# Patient Record
Sex: FEMALE | Race: WHITE | NOT HISPANIC OR LATINO | Employment: UNEMPLOYED | URBAN - METROPOLITAN AREA
[De-identification: names, ages, dates, MRNs, and addresses within clinical notes are randomized per-mention and may not be internally consistent; named-entity substitution may affect disease eponyms.]

---

## 2018-05-03 ENCOUNTER — OFFICE VISIT (OUTPATIENT)
Dept: FAMILY MEDICINE | Facility: CLINIC | Age: 53
End: 2018-05-03
Payer: OTHER GOVERNMENT

## 2018-05-03 VITALS — SYSTOLIC BLOOD PRESSURE: 132 MMHG | RESPIRATION RATE: 18 BRPM | DIASTOLIC BLOOD PRESSURE: 84 MMHG | HEART RATE: 74 BPM

## 2018-05-03 DIAGNOSIS — M79.7 FIBROMYALGIA: ICD-10-CM

## 2018-05-03 DIAGNOSIS — Z86.69 HISTORY OF MIGRAINE: ICD-10-CM

## 2018-05-03 DIAGNOSIS — G47.09 OTHER INSOMNIA: Primary | ICD-10-CM

## 2018-05-03 DIAGNOSIS — F41.8 SITUATIONAL ANXIETY: ICD-10-CM

## 2018-05-03 PROCEDURE — 99214 OFFICE O/P EST MOD 30 MIN: CPT | Performed by: FAMILY MEDICINE

## 2018-05-03 RX ORDER — ZOLPIDEM TARTRATE 10 MG/1
10 TABLET ORAL NIGHTLY PRN
Qty: 30 TABLET | Refills: 5 | Status: SHIPPED | OUTPATIENT
Start: 2018-05-03 | End: 2020-06-24 | Stop reason: SDUPTHER

## 2018-05-03 RX ORDER — BUTALBITAL, ASPIRIN, CAFFEINE AND CODEINE PHOSPHATE 50; 325; 40; 30 MG/1; MG/1; MG/1; MG/1
1 CAPSULE ORAL EVERY 6 HOURS PRN
Qty: 40 CAPSULE | Refills: 0 | Status: SHIPPED | OUTPATIENT
Start: 2018-05-03 | End: 2018-07-17 | Stop reason: SDUPTHER

## 2018-05-03 RX ORDER — TIZANIDINE 4 MG/1
4 TABLET ORAL
COMMUNITY
End: 2018-06-15 | Stop reason: SDUPTHER

## 2018-05-03 RX ORDER — PLECANATIDE 3 MG/1
3 TABLET ORAL DAILY
Refills: 5 | COMMUNITY
Start: 2018-04-02 | End: 2020-06-24 | Stop reason: ALTCHOICE

## 2018-05-03 RX ORDER — ALPRAZOLAM 0.25 MG/1
0.25 TABLET ORAL
COMMUNITY
End: 2018-05-03 | Stop reason: SDUPTHER

## 2018-05-03 RX ORDER — GABAPENTIN 100 MG/1
1 CAPSULE ORAL 3 TIMES DAILY
Refills: 2 | COMMUNITY
Start: 2018-03-03 | End: 2018-05-21 | Stop reason: SDUPTHER

## 2018-05-03 RX ORDER — ALPRAZOLAM 0.25 MG/1
0.25 TABLET ORAL 2 TIMES DAILY PRN
Qty: 30 TABLET | Refills: 0 | Status: SHIPPED | OUTPATIENT
Start: 2018-05-03 | End: 2018-07-17 | Stop reason: SDUPTHER

## 2018-05-03 RX ORDER — BUTALBITAL, ASPIRIN, CAFFEINE AND CODEINE PHOSPHATE 50; 325; 40; 30 MG/1; MG/1; MG/1; MG/1
1 CAPSULE ORAL EVERY 4 HOURS PRN
COMMUNITY
End: 2018-05-03 | Stop reason: SDUPTHER
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
## 2018-05-15 ENCOUNTER — OFFICE VISIT (OUTPATIENT)
Dept: FAMILY MEDICINE | Facility: CLINIC | Age: 53
End: 2018-05-15
Payer: OTHER GOVERNMENT

## 2018-05-15 ENCOUNTER — TELEPHONE (OUTPATIENT)
Dept: FAMILY MEDICINE | Facility: CLINIC | Age: 53
End: 2018-05-15

## 2018-05-15 VITALS — SYSTOLIC BLOOD PRESSURE: 124 MMHG | DIASTOLIC BLOOD PRESSURE: 68 MMHG | HEART RATE: 76 BPM | RESPIRATION RATE: 18 BRPM

## 2018-05-15 DIAGNOSIS — G43.809 OTHER MIGRAINE WITHOUT STATUS MIGRAINOSUS, NOT INTRACTABLE: Primary | ICD-10-CM

## 2018-05-15 PROCEDURE — 96372 THER/PROPH/DIAG INJ SC/IM: CPT | Performed by: FAMILY MEDICINE

## 2018-05-15 PROCEDURE — 99213 OFFICE O/P EST LOW 20 MIN: CPT | Mod: 25 | Performed by: FAMILY MEDICINE

## 2018-05-15 RX ORDER — PROMETHAZINE HYDROCHLORIDE 25 MG/ML
25 INJECTION, SOLUTION INTRAMUSCULAR; INTRAVENOUS ONCE
Status: COMPLETED | OUTPATIENT
Start: 2018-05-15 | End: 2018-05-15

## 2018-05-15 RX ORDER — KETOROLAC TROMETHAMINE 30 MG/ML
60 INJECTION, SOLUTION INTRAMUSCULAR; INTRAVENOUS ONCE
Status: COMPLETED | OUTPATIENT
Start: 2018-05-15 | End: 2018-05-15

## 2018-05-15 RX ORDER — BUTORPHANOL TARTRATE 10 MG/ML
1 SPRAY NASAL EVERY 4 HOURS PRN
Qty: 2.5 ML | Refills: 0 | Status: SHIPPED | OUTPATIENT
Start: 2018-05-15 | End: 2018-05-29

## 2018-05-15 RX ORDER — PREDNISONE 20 MG/1
20 TABLET ORAL 2 TIMES DAILY
Qty: 10 TABLET | Refills: 0 | Status: SHIPPED | OUTPATIENT
Start: 2018-05-15 | End: 2018-05-20

## 2018-05-15 RX ADMIN — KETOROLAC TROMETHAMINE 60 MG: 30 INJECTION, SOLUTION INTRAMUSCULAR; INTRAVENOUS at 16:00

## 2018-05-15 RX ADMIN — PROMETHAZINE HYDROCHLORIDE 25 MG: 25 INJECTION, SOLUTION INTRAMUSCULAR; INTRAVENOUS at 16:03

## 2018-05-15 NOTE — PROGRESS NOTES
Office Visit Progress Note  Subjective      Luiza Tay is a 53 y.o. female who presents for headache.    HPI she has a history of migraine and has generally been able to manage headaches recently with over-the-counter agents fluids ondansetron for nausea and occasional Fioricet.  She is currently working in [employer] and has been exposed to a lot of wood smoke and it seems to have caused some head congestion which is triggered a real intense headache.  She is not getting relief with her usual treatments.  She is trying to drink fluids and she has not had actual vomiting although she has been nauseated.  She has intense pain above her right eye.  Some photophobia and phonophobia.  No numbness or vision change or other focal neurologic symptom.  This is generally more intense and her headaches typically are but not otherwise out of the pattern of normal for her.  In the past she has used it on nose spray with benefit she is also taking Toradol shots with benefit.  She be open to just about any treatment.    When I asked her about her reaction to the would smoke it does not sound like there is been a lot of actual sniffling sneezing itching.          Review of Systems  Items reviewed this visit: No text in SmartText        Objective   /68   Pulse 76   Resp 18     PHYS EXAM she is alert and cooperative.  Speech is clear.  A little tender in the right forehead but not focally at the right temporal artery.  Neck is slightly tender but generally supple.  Cranial nerves are grossly intact.  She has got some nasal membrane congestion swelling with clear discharge oropharynx is clear.  Conjunctiva pink and moist.  TMs clear.  Eyes clear.  Heart regular.          Assessment/Plan     Diagnoses and all orders for this visit:    Other migraine without status migrainosus, not intractable  -     butorphanol (STADOL) 10 mg/mL nasal spray; Administer 1 spray into each nostril every 4 (four) hours as needed for  pain scale 8-10/10 for up to 14 days.  -     ketorolac (TORADOL) injection 60 mg; Inject 2 mL (60 mg total) into the shoulder, thigh, or buttocks once.   -     promethazine (PHENERGAN) injection 25 mg; Inject 1 mL (25 mg total) into the shoulder, thigh, or buttocks once.   -     predniSONE (DELTASONE) 20 mg tablet; Take 1 tablet (20 mg total) by mouth 2 (two) times a day for 5 days. For persistent allergic reaction      For now we will give her Phenergan and Toradol injections in the clinic and see if we can get things to improve with that treatment.  She could certainly keep Stadol nose spray on him as she is used in the past to see if that gives a more potent effect and is something she can self-administer.  Of course would like her to minimize the narcotics and I did warn her of potential comminution affects if she takes that along with Fioricet.  If she is getting more obvious allergic reaction or head congestion it might be beneficial to take a short course of prednisone to reduce inflammation and swelling which might also help her headache.  If she gets new neurologic symptoms or other warning signs like fever or stiff neck and would want to reevaluate  ALEXANDRA CANADA MD

## 2018-05-21 DIAGNOSIS — M79.7 FIBROMYALGIA: Primary | ICD-10-CM

## 2018-05-21 RX ORDER — GABAPENTIN 100 MG/1
100 CAPSULE ORAL 4 TIMES DAILY
Qty: 120 CAPSULE | Refills: 1 | Status: SHIPPED | OUTPATIENT
Start: 2018-05-21 | End: 2018-07-17 | Stop reason: SDUPTHER

## 2018-06-15 DIAGNOSIS — M79.10 MUSCLE ACHE: Primary | ICD-10-CM

## 2018-06-15 RX ORDER — TIZANIDINE 4 MG/1
TABLET ORAL
Qty: 30 TABLET | Refills: 2 | Status: SHIPPED | OUTPATIENT
Start: 2018-06-15 | End: 2018-07-24

## 2018-06-21 ENCOUNTER — OFFICE VISIT (OUTPATIENT)
Dept: URGENT CARE | Facility: CLINIC | Age: 53
End: 2018-06-21
Payer: OTHER GOVERNMENT

## 2018-06-21 VITALS
OXYGEN SATURATION: 100 % | SYSTOLIC BLOOD PRESSURE: 112 MMHG | HEART RATE: 73 BPM | RESPIRATION RATE: 18 BRPM | DIASTOLIC BLOOD PRESSURE: 82 MMHG

## 2018-06-21 DIAGNOSIS — G43.009 MIGRAINE WITHOUT AURA AND WITHOUT STATUS MIGRAINOSUS, NOT INTRACTABLE: Primary | ICD-10-CM

## 2018-06-21 PROCEDURE — 96367 TX/PROPH/DG ADDL SEQ IV INF: CPT | Performed by: NURSE PRACTITIONER

## 2018-06-21 PROCEDURE — 96368 THER/DIAG CONCURRENT INF: CPT | Performed by: NURSE PRACTITIONER

## 2018-06-21 PROCEDURE — 99214 OFFICE O/P EST MOD 30 MIN: CPT | Mod: 25 | Performed by: NURSE PRACTITIONER

## 2018-06-21 PROCEDURE — 96365 THER/PROPH/DIAG IV INF INIT: CPT | Performed by: NURSE PRACTITIONER

## 2018-06-21 RX ORDER — SODIUM CHLORIDE 9 MG/ML
1000 INJECTION, SOLUTION INTRAVENOUS ONCE
Status: COMPLETED | OUTPATIENT
Start: 2018-06-21 | End: 2018-06-21

## 2018-06-21 RX ORDER — DIPHENHYDRAMINE HYDROCHLORIDE 50 MG/ML
25 INJECTION INTRAMUSCULAR; INTRAVENOUS ONCE
Status: COMPLETED | OUTPATIENT
Start: 2018-06-21 | End: 2018-06-21

## 2018-06-21 RX ORDER — KETOROLAC TROMETHAMINE 30 MG/ML
30 INJECTION, SOLUTION INTRAMUSCULAR; INTRAVENOUS ONCE
Status: COMPLETED | OUTPATIENT
Start: 2018-06-21 | End: 2018-06-21

## 2018-06-21 RX ORDER — METOCLOPRAMIDE HYDROCHLORIDE 5 MG/ML
10 INJECTION INTRAMUSCULAR; INTRAVENOUS ONCE
Status: COMPLETED | OUTPATIENT
Start: 2018-06-21 | End: 2018-06-21

## 2018-06-21 RX ADMIN — METOCLOPRAMIDE HYDROCHLORIDE 10 MG: 5 INJECTION INTRAMUSCULAR; INTRAVENOUS at 13:43

## 2018-06-21 RX ADMIN — SODIUM CHLORIDE 1000 ML/HR: 9 INJECTION, SOLUTION INTRAVENOUS at 13:41

## 2018-06-21 RX ADMIN — DIPHENHYDRAMINE HYDROCHLORIDE 25 MG: 50 INJECTION INTRAMUSCULAR; INTRAVENOUS at 13:42

## 2018-06-21 RX ADMIN — KETOROLAC TROMETHAMINE 30 MG: 30 INJECTION, SOLUTION INTRAMUSCULAR; INTRAVENOUS at 13:43

## 2018-06-21 ASSESSMENT — ENCOUNTER SYMPTOMS
UNEXPECTED WEIGHT CHANGE: 0
MYALGIAS: 0
NUMBNESS: 0
SHORTNESS OF BREATH: 0
EYE DISCHARGE: 0
NECK STIFFNESS: 0
VOMITING: 1
AGITATION: 0
NECK PAIN: 0
SINUS PRESSURE: 0
FEVER: 0
DIAPHORESIS: 0
DYSPHORIC MOOD: 0
SORE THROAT: 0
ARTHRALGIAS: 0
APPETITE CHANGE: 0
FACIAL ASYMMETRY: 0
WEAKNESS: 0
APNEA: 0
PHOTOPHOBIA: 1
RHINORRHEA: 0
SEIZURES: 0
SPEECH DIFFICULTY: 0
FATIGUE: 0
ACTIVITY CHANGE: 1
EYE PAIN: 0
EYE REDNESS: 0
NAUSEA: 1
VOICE CHANGE: 0
DIZZINESS: 0
TREMORS: 0
COUGH: 0
TROUBLE SWALLOWING: 0
SINUS PAIN: 0
PALPITATIONS: 0
CHEST TIGHTNESS: 0
CHILLS: 0
DIARRHEA: 0
LIGHT-HEADEDNESS: 0
HEADACHES: 1

## 2018-06-21 ASSESSMENT — PAIN SCALES - GENERAL: PAINLEVEL: 10-WORST PAIN EVER

## 2018-06-21 NOTE — PROGRESS NOTES
Luiza Tay  1965    Subjective     CC:  Chief Complaint   Patient presents with   • Migraine       HPI:  Luiza Tay is a 53 y.o. female who presents for report of migraine that started yesterday around 3 PM.  She started to feel kind of funny when she stood up she had an episode of vomiting and the headache started.  She has a long history of migraines, and this feels like they have in the past.  She points to pain on the right temporal area radiating down the back of her neck.  She is the most sensitive to smells and light, and less sensitive to noise.  She did take a Zofran that did not help the nausea much and she had another episode of vomiting after that.  She also took Fioricet this morning around 730 without any relief.  She was seen in May by Dr. Dougherty and was given butorphanol nasal spray and use that at that time since she no longer has that.  She has not taken anything else for the pain like ibuprofen or Tylenol.  She generally drinks lots of fluids but feels very dry this morning.  She and her  are in town for 6 months living at [facility], they do travel rides and yesterday afternoon she tried to go riding but was not able to because of her headache.  She has no other neurologic symptoms, she has no visual changes, she has no auras, no tinnitus, dizziness, or imbalance.  She tells me she is generally well and has no other complaints at this time.    HPI    Problem List:  Patient Active Problem List   Diagnosis   • History of migraine   • Situational anxiety   • Other insomnia   • Fibromyalgia       Past Medical History:  Past Medical History:   Diagnosis Date   • Fibromyalgia 5/3/2018   • History of migraine 9/14/2017   • Other insomnia 5/3/2018   • Situational anxiety 5/3/2018       Past Surgical History:   Procedure Laterality Date   • COLONOSCOPY  03/2018    no polyps   • HYSTERECTOMY     • SIGMOIDECTOMY  2016    diverticular ds       Social History:  Social  History     Social History   • Marital status:      Spouse name: N/A   • Number of children: N/A   • Years of education: N/A     Occupational History   • Not on file.     Social History Main Topics   • Smoking status: Not on file   • Smokeless tobacco: Not on file   • Alcohol use Not on file   • Drug use: Unknown   • Sexual activity: Not on file     Other Topics Concern   • Not on file     Social History Narrative   • No narrative on file       Family History:  No family history on file.    Allergies:  Allergies   Allergen Reactions   • Desvenlafaxine Succinate      Other reaction(s): Headache   • Hydromorphone Hives   • Morphine      Other reaction(s): Headache   • Trazodone      nightmares   • Clindamycin Rash   • Sumatriptan Rash       Medications:   Current Outpatient Prescriptions on File Prior to Visit   Medication Sig Dispense Refill   • ALPRAZolam (XANAX) 0.25 mg tablet Take 1 tablet (0.25 mg total) by mouth 2 (two) times a day as needed for anxiety. 30 tablet 0   • codeine-butalbital-ASA-caff (FIORINAL-CODEINE #3) capsule Take 1 capsule by mouth every 6 (six) hours as needed for headaches. 40 capsule 0   • gabapentin (NEURONTIN) 100 mg capsule Take 1 capsule (100 mg total) by mouth 4 (four) times a day. 120 capsule 1   • tiZANidine (ZANAFLEX) 4 mg tablet TAKE ONE TABLET BY MOUTH AT BEDTIME 30 tablet 2   • TRULANCE 3 mg tablet 1 tablet daily.  5   • zolpidem (AMBIEN) 10 mg tablet Take 1 tablet (10 mg total) by mouth nightly as needed for sleep. 30 tablet 5   • BUTORPHANOL TARTRATE NASL Administer 1 spray into affected nostril(s) every 4 (four) hours as needed.       No current facility-administered medications on file prior to visit.        Review of Systems:  Review of Systems   Constitutional: Positive for activity change. Negative for appetite change, chills, diaphoresis, fatigue, fever and unexpected weight change.   HENT: Negative for congestion, ear discharge, ear pain, hearing loss, nosebleeds,  postnasal drip, rhinorrhea, sinus pain, sinus pressure, sneezing, sore throat, tinnitus, trouble swallowing and voice change.    Eyes: Positive for photophobia. Negative for pain, discharge, redness and visual disturbance.   Respiratory: Negative for apnea, cough, chest tightness and shortness of breath.    Cardiovascular: Negative for chest pain and palpitations.   Gastrointestinal: Positive for nausea and vomiting. Negative for diarrhea.   Musculoskeletal: Negative for arthralgias, myalgias, neck pain and neck stiffness.   Neurological: Positive for headaches. Negative for dizziness, tremors, seizures, syncope, facial asymmetry, speech difficulty, weakness, light-headedness and numbness.   Psychiatric/Behavioral: Negative for agitation and dysphoric mood.       Objective   EXAM:  /82 (BP Location: Right arm, Patient Position: Sitting, Cuff Size: Reg)   Pulse 73   Resp 18   SpO2 100%     Physical Exam   Constitutional: She is oriented to person, place, and time. She appears well-developed and well-nourished. No distress.   HENT:   Head: Normocephalic and atraumatic.   Right Ear: External ear normal.   Left Ear: External ear normal.   Nose: Nose normal.   Mouth/Throat: Oropharynx is clear and moist.   Eyes: Conjunctivae and EOM are normal. Pupils are equal, round, and reactive to light. Right eye exhibits no discharge. Left eye exhibits no discharge.   Neck: Normal range of motion. Neck supple. No thyromegaly present.   Cardiovascular: Normal rate, regular rhythm and normal heart sounds.    Pulmonary/Chest: Effort normal and breath sounds normal. No respiratory distress.   Musculoskeletal: Normal range of motion.   Lymphadenopathy:     She has no cervical adenopathy.   Neurological: She is alert and oriented to person, place, and time. No cranial nerve deficit or sensory deficit. She exhibits normal muscle tone. Coordination normal.   Skin: Skin is warm. She is not diaphoretic.   Psychiatric: She has a  normal mood and affect. Her behavior is normal. Judgment and thought content normal.   Vitals reviewed.      Procedures      A/P:  Assessment/Plan   Luiza was seen today for migraine.    Diagnoses and all orders for this visit:    Migraine without aura and without status migrainosus, not intractable  -     ketorolac (TORADOL) injection 30 mg; Infuse 1 mL (30 mg total) into a venous catheter once.   -     diphenhydrAMINE (BENADRYL) injection 25 mg; Infuse 0.5 mL (25 mg total) into a venous catheter once.   -     metoclopramide (REGLAN) injection 10 mg; Infuse 2 mL (10 mg total) into a venous catheter once.   -     NS infusion; Infuse 1,000 mL/hr into a venous catheter once.       Luiza tolerated the IV infusion well.  And after the completion of IV fluids she did feel better.  snf through she told me she was still having some pounding of her head, but after she finished she had resolution of pain and just felt a little tactily tender.  Rubbing of her head when it hurts.  Her cheeks appeared to me to be flushed, but her  said that this was a normal coloration for her and she generally is a little bit on the reddish side, so we determined that she was a little bit pale when she first came in.  She has no more complaint of nausea at this time, and is able to keep down fluids while she was in the clinic.  She will plan to go home and rest for the rest of the day and Ensure to drink lots of fluids at home as well.  She instructed that if there are any neurologic symptoms such as changes in speech, coordination, muscle strength, worsening head pain she needs to present to the emergency room.  I also discussed with her that if these migraines seem to be persisting or coming more frequently she needs to follow-up with Dr. Dougherty for further evaluation.  She expressed agreement the plan of care of above and has no concerns or questions at the end of this appointment and will return as needed.  Patient  Instructions:  There are no Patient Instructions on file for this visit.    Follow Up from this visit:  Return if symptoms worsen or fail to improve.        Electronically signed by: Mindy Layton CNP  6/21/2018  2:36 PM    A voice to text program was used to aid in medical record documentation. Sometimes words are printed not exactly as they were spoken. While efforts were made to carefully edit and correct any inaccuracies, some errors may be present. Errors should be taken within the context of the discussion.  Please contact our office if you need assistance interpreting this medical record or notice any mistakes.

## 2018-07-09 RX ORDER — BUTORPHANOL TARTRATE 10 MG/ML
SPRAY NASAL
Qty: 2.5 ML | OUTPATIENT
Start: 2018-07-09

## 2018-07-10 ENCOUNTER — OFFICE VISIT (OUTPATIENT)
Dept: URGENT CARE | Facility: CLINIC | Age: 53
End: 2018-07-10
Payer: OTHER GOVERNMENT

## 2018-07-10 VITALS
WEIGHT: 168 LBS | HEART RATE: 88 BPM | SYSTOLIC BLOOD PRESSURE: 110 MMHG | DIASTOLIC BLOOD PRESSURE: 70 MMHG | RESPIRATION RATE: 18 BRPM | BODY MASS INDEX: 27.12 KG/M2

## 2018-07-10 DIAGNOSIS — G43.009 MIGRAINE WITHOUT AURA AND WITHOUT STATUS MIGRAINOSUS, NOT INTRACTABLE: Primary | ICD-10-CM

## 2018-07-10 PROCEDURE — 96372 THER/PROPH/DIAG INJ SC/IM: CPT | Performed by: NURSE PRACTITIONER

## 2018-07-10 PROCEDURE — 99213 OFFICE O/P EST LOW 20 MIN: CPT | Mod: 25 | Performed by: NURSE PRACTITIONER

## 2018-07-10 RX ORDER — ORPHENADRINE CITRATE 30 MG/ML
60 INJECTION INTRAMUSCULAR; INTRAVENOUS EVERY 12 HOURS
Status: DISCONTINUED | OUTPATIENT
Start: 2018-07-10 | End: 2018-07-10

## 2018-07-10 RX ORDER — BUTORPHANOL TARTRATE 10 MG/ML
1 SPRAY NASAL EVERY 6 HOURS PRN
Qty: 2.5 ML | Refills: 0 | Status: SHIPPED | OUTPATIENT
Start: 2018-07-10 | End: 2018-07-17 | Stop reason: SDUPTHER

## 2018-07-10 RX ORDER — ONDANSETRON 4 MG/1
4 TABLET, ORALLY DISINTEGRATING ORAL ONCE
Status: COMPLETED | OUTPATIENT
Start: 2018-07-10 | End: 2018-07-10

## 2018-07-10 RX ORDER — ORPHENADRINE CITRATE 30 MG/ML
60 INJECTION INTRAMUSCULAR; INTRAVENOUS ONCE
Status: COMPLETED | OUTPATIENT
Start: 2018-07-10 | End: 2018-07-10

## 2018-07-10 RX ORDER — KETOROLAC TROMETHAMINE 30 MG/ML
30 INJECTION, SOLUTION INTRAMUSCULAR; INTRAVENOUS ONCE
Status: COMPLETED | OUTPATIENT
Start: 2018-07-10 | End: 2018-07-10

## 2018-07-10 RX ORDER — KETOROLAC TROMETHAMINE 30 MG/ML
30 INJECTION, SOLUTION INTRAMUSCULAR; INTRAVENOUS ONCE
Status: DISCONTINUED | OUTPATIENT
Start: 2018-07-10 | End: 2018-07-10

## 2018-07-10 RX ADMIN — KETOROLAC TROMETHAMINE 30 MG: 30 INJECTION, SOLUTION INTRAMUSCULAR; INTRAVENOUS at 17:26

## 2018-07-10 RX ADMIN — ORPHENADRINE CITRATE 60 MG: 30 INJECTION INTRAMUSCULAR; INTRAVENOUS at 17:27

## 2018-07-10 RX ADMIN — ONDANSETRON 4 MG: 4 TABLET, ORALLY DISINTEGRATING ORAL at 17:20

## 2018-07-10 ASSESSMENT — ENCOUNTER SYMPTOMS
ARTHRALGIAS: 0
ABDOMINAL PAIN: 0
NERVOUS/ANXIOUS: 0
HEADACHES: 1
SHORTNESS OF BREATH: 0
NUMBNESS: 0
DYSURIA: 0
BACK PAIN: 0
UNEXPECTED WEIGHT CHANGE: 0
BLOOD IN STOOL: 0
PALPITATIONS: 0
PHOTOPHOBIA: 1
RHINORRHEA: 0
WEAKNESS: 0
ADENOPATHY: 0
CONSTIPATION: 0
CHILLS: 0
ACTIVITY CHANGE: 0
FEVER: 0
DIZZINESS: 0
DIARRHEA: 0
SLEEP DISTURBANCE: 0
LIGHT-HEADEDNESS: 0
MYALGIAS: 0
APPETITE CHANGE: 0
POLYDIPSIA: 0
DYSPHORIC MOOD: 0

## 2018-07-10 ASSESSMENT — PAIN SCALES - GENERAL: PAINLEVEL: 9

## 2018-07-10 NOTE — PROGRESS NOTES
Luiza Tay  1965    Subjective     CC:  Chief Complaint   Patient presents with   • Migraine       HPI:  Luiza Tay is a 53 y.o. female who presents for report of migraine for the last 2 days.  She was horseback riding 2 days ago and reports she feels that she was very overheated and has been nauseated with headaches since then.  She is out of her abortive medications at home.  She has long-standing history of migraines and notes her triggers.  sHe has been seen in this clinic by myself in the past and treated for migraine.  She does not feel as ill today she has in the past, but is very nauseated and threw up on the way here.  She has no other complaints at this time.  HPI    Problem List:  Patient Active Problem List   Diagnosis   • History of migraine   • Situational anxiety   • Other insomnia   • Fibromyalgia       Past Medical History:  Past Medical History:   Diagnosis Date   • Fibromyalgia 5/3/2018   • History of migraine 9/14/2017   • Other insomnia 5/3/2018   • Situational anxiety 5/3/2018       Past Surgical History:   Procedure Laterality Date   • COLONOSCOPY  03/2018    no polyps   • HYSTERECTOMY     • SIGMOIDECTOMY  2016    diverticular ds       Social History:  Social History     Social History   • Marital status:      Spouse name: N/A   • Number of children: N/A   • Years of education: N/A     Occupational History   • Not on file.     Social History Main Topics   • Smoking status: Not on file   • Smokeless tobacco: Not on file   • Alcohol use Not on file   • Drug use: Unknown   • Sexual activity: Not on file     Other Topics Concern   • Not on file     Social History Narrative   • No narrative on file       Family History:  No family history on file.    Allergies:  Allergies   Allergen Reactions   • Desvenlafaxine Succinate      Other reaction(s): Headache   • Hydromorphone Hives   • Morphine      Other reaction(s): Headache   • Trazodone      nightmares   • Clindamycin  Rash   • Sumatriptan Rash       Medications:   Current Outpatient Prescriptions on File Prior to Visit   Medication Sig Dispense Refill   • ALPRAZolam (XANAX) 0.25 mg tablet Take 1 tablet (0.25 mg total) by mouth 2 (two) times a day as needed for anxiety. 30 tablet 0   • codeine-butalbital-ASA-caff (FIORINAL-CODEINE #3) capsule Take 1 capsule by mouth every 6 (six) hours as needed for headaches. 40 capsule 0   • gabapentin (NEURONTIN) 100 mg capsule Take 1 capsule (100 mg total) by mouth 4 (four) times a day. 120 capsule 1   • tiZANidine (ZANAFLEX) 4 mg tablet TAKE ONE TABLET BY MOUTH AT BEDTIME 30 tablet 2   • TRULANCE 3 mg tablet 1 tablet daily.  5   • zolpidem (AMBIEN) 10 mg tablet Take 1 tablet (10 mg total) by mouth nightly as needed for sleep. 30 tablet 5   • [DISCONTINUED] BUTORPHANOL TARTRATE NASL Administer 1 spray into affected nostril(s) every 4 (four) hours as needed.       No current facility-administered medications on file prior to visit.        Review of Systems:  Review of Systems   Constitutional: Negative for activity change, appetite change, chills, fever and unexpected weight change.   HENT: Negative for congestion, dental problem and rhinorrhea.         Sensitive to smells   Eyes: Positive for photophobia. Negative for visual disturbance.   Respiratory: Negative for shortness of breath.    Cardiovascular: Negative for chest pain and palpitations.   Gastrointestinal: Negative for abdominal pain, blood in stool, constipation and diarrhea.   Endocrine: Negative for cold intolerance, heat intolerance, polydipsia and polyuria.   Genitourinary: Negative for dysuria, enuresis, menstrual problem, pelvic pain, vaginal bleeding, vaginal discharge and vaginal pain.   Musculoskeletal: Negative for arthralgias, back pain and myalgias.   Skin: Negative for rash.   Allergic/Immunologic: Negative for environmental allergies and food allergies.   Neurological: Positive for headaches ( Worse on right frontal  area). Negative for dizziness, weakness, light-headedness and numbness.        No Tingling   Hematological: Negative for adenopathy.   Psychiatric/Behavioral: Negative for dysphoric mood and sleep disturbance. The patient is not nervous/anxious.        Objective   EXAM:  /70 (BP Location: Left arm)   Pulse 88   Resp 18   Wt 76.2 kg (168 lb)   BMI 27.12 kg/m²     Physical Exam   Constitutional: She is oriented to person, place, and time. She appears well-developed and well-nourished.   HENT:   Head: Normocephalic and atraumatic.   Right Ear: Tympanic membrane and ear canal normal.   Left Ear: Tympanic membrane and ear canal normal.   Eyes: Conjunctivae and EOM are normal. Pupils are equal, round, and reactive to light. No scleral icterus.   Neck: Normal range of motion. Neck supple.   Cardiovascular: Normal rate, regular rhythm and normal heart sounds.    Pulmonary/Chest: Effort normal and breath sounds normal. No respiratory distress.   Neurological: She is alert and oriented to person, place, and time. She displays normal reflexes. No cranial nerve deficit or sensory deficit. She exhibits normal muscle tone. Coordination normal.   Cranial nerves II through XII grossly intact   Skin: Skin is warm and dry. Capillary refill takes less than 2 seconds.   Psychiatric: She has a normal mood and affect. Her behavior is normal. Judgment and thought content normal.   Nursing note and vitals reviewed.      Procedures      A/P:  Assessment/Plan   Luiza was seen today for migraine.    Diagnoses and all orders for this visit:    Migraine without aura and without status migrainosus, not intractable  -     ondansetron ODT (ZOFRAN-ODT) disintegrating tablet 4 mg; Take 1 tablet (4 mg total) by mouth once.   -     Discontinue: ketorolac (TORADOL) injection 30 mg; Inject 1 mL (30 mg total) into the shoulder, thigh, or buttocks once.   -     Discontinue: orphenadrine (NORFLEX) injection 60 mg; Inject 2 mL (60 mg total) into  the shoulder, thigh, or buttocks every 12 (twelve) hours.   -     butorphanol (STADOL) 10 mg/mL nasal spray; Administer 1 spray into one nostril every 6 (six) hours as needed for pain scale 4-7/10 for up to 2 doses.  -     ketorolac (TORADOL) injection 30 mg; Inject 1 mL (30 mg total) into the shoulder, thigh, or buttocks once.   -     orphenadrine (NORFLEX) injection 60 mg; Inject 2 mL (60 mg total) into the shoulder, thigh, or buttocks once.     Discussed with patient that she should establish primary care here in follow-up after this appointment.  She does need to have abortives on hand.  She is given a prescription for Stadol as this seems to work best for her.  I did discuss with her that she would need a primary care to provide further abortive medications though.  She is neurologically intact and reports this migraine is typical of her others.  She thinks that the heat was her trigger at this time.  She is able to tolerate p.o. hydration so I encouraged her to do this at home.  She has a  today and has had the orphenadrine in the past along with Toradol.  She was given ODT Zofran which helped somewhat before she left.  She will return to clinic if she needs in this headache is not improving.  She is in agreement with plan of care as above had no other questions at the end of this exam.    Patient Instructions:  There are no Patient Instructions on file for this visit.    Follow Up from this visit:  Return if symptoms worsen or fail to improve, for Please schedule to establish care with either Dr. Cowart or Dr. Verdin.        Electronically signed by: Mindy Layton CNP  7/10/2018  6:41 PM    A voice to text program was used to aid in medical record documentation. Sometimes words are printed not exactly as they were spoken. While efforts were made to carefully edit and correct any inaccuracies, some errors may be present. Errors should be taken within the context of the discussion.  Please contact  our office if you need assistance interpreting this medical record or notice any mistakes.

## 2018-07-17 ENCOUNTER — OFFICE VISIT (OUTPATIENT)
Dept: FAMILY MEDICINE | Facility: CLINIC | Age: 53
End: 2018-07-17
Payer: OTHER GOVERNMENT

## 2018-07-17 VITALS
BODY MASS INDEX: 29.38 KG/M2 | RESPIRATION RATE: 16 BRPM | OXYGEN SATURATION: 95 % | DIASTOLIC BLOOD PRESSURE: 56 MMHG | HEART RATE: 71 BPM | WEIGHT: 182 LBS | SYSTOLIC BLOOD PRESSURE: 130 MMHG

## 2018-07-17 DIAGNOSIS — G43.009 MIGRAINE WITHOUT AURA AND WITHOUT STATUS MIGRAINOSUS, NOT INTRACTABLE: ICD-10-CM

## 2018-07-17 DIAGNOSIS — M79.7 FIBROMYALGIA: ICD-10-CM

## 2018-07-17 DIAGNOSIS — F41.8 SITUATIONAL ANXIETY: ICD-10-CM

## 2018-07-17 DIAGNOSIS — Z86.69 HISTORY OF MIGRAINE: Primary | ICD-10-CM

## 2018-07-17 PROCEDURE — 90715 TDAP VACCINE 7 YRS/> IM: CPT

## 2018-07-17 PROCEDURE — 99214 OFFICE O/P EST MOD 30 MIN: CPT | Mod: 25 | Performed by: FAMILY MEDICINE

## 2018-07-17 PROCEDURE — 90471 IMMUNIZATION ADMIN: CPT

## 2018-07-17 RX ORDER — BUTALBITAL, ASPIRIN, CAFFEINE AND CODEINE PHOSPHATE 50; 325; 40; 30 MG/1; MG/1; MG/1; MG/1
1 CAPSULE ORAL EVERY 6 HOURS PRN
Qty: 60 CAPSULE | Refills: 3 | Status: SHIPPED | OUTPATIENT
Start: 2018-07-17 | End: 2018-07-30 | Stop reason: HOSPADM

## 2018-07-17 RX ORDER — ALPRAZOLAM 0.25 MG/1
0.25 TABLET ORAL 2 TIMES DAILY PRN
Qty: 30 TABLET | Refills: 1 | Status: SHIPPED | OUTPATIENT
Start: 2018-07-17 | End: 2020-06-24 | Stop reason: ALTCHOICE

## 2018-07-17 RX ORDER — GABAPENTIN 100 MG/1
100 CAPSULE ORAL 4 TIMES DAILY
Qty: 120 CAPSULE | Refills: 5 | Status: SHIPPED | OUTPATIENT
Start: 2018-07-17 | End: 2018-07-24

## 2018-07-17 RX ORDER — BUTORPHANOL TARTRATE 10 MG/ML
1 SPRAY NASAL EVERY 6 HOURS PRN
Qty: 2.5 ML | Refills: 0 | Status: SHIPPED | OUTPATIENT
Start: 2018-07-17 | End: 2019-08-28 | Stop reason: SDUPTHER

## 2018-07-17 NOTE — PROGRESS NOTES
Office Visit Progress Note  Subjective      Luiza Tay is a 53 y.o. female who presents for follow-up of migraine.  She had a couple headaches this past month that required walking clinic visits and injections.  Once she got some IV fluids.  She thinks there might be a connection between sun exposure and dehydration.  She is having other headaches that she can resolve with Fioricet at home or with some Benadryl at home and rest..    HPI overall the headache pattern does seem to be worse when she is in the [place].  She does not know if it is the environment or hydration issues she cannot think of anything specific that she has been exposed to.  She like to keep some Stadol and butalbital on hand to be able to use them intermittently and she has kept the usage of this fairly modest.  The last prescription I wrote was in May and she did get some Stadol from the nurse practitioner [place].   query otherwise does not show any other prescribers.    She has some concerns about persistent weight gain and wonder if he due to gabapentin she is taking for fibromyalgia.  Gabapentin does help her and in fact we talked about possibly taking more gabapentin as a mechanism for try to provide some headache prevention I told her that I thought that the weight gain associated with the medication was likely to be fairly minimal and there were typically can be other reasons including diet and activity that could be connected with the weight gain.  She also brought up some scaly skin lesions I told her we could do a skin biopsy at some point to help you with those.    Finally she has her chronic anxiety symptoms that seem to be provoked by horse riding activities.  She uses alprazolam on a sparing basis.  I gave her a prescription for 30 on May 7 and she still has some left should like to get a refill so that she has not used for the rest of the summer          Review of Systems  Items reviewed this visit: No text in  SmartText        Objective   /56   Pulse 71   Resp 16   Wt 82.6 kg (182 lb)   SpO2 95%   BMI 29.38 kg/m²     PHYS EXAM she is alert and cooperative.  Speech is clear.  Membranes pink and moist.  He shows me areas scattered nondescript slightly scaly slightly reddened 8-10 mm patches of skin on her legs and arms.  I told her we could do a biopsy of these at some point in the future          Assessment/Plan     Diagnoses and all orders for this visit:    History of migraine  -     codeine-butalbital-ASA-caff (FIORINAL-CODEINE #3) capsule; Take 1 capsule by mouth every 6 (six) hours as needed for headaches.    Migraine without aura and without status migrainosus, not intractable  -     butorphanol (STADOL) 10 mg/mL nasal spray; Administer 1 spray into one nostril every 6 (six) hours as needed for pain scale 4-7/10 for up to 2 doses.    Situational anxiety  -     ALPRAZolam (XANAX) 0.25 mg tablet; Take 1 tablet (0.25 mg total) by mouth 2 (two) times a day as needed for anxiety.    Other orders  -     Tdap vaccine greater than or equal to 8yo IM      For now I will suggest that she continue sparing use of the symptom relieving treatments and try to gradually increase her gabapentin use to 5 or 6 or 700 mg daily to see if it helps prevent headaches any better  ALEXANDRA CANADA MD

## 2018-07-17 NOTE — PROGRESS NOTES
Random selection for chart review. Documentation reviewed.  Agree with the evaluation and management plan.

## 2018-07-24 ENCOUNTER — APPOINTMENT (OUTPATIENT)
Dept: RADIOLOGY | Facility: HOSPITAL | Age: 53
DRG: 958 | End: 2018-07-24
Payer: OTHER GOVERNMENT

## 2018-07-24 ENCOUNTER — APPOINTMENT (OUTPATIENT)
Dept: CT IMAGING | Facility: HOSPITAL | Age: 53
DRG: 958 | End: 2018-07-24
Payer: OTHER GOVERNMENT

## 2018-07-24 ENCOUNTER — HOSPITAL ENCOUNTER (INPATIENT)
Facility: HOSPITAL | Age: 53
LOS: 6 days | Discharge: HOME | DRG: 958 | End: 2018-07-30
Attending: EMERGENCY MEDICINE | Admitting: SURGERY
Payer: OTHER GOVERNMENT
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
## 2018-07-24 DIAGNOSIS — S27.0XXA TRAUMATIC PNEUMOTHORAX, INITIAL ENCOUNTER: ICD-10-CM

## 2018-07-24 DIAGNOSIS — S52.571B OTHER TYPE I OR II OPEN INTRA-ARTICULAR FRACTURE OF DISTAL END OF RIGHT RADIUS, INITIAL ENCOUNTER: ICD-10-CM

## 2018-07-24 DIAGNOSIS — S52.501A CLOSED FRACTURE OF RIGHT DISTAL RADIUS: ICD-10-CM

## 2018-07-24 LAB
ANION GAP SERPL CALC-SCNC: 12 MMOL/L (ref 3–11)
BASOPHILS # BLD AUTO: 0 10*3/UL
BASOPHILS NFR BLD AUTO: 0 % (ref 0–2)
BUN SERPL-MCNC: 8 MG/DL (ref 7–25)
CALCIUM SERPL-MCNC: 9.5 MG/DL (ref 8.6–10.3)
CHLORIDE SERPL-SCNC: 102 MMOL/L (ref 98–107)
CO2 SERPL-SCNC: 24 MMOL/L (ref 21–32)
CREAT SERPL-MCNC: 0.9 MG/DL (ref 0.6–1.2)
EOSINOPHIL # BLD AUTO: 0 10*3/UL
EOSINOPHIL NFR BLD AUTO: 0 % (ref 0–3)
ERYTHROCYTE [DISTWIDTH] IN BLOOD BY AUTOMATED COUNT: 12.6 % (ref 11.5–14)
GFR SERPL CREATININE-BSD FRML MDRD: 66 ML/MIN/1.73M*2
GLUCOSE SERPL-MCNC: 100 MG/DL (ref 70–105)
HCT VFR BLD AUTO: 40.8 % (ref 34–45)
HGB BLD-MCNC: 14.2 G/DL (ref 11.5–15.5)
LYMPHOCYTES # BLD AUTO: 1 10*3/UL
LYMPHOCYTES NFR BLD AUTO: 6 % (ref 11–47)
MCH RBC QN AUTO: 31.9 PG (ref 28–33)
MCHC RBC AUTO-ENTMCNC: 34.8 G/DL (ref 32–36)
MCV RBC AUTO: 91.8 FL (ref 81–97)
MONOCYTES # BLD AUTO: 0.7 10*3/UL
MONOCYTES NFR BLD AUTO: 5 % (ref 3–11)
NEUTROPHILS # BLD AUTO: 14 10*3/UL
NEUTROPHILS NFR BLD AUTO: 89 % (ref 41–81)
PLATELET # BLD AUTO: 255 10*3/UL (ref 140–350)
PMV BLD AUTO: 7.6 FL (ref 6.9–10.8)
POTASSIUM SERPL-SCNC: 3.3 MMOL/L (ref 3.5–5.1)
RBC # BLD AUTO: 4.45 10*6/ΜL (ref 3.7–5.3)
SODIUM SERPL-SCNC: 138 MMOL/L (ref 135–145)
WBC # BLD AUTO: 15.8 10*3/UL (ref 4.5–10.5)

## 2018-07-24 PROCEDURE — 12032 INTMD RPR S/A/T/EXT 2.6-7.5: CPT | Performed by: EMERGENCY MEDICINE

## 2018-07-24 PROCEDURE — 71045 X-RAY EXAM CHEST 1 VIEW: CPT

## 2018-07-24 PROCEDURE — 25605 CLTX DST RDL FX/EPHYS SEP W/: CPT | Performed by: EMERGENCY MEDICINE

## 2018-07-24 PROCEDURE — 73090 X-RAY EXAM OF FOREARM: CPT | Mod: RT

## 2018-07-24 PROCEDURE — 6370000100 HC RX 637 (ALT 250 FOR IP): Performed by: SURGERY

## 2018-07-24 PROCEDURE — 6360000200 HC RX 636 W HCPCS (ALT 250 FOR IP): Performed by: EMERGENCY MEDICINE

## 2018-07-24 PROCEDURE — 71260 CT THORAX DX C+: CPT

## 2018-07-24 PROCEDURE — 2500000200 HC RX 250 WO HCPCS: Performed by: EMERGENCY MEDICINE

## 2018-07-24 PROCEDURE — 6360000200 HC RX 636 W HCPCS (ALT 250 FOR IP): Performed by: SURGERY

## 2018-07-24 PROCEDURE — 6360000200 HC RX 636 W HCPCS (ALT 250 FOR IP): Mod: JW | Performed by: EMERGENCY MEDICINE

## 2018-07-24 PROCEDURE — (BLANK) HC ROOM SEMI PRIVATE

## 2018-07-24 PROCEDURE — 36415 COLL VENOUS BLD VENIPUNCTURE: CPT | Performed by: EMERGENCY MEDICINE

## 2018-07-24 PROCEDURE — 80048 BASIC METABOLIC PNL TOTAL CA: CPT | Performed by: EMERGENCY MEDICINE

## 2018-07-24 PROCEDURE — 2550000100 HC RX 255: Mod: JW | Performed by: EMERGENCY MEDICINE

## 2018-07-24 PROCEDURE — 99222 1ST HOSP IP/OBS MODERATE 55: CPT | Performed by: SURGERY

## 2018-07-24 PROCEDURE — 0PSHXZZ REPOSITION RIGHT RADIUS, EXTERNAL APPROACH: ICD-10-PCS | Performed by: EMERGENCY MEDICINE

## 2018-07-24 PROCEDURE — 32551 INSERTION OF CHEST TUBE: CPT | Performed by: EMERGENCY MEDICINE

## 2018-07-24 PROCEDURE — 99291 CRITICAL CARE FIRST HOUR: CPT | Performed by: EMERGENCY MEDICINE

## 2018-07-24 PROCEDURE — 73100 X-RAY EXAM OF WRIST: CPT | Mod: RT

## 2018-07-24 PROCEDURE — 0W9930Z DRAINAGE OF RIGHT PLEURAL CAVITY WITH DRAINAGE DEVICE, PERCUTANEOUS APPROACH: ICD-10-PCS | Performed by: EMERGENCY MEDICINE

## 2018-07-24 PROCEDURE — 0JQG3ZZ REPAIR RIGHT LOWER ARM SUBCUTANEOUS TISSUE AND FASCIA, PERCUTANEOUS APPROACH: ICD-10-PCS | Performed by: EMERGENCY MEDICINE

## 2018-07-24 PROCEDURE — 85025 COMPLETE CBC W/AUTO DIFF WBC: CPT | Performed by: EMERGENCY MEDICINE

## 2018-07-24 PROCEDURE — 2580000300 HC RX 258: Performed by: EMERGENCY MEDICINE

## 2018-07-24 PROCEDURE — 73060 X-RAY EXAM OF HUMERUS: CPT | Mod: RT

## 2018-07-24 RX ORDER — FENTANYL CITRATE/PF 50 MCG/ML
PLASTIC BAG, INJECTION (ML) INTRAVENOUS
Status: DISCONTINUED
Start: 2018-07-24 | End: 2018-07-30 | Stop reason: HOSPADM

## 2018-07-24 RX ORDER — FENTANYL CITRATE/PF 50 MCG/ML
50 PLASTIC BAG, INJECTION (ML) INTRAVENOUS
Status: COMPLETED | OUTPATIENT
Start: 2018-07-24 | End: 2018-07-24

## 2018-07-24 RX ORDER — PROPOFOL 10 MG/ML
INJECTION, EMULSION INTRAVENOUS
Status: DISCONTINUED
Start: 2018-07-24 | End: 2018-07-30 | Stop reason: HOSPADM

## 2018-07-24 RX ORDER — CEFAZOLIN SODIUM 10 G/1
2000 INJECTION, POWDER, FOR SOLUTION INTRAVENOUS ONCE
Status: COMPLETED | OUTPATIENT
Start: 2018-07-24 | End: 2018-07-24

## 2018-07-24 RX ORDER — CYCLOBENZAPRINE HCL 10 MG
10 TABLET ORAL 3 TIMES DAILY PRN
Status: DISCONTINUED | OUTPATIENT
Start: 2018-07-24 | End: 2018-07-30 | Stop reason: HOSPADM

## 2018-07-24 RX ORDER — DIPHENHYDRAMINE HYDROCHLORIDE 50 MG/ML
25 INJECTION INTRAMUSCULAR; INTRAVENOUS ONCE
Status: COMPLETED | OUTPATIENT
Start: 2018-07-24 | End: 2018-07-25

## 2018-07-24 RX ORDER — PROPOFOL 10 MG/ML
INJECTION, EMULSION INTRAVENOUS
Status: DISCONTINUED
Start: 2018-07-24 | End: 2018-07-24 | Stop reason: WASHOUT

## 2018-07-24 RX ORDER — SODIUM CHLORIDE 9 MG/ML
INJECTION, SOLUTION INTRAVENOUS
Status: COMPLETED | OUTPATIENT
Start: 2018-07-24 | End: 2018-07-24

## 2018-07-24 RX ORDER — ONDANSETRON HYDROCHLORIDE 2 MG/ML
4 INJECTION, SOLUTION INTRAVENOUS ONCE
Status: COMPLETED | OUTPATIENT
Start: 2018-07-24 | End: 2018-07-24

## 2018-07-24 RX ORDER — FENTANYL CITRATE/PF 50 MCG/ML
25 PLASTIC BAG, INJECTION (ML) INTRAVENOUS
Status: DISCONTINUED | OUTPATIENT
Start: 2018-07-24 | End: 2018-07-30 | Stop reason: HOSPADM

## 2018-07-24 RX ORDER — SODIUM CHLORIDE 0.9 % (FLUSH) 0.9 %
3 SYRINGE (ML) INJECTION AS NEEDED
Status: DISCONTINUED | OUTPATIENT
Start: 2018-07-24 | End: 2018-07-30 | Stop reason: HOSPADM

## 2018-07-24 RX ORDER — ONDANSETRON HYDROCHLORIDE 2 MG/ML
INJECTION, SOLUTION INTRAVENOUS
Status: DISCONTINUED
Start: 2018-07-24 | End: 2018-07-30 | Stop reason: HOSPADM

## 2018-07-24 RX ORDER — PROPOFOL 10 MG/ML
INJECTION, EMULSION INTRAVENOUS CODE/TRAUMA/SEDATION MEDICATION
Status: COMPLETED | OUTPATIENT
Start: 2018-07-24 | End: 2018-07-24

## 2018-07-24 RX ORDER — KETOROLAC TROMETHAMINE 30 MG/ML
30 INJECTION, SOLUTION INTRAMUSCULAR; INTRAVENOUS EVERY 6 HOURS
Status: DISCONTINUED | OUTPATIENT
Start: 2018-07-24 | End: 2018-07-27

## 2018-07-24 RX ORDER — IOPAMIDOL 755 MG/ML
115 INJECTION, SOLUTION INTRAVASCULAR ONCE
Status: COMPLETED | OUTPATIENT
Start: 2018-07-24 | End: 2018-07-24

## 2018-07-24 RX ORDER — HYDROMORPHONE HYDROCHLORIDE 1 MG/ML
1 INJECTION, SOLUTION INTRAMUSCULAR; INTRAVENOUS; SUBCUTANEOUS
Status: COMPLETED | OUTPATIENT
Start: 2018-07-24 | End: 2018-07-25

## 2018-07-24 RX ORDER — GABAPENTIN 100 MG/1
100 CAPSULE ORAL 4 TIMES DAILY
COMMUNITY
End: 2020-06-24 | Stop reason: SDUPTHER

## 2018-07-24 RX ORDER — TIZANIDINE 4 MG/1
4 TABLET ORAL NIGHTLY
COMMUNITY
End: 2020-06-24 | Stop reason: SDUPTHER

## 2018-07-24 RX ORDER — LIDOCAINE 560 MG/1
1 PATCH PERCUTANEOUS; TOPICAL; TRANSDERMAL DAILY
Status: DISCONTINUED | OUTPATIENT
Start: 2018-07-25 | End: 2018-07-30 | Stop reason: HOSPADM

## 2018-07-24 RX ADMIN — SODIUM CHLORIDE 1000 ML: 9 INJECTION, SOLUTION INTRAVENOUS at 21:27

## 2018-07-24 RX ADMIN — FENTANYL CITRATE 50 MCG: 50 INJECTION, SOLUTION INTRAMUSCULAR; INTRAVENOUS at 21:08

## 2018-07-24 RX ADMIN — IOPAMIDOL 115 ML: 755 INJECTION, SOLUTION INTRAVENOUS at 21:00

## 2018-07-24 RX ADMIN — FENTANYL CITRATE 50 MCG: 50 INJECTION, SOLUTION INTRAMUSCULAR; INTRAVENOUS at 20:06

## 2018-07-24 RX ADMIN — CYCLOBENZAPRINE HYDROCHLORIDE 10 MG: 10 TABLET, FILM COATED ORAL at 23:25

## 2018-07-24 RX ADMIN — PROPOFOL 50 MG: 10 INJECTION, EMULSION INTRAVENOUS at 21:30

## 2018-07-24 RX ADMIN — KETOROLAC TROMETHAMINE 30 MG: 30 INJECTION, SOLUTION INTRAMUSCULAR at 23:25

## 2018-07-24 RX ADMIN — CEFAZOLIN SODIUM 2000 MG: 10 INJECTION, POWDER, FOR SOLUTION INTRAVENOUS at 22:49

## 2018-07-24 RX ADMIN — PROPOFOL 50 MG: 10 INJECTION, EMULSION INTRAVENOUS at 21:45

## 2018-07-24 RX ADMIN — FENTANYL CITRATE 50 MCG: 50 INJECTION, SOLUTION INTRAMUSCULAR; INTRAVENOUS at 20:33

## 2018-07-24 RX ADMIN — ONDANSETRON 4 MG: 2 INJECTION INTRAMUSCULAR; INTRAVENOUS at 20:06

## 2018-07-24 ASSESSMENT — ENCOUNTER SYMPTOMS
WOUND: 1
COUGH: 0
DYSURIA: 0
HEADACHES: 0
EYE PAIN: 0
FEVER: 0
BACK PAIN: 0
CHILLS: 0
DIARRHEA: 0
RHINORRHEA: 0
SHORTNESS OF BREATH: 1
NAUSEA: 0
NECK PAIN: 0
ABDOMINAL PAIN: 0
VOMITING: 0
SORE THROAT: 0

## 2018-07-24 ASSESSMENT — ACTIVITIES OF DAILY LIVING (ADL)
ADEQUATE_TO_COMPLETE_ADL: YES
PATIENT'S MEMORY ADEQUATE TO SAFELY COMPLETE DAILY ACTIVITIES?: YES

## 2018-07-25 ENCOUNTER — ANESTHESIA (OUTPATIENT)
Dept: OPERATING ROOM | Facility: HOSPITAL | Age: 53
DRG: 958 | End: 2018-07-25
Payer: OTHER GOVERNMENT

## 2018-07-25 ENCOUNTER — APPOINTMENT (OUTPATIENT)
Dept: RADIOLOGY | Facility: HOSPITAL | Age: 53
DRG: 958 | End: 2018-07-25
Attending: SURGERY
Payer: OTHER GOVERNMENT

## 2018-07-25 ENCOUNTER — APPOINTMENT (OUTPATIENT)
Dept: FLUOROSCOPY | Facility: HOSPITAL | Age: 53
DRG: 958 | End: 2018-07-25
Attending: ORTHOPAEDIC SURGERY
Payer: OTHER GOVERNMENT

## 2018-07-25 ENCOUNTER — APPOINTMENT (OUTPATIENT)
Dept: RADIOLOGY | Facility: HOSPITAL | Age: 53
DRG: 958 | End: 2018-07-25
Payer: OTHER GOVERNMENT

## 2018-07-25 PROBLEM — S52.501B OPEN FRACTURE OF RIGHT DISTAL RADIUS: Status: ACTIVE | Noted: 2018-07-24

## 2018-07-25 LAB
ALBUMIN SERPL-MCNC: 3.6 G/DL (ref 3.5–5.3)
ALP SERPL-CCNC: 49 U/L (ref 41–108)
ALT SERPL-CCNC: 56 U/L (ref 0–52)
ANION GAP SERPL CALC-SCNC: 10 MMOL/L (ref 3–11)
AST SERPL-CCNC: 65 U/L (ref 0–39)
BASOPHILS # BLD AUTO: 0 10*3/UL
BASOPHILS NFR BLD AUTO: 0 % (ref 0–2)
BILIRUB SERPL-MCNC: 0.44 MG/DL (ref 0–1.4)
BUN SERPL-MCNC: 7 MG/DL (ref 7–25)
CALCIUM ALBUM COR SERPL-MCNC: 9.1 MG/DL (ref 8.5–10.1)
CALCIUM SERPL-MCNC: 8.8 MG/DL (ref 8.6–10.3)
CHLORIDE SERPL-SCNC: 104 MMOL/L (ref 98–107)
CO2 SERPL-SCNC: 22 MMOL/L (ref 21–32)
CREAT SERPL-MCNC: 0.7 MG/DL (ref 0.6–1.2)
EOSINOPHIL # BLD AUTO: 0 10*3/UL
EOSINOPHIL NFR BLD AUTO: 0 % (ref 0–3)
ERYTHROCYTE [DISTWIDTH] IN BLOOD BY AUTOMATED COUNT: 12.7 % (ref 11.5–14)
GFR SERPL CREATININE-BSD FRML MDRD: 88 ML/MIN/1.73M*2
GLUCOSE SERPL-MCNC: 106 MG/DL (ref 70–105)
HCT VFR BLD AUTO: 36.5 % (ref 34–45)
HGB BLD-MCNC: 12.9 G/DL (ref 11.5–15.5)
LYMPHOCYTES # BLD AUTO: 1.4 10*3/UL
LYMPHOCYTES NFR BLD AUTO: 14 % (ref 11–47)
MCH RBC QN AUTO: 32.9 PG (ref 28–33)
MCHC RBC AUTO-ENTMCNC: 35.3 G/DL (ref 32–36)
MCV RBC AUTO: 93.1 FL (ref 81–97)
MONOCYTES # BLD AUTO: 0.6 10*3/UL
MONOCYTES NFR BLD AUTO: 6 % (ref 3–11)
NEUTROPHILS # BLD AUTO: 7.6 10*3/UL
NEUTROPHILS NFR BLD AUTO: 79 % (ref 41–81)
PLATELET # BLD AUTO: 228 10*3/UL (ref 140–350)
PMV BLD AUTO: 7.8 FL (ref 6.9–10.8)
POTASSIUM SERPL-SCNC: 3.8 MMOL/L (ref 3.5–5.1)
PROT SERPL-MCNC: 6.2 G/DL (ref 6–8.3)
RBC # BLD AUTO: 3.93 10*6/ΜL (ref 3.7–5.3)
SODIUM SERPL-SCNC: 136 MMOL/L (ref 135–145)
WBC # BLD AUTO: 9.7 10*3/UL (ref 4.5–10.5)

## 2018-07-25 PROCEDURE — 6360000200 HC RX 636 W HCPCS (ALT 250 FOR IP)

## 2018-07-25 PROCEDURE — 6360000200 HC RX 636 W HCPCS (ALT 250 FOR IP): Mod: JW

## 2018-07-25 PROCEDURE — 0JQG0ZZ REPAIR RIGHT LOWER ARM SUBCUTANEOUS TISSUE AND FASCIA, OPEN APPROACH: ICD-10-PCS | Performed by: ORTHOPAEDIC SURGERY

## 2018-07-25 PROCEDURE — 13121 CMPLX RPR S/A/L 2.6-7.5 CM: CPT | Mod: 59 | Performed by: ORTHOPAEDIC SURGERY

## 2018-07-25 PROCEDURE — 99232 SBSQ HOSP IP/OBS MODERATE 35: CPT | Performed by: NURSE PRACTITIONER

## 2018-07-25 PROCEDURE — 6360000200 HC RX 636 W HCPCS (ALT 250 FOR IP): Performed by: ANESTHESIOLOGY

## 2018-07-25 PROCEDURE — 80053 COMPREHEN METABOLIC PANEL: CPT | Performed by: SURGERY

## 2018-07-25 PROCEDURE — 71045 X-RAY EXAM CHEST 1 VIEW: CPT

## 2018-07-25 PROCEDURE — (BLANK) HC ROOM SEMI PRIVATE

## 2018-07-25 PROCEDURE — 99285 EMERGENCY DEPT VISIT HI MDM: CPT

## 2018-07-25 PROCEDURE — (BLANK) HC RECOVERY PHASE-1 1ST  HOUR ACUITY LEVEL 3: Performed by: ORTHOPAEDIC SURGERY

## 2018-07-25 PROCEDURE — 25608 OPTX DST RD XART FX/EPI SEP2: CPT | Performed by: ORTHOPAEDIC SURGERY

## 2018-07-25 PROCEDURE — 2500000200 HC RX 250 WO HCPCS: Performed by: ORTHOPAEDIC SURGERY

## 2018-07-25 PROCEDURE — 01830 ANES ARTHR/NDSC WRST/HND NOS: CPT | Performed by: NURSE ANESTHETIST, CERTIFIED REGISTERED

## 2018-07-25 PROCEDURE — 2590000100 HC RX 259: Performed by: SURGERY

## 2018-07-25 PROCEDURE — 6360000200 HC RX 636 W HCPCS (ALT 250 FOR IP): Performed by: SURGERY

## 2018-07-25 PROCEDURE — (BLANK) HC OR LEVEL 3 PROC 1ST 15MIN: Performed by: ORTHOPAEDIC SURGERY

## 2018-07-25 PROCEDURE — 6360000200 HC RX 636 W HCPCS (ALT 250 FOR IP): Performed by: ORTHOPAEDIC SURGERY

## 2018-07-25 PROCEDURE — 2500000200 HC RX 250 WO HCPCS: Performed by: NURSE ANESTHETIST, CERTIFIED REGISTERED

## 2018-07-25 PROCEDURE — 6370000100 HC RX 637 (ALT 250 FOR IP): Performed by: SURGERY

## 2018-07-25 PROCEDURE — 6360000200 HC RX 636 W HCPCS (ALT 250 FOR IP): Performed by: NURSE ANESTHETIST, CERTIFIED REGISTERED

## 2018-07-25 PROCEDURE — C1713 ANCHOR/SCREW BN/BN,TIS/BN: HCPCS | Performed by: ORTHOPAEDIC SURGERY

## 2018-07-25 PROCEDURE — 2580000300 HC RX 258: Performed by: ORTHOPAEDIC SURGERY

## 2018-07-25 PROCEDURE — 2580000300 HC RX 258: Performed by: ANESTHESIOLOGY

## 2018-07-25 PROCEDURE — (BLANK) HC RECOVERY PHASE-1 EACH ADDITIONAL  1/2 HOUR ACUITY LEVEL 3: Performed by: ORTHOPAEDIC SURGERY

## 2018-07-25 PROCEDURE — 96376 TX/PRO/DX INJ SAME DRUG ADON: CPT

## 2018-07-25 PROCEDURE — S0028 INJECTION, FAMOTIDINE, 20 MG: HCPCS

## 2018-07-25 PROCEDURE — 2500000200 HC RX 250 WO HCPCS

## 2018-07-25 PROCEDURE — 6360000200 HC RX 636 W HCPCS (ALT 250 FOR IP): Performed by: EMERGENCY MEDICINE

## 2018-07-25 PROCEDURE — 36415 COLL VENOUS BLD VENIPUNCTURE: CPT | Performed by: SURGERY

## 2018-07-25 PROCEDURE — 99252 IP/OBS CONSLTJ NEW/EST SF 35: CPT | Mod: 57 | Performed by: ORTHOPAEDIC SURGERY

## 2018-07-25 PROCEDURE — 76000 FLUOROSCOPY <1 HR PHYS/QHP: CPT | Mod: NO READ

## 2018-07-25 PROCEDURE — 0PSH34Z REPOSITION RIGHT RADIUS WITH INTERNAL FIXATION DEVICE, PERCUTANEOUS APPROACH: ICD-10-PCS | Performed by: ORTHOPAEDIC SURGERY

## 2018-07-25 PROCEDURE — 96374 THER/PROPH/DIAG INJ IV PUSH: CPT

## 2018-07-25 PROCEDURE — 2580000300 HC RX 258: Performed by: SURGERY

## 2018-07-25 PROCEDURE — (BLANK) HC OR LEVEL 3 PROC EACH ADDITIONAL MIN: Performed by: ORTHOPAEDIC SURGERY

## 2018-07-25 PROCEDURE — 85025 COMPLETE CBC W/AUTO DIFF WBC: CPT | Performed by: SURGERY

## 2018-07-25 DEVICE — SCREW VAR ANG 2.4X20MM LCK STARDRIVE RECESS: Type: IMPLANTABLE DEVICE | Site: WRIST | Status: FUNCTIONAL

## 2018-07-25 DEVICE — IMPLANTABLE DEVICE: Type: IMPLANTABLE DEVICE | Site: WRIST | Status: FUNCTIONAL

## 2018-07-25 DEVICE — SCREW CORTEX SS 2.7MM X 14MM T8 STARDRIVE RECESS: Type: IMPLANTABLE DEVICE | Site: WRIST | Status: FUNCTIONAL

## 2018-07-25 DEVICE — SCREW VAR ANG 2.4X22MM LCK STARDRIVE: Type: IMPLANTABLE DEVICE | Site: WRIST | Status: FUNCTIONAL

## 2018-07-25 RX ORDER — ENOXAPARIN SODIUM 100 MG/ML
40 INJECTION SUBCUTANEOUS
Status: DISCONTINUED | OUTPATIENT
Start: 2018-07-25 | End: 2018-07-30 | Stop reason: HOSPADM

## 2018-07-25 RX ORDER — FENTANYL CITRATE/PF 50 MCG/ML
50 PLASTIC BAG, INJECTION (ML) INTRAVENOUS EVERY 5 MIN PRN
Status: COMPLETED | OUTPATIENT
Start: 2018-07-25 | End: 2018-07-25

## 2018-07-25 RX ORDER — CEFAZOLIN SODIUM 10 G/1
1000 INJECTION, POWDER, FOR SOLUTION INTRAVENOUS EVERY 8 HOURS
Status: DISCONTINUED | OUTPATIENT
Start: 2018-07-25 | End: 2018-07-28

## 2018-07-25 RX ORDER — ONDANSETRON HYDROCHLORIDE 2 MG/ML
4 INJECTION, SOLUTION INTRAVENOUS EVERY 6 HOURS PRN
Status: DISCONTINUED | OUTPATIENT
Start: 2018-07-25 | End: 2018-07-30 | Stop reason: HOSPADM

## 2018-07-25 RX ORDER — DOCUSATE SODIUM 100 MG/1
100 CAPSULE, LIQUID FILLED ORAL 2 TIMES DAILY
Status: DISCONTINUED | OUTPATIENT
Start: 2018-07-25 | End: 2018-07-27

## 2018-07-25 RX ORDER — PROPOFOL 10 MG/ML
INJECTION, EMULSION INTRAVENOUS CODE/TRAUMA/SEDATION MEDICATION
Status: DISCONTINUED | OUTPATIENT
Start: 2018-07-24 | End: 2018-07-30 | Stop reason: HOSPADM

## 2018-07-25 RX ORDER — DIPHENHYDRAMINE HYDROCHLORIDE 50 MG/ML
INJECTION INTRAMUSCULAR; INTRAVENOUS
Status: COMPLETED
Start: 2018-07-25 | End: 2018-07-25

## 2018-07-25 RX ORDER — PROMETHAZINE HYDROCHLORIDE 25 MG/ML
12.5 INJECTION, SOLUTION INTRAMUSCULAR; INTRAVENOUS ONCE AS NEEDED
Status: DISCONTINUED | OUTPATIENT
Start: 2018-07-25 | End: 2018-07-25 | Stop reason: HOSPADM

## 2018-07-25 RX ORDER — DEXAMETHASONE SODIUM PHOSPHATE 4 MG/ML
4 INJECTION, SOLUTION INTRA-ARTICULAR; INTRALESIONAL; INTRAMUSCULAR; INTRAVENOUS; SOFT TISSUE ONCE AS NEEDED
Status: DISCONTINUED | OUTPATIENT
Start: 2018-07-25 | End: 2018-07-25 | Stop reason: HOSPADM

## 2018-07-25 RX ORDER — MIDAZOLAM HYDROCHLORIDE 1 MG/ML
1 INJECTION, SOLUTION INTRAMUSCULAR; INTRAVENOUS EVERY 5 MIN PRN
Status: COMPLETED | OUTPATIENT
Start: 2018-07-25 | End: 2018-07-25

## 2018-07-25 RX ORDER — LIDOCAINE HYDROCHLORIDE AND EPINEPHRINE 10; 10 UG/ML; MG/ML
INJECTION, SOLUTION INFILTRATION; PERINEURAL AS NEEDED
Status: DISCONTINUED | OUTPATIENT
Start: 2018-07-25 | End: 2018-07-25 | Stop reason: HOSPADM

## 2018-07-25 RX ORDER — FENTANYL CITRATE/PF 50 MCG/ML
50 PLASTIC BAG, INJECTION (ML) INTRAVENOUS ONCE
Status: DISCONTINUED | OUTPATIENT
Start: 2018-07-25 | End: 2018-07-30 | Stop reason: HOSPADM

## 2018-07-25 RX ORDER — MIDAZOLAM HYDROCHLORIDE 1 MG/ML
INJECTION, SOLUTION INTRAMUSCULAR; INTRAVENOUS
Status: COMPLETED
Start: 2018-07-25 | End: 2018-07-25

## 2018-07-25 RX ORDER — GLYCOPYRROLATE 0.6MG/3ML
SYRINGE (ML) INTRAVENOUS AS NEEDED
Status: DISCONTINUED | OUTPATIENT
Start: 2018-07-25 | End: 2018-07-25 | Stop reason: SURG

## 2018-07-25 RX ORDER — NEOSTIGMINE METHYLSULFATE 5 MG/5 ML
SYRINGE (ML) INTRAVENOUS AS NEEDED
Status: DISCONTINUED | OUTPATIENT
Start: 2018-07-25 | End: 2018-07-25 | Stop reason: SURG

## 2018-07-25 RX ORDER — BUTALBITAL, ASPIRIN, CAFFEINE AND CODEINE PHOSPHATE 50; 325; 40; 30 MG/1; MG/1; MG/1; MG/1
1 CAPSULE ORAL EVERY 6 HOURS PRN
Status: DISCONTINUED | OUTPATIENT
Start: 2018-07-25 | End: 2018-07-27

## 2018-07-25 RX ORDER — ALPRAZOLAM 0.25 MG/1
0.25 TABLET ORAL 2 TIMES DAILY PRN
Status: DISCONTINUED | OUTPATIENT
Start: 2018-07-25 | End: 2018-07-30 | Stop reason: HOSPADM

## 2018-07-25 RX ORDER — ONDANSETRON HYDROCHLORIDE 2 MG/ML
INJECTION, SOLUTION INTRAVENOUS AS NEEDED
Status: DISCONTINUED | OUTPATIENT
Start: 2018-07-25 | End: 2018-07-25 | Stop reason: SURG

## 2018-07-25 RX ORDER — FAMOTIDINE 10 MG/ML
INJECTION INTRAVENOUS
Status: COMPLETED
Start: 2018-07-25 | End: 2018-07-25

## 2018-07-25 RX ORDER — FENTANYL CITRATE/PF 50 MCG/ML
PLASTIC BAG, INJECTION (ML) INTRAVENOUS
Status: COMPLETED
Start: 2018-07-25 | End: 2018-07-25

## 2018-07-25 RX ORDER — DEXTROSE MONOHYDRATE, SODIUM CHLORIDE, AND POTASSIUM CHLORIDE 50; 1.49; 4.5 G/1000ML; G/1000ML; G/1000ML
125 INJECTION, SOLUTION INTRAVENOUS CONTINUOUS
Status: DISCONTINUED | OUTPATIENT
Start: 2018-07-25 | End: 2018-07-27

## 2018-07-25 RX ORDER — ACETAMINOPHEN 325 MG/1
650 TABLET ORAL EVERY 6 HOURS SCHEDULED
Status: DISCONTINUED | OUTPATIENT
Start: 2018-07-25 | End: 2018-07-30 | Stop reason: HOSPADM

## 2018-07-25 RX ORDER — SODIUM CHLORIDE, SODIUM LACTATE, POTASSIUM CHLORIDE, CALCIUM CHLORIDE 600; 310; 30; 20 MG/100ML; MG/100ML; MG/100ML; MG/100ML
100 INJECTION, SOLUTION INTRAVENOUS CONTINUOUS
Status: DISCONTINUED | OUTPATIENT
Start: 2018-07-25 | End: 2018-07-25

## 2018-07-25 RX ORDER — IBUPROFEN 800 MG/1
800 TABLET ORAL EVERY 8 HOURS PRN
Status: DISCONTINUED | OUTPATIENT
Start: 2018-07-25 | End: 2018-07-27

## 2018-07-25 RX ORDER — ONDANSETRON HYDROCHLORIDE 2 MG/ML
4 INJECTION, SOLUTION INTRAVENOUS ONCE AS NEEDED
Status: COMPLETED | OUTPATIENT
Start: 2018-07-25 | End: 2018-07-25

## 2018-07-25 RX ORDER — OXYCODONE HYDROCHLORIDE 5 MG/1
5 TABLET ORAL EVERY 4 HOURS PRN
Status: DISCONTINUED | OUTPATIENT
Start: 2018-07-25 | End: 2018-07-27

## 2018-07-25 RX ORDER — ONDANSETRON HYDROCHLORIDE 2 MG/ML
INJECTION, SOLUTION INTRAVENOUS
Status: COMPLETED
Start: 2018-07-25 | End: 2018-07-25

## 2018-07-25 RX ORDER — DIPHENHYDRAMINE HYDROCHLORIDE 50 MG/ML
25 INJECTION INTRAMUSCULAR; INTRAVENOUS ONCE AS NEEDED
Status: COMPLETED | OUTPATIENT
Start: 2018-07-25 | End: 2018-07-25

## 2018-07-25 RX ORDER — TIZANIDINE 4 MG/1
4 TABLET ORAL NIGHTLY
Status: DISCONTINUED | OUTPATIENT
Start: 2018-07-25 | End: 2018-07-30 | Stop reason: HOSPADM

## 2018-07-25 RX ORDER — FENTANYL CITRATE/PF 50 MCG/ML
50 PLASTIC BAG, INJECTION (ML) INTRAVENOUS EVERY 5 MIN PRN
Status: DISCONTINUED | OUTPATIENT
Start: 2018-07-25 | End: 2018-07-25 | Stop reason: HOSPADM

## 2018-07-25 RX ORDER — OXYCODONE AND ACETAMINOPHEN 5; 325 MG/1; MG/1
1-2 TABLET ORAL EVERY 4 HOURS PRN
Status: DISCONTINUED | OUTPATIENT
Start: 2018-07-25 | End: 2018-07-27

## 2018-07-25 RX ORDER — AMOXICILLIN 250 MG
1 CAPSULE ORAL 2 TIMES DAILY
Status: DISCONTINUED | OUTPATIENT
Start: 2018-07-25 | End: 2018-07-30 | Stop reason: HOSPADM

## 2018-07-25 RX ORDER — ADHESIVE BANDAGE
30 BANDAGE TOPICAL DAILY PRN
Status: DISCONTINUED | OUTPATIENT
Start: 2018-07-25 | End: 2018-07-30 | Stop reason: HOSPADM

## 2018-07-25 RX ORDER — METOPROLOL TARTRATE 1 MG/ML
1 INJECTION, SOLUTION INTRAVENOUS EVERY 5 MIN PRN
Status: DISCONTINUED | OUTPATIENT
Start: 2018-07-25 | End: 2018-07-25 | Stop reason: HOSPADM

## 2018-07-25 RX ORDER — ONDANSETRON HYDROCHLORIDE 2 MG/ML
4 INJECTION, SOLUTION INTRAVENOUS ONCE
Status: DISCONTINUED | OUTPATIENT
Start: 2018-07-25 | End: 2018-07-25 | Stop reason: HOSPADM

## 2018-07-25 RX ORDER — SODIUM CHLORIDE 9 MG/ML
100 INJECTION, SOLUTION INTRAVENOUS CONTINUOUS
Status: DISCONTINUED | OUTPATIENT
Start: 2018-07-25 | End: 2018-07-28

## 2018-07-25 RX ORDER — ROPIVACAINE HYDROCHLORIDE 5 MG/ML
INJECTION, SOLUTION EPIDURAL; INFILTRATION; PERINEURAL AS NEEDED
Status: DISCONTINUED | OUTPATIENT
Start: 2018-07-25 | End: 2018-07-25 | Stop reason: SURG

## 2018-07-25 RX ORDER — LIDOCAINE HYDROCHLORIDE 20 MG/ML
INJECTION, SOLUTION EPIDURAL; INFILTRATION; INTRACAUDAL; PERINEURAL AS NEEDED
Status: DISCONTINUED | OUTPATIENT
Start: 2018-07-25 | End: 2018-07-25 | Stop reason: SURG

## 2018-07-25 RX ORDER — FENTANYL CITRATE/PF 50 MCG/ML
PLASTIC BAG, INJECTION (ML) INTRAVENOUS AS NEEDED
Status: DISCONTINUED | OUTPATIENT
Start: 2018-07-25 | End: 2018-07-25 | Stop reason: SURG

## 2018-07-25 RX ORDER — PROPOFOL 10 MG/ML
INJECTION, EMULSION INTRAVENOUS AS NEEDED
Status: DISCONTINUED | OUTPATIENT
Start: 2018-07-25 | End: 2018-07-25 | Stop reason: SURG

## 2018-07-25 RX ORDER — ACETAMINOPHEN 500 MG
TABLET ORAL
Status: COMPLETED
Start: 2018-07-25 | End: 2018-07-25

## 2018-07-25 RX ORDER — BUPIVACAINE HYDROCHLORIDE AND EPINEPHRINE 5; 5 MG/ML; UG/ML
INJECTION, SOLUTION EPIDURAL; INTRACAUDAL; PERINEURAL AS NEEDED
Status: DISCONTINUED | OUTPATIENT
Start: 2018-07-25 | End: 2018-07-25 | Stop reason: HOSPADM

## 2018-07-25 RX ORDER — DIPHENHYDRAMINE HYDROCHLORIDE 50 MG/ML
25 INJECTION INTRAMUSCULAR; INTRAVENOUS ONCE
Status: COMPLETED | OUTPATIENT
Start: 2018-07-25 | End: 2018-07-25

## 2018-07-25 RX ORDER — ROCURONIUM BROMIDE 50 MG/5 ML
SYRINGE (ML) INTRAVENOUS AS NEEDED
Status: DISCONTINUED | OUTPATIENT
Start: 2018-07-25 | End: 2018-07-25 | Stop reason: SURG

## 2018-07-25 RX ORDER — SODIUM CHLORIDE 9 MG/ML
10 INJECTION, SOLUTION INTRAVENOUS CONTINUOUS
Status: DISCONTINUED | OUTPATIENT
Start: 2018-07-25 | End: 2018-07-30 | Stop reason: HOSPADM

## 2018-07-25 RX ORDER — ACETAMINOPHEN 500 MG
1000 TABLET ORAL ONCE
Status: COMPLETED | OUTPATIENT
Start: 2018-07-25 | End: 2018-07-25

## 2018-07-25 RX ORDER — MIDAZOLAM HYDROCHLORIDE 1 MG/ML
1 INJECTION, SOLUTION INTRAMUSCULAR; INTRAVENOUS EVERY 5 MIN PRN
Status: DISCONTINUED | OUTPATIENT
Start: 2018-07-25 | End: 2018-07-25 | Stop reason: HOSPADM

## 2018-07-25 RX ORDER — GABAPENTIN 100 MG/1
100 CAPSULE ORAL 4 TIMES DAILY
Status: DISCONTINUED | OUTPATIENT
Start: 2018-07-25 | End: 2018-07-30 | Stop reason: HOSPADM

## 2018-07-25 RX ADMIN — DOCUSATE SODIUM 100 MG: 100 CAPSULE, LIQUID FILLED ORAL at 02:25

## 2018-07-25 RX ADMIN — FENTANYL CITRATE 50 MCG: 50 INJECTION, SOLUTION INTRAMUSCULAR; INTRAVENOUS at 09:40

## 2018-07-25 RX ADMIN — DIPHENHYDRAMINE HYDROCHLORIDE 25 MG: 50 INJECTION INTRAMUSCULAR; INTRAVENOUS at 00:00

## 2018-07-25 RX ADMIN — HYDROMORPHONE HYDROCHLORIDE 1 MG: 1 INJECTION, SOLUTION INTRAMUSCULAR; INTRAVENOUS; SUBCUTANEOUS at 12:10

## 2018-07-25 RX ADMIN — Medication 100 MCG: at 10:15

## 2018-07-25 RX ADMIN — FENTANYL CITRATE 25 MCG: 50 INJECTION INTRAMUSCULAR; INTRAVENOUS at 08:07

## 2018-07-25 RX ADMIN — POTASSIUM CHLORIDE, DEXTROSE MONOHYDRATE AND SODIUM CHLORIDE 125 ML/HR: 150; 5; 450 INJECTION, SOLUTION INTRAVENOUS at 02:18

## 2018-07-25 RX ADMIN — DIPHENHYDRAMINE HYDROCHLORIDE 25 MG: 50 INJECTION INTRAMUSCULAR; INTRAVENOUS at 12:48

## 2018-07-25 RX ADMIN — Medication 0.4 MG: at 11:25

## 2018-07-25 RX ADMIN — FENTANYL CITRATE 50 MCG: 50 INJECTION INTRAMUSCULAR; INTRAVENOUS at 12:25

## 2018-07-25 RX ADMIN — CEFAZOLIN 1000 MG: 10 INJECTION, POWDER, FOR SOLUTION INTRAVENOUS at 21:56

## 2018-07-25 RX ADMIN — MIDAZOLAM HYDROCHLORIDE 1 MG: 1 INJECTION, SOLUTION INTRAMUSCULAR; INTRAVENOUS at 13:17

## 2018-07-25 RX ADMIN — FENTANYL CITRATE 25 MCG: 50 INJECTION INTRAMUSCULAR; INTRAVENOUS at 20:51

## 2018-07-25 RX ADMIN — FENTANYL CITRATE 50 MCG: 50 INJECTION, SOLUTION INTRAMUSCULAR; INTRAVENOUS at 11:32

## 2018-07-25 RX ADMIN — Medication 1000 MG: at 09:41

## 2018-07-25 RX ADMIN — ONDANSETRON 4 MG: 2 INJECTION INTRAMUSCULAR; INTRAVENOUS at 13:14

## 2018-07-25 RX ADMIN — TIZANIDINE 4 MG: 4 TABLET ORAL at 20:12

## 2018-07-25 RX ADMIN — STANDARDIZED SENNA CONCENTRATE AND DOCUSATE SODIUM 1 TABLET: 8.6; 5 TABLET, FILM COATED ORAL at 20:12

## 2018-07-25 RX ADMIN — DIPHENHYDRAMINE HYDROCHLORIDE 25 MG: 50 INJECTION INTRAMUSCULAR; INTRAVENOUS at 09:32

## 2018-07-25 RX ADMIN — GABAPENTIN 100 MG: 100 CAPSULE ORAL at 20:13

## 2018-07-25 RX ADMIN — FENTANYL CITRATE 50 MCG: 50 INJECTION INTRAMUSCULAR; INTRAVENOUS at 12:53

## 2018-07-25 RX ADMIN — FENTANYL CITRATE 50 MCG: 50 INJECTION, SOLUTION INTRAMUSCULAR; INTRAVENOUS at 11:36

## 2018-07-25 RX ADMIN — FENTANYL CITRATE 25 MCG: 50 INJECTION INTRAMUSCULAR; INTRAVENOUS at 04:51

## 2018-07-25 RX ADMIN — Medication 3 MG: at 11:25

## 2018-07-25 RX ADMIN — TIZANIDINE 4 MG: 4 TABLET ORAL at 02:25

## 2018-07-25 RX ADMIN — FAMOTIDINE 20 MG: 10 INJECTION, SOLUTION INTRAVENOUS at 13:13

## 2018-07-25 RX ADMIN — ONDANSETRON HYDROCHLORIDE 4 MG: 2 INJECTION, SOLUTION INTRAVENOUS at 13:14

## 2018-07-25 RX ADMIN — POTASSIUM CHLORIDE, DEXTROSE MONOHYDRATE AND SODIUM CHLORIDE 125 ML/HR: 150; 5; 450 INJECTION, SOLUTION INTRAVENOUS at 20:03

## 2018-07-25 RX ADMIN — ALPRAZOLAM 0.25 MG: 0.25 TABLET ORAL at 15:40

## 2018-07-25 RX ADMIN — PROPOFOL 140 MG: 10 INJECTION, EMULSION INTRAVENOUS at 10:00

## 2018-07-25 RX ADMIN — Medication 100 MCG: at 10:05

## 2018-07-25 RX ADMIN — FENTANYL CITRATE 50 MCG: 50 INJECTION, SOLUTION INTRAMUSCULAR; INTRAVENOUS at 09:30

## 2018-07-25 RX ADMIN — FENTANYL CITRATE 25 MCG: 50 INJECTION INTRAMUSCULAR; INTRAVENOUS at 15:12

## 2018-07-25 RX ADMIN — SODIUM CHLORIDE 2.5 MILLION UNITS: 9 INJECTION, SOLUTION INTRAVENOUS at 04:33

## 2018-07-25 RX ADMIN — MIDAZOLAM 1 MG: 1 INJECTION INTRAMUSCULAR; INTRAVENOUS at 12:25

## 2018-07-25 RX ADMIN — LIDOCAINE 1 PATCH: 560 PATCH PERCUTANEOUS; TOPICAL; TRANSDERMAL at 00:06

## 2018-07-25 RX ADMIN — FENTANYL CITRATE 100 MCG: 50 INJECTION, SOLUTION INTRAMUSCULAR; INTRAVENOUS at 10:00

## 2018-07-25 RX ADMIN — KETOROLAC TROMETHAMINE 30 MG: 30 INJECTION, SOLUTION INTRAMUSCULAR at 05:29

## 2018-07-25 RX ADMIN — DOCUSATE SODIUM 100 MG: 100 CAPSULE, LIQUID FILLED ORAL at 20:12

## 2018-07-25 RX ADMIN — OXYCODONE HYDROCHLORIDE AND ACETAMINOPHEN 2 TABLET: 5; 325 TABLET ORAL at 15:41

## 2018-07-25 RX ADMIN — MIDAZOLAM HYDROCHLORIDE 1 MG: 1 INJECTION, SOLUTION INTRAMUSCULAR; INTRAVENOUS at 12:25

## 2018-07-25 RX ADMIN — LIDOCAINE 1 PATCH: 560 PATCH PERCUTANEOUS; TOPICAL; TRANSDERMAL at 16:09

## 2018-07-25 RX ADMIN — FENTANYL CITRATE 50 MCG: 50 INJECTION, SOLUTION INTRAMUSCULAR; INTRAVENOUS at 12:25

## 2018-07-25 RX ADMIN — ACETAMINOPHEN 650 MG: 325 TABLET ORAL at 18:28

## 2018-07-25 RX ADMIN — ALPRAZOLAM 0.25 MG: 0.25 TABLET ORAL at 02:17

## 2018-07-25 RX ADMIN — KETOROLAC TROMETHAMINE 30 MG: 30 INJECTION, SOLUTION INTRAMUSCULAR at 22:40

## 2018-07-25 RX ADMIN — FENTANYL CITRATE 25 MCG: 50 INJECTION, SOLUTION INTRAMUSCULAR; INTRAVENOUS at 11:14

## 2018-07-25 RX ADMIN — FENTANYL CITRATE 25 MCG: 50 INJECTION INTRAMUSCULAR; INTRAVENOUS at 22:59

## 2018-07-25 RX ADMIN — MIDAZOLAM 1 MG: 1 INJECTION INTRAMUSCULAR; INTRAVENOUS at 09:50

## 2018-07-25 RX ADMIN — STANDARDIZED SENNA CONCENTRATE AND DOCUSATE SODIUM 1 TABLET: 8.6; 5 TABLET, FILM COATED ORAL at 02:24

## 2018-07-25 RX ADMIN — SODIUM CHLORIDE, POTASSIUM CHLORIDE, SODIUM LACTATE AND CALCIUM CHLORIDE 100 ML/HR: 600; 310; 30; 20 INJECTION, SOLUTION INTRAVENOUS at 09:30

## 2018-07-25 RX ADMIN — ONDANSETRON 4 MG: 2 INJECTION INTRAMUSCULAR; INTRAVENOUS at 11:00

## 2018-07-25 RX ADMIN — FENTANYL CITRATE 50 MCG: 50 INJECTION, SOLUTION INTRAMUSCULAR; INTRAVENOUS at 10:32

## 2018-07-25 RX ADMIN — Medication 5 MG: at 10:00

## 2018-07-25 RX ADMIN — Medication 45 MG: at 10:02

## 2018-07-25 RX ADMIN — HYDROMORPHONE HYDROCHLORIDE 1 MG: 1 INJECTION, SOLUTION INTRAMUSCULAR; INTRAVENOUS; SUBCUTANEOUS at 03:33

## 2018-07-25 RX ADMIN — OXYCODONE HYDROCHLORIDE AND ACETAMINOPHEN 2 TABLET: 5; 325 TABLET ORAL at 20:03

## 2018-07-25 RX ADMIN — GABAPENTIN 100 MG: 100 CAPSULE ORAL at 16:05

## 2018-07-25 RX ADMIN — KETOROLAC TROMETHAMINE 30 MG: 30 INJECTION, SOLUTION INTRAMUSCULAR at 17:15

## 2018-07-25 RX ADMIN — ACETAMINOPHEN 650 MG: 325 TABLET ORAL at 02:24

## 2018-07-25 RX ADMIN — MIDAZOLAM HYDROCHLORIDE 1 MG: 1 INJECTION, SOLUTION INTRAMUSCULAR; INTRAVENOUS at 09:50

## 2018-07-25 RX ADMIN — CEFAZOLIN 1000 MG: 10 INJECTION, POWDER, FOR SOLUTION INTRAVENOUS at 06:10

## 2018-07-25 RX ADMIN — LIDOCAINE HYDROCHLORIDE 60 MG: 20 INJECTION, SOLUTION EPIDURAL; INFILTRATION; INTRACAUDAL; PERINEURAL at 10:00

## 2018-07-25 RX ADMIN — ROPIVACAINE HYDROCHLORIDE 30 ML: 5 INJECTION, SOLUTION EPIDURAL; INFILTRATION; PERINEURAL at 12:36

## 2018-07-25 RX ADMIN — HYDROMORPHONE HYDROCHLORIDE 1 MG: 1 INJECTION, SOLUTION INTRAMUSCULAR; INTRAVENOUS; SUBCUTANEOUS at 12:00

## 2018-07-25 RX ADMIN — HYDROMORPHONE HYDROCHLORIDE 1 MG: 1 INJECTION, SOLUTION INTRAMUSCULAR; INTRAVENOUS; SUBCUTANEOUS at 00:13

## 2018-07-25 RX ADMIN — CEFAZOLIN 1000 MG: 10 INJECTION, POWDER, FOR SOLUTION INTRAVENOUS at 10:16

## 2018-07-25 RX ADMIN — FENTANYL CITRATE 50 MCG: 50 INJECTION, SOLUTION INTRAMUSCULAR; INTRAVENOUS at 11:50

## 2018-07-25 RX ADMIN — FENTANYL CITRATE 25 MCG: 50 INJECTION INTRAMUSCULAR; INTRAVENOUS at 01:14

## 2018-07-25 RX ADMIN — FENTANYL CITRATE 25 MCG: 50 INJECTION, SOLUTION INTRAMUSCULAR; INTRAVENOUS at 11:25

## 2018-07-25 ASSESSMENT — PAIN DESCRIPTION - DESCRIPTORS
DESCRIPTORS: SHARP;STABBING

## 2018-07-25 ASSESSMENT — ENCOUNTER SYMPTOMS: EXERCISE TOLERANCE: GOOD (4-7 METS)

## 2018-07-25 NOTE — PROGRESS NOTES
07/25/18  07:20 AM    ID: 53 y.o. female admitted 7/24/18 after falling off of a horse.  No loss of consciousness, injuries identified include an open displaced right radial fracture, right-sided rib fractures, and right pneumothorax.     SUBJECTIVE:  Patient seen and examined in her room this morning, sitting in bed resting with eyes closed, awakens easily to verbal stimuli and engages easily in examination and conversation.  Patient acknowledges generalized pain this morning, states it is more so in her right chest and right wrist.  Acknowledges that it took quite a bit of time overnight to get her pain under control, however she feels that she is relatively okay this morning.  Patient also acknowledges that it is difficult to take a deep breath due to the sensation of a catching on that right side, however denies feeling short of breath.  Patient denies feeling dizzy, lightheaded, other chest pain than on her right side.  Admits to some nausea due to the pain medications and not being able to eat due to surgery this morning, no episodes of emesis, denies feeling as if her abdomen is distended.  Denies any numbness or tingling to extremities upper and lower bilaterally, does acknowledge pain in her right arm.    No reported acute overnight events per nursing staff.    OBJECTIVE:  Temp:  [36.7 °C (98.1 °F)-37 °C (98.6 °F)] 37 °C (98.6 °F)  Heart Rate:  [] 80  Resp:  [17-28] 20  BP: ()/(38-88) 93/41  Vital signs reviewed    Physical Exam:  General: Alert, oriented, in no acute distress  HEENT: Normocephalic, ecchymosis noted to forehead, pupils equal round reactive, trachea midline  Respiratory: Respirations shallow, unlabored, clear to auscultation  Chest: Tender to palpation over right lateral chest wall, no crepitus, pigtail catheter in place to right mid axillary line, small air leak noted, chest tube hooked to suction, scant amount of sanguinous drainage noted in chamber  Cardiovascular: Regular  rate and rhythm, no murmurs, rubs, clicks, gallops auscultated  Abdomen: Soft, nontender to palpation, nondistended, bowel sounds present  Extremities: Right forearm in splint that extends from above elbow to mid digit area, able to wiggle fingers, make okay sign, fingers warm to touch, capillary refill less than 3 seconds.  Pulses: Dorsalis pedis 2+ bilaterally, left radial 2+, unable to palpate left radial due to cast  Skin: Warm, dry  Psych: Mood and affect appropriate for situation    Intake/Output Summary (Last 24 hours) at 07/25/18 0840  Last data filed at 07/25/18 0635   Gross per 24 hour   Intake             1119 ml   Output             1260 ml   Net             -141 ml     Laboratory:  CBC with Platelet:  Lab Results   Component Value Date    WBC 9.7 07/25/2018    HGB 12.9 07/25/2018    HCT 36.5 07/25/2018     07/25/2018    RBC 3.93 07/25/2018    MCV 93.1 07/25/2018    MCH 32.9 07/25/2018    MCHC 35.3 07/25/2018    RDW 12.7 07/25/2018    MPV 7.8 07/25/2018     Comp: Lab Results   Component Value Date     07/25/2018    K 3.8 07/25/2018     07/25/2018    CO2 22 07/25/2018    BUN 7 07/25/2018    CREATININE 0.7 07/25/2018    GLUCOSE 106 (H) 07/25/2018    CALCIUM 8.8 07/25/2018    PROT 6.2 07/25/2018    ALBUMIN 3.6 07/25/2018    AST 65 (H) 07/25/2018    ALT 56 (H) 07/25/2018    ALKPHOS 49 07/25/2018    BILITOT 0.44 07/25/2018     ASSESSMENT:  53 y.o.female admitted 7/24/18 after falling off of a horse.  Landed on her right side, was not wearing helmet, however did not experience a loss of consciousness, injuries identified include an open displaced right radial fracture, right-sided rib fractures, and large right pneumothorax.  Pigtail catheter placed in emergency department with partial resolution of pneumothorax. Patient afebrile with stable vital signs overnight, orthopedic surgery planning for OR today for right radial fracture.     07/25/18:  PLAN:  -Keep chest tube to suction to -20 cm  H2O  -Daily chest x-rays  -Supplemental oxygen of 2-3 L and frequent use of incentive spirometry  -NPO in anticipation of operating room with orthopedic surgery for I&D and ORIF of distal radial fracture.  -Patient may have a regular diet from a general surgery standpoint postoperatively  -Pain and nausea control as necessary   -Consider epidural placement if pain unable to be controlled  -PT/OT consulted   -Weight-bear status to right upper extremity per orthopedic recommendations  -DVT prophylaxis: SCDs, pharmacological prophylaxis per orthopedic surgery recommendations    Patient case and plan of care discussed with Dr. Sauer.    Michelle Wright, CNP

## 2018-07-25 NOTE — INTERDISCIPLINARY/THERAPY
Care Manager [phone]    PORSHA reviewed chart and rounded on patient once she returned from surgery this afternoon.  She is from [place] and is staying in [facility] with her  until the end of August.  She uses no home health, oxygen or assistive device.  PT/OT are ordered and depending on how she does will determine if she can discharge to home or if she will need to somewhere for inpatient rehab.    Anticipated disposition is unclear, will depend on her progress with PT.  PORSHA will continue to follow.

## 2018-07-25 NOTE — ED PROCEDURE NOTE
Procedure  Procedural Sedation  Date/Time: 7/24/2018 9:11 PM  Performed by: FRANCISCA DOMINGO  Authorized by: FRANCISCA DOMINGO     Consent:     Consent obtained:  Verbal and emergent situation    Consent given by:  Patient    Anesthetic risks, benefits, and alternatives explained to the patient: Yes    Indications:     Procedure performed:  Chest tube placement and fracture reduction    Procedure Performed by:  Different physician    Intended level of sedation:  General anesthesia  Pre-sedation assessment:     Time since last food or drink:  4 hours prior    ASA classification: class 3 - patient with severe systemic disease      Mouth opening:  3 or more finger widths    Mallampati score:  I - soft palate, uvula, fauces, pillars visible    Pre-sedation assessments completed and reviewed: airway patency, cardiovascular function, hydration status, mental status, nausea/vomiting, pain level, respiratory function and temperature      History of difficult intubation: no      Pre-sedation assessment completed:  7/24/2018 9:10 PM  Immediate pre-procedure details:     Reassessment: Patient reassessed immediately prior to procedure    Procedure details (see MAR for exact dosages):     Sedation start time:  7/24/2018 9:15 PM    Preoxygenation:  Nonrebreather mask and nasal cannula    Medications:  Propofol    Intra-procedure monitoring:  Blood pressure monitoring, continuous pulse oximetry and EKG    Intra-procedure events: none      Sedation end time:  7/24/2018 10:03 PM  Post-procedure details:     Attendance: Constant attendance by certified staff until patient recovered      Recovery: Patient returned to pre-procedure baseline      Patient Participation:  Complete - patient participated    Level of Consciousness:  Awake    Pain Management:  Adequate    Anesthetic Complications: No      Cardiovascular Status:  Acceptable    Respiratory Status:  Acceptable    Postoperative Hydration:  Acceptable    Patient is stable for  discharge or admission: yes      Patient tolerance:  Tolerated well, no immediate complications                             Fan Boo MD  08/22/18 2326

## 2018-07-25 NOTE — ANESTHESIA PROCEDURE NOTES
Peripheral Block    Patient location during procedure: post-op  Start time: 7/25/2018 12:35 PM  End time: 7/25/2018 12:42 PM  Reason for block: post-op pain management    Staffing  Anesthesiologist: RENA SHINE  Performed: anesthesiologist   Preanesthetic Checklist  Completed: patient identified, surgical consent, pre-op evaluation, timeout performed, IV checked, risks and benefits discussed and monitors and equipment checked  Peripheral Block  Patient position: supine  Prep: ChloraPrep  Patient monitoring: EKG, blood pressure monitoring and continuous pulse oximetry  Supplemental oxygen: nasal cannula  Block type: adductor canal block  Laterality: right  Injection technique: single-shot  Procedures: ultrasound guided  Ultrasound image added to chart: yes    Needle  Needle type: EchoStim   Needle gauge: 21 G  Needle length: 4 in      Assessment  Injection assessment: no apparent complications, negative aspiration for heme, no paresthesia on injection, incremental injection and local visualized surrounding nerve on ultrasound  Paresthesia pain: none  Heart rate change: no  Slow fractionated injection: yes

## 2018-07-25 NOTE — PLAN OF CARE
Problem: Knowledge Deficit  Goal: Patient/family/caregiver demonstrates understanding of disease process, treatment plan, medications, and discharge instructions  INTERVENTIONS:   1. Complete learning assessment and assess knowledge base  2. Provide teaching at level of understanding   3. Provide teaching via preferred learning methods   Outcome: Progressing   07/24/18 2319   Interventions Appropriate for this Patient   Patient/family/caregiver demonstrates understanding of disease process, treatment plan, medications, and discharge instructions Complete learning assessment and assess knowledge base;Provide teaching at level of understanding;Provide teaching via preferred learning methods       Problem: Potential for Compromised Skin Integrity  Goal: Skin Integrity is Maintained or Improved  INTERVENTIONS:  1. Assess and monitor skin integrity  2. Collaborate with interdisciplinary team and initiate plans and interventions as needed  3. Alternate a full bath with partial baths for elderly   4. Monitor patient's hygiene practices   5. Collaborate with wound, ostomy, and continence nurse   Outcome: Progressing   07/24/18 2319   Interventions Appropriate for this Patient   Skin integrity is maintained or improved Assess and monitor skin integrity;Collaborate with interdisciplinary team and initiate plans and interventions as needed;Alternate a full bath with partial baths for elderly;Monitor patient's hygiene practices;Collaborate with wound, ostomy, and continence nurse     Goal: Nutritional status is improving  INTERVENTIONS:  1. Monitor and assess patient for malnutrition (ex- brittle hair, bruises, dry skin, pale skin and conjunctiva, muscle wasting, smooth red tongue, and disorientation)  2. Monitor patient's weight and dietary intake as ordered or per policy  3. Determine patient's food preferences and provide high-protein, high-caloric foods as appropriate  4. Assist patient with eating   5. Allow adequate time  for meals   6. Encourage patient to take dietary supplement as ordered   7. Collaborate with dietitian  8. Include patient/family/caregiver in decisions related to nutrition   Outcome: Progressing   07/24/18 2319   Interventions Appropriate for this Patient   Nutritional status is improving Monitor and assess patient for malnutrition (ex- brittle hair, bruises, dry skin, pale skin and conjunctiva, muscle wasting, smooth red tongue, and disorientation);Monitor patient's weight and dietary intake as ordered or per policy;Determine patient's food preferences and provide high-protein, high-caloric foods as appropriate;Encourage patient to take dietary supplement as ordered;Assist patient with eating;Allow adequate time for meals;Collaborate with dietitian;Include patient/family/caregiver in decisions related to nutrition     Goal: MOBILITY IS MAINTAINED OR IMPROVED  INTERVENTIONS  1. Collaborate with interdisciplinary team and initiate plan and interventions as ordered (PT/OT)  2. Encourage ambulation  3. Up to chair for meals  4. Monitor for signs of deconditioning   Outcome: Progressing   07/24/18 2319   Interventions Appropriate for this Patient   Mobility is Maintained or Improved Collaborate with interdisciplinary team and initiate plan and interventions as ordered (PT/OT);Encourage ambulation;Up to chair for meals;Monitor for signs of deconditioning       Problem: Urinary Incontinence  Goal: Perineal skin integrity is maintained or improved  INTERVENTIONS:  1. Assess genitourinary system, perineal skin, labs (urinalysis), and history of incontinence to include past management, aggravating, and alleviating factors  2. Collaborate with interdisciplinary team including wound, ostomy, and continence nurse and initiate plans and interventions as needed  4. Consider urine containment device  5. Apply skin protectant   6. Develop skin care regimen  7. Provide privacy when changing patient's incontinence device to  maintain their dignity   Outcome: Progressing   07/24/18 8012   Interventions Appropriate for this Patient   Perineal skin integrity is maintained or improved Assess genitourinary system, perineal skin, labs (urinalysis), and history of incontinence to include past management, aggravating, and alleviating factors;Collaborate with interdisciplinary team including wound, ostomy, and continence nurse and initiate plans and interventions as needed;Apply skin protectant;Consider urine containment device;Develop skin care regimen;Provide privacy when changing patient's incontinence device to maintain their dignity

## 2018-07-25 NOTE — MEDICATION HISTORY SPECIALIST NOTES
CSN: [id number]  : [id number]    Information sources:  EPIC  Complete Dispense Report    Allergies verified.    Patient interviewed in ED. Updated Epic per patient and Complete Dispense Report. Patient verified med list, last doses, and allergies.     Tizanidine 4 mg was prescribed on 6/15 in EPIC as 4 mg HS. Medication entered in incorrect format, corrected to allow proper inpatient provider functionality. (One line med)

## 2018-07-25 NOTE — BRIEF OP NOTE
Brief Op Note:    Luiza Tay  7/24/2018 - 7/25/2018    Pre-op Diagnosis     * Other type I or II open intra-articular fracture of distal end of right radius, initial encounter [S52.981B]       Post-op Diagnosis     * Other type I or II open intra-articular fracture of distal end of right radius, initial encounter [S52.991B]    Procedures:    * RIGHT ORIF WRIST    * irrigation and debridement of right upper extremity and all indicated procedures    Surgeon(s):  Saad Zuniga MD    Anesthesia:  Anesthesiologist: Mainor Mancia MD  CRNA: Guille Coelho CRNA; Crissy Taylor CRNA  Student Nurse Anesthetist: MUSA Duarte  General      Staff:   Circulator: Kelle Hya RN; Abida Osborn RN  Relief Circulator: Mark Bailon RN  Relief Scrub: David Peña  Scrub Person: Liliana Higgins  First Assist: Trevon Harrison; Sebas Zhou RN    Estimated Blood Loss:  30 mL    Specimens:  * No specimens in log *      Drains:  Chest Tube Right (Active)   Chest Tube Air Leak Continuous 7/25/2018  6:35 AM   Patency Intervention Tip/tilt 7/25/2018  6:35 AM   Drainage Description Serosanguineous 7/25/2018  6:35 AM   Dressing Status Clean;Dry;Intact 7/25/2018  8:53 AM   Site Assessment Clean;Dry;Intact 7/25/2018  8:53 AM   Surrounding Skin Intact 7/25/2018  8:53 AM   Output (mL) 10 mL 7/25/2018  6:35 AM       [REMOVED] Urethral Catheter Latex 16 Fr. (Removed)       Complications:  None    Saad Zuniga MD  Phone Number: [phone]    Date: 7/25/2018  Time: 12:56 PM

## 2018-07-25 NOTE — PLAN OF CARE
Problem: Knowledge Deficit  Goal: Patient/family/caregiver demonstrates understanding of disease process, treatment plan, medications, and discharge instructions  INTERVENTIONS:   1. Complete learning assessment and assess knowledge base  2. Provide teaching at level of understanding   3. Provide teaching via preferred learning methods   Outcome: Progressing      Problem: Potential for Compromised Skin Integrity  Goal: Skin Integrity is Maintained or Improved  INTERVENTIONS:  1. Assess and monitor skin integrity  2. Collaborate with interdisciplinary team and initiate plans and interventions as needed  3. Alternate a full bath with partial baths for elderly   4. Monitor patient's hygiene practices   5. Collaborate with wound, ostomy, and continence nurse   Outcome: Progressing    Goal: Nutritional status is improving  INTERVENTIONS:  1. Monitor and assess patient for malnutrition (ex- brittle hair, bruises, dry skin, pale skin and conjunctiva, muscle wasting, smooth red tongue, and disorientation)  2. Monitor patient's weight and dietary intake as ordered or per policy  3. Determine patient's food preferences and provide high-protein, high-caloric foods as appropriate  4. Assist patient with eating   5. Allow adequate time for meals   6. Encourage patient to take dietary supplement as ordered   7. Collaborate with dietitian  8. Include patient/family/caregiver in decisions related to nutrition   Outcome: Progressing    Goal: MOBILITY IS MAINTAINED OR IMPROVED  INTERVENTIONS  1. Collaborate with interdisciplinary team and initiate plan and interventions as ordered (PT/OT)  2. Encourage ambulation  3. Up to chair for meals  4. Monitor for signs of deconditioning   Outcome: Not Progressing      Problem: Urinary Incontinence  Goal: Perineal skin integrity is maintained or improved  INTERVENTIONS:  1. Assess genitourinary system, perineal skin, labs (urinalysis), and history of incontinence to include past management,  aggravating, and alleviating factors  2. Collaborate with interdisciplinary team including wound, ostomy, and continence nurse and initiate plans and interventions as needed  4. Consider urine containment device  5. Apply skin protectant   6. Develop skin care regimen  7. Provide privacy when changing patient's incontinence device to maintain their dignity   Outcome: Progressing      Problem: Pain - Adult  Goal: Verbalizes/displays adequate comfort level or baseline comfort level  INTERVENTIONS:  1. Encourage patient to monitor pain and request interventions  2. Assess pain using the appropriate pain scale  3. Administer analgesics based on type and severity of pain and evaluate response  4. Educate/Implement non-pharmacological measures as appropriate and evaluate response  5. Consider cultural, developmental and social influences on pain and pain management  6. Notify Provider if interventions unsuccessful or patient reports new pain  Outcome: Not Progressing      Problem: Infection - Adult  Goal: Absence of infection during hospitalization  INTERVENTIONS:  1. Assess and monitor for signs and symptoms of infection  2. Monitor lab/diagnostic results  3. Monitor all insertion sites/wounds/incisions  4. Monitor secretions for changes in amount and color  5. Administer medications as ordered  6. Educate and encourage patient and family to use good hand hygiene technique  7. Identify and educate in appropriate isolation precautions for identified infection/condition  Outcome: Progressing      Problem: Safety Adult  Goal: Patient will remain safe during hospitalization  INTERVENTIONS    1. Assess patient for fall risk and implement interventions if needed  2. Use safe transport techniques  3. Assess patient using the Sergei skin assessment scale  4. Assess patient for risk of aspiration  5. Assess patient for risk of elopement  Outcome: Progressing      Problem: Daily Care  Goal: Daily care needs are met  INTERVENTIONS:    1. Assess and monitor ability to perform self care and identify potential discharge needs  2. Assess skin integrity/risk for skin breakdown  3. Assist patient with activities of daily living as needed  4. Encourage independent activity per ability   5. Provide mouth care   6. Include patient/family/caregiver in decisions related to daily care   Outcome: Progressing      Problem: Discharge Barriers  Goal: Patient's discharge needs are met  INTERVENTIONS:  1. Assess patient for self-management skills  2. Encourage participation in management  3. Identify potential discharge barriers on admission and throughout hospital stay  4. Involve patient/family/caregiver in discharge planning process  5. Collaborate with case management/ for discharge needs  Outcome: Progressing

## 2018-07-25 NOTE — ANESTHESIA POSTPROCEDURE EVALUATION
Patient: Luiza Tay    Procedure Summary     Date:  07/25/18 Room / Location:  [facility] OR  / [facility] OR    Anesthesia Start:  0957 Anesthesia Stop:  1214    Procedures:       RIGHT ORIF WRIST (Right Wrist)      irrigation and debridement of right upper extremity and all indicated procedures (Right ) Diagnosis:       Other type I or II open intra-articular fracture of distal end of right radius, initial encounter      (Other type I or II open intra-articular fracture of distal end of right radius, initial encounter [S52.571B])    Surgeon:  Saad Zuniga MD Responsible Provider:  Mainor Mancia MD    Anesthesia Type:  general ASA Status:  2          Anesthesia Type: general  Last vitals  BP:  Pulse:    Resp:  SpO2: 90   Temp: 37.2 °C (99 °F)   Height:  Weight: 83 kg (182 lb 15.7 oz)  (07/24/18)   BMI:  IBW:    Last edited 07/25/18 1210 by PATRIZIA     Anesthesia Post Evaluation    Patient location during evaluation: PACU  Patient participation: complete - patient participated  Level of consciousness: awake and alert  Pain management: adequate  Airway patency: patent  Anesthetic complications: no  Cardiovascular status: acceptable  Respiratory status: acceptable  Hydration status: acceptable  May dismiss recovered patient based on consultation with the appropriate physicians and/or meeting appropriate discharge criteria

## 2018-07-25 NOTE — OP NOTE
"Preoperative diagnosis: Right distal radius fracture dislocation, right complex elbow laceration    Postoperative diagnosis: same    Procedure: Open reduction internal fixation right distal radius, irrigation debridement right elbow laceration, complex closure right elbow, placement of vacuum-assisted closure device right elbow    Attending surgeon: Saad Zuniga MD    Anesthesia: General endotracheal anesthesia    Estimated blood loss: minimum    Findings: Comminuted intra-articular right distal radius fracture, 4 x 3 x 2\" complex laceration of the right elbow with avulsion of the supra superficial layer exposed fascia but intact capsule no evidence of traumatic arthrotomy    Dispoition: Stable to the PACU    Implants used: [employer] distal radius plate        Indications for operation: [place] is a 53-year-old female sustained a fall from horse yesterday.  She was brought to the emergency department where she was evaluated x-rays did reveal a right intra-articular distal radius fracture.  She also had a complex laceration to the right elbow was initially sutured in the emergency department but after being seen by Dr. Steinert it was felt that she would benefit from formal irrigation debridement and operative closure.  I saw the patient and examined discuss operative care with her the risks and benefits were explained of the procedure all her questions were answered and she agreed upon operative intervention she was therefore taken to the operating room for the above-named procedure on 7/25/2018.        Surgeon's narrative: Patient was identified in the preoperative holding area.  She was taken to the operating room where she was induced under general anesthesia.  Once patient was appropriate anesthetized with position prepped and draped in the usual sterile fashion.  Full surgical timeout was then undertaken prior to surgical incision.  The patient's laterality procedure and identity were all confirmed all " involved in the case were in agreement.    We first turned our attention to the distal radius.  A nonsterile tourniquet was inflated at the onset of the case.  We then made an incision for volar approach to the distal radius.  We the standard modified approach of Saad to the distal radius.  We cut through skin subcutaneous tissue identified the FCR tendon mobilized and identified the FPL and were able to then identify the pronator quadratus.  The pronator was then resected from the fracture and this was the fracture was then visualized.  There was a very distal intra-articular fracture.  We pulled longitudinal traction and were able to get a reasonable reduction on the styloid as well as the ulnar portions of it.  Were satisfied with our reduction we temporarily pinned and using a 2.0 mm K wire.  We able to get a good reduction and was able to reduce the styloid back to the remainder of the distal radius.  Once we were satisfied the reduction which we confirmed this height as well as a volar tilt under fluoroscopy with internal rotation to fixation.  She is a 2 hole [employer] distal locking plate.  We first placed a screw into the shaft of the radius in the oblong hole of the plate in position the plate in its appropriate position.  Once were satisfied with the position of the plate we then placed 4 screws the distal segment 2 screws within the styloid and 2 screws in the ulnar portion.  These were all locking screws.  Great care was taken to make sure the screws were not bicortical in the dorsal cortex was not violated.  We checked our reduction and fixation under fluoroscopy.  We then we noted that the radial styloid was in good position as well as the height and tilt was all restored.  Being satisfied with the reduction we placed one more screw in the proximal segment and intra-articular closure.  The wound was copiously irrigated using normal saline we then closed subcutaneous tissue and closed the skin using  "3-0 Monocryl in a running fashion.  Steri-Strips were then applied.  Dressings were then placed.    Once satisfied with the wrist fixation and closure with internal rotation to the right elbow.  This is a complex laceration of the right elbow that was about 4 x 3 x 2\".  We removed the previous sutures.  There was some necrosis fat and some debris.  The overall fat layer was intact and had just been peeled off from the fascia.  There is a significant dead space underneath with lots of fluid.  There is no evidence of purulence at this point in time.  We used a curette to remove all of the nonviable tissue and also to remove any debris that we noted.  We then carefully inspected the wound.  We extended the incision by 2 cm on either side.  We again carefully inspected the wound and explore the entire zone of injury.  There was no rent to the fascia and the capsule was intact.  We then copiously irrigated the wound using 1 L of normal saline.  We again inspected the laceration noted that the superficial fat layer was intact and at this point time clean and bleeding with healthy tissue.  Given this we chose to repair the subcutaneous layer as well as the skin.  We placed we repaired the cutaneous tissue using 2-0 PDS sutures in interrupted fashion.  We tacked out of the corners back to the remaining soup subcutaneous layer and got good closure.  We then repaired the skin.  We used 2-0 nylon sutures in interrupted fashion.  Once satisfied with the closure was still concerned that there was still separation between the subcutaneous layer and the fascia.  This would possibly be an avenue for retained fluid and possibly but an increased risk for infection.  I felt that she would benefit from negative pressure dressing placed in negative pressure dressing over the over the skin and subcutaneous layer to remove the fluid and provide adhesion for the subcutaneous layer.  Once this was placed sterile dressings were then applied " the patient was then awakened from anesthesia.  We confirmed all of our reduction and fixation under fluoroscopy were all satisfied.  Patient was awakened without difficulty for anesthesia and taken to recovery room.    Plan for the patient for the right wrist she will be immobilized for 5-10 days and a short wrist splint but will encourage finger motion as well as a hand motion and elbow motion.  She can be in a sling for comfort by light to encourage her range of motion at the elbow as soon as possible.  The negative pressure dressing will be on for 7 days and then removed at home.  Once the skin is healed there is no need for any further precautions on the right elbow.  In terms of the wrist she is going to be nonweightbearing lifting no more than no lifting no more than a coffee cup for the next 4-6 weeks until her wrist heals but she could have unfettered motion once the splint is removed.

## 2018-07-25 NOTE — ED PROVIDER NOTES
Room 11    HPI:  Chief Complaint   Patient presents with   • Arm Injury     fall from horse r arm (wrist elbow)    • Rib Injury   • Wrist Pain       HPI     Patient is a 53 year old female presenting for evaluation after she was involved in a horse accident. Patient states she was riding her horse 2.5 hours ago. The horse was walking and suddenly became spooked and started running. They were going up a hill on some rocks when the patient had fallen off. She now complains of diffuse right arm pain including right shoulder and elbow pain. She has a history of an open fracture to her right humerus and has plates in place. She also complains of right sided chest pain and states she has some shortness of breath with this. Her pain is severe and sharp and exacerbated when she takes a deep breath. She denies any head or neck pain. She has not had any nausea or vomiting. She denies abdominal pain.     HISTORY:  Past Medical History:   Diagnosis Date   • Fibromyalgia 5/3/2018   • History of migraine 9/14/2017   • Other insomnia 5/3/2018   • Situational anxiety 5/3/2018       Past Surgical History:   Procedure Laterality Date   • COLONOSCOPY  03/2018    no polyps   • FOOT SURGERY Right 2009    Screws   • HUMERUS SURGERY Right 2013    plate/screws   • HYSTERECTOMY     • ORIF WRIST FRACTURE Right 7/25/2018    Procedure: RIGHT ORIF WRIST;  Surgeon: Saad Zuniga MD;  Location: [facility] OR;  Service: Orthopedics;  Laterality: Right;   • SIGMOIDECTOMY  2016    diverticular ds   • UPPER EXTREMITY DEBRIDEMENT Right 7/25/2018    Procedure: irrigation and debridement of right upper extremity and all indicated procedures;  Surgeon: Saad Zuniga MD;  Location: [facility] OR;  Service: Orthopedics;  Laterality: Right;       History reviewed. No pertinent family history.    Social History   Substance Use Topics   • Smoking status: Never Smoker   • Smokeless tobacco: Never Used   • Alcohol use Yes       ROS:  Review of Systems   Constitutional:  Negative for chills and fever.   HENT: Negative for congestion, rhinorrhea and sore throat.    Eyes: Negative for pain.   Respiratory: Positive for shortness of breath. Negative for cough.    Cardiovascular: Positive for chest pain.   Gastrointestinal: Negative for abdominal pain, diarrhea, nausea and vomiting.   Genitourinary: Negative for dysuria.   Musculoskeletal: Negative for back pain and neck pain.        Positive for right arm pain   Skin: Positive for wound. Negative for rash.   Neurological: Negative for headaches.       PE:  ED Triage Vitals   Temp Heart Rate Resp BP SpO2   07/24/18 1947 07/24/18 1947 07/24/18 1947 07/24/18 1947 07/24/18 1947   36.7 °C (98.1 °F) 104 (!) 28 131/77 (!) 2 %      Temp Source Heart Rate Source Patient Position BP Location FiO2 (%)   07/24/18 1947 07/25/18 0114 07/24/18 2005 07/25/18 0635 --   Oral Monitor Head of bed 30 degrees or higher Left arm        Physical Exam   Constitutional: She appears well-developed.   HENT:   Head: Atraumatic.   Right Ear: External ear normal.   Left Ear: External ear normal.   Small bruise mid forehead   Eyes: Conjunctivae are normal.   Neck: Neck supple.   Cardiovascular: Normal rate and regular rhythm.    No murmur heard.  Pulmonary/Chest: Effort normal and breath sounds normal. No respiratory distress. She exhibits tenderness (right sided).   Abdominal: Soft. There is no tenderness.   Musculoskeletal: Normal range of motion. She exhibits tenderness (diffuse right arm with overlying abrasions). She exhibits no edema or deformity.   Neurological: She is alert.   Skin: Skin is warm and dry. No rash noted.   Abrasion right scalp. Diffuse road rash right arm. Small rock embedded to the superficial skin of the right upper arm.    Psychiatric: She has a normal mood and affect.   Nursing note and vitals reviewed.      ED LABS:  Labs Reviewed   CBC WITH AUTO DIFFERENTIAL - Abnormal        Result Value    WBC 15.8 (*)     RBC 4.45      Hemoglobin 14.2       Hematocrit 40.8      MCV 91.8      MCH 31.9      MCHC 34.8      RDW 12.6      Platelets 255      MPV 7.6      Neutrophils% 89 (*)     Lymphocytes% 6 (*)     Monocytes% 5      Eosinophils% 0      Basophils% 0      Neutrophils Absolute 14.00      Lymphocytes Absolute 1.00      Monocytes Absolute 0.70      Eosinophils Absolute 0.00      Basophils Absolute 0.00     BASIC METABOLIC PANEL - Abnormal     Sodium 138      Potassium 3.3 (*)     Chloride 102      CO2 24      BUN 8      Creatinine 0.9      Glucose 100      Calcium 9.5      Anion Gap 12 (*)     eGFR 66      Narrative:     ESTIMATED GFR CALCULATED USING THE IDMS-TRACEABLE MDRD STUDY EQUATION WITH THE RESULT NORMALIZED TO 1.73M^2 BODY SURFACE AREA.    AVERAGE GFR BY AGE RANGE     20-30 years 116 mL/min/1.73m^2  30-40 years 107 mL/min/1.73m^2  40-50 years 99 mL/min/1.73m^2  50-60 years 93 mL/min/1.73m^2  60-70 years 85 mL/min/1.73m^2  70-up years 75 mL/min/1.73m^2         ED IMAGES:  X-ray chest portable 1 view   Final Result   IMPRESSION:   1.  Small right apical pneumothorax is stable. Tip of the right-sided chest tube is not clearly seen but it has probably been withdrawn slightly and is outside the pleural space.       X-ray chest portable 1 view   Final Result   IMPRESSION:   Tiny right apical pneumothorax. Location of the chest tube tip is not clearly seen on this exam.         X-ray forearm right 2 views   Final Result   Impression:   1.  Interval reduction and internal fixation of distal radius fracture.      X-ray wrist right 3+ views   Final Result   Impression:   1.  Interval reduction and internal fixation of distal radius fracture.      X-ray chest portable 1 view   Final Result   IMPRESSION:   1.  No acute cardiothoracic complication.   2.  Bibasilar pneumonia/atelectasis.      REPORT NOTE:   An upright, in the department, PA and lateral chest may need to be considered.      FL fluoro charge   Final Result      X-ray chest portable 1 view   Final  Result   IMPRESSION:   Small right apical pneumothorax. Elevated right diaphragm.               XR chest portable 1 view   Final Result   IMPRESSION:   Interval placement of right chest tube with apparent decrease of pneumothorax. Some anteromedial lucency remains suggesting there is likely small residual pneumothorax.         X-ray wrist 2 views right   Final Result   IMPRESSION:   Decreased displacement and angulation of the distal radius fracture fragments with splint in place.      X-ray forearm 2 views right   Final Result   IMPRESSION:   Comminuted fracture of the distal radius. Possible proximal soft tissue foreign bodies.      X-ray humerus right   Final Result   IMPRESSION:   No acute displaced fracture. Recommend clinical correlation to exclude soft tissue foreign body.      CT CHEST ABDOMEN PELVIS W IV CONTRAST No Oral Contrast   Final Result   IMPRESSION:   1.  Right rib fractures with right pneumothorax.   2.  Fracture of the distal right radius.          ED PROCEDURES:  Chest Tube  Date/Time: 7/24/2018 9:25 PM  Performed by: DANIEL NIELSON  Authorized by: DANIEL NIELSON     Consent:     Consent obtained:  Verbal and written    Consent given by:  Patient    Risks discussed:  Bleeding, infection and pain    Alternatives discussed:  No treatment  Pre-procedure details:     Skin preparation:  Alcohol    Preparation: Patient was prepped and draped in the usual sterile fashion    Sedation:     Sedation type:  Deep (Performed by Dr. Boo)  Procedure details:     Placement location: Right mid axillary.    Tube size (Fr):  16    Ultrasound guidance: no      Drainage characteristics:  Air only    Dressing:  4x4 sterile gauze  Post-procedure details:     Post-insertion x-ray findings: tube in good position      Patient tolerance of procedure:  Tolerated well, no immediate complications  Fracture - Orthopedic Injury Treatment  Date/Time: 7/24/2018 9:28 PM  Performed by: DANIEL NIELSON  Authorized by: DANIEL NIELSON      Consent:     Consent obtained:  Verbal    Consent given by:  Patient    Risks discussed:  Pain and nerve damage    Alternatives discussed:  No treatment  Injury:     Injury location:  Wrist    Wrist injury location:  R wrist  Laceration Repair  Date/Time: 7/24/2018 9:52 PM  Performed by: DANIEL NIELSON  Authorized by: DANIEL NIELSON     Consent:     Consent obtained:  Emergent situation  Laceration details:     Location:  Shoulder/arm    Shoulder/arm location:  R elbow    Length (cm):  3    Laceration depth: DEEP.  Repair type:     Repair type:  Complex  Pre-procedure details:     Preparation:  Patient was prepped and draped in usual sterile fashion and imaging obtained to evaluate for foreign bodies  Exploration:     Wound exploration: wound explored through full range of motion and entire depth of wound probed and visualized      Wound extent: areolar tissue violated, fascia violated and foreign bodies/material      Wound extent: no underlying fracture noted      Contaminated: yes    Treatment:     Area cleansed with:  Saline    Amount of cleaning:  Extensive    Irrigation solution:  Sterile saline    Irrigation method:  Syringe    Visualized foreign bodies/material removed: yes (2 small rock foreign bodies removed. )      Debridement:  Minimal    Scar revision: no    Skin repair:     Repair method:  Sutures    Suture size:  4-0    Suture material:  Nylon    Number of sutures:  4  Approximation:     Approximation:  Loose  Post-procedure details:     Dressing: Tegaderm.    Patient tolerance of procedure:  Tolerated well, no immediate complications  Laceration Repair  Date/Time: 7/24/2018 9:54 PM  Performed by: DANIEL NIELSON  Authorized by: DANIEL NIELSON     Consent:     Consent obtained:  Emergent situation  Anesthesia (see MAR for exact dosages):     Anesthesia method:  None  Laceration details:     Location:  Shoulder/arm    Shoulder/arm location:  R upper arm    Length (cm):  1  Repair type:     Repair type:   Simple  Pre-procedure details:     Preparation:  Patient was prepped and draped in usual sterile fashion and imaging obtained to evaluate for foreign bodies  Exploration:     Wound extent: foreign bodies/material      Wound extent: no areolar tissue violation noted and no fascia violation noted      Contaminated: yes    Treatment:     Area cleansed with:  Saline    Amount of cleaning:  Standard    Irrigation solution:  Sterile saline    Visualized foreign bodies/material removed: yes (1 small rock removed)    Skin repair:     Repair method:  Sutures    Suture size:  4-0    Suture material:  Nylon    Number of sutures:  1  Approximation:     Approximation:  Loose  Post-procedure details:     Dressing: Tegaderm.    Patient tolerance of procedure:  Tolerated well, no immediate complications  Fracture - Orthopedic Injury Treatment  Date/Time: 7/24/2018 9:56 PM  Performed by: DANIEL NIELSON  Authorized by: DANIEL NIELSON     Consent:     Consent obtained:  Verbal    Consent given by:  Patient  Injury:     Injury location:  Forearm    Forearm injury location:  R forearm    Forearm fracture type: distal radial    Pre-procedure assessment:     Neurological function: normal      Distal perfusion: normal    Sedation:     Sedation type:  Deep (Performed by Dr. Boo)  Procedure details:     Manipulation performed: yes      Skin traction used: yes      Skeletal traction used: yes      Reduction successful: yes      X-ray confirmed reduction: yes      Immobilization:  Splint    Splint type:  Volar short arm  Post-procedure assessment:     Neurological function: normal      Distal perfusion: normal      Patient tolerance of procedure:  Tolerated well, no immediate complications        ED COURSE:  ED Course as of Jul 27 0151 Tue Jul 24, 2018 2204 Case was discussed with trauma surgeon on call Dr. Lancaster per trauma evaluation criteria.  [AB]      ED Course User Index  [AB] Esthela Hurtado        Critical care: I provided critical care  services for this patient given the high probability of sudden, clinically significant, and/or life-threatening deterioration requiring full and direct attention, intervention, and personal management while the patient was critical.  I provided 40 minutes of critical care services in addition to separately billable procedures.       MDM:  MDM    Patient presents for evaluation after she was involved in a horse accident. Patient states she fell off of a horse while it was running. She landed on her right side and she now complains of diffuse right arm pain as well as shortness of breath and pain with breathing. On exam, she has extensive road rash to her right arm with diffuse tenderness but I do not appreciate any obvious deformity. Labs were obtained but these do not show signs of acute kidney injury, anemia or electrolyte abnormality. She is not hypoxic here, however, CT of the chest/abdomen/pelvis shows a 50% pneumothorax on the right. A chest tube was placed successfully by myself under sedation performed by Dr. Boo. CT of the abdomen did not show any intraabdominal process. Due to her diffuse arm pain, imaging was obtained and confirms an acute distal radius fracture. This was reduced successfully by myself and she was placed in volar short arm splint. Orthopedics was consulted and they agree to consult as an inpatient.  She is a small skin disruption over the ulnar surface.  This is not near the fracture is not bleeding but I did discuss with the orthopedic doctor through the nurse that this may be open.  She does have extensive road rash to her right arm that was cleaned and dressed here. After cleaning the wounds, patient did appear to have two lacerations that were repaired successfully. Small foreign bodies were removed from both lacerations. She was given Ancef here and she was treated with IV fluids, Toradol, Fentanyl and Dilaudid here. She feel somewhat better on serial evaluations. Patient does  have a small bruise to her mid forehead, however, she has not complained of a headache and she was observed here closely for several hours and has remained alert and appropriate. Head CT was not performed due to this. A trauma evaluation was called following the patient's imaging results and she was evaluated by Dr. Lancaster of trauma surgery in the emergency department. He agrees to admit for further evaluation and care.       Final diagnoses:   [V80.010A] Fall from horse, initial encounter   [S27.0XXA] Traumatic pneumothorax, initial encounter   [S52.501A] Closed fracture of right distal radius   Lacerations  Multiple abrasions  Closed head injury  Shortness of breath  Chest pain  Fibromyalgia    By signing my name, I, Esthela Bryant, attest that this documentation has been prepared under the direction and in the presence of Dr. Lopez, 7/24/2018, 11:56 PM.     I, Dayana Lopez MD,  have personally performed the services described as documentation by the scibe in my presence.  I attest this is both accurate and complete.       Dayana Lopez MD  07/27/18 0152

## 2018-07-25 NOTE — INTERDISCIPLINARY/THERAPY
07/25/18 1058   Subjective Comments   Subjective Comments Pt in surg. Will cont.to follow.   Plan   Plan Comment New eval. Check post-op orders. Fell off horse, ORIF/I&D R distal radial fx.

## 2018-07-25 NOTE — ANESTHESIA PROCEDURE NOTES
Airway  Date/Time: 7/25/2018 10:04 AM  Urgency: elective    Airway not difficult    General Information and Staff    Patient location during procedure: OR  CRNA: SONYA DILL  Other anesthesia staff: KRANTHI AMADOR  Performed: other anesthesia staff     Indications and Patient Condition  Indications for airway management: anesthesia and airway protection  Spontaneous Ventilation: absent  Sedation level: deep  Preoxygenated: yes  Patient position: sniffing  Mask difficulty assessment: 1 - vent by mask    Final Airway Details  Final airway type: endotracheal airway      Successful airway: ETT  Cuffed: yes   Successful intubation technique: direct laryngoscopy  Facilitating devices/methods: stylet  Blade: Ryan  Blade size: #3  ETT size: 7.0 mm  Cormack-Lehane Classification: grade I - full view of glottis  Placement verified by: chest auscultation, capnometry and palpation of cuff   Cuff volume (mL): 6  Measured from: lips  ETT to lips (cm): 22  Number of attempts at approach: 1    Additional Comments  Atraumatic. First attempt- 7.5 ETT too large. Switched to size 7.

## 2018-07-25 NOTE — H&P
General Surgical History and Physical  Dr. Mynor Lancaster  [facility]    Patients name: Luiza Tay  Patients : 1965  Medical record number: [id number]      Patient Active Problem List   Diagnosis   • History of migraine   • Situational anxiety   • Other insomnia   • Fibromyalgia         Fall from horse    Subjective     Patient is a 53 y.o. female presents with fall from horse.  Patient says she was riding, when she took off her helmet.  Horse spooked and she fell off.  She landed on her right side for multiple times.  She did not lose consciousness.  She did not ambulate, but was carried off the helpless stretcher.  Patient was seen in regional ER where she was found to have a displaced right radial fracture, possibly open with multiple foreign bodies and soft tissue.  Right-sided pneumothorax approximately 50% with fractures numbers 5, 7, A.  Pigtail was placed in the ER.  Trauma evaluation was called.    Patient has history of anxiety about riding horses, prior right humerus fracture from fall off different horse, migraines which seem worse in the [facility], fibromyalgia, appendectomy, cholecystectomy, sigmoidectomy, hysterectomy.  She is visiting from [place] and accompanied by friends and family.    Patient says she has no headache, neck pain, back pain.  She has some anxiety, pain in the right chest and right arm..     The following portions of the patient's history were reviewed and updated as appropriate: allergies, current medications, past family history, past medical history, past social history, past surgical history and problem list.    Review of Systems  Constitutional: Anxiety  Eyes: negative  Ears, nose, mouth, throat, and face: negative  Respiratory: negative  Cardiovascular: negative  Gastrointestinal: negative  Genitourinary:negative  Integument/breast: negative  Hematologic/lymphatic: negative  Musculoskeletal: Right chest pain, rib pain, arm pain  Neurological:  negative  Behavioral/Psych: negative  Endocrine: negative  Allergic/Immunologic: negative    Patient History:  Medical History:   Past Medical History:   Diagnosis Date   • Fibromyalgia 5/3/2018   • History of migraine 9/14/2017   • Other insomnia 5/3/2018   • Situational anxiety 5/3/2018     Surgical History:   Past Surgical History:   Procedure Laterality Date   • COLONOSCOPY  03/2018    no polyps   • HYSTERECTOMY     • SIGMOIDECTOMY  2016    diverticular ds     Family History: History reviewed. No pertinent family history.  Social History:   Social History     Social History   • Marital status:      Spouse name: N/A   • Number of children: N/A   • Years of education: N/A     Occupational History   • Not on file.     Social History Main Topics   • Smoking status: Never Smoker   • Smokeless tobacco: Never Used   • Alcohol use Yes   • Drug use: No   • Sexual activity: Yes     Other Topics Concern   • Not on file     Social History Narrative   • No narrative on file     Allergies:   Allergies   Allergen Reactions   • Desvenlafaxine Succinate      Other reaction(s): Headache   • Hydromorphone Hives   • Morphine      Other reaction(s): Headache   • Trazodone      nightmares   • Clindamycin Rash   • Sumatriptan Rash       Medications:   Current Facility-Administered Medications:   •  fentaNYL citrate (PF) 50 mcg/mL injection, , , ,   •  fentaNYL citrate (PF) 50 mcg/mL injection, , , ,   •  ondansetron (ZOFRAN) injection, , , ,   •  propofol (DIPRIVAN) injection, , , ,   •  propofol (DIPRIVAN) injection, , , ,     Current Outpatient Prescriptions:   •  codeine-butalbital-ASA-caff (FIORINAL-CODEINE #3) capsule, Take 1 capsule by mouth every 6 (six) hours as needed for headaches., Disp: 60 capsule, Rfl: 3  •  gabapentin (NEURONTIN) 100 mg capsule, Take 100 mg by mouth 4 (four) times a day., Disp: , Rfl:   •  tiZANidine (ZANAFLEX) 4 mg tablet, Take 4 mg by mouth nightly.  , Disp: , Rfl:   •  zolpidem (AMBIEN) 10 mg  tablet, Take 1 tablet (10 mg total) by mouth nightly as needed for sleep., Disp: 30 tablet, Rfl: 5  •  ALPRAZolam (XANAX) 0.25 mg tablet, Take 1 tablet (0.25 mg total) by mouth 2 (two) times a day as needed for anxiety., Disp: 30 tablet, Rfl: 1  •  butorphanol (STADOL) 10 mg/mL nasal spray, Administer 1 spray into one nostril every 6 (six) hours as needed for pain scale 4-7/10 for up to 2 doses., Disp: 2.5 mL, Rfl: 0  •  TRULANCE 3 mg tablet, Take 3 mg by mouth daily.  , Disp: , Rfl: 5    Objective:  Objective     Temp:  [36.7 °C (98.1 °F)] 36.7 °C (98.1 °F)  Heart Rate:  [] 95  Resp:  [17-28] 20  BP: ()/(38-88) 97/69  No intake/output data recorded.  I/O this shift:  In: 800 [I.V.:800]  Out: -     Exam: Primary survey: Airway breathing circulation intact.    Secondary survey:  General Appearance:    Alert, cooperative, no distress, appears stated age    Head:    Normocephalic, without obvious abnormality, atraumatic   Eyes:    PERRL, conjunctiva/corneas clear, EOM's intact, both eyes   Ears:    Normal TM's and external ear canals, both ears   Nose:   Nares normal, septum midline, mucosa normal, no drainage   Throat:   Lips, mucosa, and tongue normal; teeth and gums normal   Neck:   Supple, symmetrical, trachea midline, no adenopathy;     thyroid:  no enlargement/tenderness/nodules   Back:     Symmetric, no curvature, ROM normal, no CVA tenderness   Lungs:     Clear to auscultation bilaterally, respirations unlabored   Chest Wall:   Tender to palpation right chest, pigtail in place, small air leak noted.  Chest tube to suction.   Heart:    Regular rate and rhythm, S1 and S2 normal, no murmur, rub or gallop   Abdomen:     Soft, non-tender, bowel sounds active,     no masses, no organomegaly   Genitalia:    Normal  without lesion, discharge   Extremities:  5 out of 5 strength bilateral lower extremities, normal distal pulses.  5 out of 5 strength left arm, normal radial pulse.  Right arm limited by pain.   Able to wiggle fingers, distal pulses and capillary refill intact.   Pulses:   2+ and symmetric all extremities   Skin:   Skin color, texture, turgor normal, no rashes or lesions   Lymph nodes:   Cervical, supraclavicular, and axillary nodes normal   Neurologic:   CNII-XII intact, normal strength, sensation and reflexes     throughout    Lab and image review:  Data Review CBC:   Lab Results   Component Value Date    WBC 15.8 (H) 07/24/2018    RBC 4.45 07/24/2018    HGB 14.2 07/24/2018    HCT 40.8 07/24/2018     07/24/2018     BMP:   Lab Results   Component Value Date    GLUCOSE 100 07/24/2018     07/24/2018    K 3.3 (L) 07/24/2018     07/24/2018    CO2 24 07/24/2018    BUN 8 07/24/2018    CREATININE 0.9 07/24/2018    CALCIUM 9.5 07/24/2018     Coagulation: No results found for: PT, INR, APTT  Cardiac markers: No results found for: TROPONINT, MYOGLOBIN  ABGs: No results found for: PHART, PHCAP, PHVEN, PO2, PO2ART, PO2CAP, FYM4RXC, YSV6EDY, PCO2, BASEEXCESS  Radiology review: Multiple x-rays, CT chest abdomen pelvis reviewed    Assessment: 53-year-old female status post fall from horse with right-sided rib fractures numbers 578, 50% pneumothorax treated with pigtail chest tube with improvement on x-ray, right distal radial fracture status post reduction and splinting.  Multiple foreign bodies and soft tissue lacerations right arm.    Clinical exam does not reveal any additional injuries apart from CT and x-ray findings.  Patient has small abrasion on the head which does not require treatment.  She has significant anxiety, which may be impacting her pain management, and she has allergies to morphine which exacerbate her migraines.    We will admit to the trauma service, multimodal pain control, and will consider epidural pain catheter if unable to manage her pain without.  Orthopedic surgery has been consulted and will evaluate the right arm for possible washout, see if any further reduction of her  radial fracture as needed.  Patient may eat if orthopedics is not planning any surgical intervention.  Repeat chest x-ray in the morning, PT for mobilization thereafter.    My partner Dr. Sauer will be assuming care of this patient in the morning.      Problems: [unfilled]    Plan: Admit to trauma, pain control, orthopedic consultation, chest x-ray in the morning.      Electronically signed by:  Mynor Lancaster MD

## 2018-07-25 NOTE — CONSULTATION
ORTHOPAEDIC CONSULTATION    Patient: Luiza Tay   MRN: [id number]   CSN: [id number]     Date of Injury: July 24, 2018    Orthopedic Staff: Justin N Steinert, MD      Chief Complaint:    Chief Complaint   Patient presents with   • Arm Injury     fall from horse r arm (wrist elbow)    • Rib Injury   • Wrist Pain         HPI:  Luiza Tay is a 53 y.o. female who present to the emergency room today after a fall from her horse.  She states that the horse got spooked and she fell off.  She denies striking her head or losing consciousness.  She states she did land on her right arm and began having significant pain in the right arm.  She was seen in the emergency room where x-rays revealed a displaced intra-articular distal radius fracture on the right.  Additionally she had multiple skin tears and lacerations of her right arm which were contaminated with dirt and gravel.  She did undergo some debridement and closure of the large wounds with suture in the ER.  Additionally a closed reduction of the distal radius was performed followed by splinting.  She is also noted to have a right-sided pneumothorax and rib fractures.  A chest tube was also placed in the ER.  She has been admitted to the hospital by the trauma service.  Patient resides in [place] but is spending the summer camping at [facility].  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  She does have a history of a right humeral shaft fracture treated with ORIF many years ago after another fall from horse.  She is right-hand dominant.  She denies any pain in the lower extremities or the left upper extremity.     Review of Systems:    Negative except for those mentioned in the HPI    Past Medical History:  Past Medical History:   Diagnosis Date   • Fibromyalgia 5/3/2018   • History of migraine 9/14/2017   • Other insomnia 5/3/2018   • Situational anxiety 5/3/2018       Past Surgical History:  Past Surgical History:   Procedure Laterality  Date   • COLONOSCOPY  03/2018    no polyps   • FOOT SURGERY Right 2009    Screws   • HUMERUS SURGERY Right 2013    plate/screws   • HYSTERECTOMY     • SIGMOIDECTOMY  2016    diverticular ds       Allergies:  Allergies   Allergen Reactions   • Desvenlafaxine Succinate      Other reaction(s): Headache   • Hydromorphone Hives   • Morphine      Other reaction(s): Headache   • Trazodone      nightmares   • Clindamycin Rash   • Sumatriptan Rash       Social History:  Social History   Substance Use Topics   • Smoking status: Never Smoker   • Smokeless tobacco: Never Used   • Alcohol use Yes     History   Drug Use No       Family History:     History reviewed. No pertinent family history.    Home Medications:   Prior to Admission medications    Medication Sig Start Date End Date Taking? Authorizing Provider   codeine-butalbital-ASA-caff (FIORINAL-CODEINE #3) capsule Take 1 capsule by mouth every 6 (six) hours as needed for headaches. 7/17/18  Yes Carlos A Dougherty MD   gabapentin (NEURONTIN) 100 mg capsule Take 100 mg by mouth 4 (four) times a day.   Yes Historical Provider, MD   tiZANidine (ZANAFLEX) 4 mg tablet Take 4 mg by mouth nightly.     Yes Historical Provider, MD   zolpidem (AMBIEN) 10 mg tablet Take 1 tablet (10 mg total) by mouth nightly as needed for sleep. 5/3/18  Yes Carlos A Dougherty MD   ALPRAZolam (XANAX) 0.25 mg tablet Take 1 tablet (0.25 mg total) by mouth 2 (two) times a day as needed for anxiety. 7/17/18   Carlos A Dougherty MD   butorphanol (STADOL) 10 mg/mL nasal spray Administer 1 spray into one nostril every 6 (six) hours as needed for pain scale 4-7/10 for up to 2 doses. 7/17/18   Carlos A Dougherty MD   TRULANCE 3 mg tablet Take 3 mg by mouth daily.   4/2/18   Historical Provider, MD        Physical Examination:  /63   Pulse 90   Temp 36.9 °C (98.4 °F) (Oral)   Resp 20   Wt 83 kg (182 lb 15.7 oz)   SpO2 96%   BMI 29.53 kg/m²   General: Healthy, well-developed, well-nourished, mild distress  Psych: Alert  and oriented x3.  Appropriate mood/affect  HEENT: normocephalic, atraumatic  Neck: supple  Resp: non-labored.  Chest tube in place  Lymph: no lymphadenopathy  Musculoskeletal: Right upper extremity: A sugar tong splint is in place.  This was removed.  She was noted to have Tegaderm over all of her wounds.  This was also removed.  There is a very small poke hole on the volar aspect of the wrist.  There is some residual deformity to the wrist as well.  She has several large skin tears on the forearm and elbow which still had considerable amount of debris.  A large laceration over the radial aspect of the elbow and forearm is present and has been sutured closed.  There is also very small cut on the upper arm with a suture which is directly over her previous incision.  All compartments are soft and compressible.  No tenderness to palpation or crepitance noted around the shoulder or clavicle.  She is able to very weakly wiggle her fingers.  Sensation appears to be intact light touch over the radial, median, ulnar nerves.    Imaging:  Plain films of the right forearm and humerus demonstrate an intra-articular distal radius fracture with complete dorsal displacement and dorsal comminution.  There is significant debris and foreign bodies noticed within the soft tissue  near the elbow.  No elbow fracture readily identified.  There is previous plate and screw fixation of the humeral shaft which is well-healed.  Postreduction wrist x-rays show improvement in fracture position but still remains unacceptably dorsally angulated.    CT of the chest abdomen pelvis does not reveal any additional fractures other than some rib fractures.    Pertinent Laboratory Studies:    Admission on 07/24/2018   Component Date Value Ref Range Status   • WBC 07/24/2018 15.8* 4.5 - 10.5 10*3/uL Final   • RBC 07/24/2018 4.45  3.70 - 5.30 10*6/µL Final   • Hemoglobin 07/24/2018 14.2  11.5 - 15.5 g/dL Final   • Hematocrit 07/24/2018 40.8  34.0 - 45.0 %  Final   • MCV 07/24/2018 91.8  81.0 - 97.0 fL Final   • MCH 07/24/2018 31.9  28.0 - 33.0 pg Final   • MCHC 07/24/2018 34.8  32.0 - 36.0 g/dL Final   • RDW 07/24/2018 12.6  11.5 - 14.0 % Final   • Platelets 07/24/2018 255  140 - 350 10*3/uL Final   • MPV 07/24/2018 7.6  6.9 - 10.8 fL Final   • Neutrophils% 07/24/2018 89* 41 - 81 % Final   • Lymphocytes% 07/24/2018 6* 11 - 47 % Final   • Monocytes% 07/24/2018 5  3 - 11 % Final   • Eosinophils% 07/24/2018 0  0 - 3 % Final   • Basophils% 07/24/2018 0  0 - 2 % Final   • Neutrophils Absolute 07/24/2018 14.00  10*3/uL Final   • Lymphocytes Absolute 07/24/2018 1.00  10*3/uL Final   • Monocytes Absolute 07/24/2018 0.70  10*3/uL Final   • Eosinophils Absolute 07/24/2018 0.00  10*3/uL Final   • Basophils Absolute 07/24/2018 0.00  10*3/uL Final   • Sodium 07/24/2018 138  135 - 145 mmol/L Final   • Potassium 07/24/2018 3.3* 3.5 - 5.1 mmol/L Final   • Chloride 07/24/2018 102  98 - 107 mmol/L Final   • CO2 07/24/2018 24  21 - 32 mmol/L Final   • BUN 07/24/2018 8  7 - 25 mg/dL Final   • Creatinine 07/24/2018 0.9  0.6 - 1.2 mg/dL Final   • Glucose 07/24/2018 100  70 - 105 mg/dL Final   • Calcium 07/24/2018 9.5  8.6 - 10.3 mg/dL Final   • Anion Gap 07/24/2018 12* 3 - 11 mmol/L Final   • eGFR 07/24/2018 66  >60 mL/min/1.73m*2 Final         Assessment:  53 y.o. female with right vocal open intra-articular distal radius fracture, multiple contaminated lacerations and skin tears to the right upper extremity.  I have reviewed the x-rays and my findings with Callie and her .    Plan: I have reviewed the x-rays and my findings with the patient.  As there was still significant contamination of her wounds I performed further irrigation and debridement of the wounds at the bedside.  The wounds were then covered with Xeroform and gauze and her sugar tong splint replaced.  She does have a small poke hole wound on the volar aspect of her wrist I am going to treat this as an open distal  radius fracture.  I am starting her on Ancef for open fracture as well as her wounds.  As she was tossed off of a horse and the possibility for [place] contamination exists, I am also giving her a dose of penicillin.  Plan will be for OR in the morning for irrigation debridement of her right upper extremity wounds including exploration of the large wound near the elbow for possible traumatic arthrotomy.  She will need I&D and ORIF of her distal radius fracture as well.  Surgery will most likely be performed by Dr. Samayoa. The risks and benefits have been discussed with the patient.  Risk include, were not limited to, risk of anesthesia up to including death, damage to blood vessels and nerves, nonunion, malunion, failure of hardware, painful hardware, stiffness, infection, hematoma, wound complications, blood clots, potential need for additional procedures.  She will need to remain n.p.o.  She has been instructed on elevation of the right upper extremity.  Luiza verbalizes understanding of the plan and is in agreement.  All questions answered.    [unfilled]  2:18 AM

## 2018-07-25 NOTE — ANESTHESIA PREPROCEDURE EVALUATION
"Pre-Procedure Assessment    Patient: Luiza Tay, female, 53 y.o.    Ht Readings from Last 1 Encounters:   09/25/17 1.676 m (5' 6\")     Wt Readings from Last 1 Encounters:   07/24/18 83 kg (182 lb 15.7 oz)       Last Vitals  /44 (07/25/18 0851)    Temp      Pulse 76 (07/25/18 0851)   Resp 20 (07/25/18 0851)    SpO2 97 % (07/25/18 0851)    Pain Score         Problem list reviewed and Medical history reviewed           Airway   Mallampati: II  TM distance: >3 FB  Neck ROM: full      Dental      Pulmonary - negative ROS    breath sounds clear to auscultation  Cardiovascular - negative ROS  Exercise tolerance: good (4-7 METS)    Rhythm: regular  Rate: normal    Mental Status/Neuro/Psych - negative ROS   Pt is alert.        GI/Hepatic/Renal - negative ROS     Endo/Other - negative ROS   Abdominal           Social History   Substance Use Topics   • Smoking status: Never Smoker   • Smokeless tobacco: Never Used   • Alcohol use Yes      Hematology   Lab Results   Component Value Date/Time    WBC 9.7 07/25/2018 05:48 AM    RBC 3.93 07/25/2018 05:48 AM    MCV 93.1 07/25/2018 05:48 AM    HGB 12.9 07/25/2018 05:48 AM    HCT 36.5 07/25/2018 05:48 AM     07/25/2018 05:48 AM      Coagulation No results found for: PT, APTT, INR   General Chemistry   Lab Results   Component Value Date/Time    CALCIUM 8.8 07/25/2018 05:48 AM    BUN 7 07/25/2018 05:48 AM    CREATININE 0.7 07/25/2018 05:48 AM    GLUCOSE 106 (H) 07/25/2018 05:48 AM     07/25/2018 05:48 AM    K 3.8 07/25/2018 05:48 AM    CO2 22 07/25/2018 05:48 AM     07/25/2018 05:48 AM     Anesthesia Plan    ASA 2   NPO status reviewed: > 6 hours    General         Induction: intravenous   Airway Planning: oral ET tube          Plan for postoperative opioid use.    Anesthetic plan and risks discussed with patient.                "

## 2018-07-26 ENCOUNTER — APPOINTMENT (OUTPATIENT)
Dept: WOUND CARE | Facility: HOSPITAL | Age: 53
DRG: 958 | End: 2018-07-26
Attending: SURGERY
Payer: OTHER GOVERNMENT

## 2018-07-26 ENCOUNTER — APPOINTMENT (OUTPATIENT)
Dept: RADIOLOGY | Facility: HOSPITAL | Age: 53
DRG: 958 | End: 2018-07-26
Attending: ORTHOPAEDIC SURGERY
Payer: OTHER GOVERNMENT

## 2018-07-26 ENCOUNTER — APPOINTMENT (OUTPATIENT)
Dept: RADIOLOGY | Facility: HOSPITAL | Age: 53
DRG: 958 | End: 2018-07-26
Payer: OTHER GOVERNMENT

## 2018-07-26 LAB
ALBUMIN SERPL-MCNC: 2.8 G/DL (ref 3.5–5.3)
ALP SERPL-CCNC: 40 U/L (ref 41–108)
ALT SERPL-CCNC: 32 U/L (ref 0–52)
ANION GAP SERPL CALC-SCNC: 6 MMOL/L (ref 3–11)
AST SERPL-CCNC: 30 U/L (ref 0–39)
BASOPHILS # BLD AUTO: 0 10*3/UL
BASOPHILS NFR BLD AUTO: 0 % (ref 0–2)
BILIRUB SERPL-MCNC: 0.31 MG/DL (ref 0–1.4)
BUN SERPL-MCNC: 6 MG/DL (ref 7–25)
CALCIUM ALBUM COR SERPL-MCNC: 9 MG/DL (ref 8.5–10.1)
CALCIUM SERPL-MCNC: 8 MG/DL (ref 8.6–10.3)
CHLORIDE SERPL-SCNC: 107 MMOL/L (ref 98–107)
CO2 SERPL-SCNC: 26 MMOL/L (ref 21–32)
CREAT SERPL-MCNC: 0.7 MG/DL (ref 0.6–1.2)
EOSINOPHIL # BLD AUTO: 0.3 10*3/UL
EOSINOPHIL NFR BLD AUTO: 4 % (ref 0–3)
ERYTHROCYTE [DISTWIDTH] IN BLOOD BY AUTOMATED COUNT: 12.8 % (ref 11.5–14)
GFR SERPL CREATININE-BSD FRML MDRD: 88 ML/MIN/1.73M*2
GLUCOSE SERPL-MCNC: 101 MG/DL (ref 70–105)
HCT VFR BLD AUTO: 28.7 % (ref 34–45)
HGB BLD-MCNC: 10.3 G/DL (ref 11.5–15.5)
LYMPHOCYTES # BLD AUTO: 1.6 10*3/UL
LYMPHOCYTES NFR BLD AUTO: 20 % (ref 11–47)
MCH RBC QN AUTO: 33.2 PG (ref 28–33)
MCHC RBC AUTO-ENTMCNC: 35.7 G/DL (ref 32–36)
MCV RBC AUTO: 92.9 FL (ref 81–97)
MONOCYTES # BLD AUTO: 0.7 10*3/UL
MONOCYTES NFR BLD AUTO: 9 % (ref 3–11)
NEUTROPHILS # BLD AUTO: 5.4 10*3/UL
NEUTROPHILS NFR BLD AUTO: 67 % (ref 41–81)
PLATELET # BLD AUTO: 178 10*3/UL (ref 140–350)
PMV BLD AUTO: 8 FL (ref 6.9–10.8)
POTASSIUM SERPL-SCNC: 3.7 MMOL/L (ref 3.5–5.1)
PROT SERPL-MCNC: 5.1 G/DL (ref 6–8.3)
RBC # BLD AUTO: 3.09 10*6/ΜL (ref 3.7–5.3)
SODIUM SERPL-SCNC: 139 MMOL/L (ref 135–145)
WBC # BLD AUTO: 8.1 10*3/UL (ref 4.5–10.5)

## 2018-07-26 PROCEDURE — 99232 SBSQ HOSP IP/OBS MODERATE 35: CPT | Performed by: NURSE PRACTITIONER

## 2018-07-26 PROCEDURE — 97110 THERAPEUTIC EXERCISES: CPT | Mod: GO | Performed by: OCCUPATIONAL THERAPIST

## 2018-07-26 PROCEDURE — 6370000100 HC RX 637 (ALT 250 FOR IP): Performed by: SURGERY

## 2018-07-26 PROCEDURE — 97165 OT EVAL LOW COMPLEX 30 MIN: CPT | Mod: GO | Performed by: OCCUPATIONAL THERAPIST

## 2018-07-26 PROCEDURE — 80053 COMPREHEN METABOLIC PANEL: CPT | Performed by: SURGERY

## 2018-07-26 PROCEDURE — 97535 SELF CARE MNGMENT TRAINING: CPT | Mod: GO

## 2018-07-26 PROCEDURE — 73110 X-RAY EXAM OF WRIST: CPT | Mod: RT

## 2018-07-26 PROCEDURE — 6360000200 HC RX 636 W HCPCS (ALT 250 FOR IP): Performed by: ORTHOPAEDIC SURGERY

## 2018-07-26 PROCEDURE — 36415 COLL VENOUS BLD VENIPUNCTURE: CPT | Performed by: SURGERY

## 2018-07-26 PROCEDURE — 2500000200 HC RX 250 WO HCPCS: Performed by: ORTHOPAEDIC SURGERY

## 2018-07-26 PROCEDURE — 97530 THERAPEUTIC ACTIVITIES: CPT | Mod: GP | Performed by: PHYSICAL THERAPIST

## 2018-07-26 PROCEDURE — 2580000300 HC RX 258: Performed by: ORTHOPAEDIC SURGERY

## 2018-07-26 PROCEDURE — 71045 X-RAY EXAM CHEST 1 VIEW: CPT

## 2018-07-26 PROCEDURE — 97530 THERAPEUTIC ACTIVITIES: CPT | Mod: GO | Performed by: OCCUPATIONAL THERAPIST

## 2018-07-26 PROCEDURE — (BLANK) HC ROOM SEMI PRIVATE

## 2018-07-26 PROCEDURE — 85025 COMPLETE CBC W/AUTO DIFF WBC: CPT | Performed by: SURGERY

## 2018-07-26 PROCEDURE — 2580000300 HC RX 258: Performed by: NURSE PRACTITIONER

## 2018-07-26 PROCEDURE — 73090 X-RAY EXAM OF FOREARM: CPT | Mod: RT

## 2018-07-26 PROCEDURE — 99212 OFFICE O/P EST SF 10 MIN: CPT

## 2018-07-26 PROCEDURE — 97162 PT EVAL MOD COMPLEX 30 MIN: CPT | Mod: GP | Performed by: PHYSICAL THERAPIST

## 2018-07-26 PROCEDURE — 6360000200 HC RX 636 W HCPCS (ALT 250 FOR IP)

## 2018-07-26 PROCEDURE — 2590000100 HC RX 259: Performed by: SURGERY

## 2018-07-26 PROCEDURE — 6360000200 HC RX 636 W HCPCS (ALT 250 FOR IP): Performed by: SURGERY

## 2018-07-26 RX ORDER — NALOXONE HYDROCHLORIDE 0.4 MG/ML
INJECTION, SOLUTION INTRAMUSCULAR; INTRAVENOUS; SUBCUTANEOUS
Status: COMPLETED
Start: 2018-07-26 | End: 2018-07-26

## 2018-07-26 RX ORDER — SODIUM CHLORIDE 9 MG/ML
1000 INJECTION, SOLUTION INTRAVENOUS ONCE
Status: COMPLETED | OUTPATIENT
Start: 2018-07-26 | End: 2018-07-26

## 2018-07-26 RX ORDER — LEVALBUTEROL 1.25 MG/.5ML
1.25 SOLUTION, CONCENTRATE RESPIRATORY (INHALATION) EVERY 4 HOURS PRN
Status: DISCONTINUED | OUTPATIENT
Start: 2018-07-26 | End: 2018-07-30 | Stop reason: HOSPADM

## 2018-07-26 RX ADMIN — KETOROLAC TROMETHAMINE 30 MG: 30 INJECTION, SOLUTION INTRAMUSCULAR at 22:27

## 2018-07-26 RX ADMIN — CEFAZOLIN 1000 MG: 10 INJECTION, POWDER, FOR SOLUTION INTRAVENOUS at 22:28

## 2018-07-26 RX ADMIN — GABAPENTIN 100 MG: 100 CAPSULE ORAL at 21:24

## 2018-07-26 RX ADMIN — CYCLOBENZAPRINE HYDROCHLORIDE 10 MG: 10 TABLET, FILM COATED ORAL at 04:46

## 2018-07-26 RX ADMIN — OXYCODONE HYDROCHLORIDE AND ACETAMINOPHEN 2 TABLET: 5; 325 TABLET ORAL at 02:23

## 2018-07-26 RX ADMIN — ALPRAZOLAM 0.25 MG: 0.25 TABLET ORAL at 04:46

## 2018-07-26 RX ADMIN — LIDOCAINE 1 PATCH: 560 PATCH PERCUTANEOUS; TOPICAL; TRANSDERMAL at 09:45

## 2018-07-26 RX ADMIN — DOCUSATE SODIUM 100 MG: 100 CAPSULE, LIQUID FILLED ORAL at 09:45

## 2018-07-26 RX ADMIN — OXYCODONE HYDROCHLORIDE 5 MG: 5 TABLET ORAL at 21:24

## 2018-07-26 RX ADMIN — SODIUM CHLORIDE 100 ML/HR: 9 INJECTION, SOLUTION INTRAVENOUS at 04:07

## 2018-07-26 RX ADMIN — STANDARDIZED SENNA CONCENTRATE AND DOCUSATE SODIUM 1 TABLET: 8.6; 5 TABLET, FILM COATED ORAL at 21:24

## 2018-07-26 RX ADMIN — ENOXAPARIN SODIUM 40 MG: 40 INJECTION, SOLUTION INTRAVENOUS; SUBCUTANEOUS at 09:45

## 2018-07-26 RX ADMIN — GABAPENTIN 100 MG: 100 CAPSULE ORAL at 17:15

## 2018-07-26 RX ADMIN — STANDARDIZED SENNA CONCENTRATE AND DOCUSATE SODIUM 1 TABLET: 8.6; 5 TABLET, FILM COATED ORAL at 09:45

## 2018-07-26 RX ADMIN — SODIUM CHLORIDE 100 ML/HR: 9 INJECTION, SOLUTION INTRAVENOUS at 18:22

## 2018-07-26 RX ADMIN — ACETAMINOPHEN 650 MG: 325 TABLET ORAL at 05:11

## 2018-07-26 RX ADMIN — BUTALBITAL, ASPIRIN, CAFFEINE AND CODEINE PHOSPHATE 1 CAPSULE: 30; 50; 40; 325 CAPSULE ORAL at 18:51

## 2018-07-26 RX ADMIN — OXYCODONE HYDROCHLORIDE 5 MG: 5 TABLET ORAL at 13:45

## 2018-07-26 RX ADMIN — TIZANIDINE 4 MG: 4 TABLET ORAL at 21:23

## 2018-07-26 RX ADMIN — SODIUM CHLORIDE 1000 ML: 9 INJECTION, SOLUTION INTRAVENOUS at 10:45

## 2018-07-26 RX ADMIN — FENTANYL CITRATE 25 MCG: 50 INJECTION INTRAMUSCULAR; INTRAVENOUS at 03:33

## 2018-07-26 RX ADMIN — ACETAMINOPHEN 650 MG: 325 TABLET ORAL at 00:02

## 2018-07-26 RX ADMIN — ACETAMINOPHEN 650 MG: 325 TABLET ORAL at 23:53

## 2018-07-26 RX ADMIN — GABAPENTIN 100 MG: 100 CAPSULE ORAL at 13:41

## 2018-07-26 RX ADMIN — KETOROLAC TROMETHAMINE 30 MG: 30 INJECTION, SOLUTION INTRAMUSCULAR at 11:15

## 2018-07-26 RX ADMIN — KETOROLAC TROMETHAMINE 30 MG: 30 INJECTION, SOLUTION INTRAMUSCULAR at 05:11

## 2018-07-26 RX ADMIN — CEFAZOLIN 1000 MG: 10 INJECTION, POWDER, FOR SOLUTION INTRAVENOUS at 14:28

## 2018-07-26 RX ADMIN — NALOXONE HYDROCHLORIDE 0.4 MG: 0.4 INJECTION, SOLUTION INTRAMUSCULAR; INTRAVENOUS; SUBCUTANEOUS at 08:38

## 2018-07-26 RX ADMIN — GABAPENTIN 100 MG: 100 CAPSULE ORAL at 09:45

## 2018-07-26 RX ADMIN — CEFAZOLIN 1000 MG: 10 INJECTION, POWDER, FOR SOLUTION INTRAVENOUS at 05:12

## 2018-07-26 RX ADMIN — KETOROLAC TROMETHAMINE 30 MG: 30 INJECTION, SOLUTION INTRAMUSCULAR at 17:15

## 2018-07-26 NOTE — INTERDISCIPLINARY/THERAPY
07/26/18 1252   Time Calculation   Start Time 1252   Stop Time 1330   Time Calculation (min) 38 min   General   Chart Reviewed Yes   Therapy Treatment Diagnosis R distal Radius fx- ORIF   Is this a Co-Treatment? No   Family/Caregiver Present Yes   Precautions   Reinforced Precautions Yes   Other Precautions fall   Weight Bearing Precautions Yes   RUE Weight Bearing Non Weight Bearing (NWB)   Home Living   Type of Home Motor home/RV   Home Layout Two level   Home Access Stairs to enter with rails   Entrance Stairs-Number of Steps 3   Bathroom Shower/Tub Walk-in shower   Bathroom Toilet Standard   Home Living Comments Lives in [place]/horse trailer- from [place]   Prior Function   Level of [place] Independent with ADLs and functional transfers;Independent with homemaking with ambulation   Lived With Spouse   Receives Help From Family   ADL Assistance Independent   IADL Assistance Independent   Driving Yes   Subjective Comments   Subjective Comments RN Ok'd for OT with BP now stable..  Pt agrreeable for OT and spoue present.  At end pt positioned back into bed with call light and all needs within reach.   Pain Assessment   Pain Assessment 0-10   Pain Score 8   Pain Type Surgical pain   Pain Location Arm   Pain Orientation Right   Pain Interventions Medication (See MAR);Repositioned   Cognition   Arousal/Alertness WFL   Orientation Level Oriented to time;Oriented to situation;Oriented to person;Oriented to place   Bed Mobility   Bed Mobility Assessed   Bed Mobility 1   Bed Mobility From 1 Supine   Bed Mobility Type 1 To and from   Bed Mobility to 1 Short sit   Level of Assistance 1 Min assist x 2   Bed Mobility Comments 1 partly due to multiple lines/tubes pt needed min x 2 supine to sit and then back to supine at end.   Transfers   Transfer Assessed   Transfer 1   Transfer From 1 Bed   Transfer Type 1 To and from   Transfer to 1 Commode-standard   Technique 1 Stand pivot   Transfer Device 1 Grab bar;Transfer belt    Level of Assistance 1 Min assist x 2   Trials/Comments 1 Pt tearful due to pain but just min x 2 to stand and then a few steps to commode and to sit and min x 1 back to bed.   Toileting   Toileting Level of Assistance Minimum assistance   Where Assessed Bedside commode   Toileting Comments Min  a for transfers and min a for hygiene   LUE Assessment   LUE Assessment WFL   RLE Assessment   RLE Assessment X   Activity Tolerance   Activity Tolerance Comments Pt able to complete AAROM R shoulder to 90 flex/abd.  30-90 of elbow fex and only partial fist with fingers with significant edema and Prom of fingers to full ext..  Pt was positioned in arm sling for transfers   Assessment   Rehab Potential Good   Progress Slow progress, decreased activity tolerance   Problem List Decreased ADL status;Decreased upper extremity range of motion;Decreased functional mobility;Decreased IADLs   Barriers to Discharge Inaccessible home environment   Recommendations for Therapy Continue skilled therapy   Assessment Comment Pt very tearful due to pain through the entire sessions but had fair active movement of R shoulder/elbow although hand stiff and edematous and just min a for OOB and transfer to commode.   Plan   Plan Comment R UE ROM/ADL   Treatment Interventions ADL retraining;Therapeutic activity;Therapeutic exercise;UE strengthening/ROM;Patient/family training   OT Frequency 3-5x/wk   Date POC Due 08/02/18

## 2018-07-26 NOTE — INTERDISCIPLINARY/THERAPY
07/26/18 1444   PT Last Visit   PT Received On 07/26/18   Visit Number 1   General   Chart Reviewed Yes   Therapy Treatment Diagnosis ORIF R distal radius, irrigation, debridement R elbow laceration, complex closure R elbow 7/25.   PT Treatment Duration (Min) 26 Minutes   Is this a Co-Treatment? No   Family/Caregiver Present Yes   Precautions   Other Precautions NWB R UE, chest tube   Weight Bearing Precautions Yes   RUE Weight Bearing Non Weight Bearing (NWB)   Home Living   Type of Home Motor home/RV  (Horse trailer)   Home Access Stairs to enter with rails   Entrance Stairs-Number of Steps 3   Home Adaptive Equipment None   Prior Function   Level of [place] Independent with ADLs and functional transfers   Lived With Spouse   Subjective Comments   Subjective Comments Pt. was lying in bed, agreed to PT. Martin per RN for therapy. Pt. ended session sitting in recliner with call light in reach. Chair alarm on.   Pain Assessment   Pain Assessment 0-10   Pain Score 8   Bed Mobility 1   Bed Mobility From 1 Supine   Bed Mobility Type 1 To   Bed Mobility to 1 Short sit   Level of Assistance 1 Mod assist x 2   Transfer 1   Transfer From 1 Sit;Bed   Transfer Type 1 To and from   Transfer to 1 Stand;Sit;Chair with arms   Technique 1 Sit to stand;Stand to sit   Transfer Device 1 Transfer belt   Level of Assistance 1 Min assist x 2;Increased time;Verbal cueing   Trials/Comments 1 Extra asst. to watch lines.   Ambulation   Ambulation Assessed   RUE Weight Bearing Non Weight Bearing (NWB)   Ambulation 1   Surface 1 Level surface;Smooth   Device 1 Gait belt   Assistance 1 Min assist x 2;Verbal cueing;Increased time   Quality of Gait 1 Wide base of support. Guarded due to pain.   Comments/Distance 1 2 feet.   Balance   Balance Intact   RLE Assessment   RLE Assessment WFL   LLE Assessment   LLE Assessment WFL   Light Touch   RLE Light Touch Grossly intact   LLE Light Touch Grossly intact   Activity Tolerance   Position  Sitting;Standing   Standing Endurance Tolerates less than 10 min exercise, no significant change in vital signs   Sitting Endurance Tolerates less than 10 min exercise, no significant change in vital signs   Activity Tolerance Comments SaO2 94%. O2 on 4L/min. NC.   Assessment   Rehab Potential Good   Problem List Decreased mobility;Decreased endurance;Pain   Barriers to Discharge Inaccessible home environment   Recommendation   Recommendations for Therapy Continue skilled therapy;Unable to tolerate 3 hours therapy   Plan   Treatment Interventions Bed mobility;Functional transfer training;Stair training;Gait training;Balance training;Endurance training;Therapeutic activities;Therapeutic exercises   PT Frequency 5-7x/wk   Plan Comment 2 asst. Transfers, bed mobility. Progress gait as pt. is able. Limited by pain and multiple lines.   Date POC Due 08/02/18

## 2018-07-26 NOTE — PROGRESS NOTES
Patient seen and examined.  Wrist pain improved still some significant trepidation.  Overnight noted to be hypotensive currently being given fluids.  Denies any fevers chills or night sweats.    Hand is warm and well-perfused neurovascular intact in the median radial ulnar nerve distribution.    Impression: Postoperative day #1 status post open reduction internal fixation right wrist with repair of laceration to right elbow.  Plan: Discussed [place] condition with the patient I think that she is doing well I am concerned over pressure currently she is being administered n fluid resuscitation.  The trauma surgery team is well aware of her progress and is monitoring her appropriately.  From my standpoint I like for to start occupational therapy range of motion at the fingers and hand and wrist as soon as possible she has a vacuum dressing on the right elbow the right elbow which will remain there for 7 days.  She will follow-up with Dr. Steinert upon discharge.

## 2018-07-26 NOTE — INTERDISCIPLINARY/THERAPY
Patient is a 54 y/o female, admitted s/p fall from a horse. Hx. Migraines, anxiety, fibromyalgia. WCT consulted per Dr. Zuniga with surgery for troubleshooting to provena plus incisional wound VAC pump. Patient underwent surgery on 7/25/18 per Dr. Saad Zuniga with open reduction internal fixation of right distal radius, irrigation and debridement of right elbow laceration with provena plus dressing applied to right elbow incision. This dressing to be left on for 7 days post-op. With assessment, dressing functioning well and clean, dry and intact. Instructions given to patient and her spouse at bedside. WCT to follow up PRN for troubleshooting of pump only.        07/26/18 1515   Incision 07/25/18   Date First Assessed/Time First Assessed: 07/25/18 1115   Location Descriptor: Right  Location: Elbow  Incision Type: Post-op incision intact approximated   Dressing Status Clean;Dry;Intact;Do not remove per provider order   Dressing Changed No   (R) Drainage Amount Small (25% or less of dressing)   (R) Drainage Description Serosanguineous   Wound Odor None   (R) Site Assessment Prior to Treatment Unable to view   (R) Surrounding Skin Assessment Unable to view   Dressing NPWT - Disposable system   Compression Therapy/Wraps/Garments Long stretch wrap   NPWT Cycle at Time of Assessment Continuous   NPWT Target Pressure (mmHg) 125   NPWT Dressing/Machine Therapeutic and Intact Yes   NPWT VAC Canister Changed No   NPWT Instillation Therapy, Solution Changed as Ordered N/A   WCT NPWT Unit Type Provena Plus   WCT Debridement Performed No

## 2018-07-26 NOTE — INTERDISCIPLINARY/THERAPY
Care Manager [phone]    CM reviewed chart and rounded on patient to discuss plan of care.  She will be seen by PT/OT today, depending on how she does while here she may need inpatient placement for rehab.  She is having pain and BP issues this morning.    She has a wound vac on her right elbow.  I have contacted Dr. Zuniga and obtained a wound care order to eval and treat to help coordinate discharge needs.    Anticipated disposition is unclear depending on her wound care and antibiotic needs.  CM will continue to follow.

## 2018-07-26 NOTE — NURSING END OF SHIFT
Nursing End of Shift Summary    Goals:  Clinical Goals for the Shift: control pain    Narrative Summary of Progress Towards Clinical Goals:  Patient's pain managed by rest and medication.   Patient started to eat regular food and tolerated.    Barriers for Transfer or Discharge: yes  Medical intervention.

## 2018-07-26 NOTE — PROGRESS NOTES
"07/26/18  0800 AM    ID: 53 y.o. female admitted 7/24/18 after falling off of a horse.  No loss of consciousness, injuries identified include an open displaced right radial fracture, right-sided rib fractures, and right pneumothorax.      SUBJECTIVE:  Patient seen and examined in her room this morning, patient very drowsy, states \"I can't even stay awake to talk to you\". Patient acknowledges pain in her right chest and right wrist/elbow area, however subjective examination is markedly limited due to patient's extreme drowsiness and lethargy.  Patient was able to deny increased shortness of breath, difficulty breathing, or worsening chest pain on the right side.    OBJECTIVE:  Temp:  [36.5 °C (97.7 °F)-37.2 °C (99 °F)] 36.7 °C (98.1 °F)  Heart Rate:  [62-98] 64  Resp:  [15-20] 16  BP: ()/() 121/102  Vital signs reviewed  7/26/18 CBC, CMP reviewed  7/26/18 Portable chest x-ray reviewed with Dr. Mary, radiologist    Physical Exam:  General: Lethargic, in no acute distress  HEENT: Normocephalic, ecchymosis noted to forehead, pupils equal round reactive, trachea midline, no JVD  Respiratory: Respirations shallow, unlabored, clear to auscultation, decreased to right upper lobe  Chest: Tender to palpation over right lateral chest wall, no crepitus, pigtail catheter in place to right mid axillary line, no air leak noted, chest tube not hooked to suction on examination, scant amount of sanguinous drainage noted in chamber  Cardiovascular: Regular rate and rhythm, no murmurs, rubs, clicks, gallops auscultated  Abdomen: Soft, nontender to palpation, nondistended, bowel sounds present  Extremities: Right forearm in surgical cast that extends from elbow to mid digit area, able to wiggle fingers, fingers warm to touch, capillary refill less than 3 seconds, acknowledges pain with movement  Pulses: Dorsalis pedis 2+ bilaterally, left radial 2+, unable to palpate left radial due to cast  Skin: Warm, dry, right upper " extremity with ecchymosis, lacerations to humerus with sutures intact    Upon chart review this morning, it was noted the patient had a blood pressure of 87/45 at 0200 and 72/38 at 0630, and neither of these were relayed to the provider. Upon entering the patient's room this morning, it was noted that the patient was very drowsy was having a very difficult time staying awake to participate in the examination and maintain conversation.  The patient was able to acknowledge that she felt tired and could not keep her eyes open.  Patient was connected to the continuous pulse oximetry, her saturations were noted to be approximately 97-98% on 4 L per nasal cannula, pulse was noted to be approximately 56-60.  Upon further examination of the patient, it was noted that the patient's chest tube was not hooked to suction.  Nursing staff was notified of this immediately, and chest tube was then hooked to suction, and it was related that the chest tube had not been to suction since she had come back to the floor from her surgery yesterday afternoon.  Reviewed patient's  morning labs, and total narcotic medication that she had received since yesterday afternoon, and decision was made to give patient 0.4 mg of Narcan.  Patient responded well to the Narcan, and was able to hold a conversation and stay awake.  Blood pressures slightly improved over the next 20 minutes.  Nursing staff was instructed to keep blood pressure MAP greater than 65 and to call for further instruction if this was not achieved.    Intake/Output Summary (Last 24 hours) at 07/26/18 1337  Last data filed at 07/26/18 0600   Gross per 24 hour   Intake             2085 ml   Output             2005 ml   Net               80 ml     Laboratory:  CBC with Platelet:  Lab Results   Component Value Date    WBC 8.1 07/26/2018    HGB 10.3 (L) 07/26/2018    HCT 28.7 (L) 07/26/2018     07/26/2018    RBC 3.09 (L) 07/26/2018    MCV 92.9 07/26/2018    MCH 33.2 (H)  07/26/2018    MCHC 35.7 07/26/2018    RDW 12.8 07/26/2018    MPV 8.0 07/26/2018     Comp: Lab Results   Component Value Date     07/26/2018    K 3.7 07/26/2018     07/26/2018    CO2 26 07/26/2018    BUN 6 (L) 07/26/2018    CREATININE 0.7 07/26/2018    GLUCOSE 101 07/26/2018    CALCIUM 8.0 (L) 07/26/2018    PROT 5.1 (L) 07/26/2018    ALBUMIN 2.8 (L) 07/26/2018    AST 30 07/26/2018    ALT 32 07/26/2018    ALKPHOS 40 (L) 07/26/2018    BILITOT 0.31 07/26/2018     ASSESSMENT:  53 y.o.female admitted 7/24/18 after falling off of a horse.  Landed on her right side, not wearing helmet, denies loss of consciousness, injuries include an open displaced right radial fracture, right-sided rib fractures, and large right pneumothorax. Pigtail catheter placed in emergency department with partial resolution of pneumothorax. Is POD #1 from an ORIF of the right distal radius, irrigation & debridement of a right elbow laceration, complex closure of right elbow and placement of Prevena wound dressing to right elbow. Patient lethargic, hypotensive on examination this morning, chest tube not to suction. Chest tube immediately placed to suction, given Narcan with improvement of both blood pressure and lethargy.     07/26/18:  PLAN:  -Continue with vital sign checks every hour   -Maintain MAP greater than 65  -Keep chest tube to -20 cm H2O  -Supplemental oxygen of 2-3 L and frequent use of incentive spirometry  -Consult respiratory therapy for nebulizer treatments  -Repeat chest x-ray 7/27/18 morning  -Repeat CBC, BMP 7/27/18 morning  -Pain and nausea control as necessary   -Judicious use of narcotics and sedating medications  -Elevate, ice right upper extremity  -Patient may have regular diet as tolerated  -PT/OT consulted   -Weight-bear status to right upper extremity per orthopedic surgery recommendations  -DVT prophylaxis: SCDs, Lovenox 40 mg SQ daily    Patient case and plan of care discussed with Dr. Sauer.    Michelle BENNETT  Kyle, CNP

## 2018-07-26 NOTE — PLAN OF CARE
Problem: Wound Care  Goal: Wound will progress through the stages of healing  INTERVENTIONS:  1. Assess decrease in wound size.  2. Assess for presence of granulation tissue.  3. Assess for signs and symptoms of infection.  4. Assess drainage.  Outcome: Progressing   07/26/18 1515   Interventions Appropriate for this Patient   WC WOUND WILL PROGRESS THROUGH THE STAGES OF HEALING Assess drainage.

## 2018-07-26 NOTE — DISCHARGE INSTR - OTHER INFO
Wound Care Instructions for Discharge     Wound Site / Location:  • Right elbow post-op intact incision    NPWT:  • Prevena incisional wound VAC pump at target pressure of 125mmHg on continuous negative pressure.  • If NPWT pump malfunctions or unable to troubleshoot, contact physician or surgeon for further instruction.  • Pump to remain intact over site. Pump will automatically shut off after 7 days post-op. Dressing to then be removed.   • Follow any post-op instructions as directed per your surgeon.   • Follow up with your primary care provider as directed after hospital discharge.     Right wrist splint: Do not remove splint until follow-up visit.  Keep splint clean and dry at all times.

## 2018-07-26 NOTE — NURSING END OF SHIFT
Nursing End of Shift Summary    Goals:  Clinical Goals for the Shift: pain control; maintain safety and comfort; monitor vital signs and respiratory status    Narrative Summary of Progress Towards Clinical Goals:  Patient was weaned from the oxygen therapy; no fall noted; pain was maintained on minimum;     Barriers for Transfer or Discharge: yes  Patient needs to be evaluated by PT; patient still has drains and IV lines and still has pain

## 2018-07-26 NOTE — NURSING NOTE
Call out to Michelle CARPENTER regarding low to no output from CT. Instructed to keep the CT to suction and she will speak to Dr. Champion about this.

## 2018-07-26 NOTE — PLAN OF CARE
Problem: Pain - Adult  Goal: Verbalizes/displays adequate comfort level or baseline comfort level  INTERVENTIONS:  1. Encourage patient to monitor pain and request interventions  2. Assess pain using the appropriate pain scale  3. Administer analgesics based on type and severity of pain and evaluate response  4. Educate/Implement non-pharmacological measures as appropriate and evaluate response  5. Consider cultural, developmental and social influences on pain and pain management  6. Notify Provider if interventions unsuccessful or patient reports new pain   Outcome: Progressing   07/25/18 2100   Interventions Appropriate for this Patient   Verbalizes/displays adequate comfort level or baseline comfort level Encourage patient to monitor pain and request interventions;Assess pain using the appropriate pain scale;Administer analgesics based on type and severity of pain and evaluate response;Educate/Implement non-pharmacological measures as appropriate and evaluate response;Consider cultural, developmental and social influences on pain and pain management       Problem: Safety Adult  Goal: Patient will remain safe during hospitalization  INTERVENTIONS    1. Assess patient for fall risk and implement interventions if needed  2. Use safe transport techniques  3. Assess patient using the Sergei skin assessment scale  4. Assess patient for risk of aspiration  5. Assess patient for risk of elopement   Outcome: Progressing   07/25/18 2100   Interventions Appropriate for this Patient   Patient will remain safe [facility] Use safe transport;Assess Patient using the Sergei scale;Assess patient for Fall Risk

## 2018-07-26 NOTE — BRIEF OP NOTE
Brief Op Note:    Luiza Tay  7/24/2018 - 7/25/2018    Pre-op Diagnosis     * Other type I or II open intra-articular fracture of distal end of right radius, initial encounter [S52.581B]       Post-op Diagnosis     * Other type I or II open intra-articular fracture of distal end of right radius, initial encounter [S52.631B]    Procedures:    * RIGHT ORIF WRIST    * irrigation and debridement of right upper extremity and all indicated procedures    Surgeon(s):  Saad Zuniga MD    Anesthesia:  Anesthesiologist: Mainor Mancia MD  CRNA: Guille Coelho CRNA; Crissy Taylor CRNA  Student Nurse Anesthetist: MUSA Duarte  General      Staff:   Circulator: Kelle Hay RN; Abida Osborn RN  Relief Circulator: Mark Bailon RN  Relief Scrub: David Peña  Scrub Person: Liliana Higgins  First Assist: Trevon Harrison; Sebas Zhou RN    Estimated Blood Loss:  30 mL    Specimens:  * No specimens in log *      Drains:  Chest Tube Right (Active)   Chest Tube Air Leak Continuous 7/25/2018  6:35 AM   Patency Intervention Tip/tilt 7/25/2018  6:35 AM   Drainage Description Serosanguineous 7/25/2018  6:35 AM   Dressing Status Clean;Dry;Intact 7/25/2018  8:53 AM   Site Assessment Clean;Dry;Intact 7/25/2018  8:53 AM   Surrounding Skin Intact 7/25/2018  8:53 AM   Output (mL) 10 mL 7/25/2018  6:35 AM       [REMOVED] Urethral Catheter Latex 16 Fr. (Removed)       Complications:  None    Saad Zuniga MD  Phone Number: [phone]    Date: 7/25/2018  Time: 6:55 PM

## 2018-07-26 NOTE — PLAN OF CARE
Problem: Pain - Adult  Goal: Verbalizes/displays adequate comfort level or baseline comfort level  INTERVENTIONS:  1. Encourage patient to monitor pain and request interventions  2. Assess pain using the appropriate pain scale  3. Administer analgesics based on type and severity of pain and evaluate response  4. Educate/Implement non-pharmacological measures as appropriate and evaluate response  5. Consider cultural, developmental and social influences on pain and pain management  6. Notify Provider if interventions unsuccessful or patient reports new pain   Outcome: Progressing   07/25/18 2100   Interventions Appropriate for this Patient   Verbalizes/displays adequate comfort level or baseline comfort level Encourage patient to monitor pain and request interventions;Assess pain using the appropriate pain scale;Administer analgesics based on type and severity of pain and evaluate response;Educate/Implement non-pharmacological measures as appropriate and evaluate response;Consider cultural, developmental and social influences on pain and pain management       Problem: Infection - Adult  Goal: Absence of infection during hospitalization  INTERVENTIONS:  1. Assess and monitor for signs and symptoms of infection  2. Monitor lab/diagnostic results  3. Monitor all insertion sites/wounds/incisions  4. Monitor secretions for changes in amount and color  5. Administer medications as ordered  6. Educate and encourage patient and family to use good hand hygiene technique  7. Identify and educate in appropriate isolation precautions for identified infection/condition   Outcome: Progressing      Problem: Safety Adult  Goal: Patient will remain safe during hospitalization  INTERVENTIONS    1. Assess patient for fall risk and implement interventions if needed  2. Use safe transport techniques  3. Assess patient using the Sergei skin assessment scale  4. Assess patient for risk of aspiration  5. Assess patient for risk of elopement    Outcome: Progressing   07/25/18 2100   Interventions Appropriate for this Patient   Patient will remain safe durning hospitalization Use safe transport;Assess Patient using the Sergei scale;Assess patient for Fall Risk       Problem: Daily Care  Goal: Daily care needs are met  INTERVENTIONS:   1. Assess and monitor ability to perform self care and identify potential discharge needs  2. Assess skin integrity/risk for skin breakdown  3. Assist patient with activities of daily living as needed  4. Encourage independent activity per ability   5. Provide mouth care   6. Include patient/family/caregiver in decisions related to daily care    Outcome: Progressing   07/25/18 2749   Interventions Appropriate for this Patient   Daily care needs are met Assess and monitor skin integrity;Assess and monitor ability to perform self care and identify potential discharge needs;Assess skin integrity/risk for skin breakdown;Assist patient with activities of daily living as needed;Encourage independent activity per ability;Include patient/family/caregiver in decisions related to daily care       Problem: Respiratory - Adult  Goal: Achieves optimal ventilation and oxygenation  INTERVENTIONS:  1. Assess for changes in respiratory status  2. Assess for changes in mentation and behavior  3. Position to facilitate oxygenation and minimize respiratory effort  4. Oxygen supplementation based on oxygen saturation or ABGs  5. Assess patient's ability to cough effectively  6. Encourage broncho-pulmonary hygiene including cough, deep breathe  7. Assess the need for suctioning   8. Assess and instruct to report SOB or any respiratory difficulty  9. Respiratory Therapy support as indicated, including medications and treatment.  Outcome: Progressing   07/25/18 8691   Interventions Appropriate for this Patient   Achieves optimal ventilation and oxygenation Assess for changes in respiratory status;Assess for changes in mentation and  behavior;Position to facilitate oxygenation and minimize respiratory effort;Assess patient's ability to cough effectively;Encourage broncho-pulmonary hygiene including cough, deep breathe;Assess and instruct to report shortness of breath or any respiratory difficulty       Problem: Skin/Tissue Integrity - Adult  Goal: Incisions, wounds, or drain sites healing without S/S of infection  INTERVENTIONS  1. Assess and document risk factors for skin breakdown  2. Assess and document skin integrity  3. Assess and document dressing/incision, wound bed, drain sites and surrounding tissue  4. Implement wound care per orders  Outcome: Progressing   07/25/18 2355   Interventions Appropriate for this Patient   Incision(s), wound(s) or drain site(s) healing without S/S of infection Assess and document risk factors for skin breakdown;Assess and document skin integrity;Assess and document dressing/incision, wound bed, drain sites and surrounding tissue;Implement wound care per orders       Problem: Musculoskeletal - Adult  Goal: Maintain proper alignment of affected body part  INTERVENTIONS:  1. Support and protect limb and body alignment per provider's orders  2. Instruct and reinforce with patient and family use of appropriate assistive device and precautions (e.g. spinal or hip dislocation precautions)  Outcome: Progressing   07/25/18 2355   Interventions Appropriate for this Patient   Maintain proper alignment of affected body part Support and protect limb and body alignment per provider's orders

## 2018-07-26 NOTE — WOUND CARE PROCEDURE
Wound Care Instructions for Nursing    Wound Site / Location:  • Right elbow post-op intact incision    NPWT:  • Provena incisional wound VAC pump at target pressure of 125mmHg on continuous negative pressure.  • If NPWT pump malfunctions or unable to troubleshoot, contact physician or surgeon for further instruction.  • Pump to remain intact over site. Pump will automatically shut off after 7 days post-op. Dressing to then be removed.     Frequency  • Wound Care Team to troubleshoot pump PRN.

## 2018-07-27 ENCOUNTER — APPOINTMENT (OUTPATIENT)
Dept: RADIOLOGY | Facility: HOSPITAL | Age: 53
DRG: 958 | End: 2018-07-27
Payer: OTHER GOVERNMENT

## 2018-07-27 LAB
ALBUMIN SERPL-MCNC: 2.8 G/DL (ref 3.5–5.3)
ALP SERPL-CCNC: 44 U/L (ref 41–108)
ALT SERPL-CCNC: 19 U/L (ref 0–52)
ANION GAP SERPL CALC-SCNC: 6 MMOL/L (ref 3–11)
AST SERPL-CCNC: 19 U/L (ref 0–39)
BASOPHILS # BLD AUTO: 0 10*3/UL
BASOPHILS NFR BLD AUTO: 0 % (ref 0–2)
BILIRUB SERPL-MCNC: 0.33 MG/DL (ref 0–1.4)
BUN SERPL-MCNC: 4 MG/DL (ref 7–25)
CALCIUM ALBUM COR SERPL-MCNC: 8.9 MG/DL (ref 8.5–10.1)
CALCIUM SERPL-MCNC: 7.9 MG/DL (ref 8.6–10.3)
CHLORIDE SERPL-SCNC: 109 MMOL/L (ref 98–107)
CO2 SERPL-SCNC: 24 MMOL/L (ref 21–32)
CREAT SERPL-MCNC: 0.5 MG/DL (ref 0.6–1.2)
EOSINOPHIL # BLD AUTO: 0.2 10*3/UL
EOSINOPHIL NFR BLD AUTO: 2 % (ref 0–3)
ERYTHROCYTE [DISTWIDTH] IN BLOOD BY AUTOMATED COUNT: 12.5 % (ref 11.5–14)
GFR SERPL CREATININE-BSD FRML MDRD: 130 ML/MIN/1.73M*2
GLUCOSE SERPL-MCNC: 75 MG/DL (ref 70–105)
HCT VFR BLD AUTO: 29.6 % (ref 34–45)
HGB BLD-MCNC: 10.5 G/DL (ref 11.5–15.5)
LYMPHOCYTES # BLD AUTO: 1.8 10*3/UL
LYMPHOCYTES NFR BLD AUTO: 26 % (ref 11–47)
MCH RBC QN AUTO: 33 PG (ref 28–33)
MCHC RBC AUTO-ENTMCNC: 35.5 G/DL (ref 32–36)
MCV RBC AUTO: 92.7 FL (ref 81–97)
MONOCYTES # BLD AUTO: 0.5 10*3/UL
MONOCYTES NFR BLD AUTO: 7 % (ref 3–11)
NEUTROPHILS # BLD AUTO: 4.4 10*3/UL
NEUTROPHILS NFR BLD AUTO: 64 % (ref 41–81)
PLATELET # BLD AUTO: 169 10*3/UL (ref 140–350)
PMV BLD AUTO: 7.7 FL (ref 6.9–10.8)
POTASSIUM SERPL-SCNC: 3.2 MMOL/L (ref 3.5–5.1)
PROT SERPL-MCNC: 5.2 G/DL (ref 6–8.3)
RBC # BLD AUTO: 3.19 10*6/ΜL (ref 3.7–5.3)
SODIUM SERPL-SCNC: 139 MMOL/L (ref 135–145)
WBC # BLD AUTO: 6.9 10*3/UL (ref 4.5–10.5)

## 2018-07-27 PROCEDURE — 99233 SBSQ HOSP IP/OBS HIGH 50: CPT | Performed by: NURSE PRACTITIONER

## 2018-07-27 PROCEDURE — 85025 COMPLETE CBC W/AUTO DIFF WBC: CPT | Performed by: SURGERY

## 2018-07-27 PROCEDURE — 6360000200 HC RX 636 W HCPCS (ALT 250 FOR IP): Performed by: ORTHOPAEDIC SURGERY

## 2018-07-27 PROCEDURE — 2500000200 HC RX 250 WO HCPCS: Performed by: ORTHOPAEDIC SURGERY

## 2018-07-27 PROCEDURE — (BLANK) HC ROOM SEMI PRIVATE

## 2018-07-27 PROCEDURE — 6370000100 HC RX 637 (ALT 250 FOR IP): Performed by: SURGERY

## 2018-07-27 PROCEDURE — 36415 COLL VENOUS BLD VENIPUNCTURE: CPT | Performed by: SURGERY

## 2018-07-27 PROCEDURE — 2590000100 HC RX 259: Performed by: NURSE PRACTITIONER

## 2018-07-27 PROCEDURE — 2590000100 HC RX 259: Performed by: SURGERY

## 2018-07-27 PROCEDURE — 71045 X-RAY EXAM CHEST 1 VIEW: CPT

## 2018-07-27 PROCEDURE — 6370000100 HC RX 637 (ALT 250 FOR IP): Performed by: NURSE PRACTITIONER

## 2018-07-27 PROCEDURE — 2580000300 HC RX 258: Performed by: NURSE PRACTITIONER

## 2018-07-27 PROCEDURE — 6360000200 HC RX 636 W HCPCS (ALT 250 FOR IP): Performed by: SURGERY

## 2018-07-27 PROCEDURE — 2580000300 HC RX 258: Performed by: ORTHOPAEDIC SURGERY

## 2018-07-27 PROCEDURE — 80053 COMPREHEN METABOLIC PANEL: CPT | Performed by: SURGERY

## 2018-07-27 RX ORDER — SODIUM CHLORIDE 9 MG/ML
500 INJECTION, SOLUTION INTRAVENOUS ONCE
Status: COMPLETED | OUTPATIENT
Start: 2018-07-27 | End: 2018-07-27

## 2018-07-27 RX ORDER — DOCUSATE SODIUM 100 MG/1
200 CAPSULE, LIQUID FILLED ORAL 2 TIMES DAILY
Status: DISCONTINUED | OUTPATIENT
Start: 2018-07-27 | End: 2018-07-30 | Stop reason: HOSPADM

## 2018-07-27 RX ORDER — OXYCODONE HYDROCHLORIDE 5 MG/1
5-10 TABLET ORAL EVERY 4 HOURS PRN
Status: DISCONTINUED | OUTPATIENT
Start: 2018-07-27 | End: 2018-07-30 | Stop reason: HOSPADM

## 2018-07-27 RX ORDER — IBUPROFEN 800 MG/1
800 TABLET ORAL EVERY 8 HOURS SCHEDULED
Status: DISCONTINUED | OUTPATIENT
Start: 2018-07-27 | End: 2018-07-30 | Stop reason: HOSPADM

## 2018-07-27 RX ORDER — BUTALBITAL, ACETAMINOPHEN AND CAFFEINE 50; 325; 40 MG/1; MG/1; MG/1
2 TABLET ORAL ONCE
Status: COMPLETED | OUTPATIENT
Start: 2018-07-27 | End: 2018-07-27

## 2018-07-27 RX ADMIN — ACETAMINOPHEN 650 MG: 325 TABLET ORAL at 17:53

## 2018-07-27 RX ADMIN — LIDOCAINE 1 PATCH: 560 PATCH PERCUTANEOUS; TOPICAL; TRANSDERMAL at 09:25

## 2018-07-27 RX ADMIN — CEFAZOLIN 1000 MG: 10 INJECTION, POWDER, FOR SOLUTION INTRAVENOUS at 05:48

## 2018-07-27 RX ADMIN — OXYCODONE HYDROCHLORIDE 5 MG: 5 TABLET ORAL at 03:54

## 2018-07-27 RX ADMIN — SODIUM CHLORIDE 100 ML/HR: 9 INJECTION, SOLUTION INTRAVENOUS at 03:57

## 2018-07-27 RX ADMIN — TIZANIDINE 4 MG: 4 TABLET ORAL at 22:30

## 2018-07-27 RX ADMIN — KETOROLAC TROMETHAMINE 30 MG: 30 INJECTION, SOLUTION INTRAMUSCULAR at 05:46

## 2018-07-27 RX ADMIN — FENTANYL CITRATE 25 MCG: 50 INJECTION INTRAMUSCULAR; INTRAVENOUS at 22:30

## 2018-07-27 RX ADMIN — ONDANSETRON 4 MG: 2 INJECTION INTRAMUSCULAR; INTRAVENOUS at 13:36

## 2018-07-27 RX ADMIN — ACETAMINOPHEN 650 MG: 325 TABLET ORAL at 11:43

## 2018-07-27 RX ADMIN — GABAPENTIN 100 MG: 100 CAPSULE ORAL at 22:30

## 2018-07-27 RX ADMIN — CEFAZOLIN 1000 MG: 10 INJECTION, POWDER, FOR SOLUTION INTRAVENOUS at 23:07

## 2018-07-27 RX ADMIN — ACETAMINOPHEN 650 MG: 325 TABLET ORAL at 23:12

## 2018-07-27 RX ADMIN — BUTALBITAL, ACETAMINOPHEN, AND CAFFEINE 2 TABLET: 50; 325; 40 TABLET ORAL at 09:21

## 2018-07-27 RX ADMIN — SODIUM CHLORIDE 100 ML/HR: 9 INJECTION, SOLUTION INTRAVENOUS at 21:37

## 2018-07-27 RX ADMIN — IBUPROFEN 800 MG: 800 TABLET ORAL at 13:31

## 2018-07-27 RX ADMIN — GABAPENTIN 100 MG: 100 CAPSULE ORAL at 13:32

## 2018-07-27 RX ADMIN — CEFAZOLIN 1000 MG: 10 INJECTION, POWDER, FOR SOLUTION INTRAVENOUS at 13:32

## 2018-07-27 RX ADMIN — ENOXAPARIN SODIUM 40 MG: 40 INJECTION, SOLUTION INTRAVENOUS; SUBCUTANEOUS at 09:24

## 2018-07-27 RX ADMIN — DOCUSATE SODIUM 200 MG: 100 CAPSULE, LIQUID FILLED ORAL at 09:24

## 2018-07-27 RX ADMIN — GABAPENTIN 100 MG: 100 CAPSULE ORAL at 09:24

## 2018-07-27 RX ADMIN — STANDARDIZED SENNA CONCENTRATE AND DOCUSATE SODIUM 1 TABLET: 8.6; 5 TABLET, FILM COATED ORAL at 09:24

## 2018-07-27 RX ADMIN — IBUPROFEN 800 MG: 800 TABLET ORAL at 22:30

## 2018-07-27 RX ADMIN — GABAPENTIN 100 MG: 100 CAPSULE ORAL at 17:54

## 2018-07-27 RX ADMIN — OXYCODONE HYDROCHLORIDE 10 MG: 5 TABLET ORAL at 17:59

## 2018-07-27 RX ADMIN — ACETAMINOPHEN 650 MG: 325 TABLET ORAL at 05:48

## 2018-07-27 RX ADMIN — SODIUM CHLORIDE 500 ML: 9 INJECTION, SOLUTION INTRAVENOUS at 07:45

## 2018-07-27 RX ADMIN — FENTANYL CITRATE 25 MCG: 50 INJECTION INTRAMUSCULAR; INTRAVENOUS at 11:47

## 2018-07-27 NOTE — INTERDISCIPLINARY/THERAPY
07/27/18 0819   Subjective Comments   Subjective Comments Attempted to see pt at this time however pt verbalizing increased fatigue. pt stated she wish to remain in bed for a bit to allow for some rest. Will continue to attempt as able.

## 2018-07-27 NOTE — PROGRESS NOTES
Patient seen and examined today pressures improving overnight.  Received Narcan yesterday improved mental status.  Still with pain today however improved.    Splint intact no evidence of drainage neurovascular intact in the median radial ulnar nerve distribution.  Finger slightly stiff can get full extension of the fingers as well as thumb but with some discomfort.  Vacuum dressing intact on left elbow minimal serosanguineous drainage    Imaging: Wrist x-rays reviewed stable fixation        Impression: Postoperative day #2 status post open reduction internal fixation right distal radius irrigation debridement right elbow laceration.           Plan: Range of motion as tolerated with occupational therapy and independently of right hand and wrist and elbow.  Can maintain splint for 2 weeks until seen in follow-up and then will be placed into a cockup wrist splint follow-up with Dr. Steinert upon discharge

## 2018-07-27 NOTE — PROGRESS NOTES
07/27/18  0800 AM    ID: 53 y.o. female admitted 7/24/18 after falling off of a horse.  No loss of consciousness, injuries identified include an open displaced right radial fracture, right-sided rib fractures, and right pneumothorax.      SUBJECTIVE:  Hypotensive. A-febrile. No acute events noted overnight.   Pt resting comfortably in bed, states she has a massive headache. Denies feeling lethargic, weak, dizzy. Denies CP, palpitations. Admits intermittent SOB, worsening with ambulation. Admits RUE pain, R chest wall pain and discomfort.     OBJECTIVE:  Temp:  [36.7 °C (98.1 °F)-38.2 °C (100.8 °F)] 36.7 °C (98.1 °F)  Heart Rate:  [64-99] 81  Resp:  [16-18] 18  BP: ()/() 126/58  Vital signs reviewed    Physical Exam:  General: alert, orient, no acute distress  HEENT: Normocephalic, ecchymosis noted to forehead, pupils equal round reactive, trachea midline, no JVD  Respiratory: Respirations shallow, unlabored, clear to auscultation. No air leak noted to atrium. Chest tube dressing CDI.  Cardiovascular: Regular rate and rhythm, no murmurs, rubs, clicks, gallops auscultated  Abdomen: Soft, nontender to palpation, nondistended, bowel sounds present  Extremities: Right forearm in surgical cast that extends from elbow to mid digit area, able to wiggle fingers, fingers warm to touch, capillary refill less than 3 seconds, acknowledges pain with movement      Intake/Output Summary (Last 24 hours) at 07/27/18 0958  Last data filed at 07/27/18 0600   Gross per 24 hour   Intake             3147 ml   Output             1550 ml   Net             1597 ml     Laboratory:  CBC with Platelet:    Lab Results   Component Value Date    WBC 6.9 07/27/2018    HGB 10.5 (L) 07/27/2018    HCT 29.6 (L) 07/27/2018     07/27/2018    RBC 3.19 (L) 07/27/2018    MCV 92.7 07/27/2018    MCH 33.0 07/27/2018    MCHC 35.5 07/27/2018    RDW 12.5 07/27/2018    MPV 7.7 07/27/2018     Comp:   Lab Results   Component Value Date      07/27/2018    K 3.2 (L) 07/27/2018     (H) 07/27/2018    CO2 24 07/27/2018    BUN 4 (L) 07/27/2018    CREATININE 0.5 (L) 07/27/2018    GLUCOSE 75 07/27/2018    CALCIUM 7.9 (L) 07/27/2018    PROT 5.2 (L) 07/27/2018    ALBUMIN 2.8 (L) 07/27/2018    AST 19 07/27/2018    ALT 19 07/27/2018    ALKPHOS 44 07/27/2018    BILITOT 0.33 07/27/2018     ASSESSMENT:  53 y.o.female admitted 7/24/18 after falling off of a horse.  Landed on her right side, not wearing helmet, denies loss of consciousness, injuries include an open displaced right radial fracture, right-sided rib fractures, and large right pneumothorax. Pigtail catheter placed in emergency department with partial resolution of pneumothorax. Is POD 2 s/p ORIF of the right distal radius, irrigation & debridement of a right elbow laceration, complex closure of right elbow and placement of Prevena wound dressing to right elbow. CXR this AM showing slight improvement of R apical pneumo. Will keep to suction and likely place to water seal tomorrow. Repeat CXR in AM. Will adjust narcotics as pt has multiple narcotics ordered. Hypotensive this AM with documented 60/30 BP. 500cc NS Bolus x 1 ordered. Will keep pt on MIVF and reduce narcotics. Nursing staff instructed to notify provider of hypotension.     07/27/18:  PLAN:  -Chest tube to suction  -CXR in AM  -Regular diet  -Pulmonary toilet  -Elevate, ice right upper extremity  -PT/OT consulted    -Weight-bear status to right upper extremity per orthopedic  -DVT prophylaxis: SCDs, Lovenox 40 mg SQ daily    BAR YANG, CNP

## 2018-07-27 NOTE — PLAN OF CARE
Problem: Knowledge Deficit  Goal: Patient/family/caregiver demonstrates understanding of disease process, treatment plan, medications, and discharge instructions  INTERVENTIONS:   1. Complete learning assessment and assess knowledge base  2. Provide teaching at level of understanding   3. Provide teaching via preferred learning methods   Outcome: Progressing   07/27/18 0255   Interventions Appropriate for this Patient   Patient/family/caregiver demonstrates understanding of disease process, treatment plan, medications, and discharge instructions Complete learning assessment and assess knowledge base;Provide teaching at level of understanding;Provide teaching via preferred learning methods       Problem: Potential for Compromised Skin Integrity  Goal: Skin Integrity is Maintained or Improved  INTERVENTIONS:  1. Assess and monitor skin integrity  2. Collaborate with interdisciplinary team and initiate plans and interventions as needed  3. Alternate a full bath with partial baths for elderly   4. Monitor patient's hygiene practices   5. Collaborate with wound, ostomy, and continence nurse   Outcome: Progressing   07/27/18 0255   Interventions Appropriate for this Patient   Skin integrity is maintained or improved Collaborate with interdisciplinary team and initiate plans and interventions as needed;Alternate a full bath with partial baths for elderly;Monitor patient's hygiene practices;Collaborate with wound, ostomy, and continence nurse     Goal: Nutritional status is improving  INTERVENTIONS:  1. Monitor and assess patient for malnutrition (ex- brittle hair, bruises, dry skin, pale skin and conjunctiva, muscle wasting, smooth red tongue, and disorientation)  2. Monitor patient's weight and dietary intake as ordered or per policy  3. Determine patient's food preferences and provide high-protein, high-caloric foods as appropriate  4. Assist patient with eating   5. Allow adequate time for meals   6. Encourage patient  to take dietary supplement as ordered   7. Collaborate with dietitian  8. Include patient/family/caregiver in decisions related to nutrition   Outcome: Progressing   07/27/18 0255   Interventions Appropriate for this Patient   Nutritional status is improving Monitor and assess patient for malnutrition (ex- brittle hair, bruises, dry skin, pale skin and conjunctiva, muscle wasting, smooth red tongue, and disorientation);Monitor patient's weight and dietary intake as ordered or per policy;Determine patient's food preferences and provide high-protein, high-caloric foods as appropriate;Assist patient with eating;Include patient/family/caregiver in decisions related to nutrition;Collaborate with dietitian;Encourage patient to take dietary supplement as ordered     Goal: MOBILITY IS MAINTAINED OR IMPROVED  INTERVENTIONS  1. Collaborate with interdisciplinary team and initiate plan and interventions as ordered (PT/OT)  2. Encourage ambulation  3. Up to chair for meals  4. Monitor for signs of deconditioning   Outcome: Progressing   07/27/18 0255   Interventions Appropriate for this Patient   Mobility is Maintained or Improved Collaborate with interdisciplinary team and initiate plan and interventions as ordered (PT/OT);Encourage ambulation;Up to chair for meals;Monitor for signs of deconditioning       Problem: Urinary Incontinence  Goal: Perineal skin integrity is maintained or improved  INTERVENTIONS:  1. Assess genitourinary system, perineal skin, labs (urinalysis), and history of incontinence to include past management, aggravating, and alleviating factors  2. Collaborate with interdisciplinary team including wound, ostomy, and continence nurse and initiate plans and interventions as needed  4. Consider urine containment device  5. Apply skin protectant   6. Develop skin care regimen  7. Provide privacy when changing patient's incontinence device to maintain their dignity   Outcome: Progressing   07/27/18 0255    Interventions Appropriate for this Patient   Perineal skin integrity is maintained or improved Assess genitourinary system, perineal skin, labs (urinalysis), and history of incontinence to include past management, aggravating, and alleviating factors;Apply skin protectant;Provide privacy when changing patient's incontinence device to maintain their dignity;Consider urine containment device;Develop skin care regimen;Collaborate with interdisciplinary team including wound, ostomy, and continence nurse and initiate plans and interventions as needed       Problem: Pain - Adult  Goal: Verbalizes/displays adequate comfort level or baseline comfort level  INTERVENTIONS:  1. Encourage patient to monitor pain and request interventions  2. Assess pain using the appropriate pain scale  3. Administer analgesics based on type and severity of pain and evaluate response  4. Educate/Implement non-pharmacological measures as appropriate and evaluate response  5. Consider cultural, developmental and social influences on pain and pain management  6. Notify Provider if interventions unsuccessful or patient reports new pain   Outcome: Progressing   07/27/18 0255   Interventions Appropriate for this Patient   Verbalizes/displays adequate comfort level or baseline comfort level Encourage patient to monitor pain and request interventions;Assess pain using the appropriate pain scale;Administer analgesics based on type and severity of pain and evaluate response;Educate/Implement non-pharmacological measures as appropriate and evaluate response;Notify Provider if interventions unsuccessful or patient reports new pain       Problem: Infection - Adult  Goal: Absence of infection during hospitalization  INTERVENTIONS:  1. Assess and monitor for signs and symptoms of infection  2. Monitor lab/diagnostic results  3. Monitor all insertion sites/wounds/incisions  4. Monitor secretions for changes in amount and color  5. Administer medications as  ordered  6. Educate and encourage patient and family to use good hand hygiene technique  7. Identify and educate in appropriate isolation precautions for identified infection/condition   Outcome: Progressing   07/27/18 0255   Interventions Appropriate for this Patient   Absence of infection during hospitalization Assess and monitor for signs and symptoms of infection;Monitor all insertion sites/wounds/incisions;Monitor secretions for changes in amount and colo;Educate and encourage patient and family to use good hand hygiene technique;Identify and educate in appropriate isolation precautions for identified infection/condition;Administer medications as ordered;Monitor lab/diagnostic results       Problem: Safety Adult  Goal: Patient will remain safe during hospitalization  INTERVENTIONS    1. Assess patient for fall risk and implement interventions if needed  2. Use safe transport techniques  3. Assess patient using the Sergei skin assessment scale  4. Assess patient for risk of aspiration  5. Assess patient for risk of elopement   Outcome: Progressing   07/27/18 0255   Interventions Appropriate for this Patient   Patient will remain safe durning hospitalization Assess patient for Fall Risk;Use safe transport;Assess Patient using the Sergei scale       Problem: Daily Care  Goal: Daily care needs are met  INTERVENTIONS:   1. Assess and monitor ability to perform self care and identify potential discharge needs  2. Assess skin integrity/risk for skin breakdown  3. Assist patient with activities of daily living as needed  4. Encourage independent activity per ability   5. Provide mouth care   6. Include patient/family/caregiver in decisions related to daily care    Outcome: Progressing   07/27/18 0255   Interventions Appropriate for this Patient   Daily care needs are met Assess and monitor skin integrity;Identify patients at risk for skin breakdown on admission and per policy;Assess and monitor ability to perform self  care and identify potential discharge needs;Assess skin integrity/risk for skin breakdown;Assist patient with activities of daily living as needed;Encourage independent activity per ability;Include patient/family/caregiver in decisions related to daily care       Problem: Discharge Barriers  Goal: Patient's discharge needs are met  INTERVENTIONS:  1. Assess patient for self-management skills  2. Encourage participation in management  3. Identify potential discharge barriers on admission and throughout hospital stay  4. Involve patient/family/caregiver in discharge planning process  5. Collaborate with case management/ for discharge needs   Outcome: Progressing   07/27/18 0255   Interventions Appropriate for this Patient   Patient discharge needs are met Assess patient for self-management skills;Encourage participation in management;Identify potential discharge barriers on admission and throughout hospital stay;Involve patient/family/caregiver in discharge planning process;Collaborate with case management/ for discharge needs       Problem: Respiratory - Adult  Goal: Achieves optimal ventilation and oxygenation  INTERVENTIONS:  1. Assess for changes in respiratory status  2. Assess for changes in mentation and behavior  3. Position to facilitate oxygenation and minimize respiratory effort  4. Oxygen supplementation based on oxygen saturation or ABGs  5. Assess patient's ability to cough effectively  6. Encourage broncho-pulmonary hygiene including cough, deep breathe  7. Assess the need for suctioning   8. Assess and instruct to report SOB or any respiratory difficulty  9. Respiratory Therapy support as indicated, including medications and treatment.   Outcome: Progressing   07/27/18 0255   Interventions Appropriate for this Patient   Achieves optimal ventilation and oxygenation Assess for changes in respiratory status;Assess for changes in mentation and behavior;Position to facilitate  oxygenation and minimize respiratory effort;Oxygen supplementation based on oxygen saturation or arterial blood gases;Assess patient's ability to cough effectively;Encourage broncho-pulmonary hygiene including cough, deep breathe;Assess the need for suctioning;Assess and instruct to report shortness of breath or any respiratory difficulty;Respiratory Therapy support as indicated, including medications and treatment       Problem: Skin/Tissue Integrity - Adult  Goal: Incisions, wounds, or drain sites healing without S/S of infection  INTERVENTIONS  1. Assess and document risk factors for skin breakdown  2. Assess and document skin integrity  3. Assess and document dressing/incision, wound bed, drain sites and surrounding tissue  4. Implement wound care per orders   Outcome: Progressing   07/27/18 0255   Interventions Appropriate for this Patient   Incision(s), wound(s) or drain site(s) healing without S/S of infection Assess and document risk factors for skin breakdown;Assess and document skin integrity;Assess and document dressing/incision, wound bed, drain sites and surrounding tissue;Implement wound care per orders       Problem: Musculoskeletal - Adult  Goal: Return mobility to safest level of function  INTERVENTIONS:  1. Assess patient stability and activity tolerance for standing, transferring and ambulating w/ or w/o assistive devices  2. Assist with transfers and ambulation using safe practices  3. Ensure adequate protection for wounds/incisions during mobilization  4. Obtain PT/OT and other consults as needed  5. Apply Continuous Passive Motion per order to increase flexion toward goal  6. Instruct patient/family in ordered activity level   Outcome: Progressing   07/27/18 0255   Interventions Appropriate for this Patient   Return mobility to safest level of function Assess patient stability and activity tolerance for standing, transferring and ambulating with or without assistive devices;Assist with  transfers and ambulation using safe practices;Ensure adequate protection for wounds/incisions during mobilization;Apply Continuous Passive Motion per order to increase flexion toward goal;Instruct patient/family in ordered activity level;Obtain PT/OT and other consults as needed     Goal: Maintain proper alignment of affected body part  INTERVENTIONS:  1. Support and protect limb and body alignment per provider's orders  2. Instruct and reinforce with patient and family use of appropriate assistive device and precautions (e.g. spinal or hip dislocation precautions)   Outcome: Progressing   07/27/18 0255   Interventions Appropriate for this Patient   Maintain proper alignment of affected body part Support and protect limb and body alignment per provider's orders;Instruct and reinforce with patient and family use of appropriate assistive device and precautions (e.g. spinal or hip dislocation precautions)     Goal: Return ADL status to a safe level of function  INTERVENTIONS:  1. Assess patient's ADL deficits and provide assistive devices as needed  2. Obtain PT/OT consults as needed  3. Assist and instruct patient to increase activity and self care   Outcome: Progressing   07/27/18 0255   Interventions Appropriate for this Patient   Return activities of daily living status to a safe level of function Assess patient's activities of daily living deficits and provide assistive devices as needed;Obtain PT/OT consults as needed;Assist and instruct patient to increase activity and self care       Problem: Wound Care  Goal: Wound will progress through the stages of healing  INTERVENTIONS:  1. Assess decrease in wound size.  2. Assess for presence of granulation tissue.  3. Assess for signs and symptoms of infection.  4. Assess drainage.   Outcome: Progressing   07/27/18 0255   Interventions Appropriate for this Patient   WC WOUND WILL PROGRESS THROUGH THE STAGES OF HEALING Assess decrease in wound size.;Assess for signs and  symptoms of infection.;Assess for presence of granulation tissue.;Assess drainage.

## 2018-07-27 NOTE — INTERDISCIPLINARY/THERAPY
07/27/18 1016   Subjective Comments   Subjective Comments Attempted to see pt at this time again however pt continues to verbalize pain and fatigue. Will continue to attempt as appropriate.

## 2018-07-27 NOTE — INTERDISCIPLINARY/THERAPY
07/27/18 1500   Subjective Comments   Subjective Comments Attempted to see pt in the pm.  Nsx requested hold, pt refused secondary to pain and not feeling well.

## 2018-07-27 NOTE — NURSING END OF SHIFT
Nursing End of Shift Summary    Goals:  Clinical Goals for the Shift: pain control; maintain safety and comfort; monitor vital signs and respiratory status    Narrative Summary of Progress Towards Clinical Goals:      Pain control    Barriers for Transfer or Discharge: yes    Pain control

## 2018-07-27 NOTE — INTERDISCIPLINARY/THERAPY
07/27/18 1400   Subjective Comments   Subjective Comments Attempted to see pt at this time however pt continues to decline getting up. Will continue to attempt as able.

## 2018-07-27 NOTE — INTERDISCIPLINARY/THERAPY
Care Manager [phone]    CM reviewed chart and rounded on patient to discuss plan of care.  She was very tired and was trying to rest, a sign has been placed on her door this morning to help her achieve this.  She states she has been up to the chair, however she has not worked with PT yet.  Encouraged ambulation with her.  Will continue to follow her progress to determine if she will be needing any short term PT/OT placement once medically stable.    Currently she has a CT in place, remains on oxygen and had hypotension issues this morning.    Anticipated disposition is home pending PT recommendations.  CM will continue to follow.

## 2018-07-27 NOTE — PLAN OF CARE
Problem: Pain - Adult  Goal: Verbalizes/displays adequate comfort level or baseline comfort level  INTERVENTIONS:  1. Encourage patient to monitor pain and request interventions  2. Assess pain using the appropriate pain scale  3. Administer analgesics based on type and severity of pain and evaluate response  4. Educate/Implement non-pharmacological measures as appropriate and evaluate response  5. Consider cultural, developmental and social influences on pain and pain management  6. Notify Provider if interventions unsuccessful or patient reports new pain   Outcome: Progressing   07/26/18 4208   Interventions Appropriate for this Patient   Verbalizes/displays adequate comfort level or baseline comfort level Encourage patient to monitor pain and request interventions;Assess pain using the appropriate pain scale;Administer analgesics based on type and severity of pain and evaluate response;Educate/Implement non-pharmacological measures as appropriate and evaluate response;Consider cultural, developmental and social influences on pain and pain management;Notify Provider if interventions unsuccessful or patient reports new pain

## 2018-07-27 NOTE — INTERDISCIPLINARY/THERAPY
07/27/18 1050   Subjective Comments   Subjective Comments Pt refused this am stating that she did not get any sleep and she is in too much pain.

## 2018-07-28 ENCOUNTER — APPOINTMENT (OUTPATIENT)
Dept: RADIOLOGY | Facility: HOSPITAL | Age: 53
DRG: 958 | End: 2018-07-28
Payer: OTHER GOVERNMENT

## 2018-07-28 LAB
ALBUMIN SERPL-MCNC: 2.5 G/DL (ref 3.5–5.3)
ALP SERPL-CCNC: 47 U/L (ref 41–108)
ALT SERPL-CCNC: 12 U/L (ref 0–52)
ANION GAP SERPL CALC-SCNC: 8 MMOL/L (ref 3–11)
AST SERPL-CCNC: 13 U/L (ref 0–39)
BASOPHILS # BLD AUTO: 0 10*3/UL
BASOPHILS NFR BLD AUTO: 0 % (ref 0–2)
BILIRUB SERPL-MCNC: 0.19 MG/DL (ref 0–1.4)
BUN SERPL-MCNC: 4 MG/DL (ref 7–25)
CALCIUM ALBUM COR SERPL-MCNC: 9 MG/DL (ref 8.5–10.1)
CALCIUM SERPL-MCNC: 7.8 MG/DL (ref 8.6–10.3)
CHLORIDE SERPL-SCNC: 109 MMOL/L (ref 98–107)
CO2 SERPL-SCNC: 23 MMOL/L (ref 21–32)
CREAT SERPL-MCNC: 0.5 MG/DL (ref 0.6–1.2)
EOSINOPHIL # BLD AUTO: 0.4 10*3/UL
EOSINOPHIL NFR BLD AUTO: 9 % (ref 0–3)
ERYTHROCYTE [DISTWIDTH] IN BLOOD BY AUTOMATED COUNT: 12.5 % (ref 11.5–14)
GFR SERPL CREATININE-BSD FRML MDRD: 130 ML/MIN/1.73M*2
GLUCOSE SERPL-MCNC: 118 MG/DL (ref 70–105)
HCT VFR BLD AUTO: 26.9 % (ref 34–45)
HGB BLD-MCNC: 9.7 G/DL (ref 11.5–15.5)
LYMPHOCYTES # BLD AUTO: 1.7 10*3/UL
LYMPHOCYTES NFR BLD AUTO: 33 % (ref 11–47)
MCH RBC QN AUTO: 32.9 PG (ref 28–33)
MCHC RBC AUTO-ENTMCNC: 36 G/DL (ref 32–36)
MCV RBC AUTO: 91.1 FL (ref 81–97)
MONOCYTES # BLD AUTO: 0.4 10*3/UL
MONOCYTES NFR BLD AUTO: 8 % (ref 3–11)
NEUTROPHILS # BLD AUTO: 2.7 10*3/UL
NEUTROPHILS NFR BLD AUTO: 51 % (ref 41–81)
PLATELET # BLD AUTO: 176 10*3/UL (ref 140–350)
PMV BLD AUTO: 7.6 FL (ref 6.9–10.8)
POTASSIUM SERPL-SCNC: 2.8 MMOL/L (ref 3.5–5.1)
PROT SERPL-MCNC: 4.9 G/DL (ref 6–8.3)
RBC # BLD AUTO: 2.95 10*6/ΜL (ref 3.7–5.3)
SODIUM SERPL-SCNC: 140 MMOL/L (ref 135–145)
WBC # BLD AUTO: 5.2 10*3/UL (ref 4.5–10.5)

## 2018-07-28 PROCEDURE — 85025 COMPLETE CBC W/AUTO DIFF WBC: CPT | Performed by: SURGERY

## 2018-07-28 PROCEDURE — 99232 SBSQ HOSP IP/OBS MODERATE 35: CPT | Performed by: NURSE PRACTITIONER

## 2018-07-28 PROCEDURE — 80053 COMPREHEN METABOLIC PANEL: CPT | Performed by: SURGERY

## 2018-07-28 PROCEDURE — 6360000200 HC RX 636 W HCPCS (ALT 250 FOR IP): Performed by: ORTHOPAEDIC SURGERY

## 2018-07-28 PROCEDURE — 2500000200 HC RX 250 WO HCPCS: Performed by: ORTHOPAEDIC SURGERY

## 2018-07-28 PROCEDURE — (BLANK) HC ROOM SEMI PRIVATE

## 2018-07-28 PROCEDURE — 6360000200 HC RX 636 W HCPCS (ALT 250 FOR IP): Performed by: SURGERY

## 2018-07-28 PROCEDURE — 6370000100 HC RX 637 (ALT 250 FOR IP): Performed by: NURSE PRACTITIONER

## 2018-07-28 PROCEDURE — 71045 X-RAY EXAM CHEST 1 VIEW: CPT

## 2018-07-28 PROCEDURE — 99024 POSTOP FOLLOW-UP VISIT: CPT | Performed by: ORTHOPAEDIC SURGERY

## 2018-07-28 PROCEDURE — 6370000100 HC RX 637 (ALT 250 FOR IP): Performed by: SURGERY

## 2018-07-28 PROCEDURE — 36415 COLL VENOUS BLD VENIPUNCTURE: CPT | Performed by: SURGERY

## 2018-07-28 RX ORDER — POTASSIUM CHLORIDE 750 MG/1
60 TABLET, EXTENDED RELEASE ORAL ONCE
Status: COMPLETED | OUTPATIENT
Start: 2018-07-28 | End: 2018-07-28

## 2018-07-28 RX ADMIN — GABAPENTIN 100 MG: 100 CAPSULE ORAL at 12:57

## 2018-07-28 RX ADMIN — TIZANIDINE 4 MG: 4 TABLET ORAL at 20:54

## 2018-07-28 RX ADMIN — CEFAZOLIN 1000 MG: 10 INJECTION, POWDER, FOR SOLUTION INTRAVENOUS at 06:14

## 2018-07-28 RX ADMIN — ACETAMINOPHEN 650 MG: 325 TABLET ORAL at 18:09

## 2018-07-28 RX ADMIN — GABAPENTIN 100 MG: 100 CAPSULE ORAL at 18:09

## 2018-07-28 RX ADMIN — IBUPROFEN 800 MG: 800 TABLET ORAL at 21:04

## 2018-07-28 RX ADMIN — ENOXAPARIN SODIUM 40 MG: 40 INJECTION, SOLUTION INTRAVENOUS; SUBCUTANEOUS at 09:26

## 2018-07-28 RX ADMIN — POTASSIUM CHLORIDE 60 MEQ: 750 TABLET, FILM COATED, EXTENDED RELEASE ORAL at 09:26

## 2018-07-28 RX ADMIN — GABAPENTIN 100 MG: 100 CAPSULE ORAL at 20:54

## 2018-07-28 RX ADMIN — IBUPROFEN 800 MG: 800 TABLET ORAL at 18:11

## 2018-07-28 RX ADMIN — FENTANYL CITRATE 25 MCG: 50 INJECTION INTRAMUSCULAR; INTRAVENOUS at 06:13

## 2018-07-28 RX ADMIN — ACETAMINOPHEN 650 MG: 325 TABLET ORAL at 06:13

## 2018-07-28 RX ADMIN — LIDOCAINE 1 PATCH: 560 PATCH PERCUTANEOUS; TOPICAL; TRANSDERMAL at 09:28

## 2018-07-28 RX ADMIN — ACETAMINOPHEN 650 MG: 325 TABLET ORAL at 12:57

## 2018-07-28 RX ADMIN — OXYCODONE HYDROCHLORIDE 10 MG: 5 TABLET ORAL at 20:55

## 2018-07-28 RX ADMIN — GABAPENTIN 100 MG: 100 CAPSULE ORAL at 09:26

## 2018-07-28 RX ADMIN — OXYCODONE HYDROCHLORIDE 10 MG: 5 TABLET ORAL at 16:55

## 2018-07-28 RX ADMIN — IBUPROFEN 800 MG: 800 TABLET ORAL at 06:13

## 2018-07-28 NOTE — PROGRESS NOTES
07/28/18  0800 AM    ID: 53 y.o. female admitted 7/24/18 after falling off of a horse.  No loss of consciousness, injuries identified include an open displaced right radial fracture, right-sided rib fractures, and right pneumothorax.      SUBJECTIVE:  Hypotensive. A-febrile. No acute events noted overnight.   Pt resting comfortably in bed, states she got some good sleep last night. HA has improved but still there. Denies feeling lethargic, weak, dizzy. Denies CP, palpitations. Admits improved SOB, worsening with ambulation. Admits RUE pain, R chest wall pain and discomfort. Admits diarrhea.     OBJECTIVE:  Temp:  [36.2 °C (97.2 °F)-36.8 °C (98.2 °F)] 36.5 °C (97.7 °F)  Heart Rate:  [] 67  Resp:  [20] 20  BP: (111-138)/(42-59) 111/42  Vital signs reviewed    Physical Exam:  General: alert, orient, no acute distress  HEENT: Normocephalic, ecchymosis noted to forehead, pupils equal round reactive, trachea midline, no JVD  Respiratory: Respirations shallow, unlabored, clear to auscultation. Previous chest tube site dressing CDI.  Cardiovascular: Regular rate and rhythm, no murmurs, rubs, clicks, gallops auscultated  Abdomen: Soft, nontender to palpation, nondistended, bowel sounds present  Extremities: Right forearm in surgical cast that extends from elbow to mid digit area, able to wiggle fingers, fingers warm to touch, capillary refill less than 3 seconds, acknowledges pain with movement      Intake/Output Summary (Last 24 hours) at 07/28/18 0947  Last data filed at 07/28/18 0613   Gross per 24 hour   Intake             3667 ml   Output             1850 ml   Net             1817 ml     Laboratory:  CBC with Platelet:    Lab Results   Component Value Date    WBC 5.2 07/28/2018    HGB 9.7 (L) 07/28/2018    HCT 26.9 (L) 07/28/2018     07/28/2018    RBC 2.95 (L) 07/28/2018    MCV 91.1 07/28/2018    MCH 32.9 07/28/2018    MCHC 36.0 07/28/2018    RDW 12.5 07/28/2018    MPV 7.6 07/28/2018     Comp:   Lab Results    Component Value Date     07/28/2018    K 2.8 (LL) 07/28/2018     (H) 07/28/2018    CO2 23 07/28/2018    BUN 4 (L) 07/28/2018    CREATININE 0.5 (L) 07/28/2018    GLUCOSE 118 (H) 07/28/2018    CALCIUM 7.8 (L) 07/28/2018    PROT 4.9 (L) 07/28/2018    ALBUMIN 2.5 (L) 07/28/2018    AST 13 07/28/2018    ALT 12 07/28/2018    ALKPHOS 47 07/28/2018    BILITOT 0.19 07/28/2018     ASSESSMENT:  53 y.o.female admitted 7/24/18 after falling off of a horse.  Landed on her right side, not wearing helmet, denies loss of consciousness, injuries include an open displaced right radial fracture, right-sided rib fractures, and large right pneumothorax. Pigtail catheter placed in emergency department with partial resolution of pneumothorax. Is POD 3 s/p ORIF of the right distal radius, irrigation & debridement of a right elbow laceration, complex closure of right elbow and placement of Prevena wound dressing to right elbow. CXR this AM showing stable small R apical pneumo. Will place to water seal today. Repeat CXR in AM, hopeful to d/c chest tube tomorrow. Will SL MIVF as hypotension has appeared to have resolved, likely r/t dehydration. Hopeful d/c Monday AM.     07/28/18:  PLAN:  -Chest tube to water seal  -CXR in AM  -K+ replacement  -CBC and BMP in AM  -Regular diet  -Pulmonary toilet  -Elevate, ice right upper extremity  -PT/OT consulted    -Weight-bear status to right upper extremity per orthopedic  -DVT prophylaxis: SCDs, Lovenox 40 mg SQ daily    BAR YANG, CNP

## 2018-07-28 NOTE — PROGRESS NOTES
Subjective:  Symptoms:  (No new orthopedic issues.  Blood pressure has remained stable for the last 24 hours.  Patient states her pain has improved today.).      Objective:  General Appearance:  Comfortable and in no acute distress.    Vital signs: (most recent): Blood pressure 111/42, pulse 67, temperature 36.5 °C (97.7 °F), temperature source Oral, resp. rate 20, weight 83 kg (182 lb 15.7 oz), SpO2 94 %.    Extremities: (Right upper extremity: Splint in place.  Splint is clean dry and intact.  Minnie incisional VAC over elbow laceration with good seal.  No drainage in canister.  She is neurovascularly intact.  Mild to moderate swelling to the hand and fingers.)    Temp:  [36.2 °C (97.2 °F)-36.8 °C (98.2 °F)] 36.5 °C (97.7 °F)  Heart Rate:  [] 67  Resp:  [20] 20  BP: (111-138)/(42-59) 111/42  I/O last 3 completed shifts:  In: 5017 [P.O.:1620; I.V.:3397]  Out: 3400 [Urine:3400]  No intake/output data recorded.  Assessment:    Condition: In stable condition.   (Postoperative day #2 status post ORIF right distal radius and irrigation debridement of right elbow laceration.    -Pain control.  Continue current regimen  -No weightbearing to right upper extremity.  Okay for finger and elbow range of motion  -Elevate right upper extremity  -PT/OT  -Continue incisional VAC and remove at home on postoperative day #7  -We will discontinue antibiotics at this time  -Okay for DC from orthopedic standpoint.  Patient will need to follow-up in orthopedic office in 2 weeks with Tye Arevalo CNP.).     Principal Problem:    Open fracture of right distal radius

## 2018-07-28 NOTE — PLAN OF CARE
Problem: Knowledge Deficit  Goal: Patient/family/caregiver demonstrates understanding of disease process, treatment plan, medications, and discharge instructions  INTERVENTIONS:   1. Complete learning assessment and assess knowledge base  2. Provide teaching at level of understanding   3. Provide teaching via preferred learning methods   Outcome: Progressing   07/28/18 0413   Interventions Appropriate for this Patient   Patient/family/caregiver demonstrates understanding of disease process, treatment plan, medications, and discharge instructions Provide teaching at level of understanding       Problem: Potential for Compromised Skin Integrity  Goal: Skin Integrity is Maintained or Improved  INTERVENTIONS:  1. Assess and monitor skin integrity  2. Collaborate with interdisciplinary team and initiate plans and interventions as needed  3. Alternate a full bath with partial baths for elderly   4. Monitor patient's hygiene practices   5. Collaborate with wound, ostomy, and continence nurse   Outcome: Progressing   07/28/18 0413   Interventions Appropriate for this Patient   Skin integrity is maintained or improved Assess and monitor skin integrity     Goal: MOBILITY IS MAINTAINED OR IMPROVED  INTERVENTIONS  1. Collaborate with interdisciplinary team and initiate plan and interventions as ordered (PT/OT)  2. Encourage ambulation  3. Up to chair for meals  4. Monitor for signs of deconditioning   Outcome: Progressing   07/28/18 0413   Interventions Appropriate for this Patient   Mobility is Maintained or Improved Encourage ambulation       Problem: Urinary Incontinence  Goal: Perineal skin integrity is maintained or improved  INTERVENTIONS:  1. Assess genitourinary system, perineal skin, labs (urinalysis), and history of incontinence to include past management, aggravating, and alleviating factors  2. Collaborate with interdisciplinary team including wound, ostomy, and continence nurse and initiate plans and interventions  as needed  4. Consider urine containment device  5. Apply skin protectant   6. Develop skin care regimen  7. Provide privacy when changing patient's incontinence device to maintain their dignity   Outcome: Progressing   07/28/18 0413   Interventions Appropriate for this Patient   Perineal skin integrity is maintained or improved Provide privacy when changing patient's incontinence device to maintain their dignity       Problem: Pain - Adult  Goal: Verbalizes/displays adequate comfort level or baseline comfort level  INTERVENTIONS:  1. Encourage patient to monitor pain and request interventions  2. Assess pain using the appropriate pain scale  3. Administer analgesics based on type and severity of pain and evaluate response  4. Educate/Implement non-pharmacological measures as appropriate and evaluate response  5. Consider cultural, developmental and social influences on pain and pain management  6. Notify Provider if interventions unsuccessful or patient reports new pain   Outcome: Progressing   07/28/18 0413   Interventions Appropriate for this Patient   Verbalizes/displays adequate comfort level or baseline comfort level Encourage patient to monitor pain and request interventions;Assess pain using the appropriate pain scale       Problem: Infection - Adult  Goal: Absence of infection during hospitalization  INTERVENTIONS:  1. Assess and monitor for signs and symptoms of infection  2. Monitor lab/diagnostic results  3. Monitor all insertion sites/wounds/incisions  4. Monitor secretions for changes in amount and color  5. Administer medications as ordered  6. Educate and encourage patient and family to use good hand hygiene technique  7. Identify and educate in appropriate isolation precautions for identified infection/condition   Outcome: Progressing   07/28/18 0413   Interventions Appropriate for this Patient   Absence of infection during hospitalization Administer medications as ordered       Problem: Safety  Adult  Goal: Patient will remain safe during hospitalization  INTERVENTIONS    1. Assess patient for fall risk and implement interventions if needed  2. Use safe transport techniques  3. Assess patient using the Sergei skin assessment scale  4. Assess patient for risk of aspiration  5. Assess patient for risk of elopement   Outcome: Progressing   07/28/18 0413   Interventions Appropriate for this Patient   Patient will remain safe durning hospitalization Assess patient for Fall Risk;Assess Patient using the Sergei scale       Problem: Daily Care  Goal: Daily care needs are met  INTERVENTIONS:   1. Assess and monitor ability to perform self care and identify potential discharge needs  2. Assess skin integrity/risk for skin breakdown  3. Assist patient with activities of daily living as needed  4. Encourage independent activity per ability   5. Provide mouth care   6. Include patient/family/caregiver in decisions related to daily care    Outcome: Progressing   07/28/18 0413   Interventions Appropriate for this Patient   Daily care needs are met Encourage independent activity per ability;Assess skin integrity/risk for skin breakdown       Problem: Skin/Tissue Integrity - Adult  Goal: Incisions, wounds, or drain sites healing without S/S of infection  INTERVENTIONS  1. Assess and document risk factors for skin breakdown  2. Assess and document skin integrity  3. Assess and document dressing/incision, wound bed, drain sites and surrounding tissue  4. Implement wound care per orders   Outcome: Progressing   07/28/18 0413   Interventions Appropriate for this Patient   Incision(s), wound(s) or drain site(s) healing without S/S of infection Assess and document skin integrity       Problem: Musculoskeletal - Adult  Goal: Return mobility to safest level of function  INTERVENTIONS:  1. Assess patient stability and activity tolerance for standing, transferring and ambulating w/ or w/o assistive devices  2. Assist with transfers  and ambulation using safe practices  3. Ensure adequate protection for wounds/incisions during mobilization  4. Obtain PT/OT and other consults as needed  5. Apply Continuous Passive Motion per order to increase flexion toward goal  6. Instruct patient/family in ordered activity level   Outcome: Progressing   07/28/18 0413   Interventions Appropriate for this Patient   Return mobility to safest level of function Assist with transfers and ambulation using safe practices

## 2018-07-28 NOTE — DISCHARGE INSTRUCTIONS
No weightbearing to right upper extremity.  Continue to work on finger and elbow range of motion.    Continue to elevate right upper extremity to assist with swelling.

## 2018-07-28 NOTE — NURSING END OF SHIFT
Nursing End of Shift Summary    Goals:  Clinical Goals for the Shift: pain control     Narrative Summary of Progress Towards Clinical Goals:  Pain control    Barriers for Transfer or Discharge: yes    Pain control

## 2018-07-28 NOTE — PLAN OF CARE
Problem: Pain - Adult  Goal: Verbalizes/displays adequate comfort level or baseline comfort level  INTERVENTIONS:  1. Encourage patient to monitor pain and request interventions  2. Assess pain using the appropriate pain scale  3. Administer analgesics based on type and severity of pain and evaluate response  4. Educate/Implement non-pharmacological measures as appropriate and evaluate response  5. Consider cultural, developmental and social influences on pain and pain management  6. Notify Provider if interventions unsuccessful or patient reports new pain   Outcome: Progressing   07/27/18 0255   Interventions Appropriate for this Patient   Verbalizes/displays adequate comfort level or baseline comfort level Encourage patient to monitor pain and request interventions;Assess pain using the appropriate pain scale;Administer analgesics based on type and severity of pain and evaluate response;Educate/Implement non-pharmacological measures as appropriate and evaluate response;Notify Provider if interventions unsuccessful or patient reports new pain

## 2018-07-28 NOTE — NURSING END OF SHIFT
Nursing End of Shift Summary    Goals:  Clinical Goals for the Shift: Pain control, patient safety    Narrative Summary of Progress Towards Clinical Goals  Patient remained free from falls and injury during shift. Patient pain was maintained at a tolerable level. Patient was able to sleep well throughout the night.     Barriers for Transfer or Discharge:   Yes, patient needs ongoing medical care.

## 2018-07-29 ENCOUNTER — APPOINTMENT (OUTPATIENT)
Dept: RADIOLOGY | Facility: HOSPITAL | Age: 53
DRG: 958 | End: 2018-07-29
Payer: OTHER GOVERNMENT

## 2018-07-29 LAB
ALBUMIN SERPL-MCNC: 2.8 G/DL (ref 3.5–5.3)
ALP SERPL-CCNC: 44 U/L (ref 41–108)
ALT SERPL-CCNC: 11 U/L (ref 0–52)
ANION GAP SERPL CALC-SCNC: 9 MMOL/L (ref 3–11)
AST SERPL-CCNC: 12 U/L (ref 0–39)
BASOPHILS # BLD AUTO: 0 10*3/UL
BASOPHILS NFR BLD AUTO: 0 % (ref 0–2)
BILIRUB SERPL-MCNC: 0.3 MG/DL (ref 0–1.4)
BUN SERPL-MCNC: 3 MG/DL (ref 7–25)
CALCIUM ALBUM COR SERPL-MCNC: 9.4 MG/DL (ref 8.5–10.1)
CALCIUM SERPL-MCNC: 8.4 MG/DL (ref 8.6–10.3)
CHLORIDE SERPL-SCNC: 109 MMOL/L (ref 98–107)
CO2 SERPL-SCNC: 24 MMOL/L (ref 21–32)
CREAT SERPL-MCNC: 0.5 MG/DL (ref 0.6–1.2)
EOSINOPHIL # BLD AUTO: 0.5 10*3/UL
EOSINOPHIL NFR BLD AUTO: 9 % (ref 0–3)
ERYTHROCYTE [DISTWIDTH] IN BLOOD BY AUTOMATED COUNT: 12.4 % (ref 11.5–14)
GFR SERPL CREATININE-BSD FRML MDRD: 130 ML/MIN/1.73M*2
GLUCOSE SERPL-MCNC: 88 MG/DL (ref 70–105)
HCT VFR BLD AUTO: 28.5 % (ref 34–45)
HGB BLD-MCNC: 10.1 G/DL (ref 11.5–15.5)
LYMPHOCYTES # BLD AUTO: 1.8 10*3/UL
LYMPHOCYTES NFR BLD AUTO: 37 % (ref 11–47)
MCH RBC QN AUTO: 32.3 PG (ref 28–33)
MCHC RBC AUTO-ENTMCNC: 35.5 G/DL (ref 32–36)
MCV RBC AUTO: 90.9 FL (ref 81–97)
MONOCYTES # BLD AUTO: 0.4 10*3/UL
MONOCYTES NFR BLD AUTO: 8 % (ref 3–11)
NEUTROPHILS # BLD AUTO: 2.2 10*3/UL
NEUTROPHILS NFR BLD AUTO: 45 % (ref 41–81)
PLATELET # BLD AUTO: 227 10*3/UL (ref 140–350)
PMV BLD AUTO: 7.5 FL (ref 6.9–10.8)
POTASSIUM SERPL-SCNC: 3.2 MMOL/L (ref 3.5–5.1)
PROT SERPL-MCNC: 5.2 G/DL (ref 6–8.3)
RBC # BLD AUTO: 3.14 10*6/ΜL (ref 3.7–5.3)
SODIUM SERPL-SCNC: 142 MMOL/L (ref 135–145)
WBC # BLD AUTO: 4.9 10*3/UL (ref 4.5–10.5)

## 2018-07-29 PROCEDURE — 6370000100 HC RX 637 (ALT 250 FOR IP): Performed by: NURSE PRACTITIONER

## 2018-07-29 PROCEDURE — 97530 THERAPEUTIC ACTIVITIES: CPT | Mod: GP

## 2018-07-29 PROCEDURE — 97116 GAIT TRAINING THERAPY: CPT | Mod: GP

## 2018-07-29 PROCEDURE — (BLANK) HC ROOM SEMI PRIVATE

## 2018-07-29 PROCEDURE — 85025 COMPLETE CBC W/AUTO DIFF WBC: CPT | Performed by: SURGERY

## 2018-07-29 PROCEDURE — 6360000200 HC RX 636 W HCPCS (ALT 250 FOR IP): Performed by: SURGERY

## 2018-07-29 PROCEDURE — 71045 X-RAY EXAM CHEST 1 VIEW: CPT

## 2018-07-29 PROCEDURE — 80053 COMPREHEN METABOLIC PANEL: CPT | Performed by: SURGERY

## 2018-07-29 PROCEDURE — 6370000100 HC RX 637 (ALT 250 FOR IP): Performed by: SURGERY

## 2018-07-29 PROCEDURE — 99232 SBSQ HOSP IP/OBS MODERATE 35: CPT | Performed by: SURGERY

## 2018-07-29 PROCEDURE — 36415 COLL VENOUS BLD VENIPUNCTURE: CPT | Performed by: SURGERY

## 2018-07-29 RX ADMIN — OXYCODONE HYDROCHLORIDE 10 MG: 5 TABLET ORAL at 07:32

## 2018-07-29 RX ADMIN — ACETAMINOPHEN 650 MG: 325 TABLET ORAL at 00:01

## 2018-07-29 RX ADMIN — GABAPENTIN 100 MG: 100 CAPSULE ORAL at 20:47

## 2018-07-29 RX ADMIN — ACETAMINOPHEN 650 MG: 325 TABLET ORAL at 05:22

## 2018-07-29 RX ADMIN — IBUPROFEN 800 MG: 800 TABLET ORAL at 22:04

## 2018-07-29 RX ADMIN — GABAPENTIN 100 MG: 100 CAPSULE ORAL at 15:31

## 2018-07-29 RX ADMIN — ENOXAPARIN SODIUM 40 MG: 40 INJECTION, SOLUTION INTRAVENOUS; SUBCUTANEOUS at 09:10

## 2018-07-29 RX ADMIN — OXYCODONE HYDROCHLORIDE 5 MG: 5 TABLET ORAL at 17:05

## 2018-07-29 RX ADMIN — TIZANIDINE 4 MG: 4 TABLET ORAL at 20:45

## 2018-07-29 RX ADMIN — ACETAMINOPHEN 650 MG: 325 TABLET ORAL at 17:05

## 2018-07-29 RX ADMIN — LIDOCAINE 1 PATCH: 560 PATCH PERCUTANEOUS; TOPICAL; TRANSDERMAL at 09:10

## 2018-07-29 RX ADMIN — GABAPENTIN 100 MG: 100 CAPSULE ORAL at 09:11

## 2018-07-29 RX ADMIN — ACETAMINOPHEN 650 MG: 325 TABLET ORAL at 12:12

## 2018-07-29 RX ADMIN — IBUPROFEN 800 MG: 800 TABLET ORAL at 05:21

## 2018-07-29 RX ADMIN — ALPRAZOLAM 0.25 MG: 0.25 TABLET ORAL at 00:39

## 2018-07-29 RX ADMIN — OXYCODONE HYDROCHLORIDE 5 MG: 5 TABLET ORAL at 12:12

## 2018-07-29 RX ADMIN — IBUPROFEN 800 MG: 800 TABLET ORAL at 15:31

## 2018-07-29 RX ADMIN — OXYCODONE HYDROCHLORIDE 5 MG: 5 TABLET ORAL at 22:03

## 2018-07-29 NOTE — PROGRESS NOTES
Patient refused insertion of a new peripheral IV. Explained to patient that she has no continuous fluids nor any scheduled IV medications. Patient stated she would allow insertion of an IV only if medically necessary.

## 2018-07-29 NOTE — NURSING END OF SHIFT
Nursing End of Shift Summary    Goals:  Clinical Goals for the Shift: Pain control, patient safety    Narrative Summary of Progress Towards Clinical Goals:  Patient pain controlled adequately with PRN medications. Patient remained free from fall and injury.     Barriers for Transfer or Discharge:   Yes, Patient requires ongoing medical care for chest tube and small wound vac interventions.

## 2018-07-29 NOTE — INTERDISCIPLINARY/THERAPY
07/29/18 0941   PT Last Visit   PT Received On 07/29/18   General   Chart Reviewed Yes   PT Treatment Duration (Min) 25 Minutes   Is this a Co-Treatment? No   Additional Pertinent History Travel trailer steps with vertical handail. Also can use ramp into back of horse trailer and go through to living areas.    Family/Caregiver Present No   Precautions   Other Precautions NWB RUE, wound vac, chest tube   RUE Weight Bearing Non Weight Bearing (NWB)   Subjective Comments   Subjective Comments NSG permitted therapy today. Pt reports feeling lazy and not wanting to get up, but with encouragement does agree to session.    Pain Assessment   Pain Assessment 0-10   Pain Score 5 - Moderate pain   Pain Type Surgical pain   Pain Location Shoulder   Bed Mobility 1   Bed Mobility From 1 Supine   Bed Mobility Type 1 To   Bed Mobility to 1 Short sit   Level of Assistance 1 Standby assistance   Transfer 1   Transfer From 1 Sit   Transfer Type 1 To and from   Transfer to 1 Stand   Level of Assistance 1 Contact guard assist x 1;Standby assistance   Ambulation   Ambulation Assessed   RUE Weight Bearing Non Weight Bearing (NWB)   Ambulation 1   Surface 1 Level surface;Smooth   Device 1 No device   Assistance 1 Standby assistance;Contact guard assist x 1   Quality of Gait 1 Pt walking with normal [place]. slow and cautious. multiple slight gait deviations, but pt reports this is the first she has walked outside the room.    Comments/Distance 1 30m, noted SOB   Balance   Balance Intact   Toileting   Toileting Level of Assistance Distant supervision   Where Assessed Toilet   Toileting Comments no physical assistance needed today. Just cues to line management   Assessment   Rehab Potential Good   Progress Progressing toward goals   Problem List Decreased endurance;Impaired balance;Decreased mobility   Assessment Comment Pt did very well with session today. Pt will be safe with gait and pt encouraged to walk more with [facility] staff to gain more  stability. Pt does need to trial a few steps before DC, but will likely have no safety concerns with that either. Pt moves cautiously, but safely without AD   Plan   Treatment Interventions Stair training;Balance training;Gait training;Endurance training   PT Frequency 5-7x/wk   Plan Comment 1Assist, gait without AD, balance exs, stair trial.

## 2018-07-29 NOTE — NURSING END OF SHIFT
Nursing End of Shift Summary    Goals:  Clinical Goals for the Shift: pain control    Narrative Summary of Progress Towards Clinical Goals:    Increased pain relief    Barriers for Transfer or Discharge: yes    Pain control

## 2018-07-29 NOTE — PROGRESS NOTES
07/29/18  0800 AM    ID: 53 y.o. female admitted 7/24/18 after falling off of a horse.  No loss of consciousness, injuries identified include an open displaced right radial fracture, right-sided rib fractures, and right pneumothorax.      SUBJECTIVE:  Patient feels well today. Pain in the right chest is improving daily. She is comfortable with her wrist fracture. She is feeling ready for discharge soon.    OBJECTIVE:  Temp:  [36.4 °C (97.5 °F)-37.3 °C (99.1 °F)] 37.3 °C (99.1 °F)  Heart Rate:  [65-74] 74  Resp:  [20] 20  BP: (109-148)/(45-63) 148/63  Vital signs reviewed    Physical Exam:  General: alert, orient, no acute distress  HEENT: Normocephalic, ecchymosis noted to forehead, pupils equal round reactive, trachea midline, no JVD  Respiratory: Respirations shallow, unlabored, clear to auscultation. Cardiovascular: Regular rate and rhythm, no murmurs, rubs, clicks, gallops auscultated  Abdomen: Soft, nontender to palpation, nondistended, bowel sounds present  Extremities: Right forearm in surgical cast that extends from elbow to mid digit area, able to wiggle fingers      Intake/Output Summary (Last 24 hours) at 07/29/18 1714  Last data filed at 07/29/18 0600   Gross per 24 hour   Intake             1860 ml   Output             1575 ml   Net              285 ml     Laboratory:  CBC with Platelet:    Lab Results   Component Value Date    WBC 4.9 07/29/2018    HGB 10.1 (L) 07/29/2018    HCT 28.5 (L) 07/29/2018     07/29/2018    RBC 3.14 (L) 07/29/2018    MCV 90.9 07/29/2018    MCH 32.3 07/29/2018    MCHC 35.5 07/29/2018    RDW 12.4 07/29/2018    MPV 7.5 07/29/2018     Comp:   Lab Results   Component Value Date     07/29/2018    K 3.2 (L) 07/29/2018     (H) 07/29/2018    CO2 24 07/29/2018    BUN 3 (L) 07/29/2018    CREATININE 0.5 (L) 07/29/2018    GLUCOSE 88 07/29/2018    CALCIUM 8.4 (L) 07/29/2018    PROT 5.2 (L) 07/29/2018    ALBUMIN 2.8 (L) 07/29/2018    AST 12 07/29/2018    ALT 11 07/29/2018     ALKPHOS 44 07/29/2018    BILITOT 0.30 07/29/2018     IMAGING:  Exam:   Single portable view of the chest. 07/29/2018     Clinical History:  f/u pneumo - placed to water seal 07/28/18     Comparison: 7/28/2018     Discussion: Single portable frontal view of the chest      The right chest tube is no longer within the hemithorax. Previously seen pneumothorax is not definitely visualized on today's film. I basilar atelectasis appears unchanged. Heart size is stable.      IMPRESSION:  IMPRESSION:  1.  Right-sided chest tube is no longer within the right hemithorax is tip is projected lateral to the ribs.  2.  No definite pneumothorax.  3.  Bibasilar atelectasis.    ASSESSMENT:  53 y.o.female admitted 7/24/18 after falling off of a horse.  Landed on her right side, not wearing helmet, denies loss of consciousness, injuries include an open displaced right radial fracture, right-sided rib fractures, and large right pneumothorax. Pigtail catheter placed in emergency department with partial resolution of pneumothorax. Is s/p ORIF of the right distal radius, irrigation & debridement of a right elbow laceration, complex closure of right elbow and placement of Prevena wound dressing to right elbow.     07/29/18:CXR this AM showing stable small R apical pneumo and pigtail no longer in place. Pigtail removed today. Will repeat CXR tomorrow AM and hopefully discharge afterwards. Meanwhile, the patient is preparing for better accomodations at home as she lives in a horse trailer full time.     PLAN:  -Chest tube removed  -CXR in AM  -Regular diet  -Pulmonary toilet  -Elevate, ice right upper extremity  -PT/OT consulted    -Weight-bear status to right upper extremity per orthopedic  -DVT prophylaxis: SCDs, Lovenox 40 mg SQ daily  Hopeful discharge tomorrow    BEN RICHARDSON MD

## 2018-07-29 NOTE — PLAN OF CARE
Problem: Knowledge Deficit  Goal: Patient/family/caregiver demonstrates understanding of disease process, treatment plan, medications, and discharge instructions  INTERVENTIONS:   1. Complete learning assessment and assess knowledge base  2. Provide teaching at level of understanding   3. Provide teaching via preferred learning methods   Outcome: Progressing   07/28/18 2250   Interventions Appropriate for this Patient   Patient/family/caregiver demonstrates understanding of disease process, treatment plan, medications, and discharge instructions Provide teaching at level of understanding       Problem: Potential for Compromised Skin Integrity  Goal: Skin Integrity is Maintained or Improved  INTERVENTIONS:  1. Assess and monitor skin integrity  2. Collaborate with interdisciplinary team and initiate plans and interventions as needed  3. Alternate a full bath with partial baths for elderly   4. Monitor patient's hygiene practices   5. Collaborate with wound, ostomy, and continence nurse   Outcome: Progressing   07/28/18 2250   Interventions Appropriate for this Patient   Skin integrity is maintained or improved Assess and monitor skin integrity     Goal: MOBILITY IS MAINTAINED OR IMPROVED  INTERVENTIONS  1. Collaborate with interdisciplinary team and initiate plan and interventions as ordered (PT/OT)  2. Encourage ambulation  3. Up to chair for meals  4. Monitor for signs of deconditioning   Outcome: Progressing   07/28/18 2250   Interventions Appropriate for this Patient   Mobility is Maintained or Improved Encourage ambulation       Problem: Pain - Adult  Goal: Verbalizes/displays adequate comfort level or baseline comfort level  INTERVENTIONS:  1. Encourage patient to monitor pain and request interventions  2. Assess pain using the appropriate pain scale  3. Administer analgesics based on type and severity of pain and evaluate response  4. Educate/Implement non-pharmacological measures as appropriate and evaluate  response  5. Consider cultural, developmental and social influences on pain and pain management  6. Notify Provider if interventions unsuccessful or patient reports new pain   Outcome: Progressing   07/28/18 2250   Interventions Appropriate for this Patient   Verbalizes/displays adequate comfort level or baseline comfort level Encourage patient to monitor pain and request interventions;Assess pain using the appropriate pain scale       Problem: Infection - Adult  Goal: Absence of infection during hospitalization  INTERVENTIONS:  1. Assess and monitor for signs and symptoms of infection  2. Monitor lab/diagnostic results  3. Monitor all insertion sites/wounds/incisions  4. Monitor secretions for changes in amount and color  5. Administer medications as ordered  6. Educate and encourage patient and family to use good hand hygiene technique  7. Identify and educate in appropriate isolation precautions for identified infection/condition   Outcome: Progressing   07/28/18 2250   Interventions Appropriate for this Patient   Absence of infection during hospitalization Assess and monitor for signs and symptoms of infection;Monitor lab/diagnostic results;Administer medications as ordered       Problem: Safety Adult  Goal: Patient will remain safe during hospitalization  INTERVENTIONS    1. Assess patient for fall risk and implement interventions if needed  2. Use safe transport techniques  3. Assess patient using the Sergei skin assessment scale  4. Assess patient for risk of aspiration  5. Assess patient for risk of elopement   Outcome: Progressing   07/28/18 2250   Interventions Appropriate for this Patient   Patient will remain safe durning hospitalization Assess patient for Fall Risk;Assess Patient using the Sergei scale       Problem: Daily Care  Goal: Daily care needs are met  INTERVENTIONS:   1. Assess and monitor ability to perform self care and identify potential discharge needs  2. Assess skin integrity/risk for  skin breakdown  3. Assist patient with activities of daily living as needed  4. Encourage independent activity per ability   5. Provide mouth care   6. Include patient/family/caregiver in decisions related to daily care    Outcome: Progressing   07/28/18 2250   Interventions Appropriate for this Patient   Daily care needs are met Assess and monitor skin integrity;Assess skin integrity/risk for skin breakdown       Problem: Respiratory - Adult  Goal: Achieves optimal ventilation and oxygenation  INTERVENTIONS:  1. Assess for changes in respiratory status  2. Assess for changes in mentation and behavior  3. Position to facilitate oxygenation and minimize respiratory effort  4. Oxygen supplementation based on oxygen saturation or ABGs  5. Assess patient's ability to cough effectively  6. Encourage broncho-pulmonary hygiene including cough, deep breathe  7. Assess the need for suctioning   8. Assess and instruct to report SOB or any respiratory difficulty  9. Respiratory Therapy support as indicated, including medications and treatment.   Outcome: Progressing   07/28/18 2250   Interventions Appropriate for this Patient   Achieves optimal ventilation and oxygenation Assess for changes in respiratory status;Assess for changes in mentation and behavior       Problem: Skin/Tissue Integrity - Adult  Goal: Incisions, wounds, or drain sites healing without S/S of infection  INTERVENTIONS  1. Assess and document risk factors for skin breakdown  2. Assess and document skin integrity  3. Assess and document dressing/incision, wound bed, drain sites and surrounding tissue  4. Implement wound care per orders   Outcome: Progressing   07/28/18 2250   Interventions Appropriate for this Patient   Incision(s), wound(s) or drain site(s) healing without S/S of infection Assess and document skin integrity       Problem: Musculoskeletal - Adult  Goal: Return mobility to safest level of function  INTERVENTIONS:  1. Assess patient stability  and activity tolerance for standing, transferring and ambulating w/ or w/o assistive devices  2. Assist with transfers and ambulation using safe practices  3. Ensure adequate protection for wounds/incisions during mobilization  4. Obtain PT/OT and other consults as needed  5. Apply Continuous Passive Motion per order to increase flexion toward goal  6. Instruct patient/family in ordered activity level   Outcome: Progressing   07/28/18 2250   Interventions Appropriate for this Patient   Return mobility to safest level of function Assist with transfers and ambulation using safe practices     Goal: Maintain proper alignment of affected body part  INTERVENTIONS:  1. Support and protect limb and body alignment per provider's orders  2. Instruct and reinforce with patient and family use of appropriate assistive device and precautions (e.g. spinal or hip dislocation precautions)   Outcome: Progressing   07/28/18 2250   Interventions Appropriate for this Patient   Maintain proper alignment of affected body part Support and protect limb and body alignment per provider's orders       Problem: Safety Adult - Fall  Goal: Free from fall injury  INTERVENTIONS:    Please reference Cares/Safety Flowsheet under [place]illa Fall Risk for interventions.   Outcome: Progressing

## 2018-07-30 ENCOUNTER — TELEPHONE (OUTPATIENT)
Dept: SURGERY | Facility: CLINIC | Age: 53
End: 2018-07-30

## 2018-07-30 ENCOUNTER — APPOINTMENT (OUTPATIENT)
Dept: RADIOLOGY | Facility: HOSPITAL | Age: 53
DRG: 958 | End: 2018-07-30
Payer: OTHER GOVERNMENT

## 2018-07-30 VITALS
WEIGHT: 182.98 LBS | TEMPERATURE: 98.8 F | BODY MASS INDEX: 29.53 KG/M2 | RESPIRATION RATE: 16 BRPM | OXYGEN SATURATION: 93 % | HEART RATE: 85 BPM | SYSTOLIC BLOOD PRESSURE: 154 MMHG | DIASTOLIC BLOOD PRESSURE: 57 MMHG

## 2018-07-30 LAB
ALBUMIN SERPL-MCNC: 3.1 G/DL (ref 3.5–5.3)
ALP SERPL-CCNC: 60 U/L (ref 41–108)
ALT SERPL-CCNC: 13 U/L (ref 0–52)
ANION GAP SERPL CALC-SCNC: 11 MMOL/L (ref 3–11)
AST SERPL-CCNC: 16 U/L (ref 0–39)
BASOPHILS # BLD AUTO: 0.1 10*3/UL
BASOPHILS NFR BLD AUTO: 1 % (ref 0–2)
BILIRUB SERPL-MCNC: 0.34 MG/DL (ref 0–1.4)
BUN SERPL-MCNC: 5 MG/DL (ref 7–25)
CALCIUM ALBUM COR SERPL-MCNC: 9.3 MG/DL (ref 8.5–10.1)
CALCIUM SERPL-MCNC: 8.6 MG/DL (ref 8.6–10.3)
CHLORIDE SERPL-SCNC: 104 MMOL/L (ref 98–107)
CO2 SERPL-SCNC: 24 MMOL/L (ref 21–32)
CREAT SERPL-MCNC: 0.6 MG/DL (ref 0.6–1.2)
EOSINOPHIL # BLD AUTO: 0.4 10*3/UL
EOSINOPHIL NFR BLD AUTO: 8 % (ref 0–3)
ERYTHROCYTE [DISTWIDTH] IN BLOOD BY AUTOMATED COUNT: 12.5 % (ref 11.5–14)
GFR SERPL CREATININE-BSD FRML MDRD: 105 ML/MIN/1.73M*2
GLUCOSE SERPL-MCNC: 101 MG/DL (ref 70–105)
HCT VFR BLD AUTO: 40.2 % (ref 34–45)
HGB BLD-MCNC: 14.5 G/DL (ref 11.5–15.5)
LYMPHOCYTES # BLD AUTO: 1.7 10*3/UL
LYMPHOCYTES NFR BLD AUTO: 34 % (ref 11–47)
MCH RBC QN AUTO: 32.6 PG (ref 28–33)
MCHC RBC AUTO-ENTMCNC: 36.1 G/DL (ref 32–36)
MCV RBC AUTO: 90.5 FL (ref 81–97)
MONOCYTES # BLD AUTO: 0.4 10*3/UL
MONOCYTES NFR BLD AUTO: 7 % (ref 3–11)
NEUTROPHILS # BLD AUTO: 2.5 10*3/UL
NEUTROPHILS NFR BLD AUTO: 49 % (ref 41–81)
PLATELET # BLD AUTO: 218 10*3/UL (ref 140–350)
PMV BLD AUTO: 7.2 FL (ref 6.9–10.8)
POTASSIUM SERPL-SCNC: 3.1 MMOL/L (ref 3.5–5.1)
PROT SERPL-MCNC: 6 G/DL (ref 6–8.3)
RBC # BLD AUTO: 4.44 10*6/ΜL (ref 3.7–5.3)
SODIUM SERPL-SCNC: 139 MMOL/L (ref 135–145)
WBC # BLD AUTO: 5 10*3/UL (ref 4.5–10.5)

## 2018-07-30 PROCEDURE — 99238 HOSP IP/OBS DSCHRG MGMT 30/<: CPT | Performed by: NURSE PRACTITIONER

## 2018-07-30 PROCEDURE — 6370000100 HC RX 637 (ALT 250 FOR IP): Performed by: SURGERY

## 2018-07-30 PROCEDURE — 80053 COMPREHEN METABOLIC PANEL: CPT | Performed by: SURGERY

## 2018-07-30 PROCEDURE — 85025 COMPLETE CBC W/AUTO DIFF WBC: CPT | Performed by: SURGERY

## 2018-07-30 PROCEDURE — 36415 COLL VENOUS BLD VENIPUNCTURE: CPT | Performed by: SURGERY

## 2018-07-30 PROCEDURE — 6370000100 HC RX 637 (ALT 250 FOR IP): Performed by: NURSE PRACTITIONER

## 2018-07-30 PROCEDURE — 99024 POSTOP FOLLOW-UP VISIT: CPT | Performed by: NURSE PRACTITIONER

## 2018-07-30 PROCEDURE — 71045 X-RAY EXAM CHEST 1 VIEW: CPT

## 2018-07-30 RX ORDER — IBUPROFEN 800 MG/1
800 TABLET ORAL EVERY 8 HOURS SCHEDULED
Qty: 90 TABLET | Refills: 0 | Status: SHIPPED | OUTPATIENT
Start: 2018-07-30 | End: 2020-06-24 | Stop reason: ALTCHOICE

## 2018-07-30 RX ORDER — ACETAMINOPHEN 325 MG/1
650 TABLET ORAL EVERY 6 HOURS SCHEDULED
Qty: 90 TABLET | Refills: 0 | Status: SHIPPED | OUTPATIENT
Start: 2018-07-30 | End: 2020-06-24 | Stop reason: ALTCHOICE

## 2018-07-30 RX ORDER — OXYCODONE HYDROCHLORIDE 5 MG/1
5-10 TABLET ORAL EVERY 4 HOURS PRN
Qty: 20 TABLET | Refills: 0 | Status: SHIPPED | OUTPATIENT
Start: 2018-07-30 | End: 2018-08-02 | Stop reason: SDUPTHER

## 2018-07-30 RX ORDER — POTASSIUM CHLORIDE 1500 MG/1
40 TABLET, EXTENDED RELEASE ORAL 2 TIMES DAILY WITH MEALS
Qty: 10 TABLET | Refills: 0 | Status: SHIPPED | OUTPATIENT
Start: 2018-07-30 | End: 2020-06-24 | Stop reason: ALTCHOICE

## 2018-07-30 RX ORDER — DOCUSATE SODIUM 100 MG/1
200 CAPSULE, LIQUID FILLED ORAL 2 TIMES DAILY
Qty: 90 CAPSULE | Refills: 0 | Status: SHIPPED | OUTPATIENT
Start: 2018-07-30 | End: 2020-06-24 | Stop reason: ALTCHOICE

## 2018-07-30 RX ORDER — POTASSIUM CHLORIDE 750 MG/1
40 TABLET, EXTENDED RELEASE ORAL 2 TIMES DAILY WITH MEALS
Status: DISCONTINUED | OUTPATIENT
Start: 2018-07-30 | End: 2018-07-30 | Stop reason: HOSPADM

## 2018-07-30 RX ORDER — LIDOCAINE 560 MG/1
1 PATCH PERCUTANEOUS; TOPICAL; TRANSDERMAL DAILY
Qty: 20 PATCH | Refills: 0 | Status: SHIPPED | OUTPATIENT
Start: 2018-07-30 | End: 2020-06-24 | Stop reason: ALTCHOICE

## 2018-07-30 RX ADMIN — CYCLOBENZAPRINE HYDROCHLORIDE 10 MG: 10 TABLET, FILM COATED ORAL at 15:13

## 2018-07-30 RX ADMIN — IBUPROFEN 800 MG: 800 TABLET ORAL at 15:10

## 2018-07-30 RX ADMIN — ACETAMINOPHEN 650 MG: 325 TABLET ORAL at 05:38

## 2018-07-30 RX ADMIN — OXYCODONE HYDROCHLORIDE 10 MG: 5 TABLET ORAL at 09:08

## 2018-07-30 RX ADMIN — OXYCODONE HYDROCHLORIDE 10 MG: 5 TABLET ORAL at 04:00

## 2018-07-30 RX ADMIN — ALPRAZOLAM 0.25 MG: 0.25 TABLET ORAL at 15:14

## 2018-07-30 RX ADMIN — IBUPROFEN 800 MG: 800 TABLET ORAL at 05:38

## 2018-07-30 RX ADMIN — CYCLOBENZAPRINE HYDROCHLORIDE 10 MG: 10 TABLET, FILM COATED ORAL at 09:10

## 2018-07-30 RX ADMIN — GABAPENTIN 100 MG: 100 CAPSULE ORAL at 12:09

## 2018-07-30 RX ADMIN — POTASSIUM CHLORIDE 40 MEQ: 750 TABLET, FILM COATED, EXTENDED RELEASE ORAL at 09:10

## 2018-07-30 RX ADMIN — OXYCODONE HYDROCHLORIDE 10 MG: 5 TABLET ORAL at 12:57

## 2018-07-30 RX ADMIN — ACETAMINOPHEN 650 MG: 325 TABLET ORAL at 12:09

## 2018-07-30 RX ADMIN — LIDOCAINE 1 PATCH: 560 PATCH PERCUTANEOUS; TOPICAL; TRANSDERMAL at 09:10

## 2018-07-30 RX ADMIN — GABAPENTIN 100 MG: 100 CAPSULE ORAL at 09:08

## 2018-07-30 NOTE — TELEPHONE ENCOUNTER
"Called pharmacy with verification of prescription: Per Silke Bone, CNP: patient needs to take 40 meq twice daily for 5 days. Pharmacist stated \"we will make the necessary quantity adjustments.\" I verbalized understanding    "

## 2018-07-30 NOTE — PLAN OF CARE
Problem: Knowledge Deficit  Goal: Patient/family/caregiver demonstrates understanding of disease process, treatment plan, medications, and discharge instructions  INTERVENTIONS:   1. Complete learning assessment and assess knowledge base  2. Provide teaching at level of understanding   3. Provide teaching via preferred learning methods   Outcome: Progressing      Problem: Potential for Compromised Skin Integrity  Goal: Skin Integrity is Maintained or Improved  INTERVENTIONS:  1. Assess and monitor skin integrity  2. Collaborate with interdisciplinary team and initiate plans and interventions as needed  3. Alternate a full bath with partial baths for elderly   4. Monitor patient's hygiene practices   5. Collaborate with wound, ostomy, and continence nurse   Outcome: Progressing    Goal: Nutritional status is improving  INTERVENTIONS:  1. Monitor and assess patient for malnutrition (ex- brittle hair, bruises, dry skin, pale skin and conjunctiva, muscle wasting, smooth red tongue, and disorientation)  2. Monitor patient's weight and dietary intake as ordered or per policy  3. Determine patient's food preferences and provide high-protein, high-caloric foods as appropriate  4. Assist patient with eating   5. Allow adequate time for meals   6. Encourage patient to take dietary supplement as ordered   7. Collaborate with dietitian  8. Include patient/family/caregiver in decisions related to nutrition   Outcome: Progressing

## 2018-07-30 NOTE — NURSING NOTE
Patient discharged home in stable condition with all personal belongings via personal vehicle accompanied by . Discharge information and follow up instructions were explained thoroughly and the patient had no questions/concerns. Taken to front via transport.

## 2018-07-30 NOTE — PLAN OF CARE
Problem: Knowledge Deficit  Goal: Patient/family/caregiver demonstrates understanding of disease process, treatment plan, medications, and discharge instructions  INTERVENTIONS:   1. Complete learning assessment and assess knowledge base  2. Provide teaching at level of understanding   3. Provide teaching via preferred learning methods   Outcome: Progressing   07/30/18 0155   Interventions Appropriate for this Patient   Patient/family/caregiver demonstrates understanding of disease process, treatment plan, medications, and discharge instructions Complete learning assessment and assess knowledge base;Provide teaching via preferred learning methods;Provide teaching at level of understanding       Problem: Potential for Compromised Skin Integrity  Goal: Skin Integrity is Maintained or Improved  INTERVENTIONS:  1. Assess and monitor skin integrity  2. Collaborate with interdisciplinary team and initiate plans and interventions as needed  3. Alternate a full bath with partial baths for elderly   4. Monitor patient's hygiene practices   5. Collaborate with wound, ostomy, and continence nurse   Outcome: Progressing   07/30/18 0155   Interventions Appropriate for this Patient   Skin integrity is maintained or improved Assess and monitor skin integrity;Collaborate with interdisciplinary team and initiate plans and interventions as needed;Monitor patient's hygiene practices       Problem: Pain - Adult  Goal: Verbalizes/displays adequate comfort level or baseline comfort level  INTERVENTIONS:  1. Encourage patient to monitor pain and request interventions  2. Assess pain using the appropriate pain scale  3. Administer analgesics based on type and severity of pain and evaluate response  4. Educate/Implement non-pharmacological measures as appropriate and evaluate response  5. Consider cultural, developmental and social influences on pain and pain management  6. Notify Provider if interventions unsuccessful or patient reports  new pain   Outcome: Progressing   07/30/18 0155   Interventions Appropriate for this Patient   Verbalizes/displays adequate comfort level or baseline comfort level Encourage patient to monitor pain and request interventions;Assess pain using the appropriate pain scale;Administer analgesics based on type and severity of pain and evaluate response;Educate/Implement non-pharmacological measures as appropriate and evaluate response;Consider cultural, developmental and social influences on pain and pain management;Notify Provider if interventions unsuccessful or patient reports new pain       Problem: Discharge Barriers  Goal: Patient's discharge needs are met  INTERVENTIONS:  1. Assess patient for self-management skills  2. Encourage participation in management  3. Identify potential discharge barriers on admission and throughout hospital stay  4. Involve patient/family/caregiver in discharge planning process  5. Collaborate with case management/ for discharge needs   Outcome: Progressing   07/30/18 0155   Interventions Appropriate for this Patient   Patient discharge needs are met Assess patient for self-management skills;Encourage participation in management;Identify potential discharge barriers on admission and throughout hospital stay;Involve patient/family/caregiver in discharge planning process       Problem: Skin/Tissue Integrity - Adult  Goal: Incisions, wounds, or drain sites healing without S/S of infection  INTERVENTIONS  1. Assess and document risk factors for skin breakdown  2. Assess and document skin integrity  3. Assess and document dressing/incision, wound bed, drain sites and surrounding tissue  4. Implement wound care per orders   Outcome: Progressing   07/30/18 0155   Interventions Appropriate for this Patient   Incision(s), wound(s) or drain site(s) healing without S/S of infection Assess and document risk factors for skin breakdown;Assess and document skin integrity;Assess and  document dressing/incision, wound bed, drain sites and surrounding tissue;Implement wound care per orders

## 2018-07-30 NOTE — PROGRESS NOTES
Orthopedic Progress Note    07/30/18  12:12 PM    Patient voices no complaints  Pain is controlled   Denies nausea or vomiting  No chest pain or shortness or breath    RN voices no concerns    Physical Exam  Examination of the right wrist reveals the patient is neurovascularly intact.  She has full sensation to all 5 digits on the right hand.  She has a 2+ circulation radial and ulnar artery on the right hand and 2+ capillary refill.  She is able to move all of her fingers on command.  She has expected mild to moderate swelling throughout the hand and into the fingers to a lesser extent.  Dressings are removed today.  The patient states she immediately felt better soon as the dressings were off.  Incision is healing nicely Steri-Strips are still in place no obvious discharge or drainage.  CBC with Platelet:    Lab Results   Component Value Date    WBC 5.0 07/30/2018    HGB 14.5 07/30/2018    HCT 40.2 07/30/2018     07/30/2018    RBC 4.44 07/30/2018    MCV 90.5 07/30/2018    MCH 32.6 07/30/2018    MCHC 36.1 (H) 07/30/2018    RDW 12.5 07/30/2018    MPV 7.2 07/30/2018     Comp:   Lab Results   Component Value Date     07/30/2018    K 3.1 (L) 07/30/2018     07/30/2018    CO2 24 07/30/2018    BUN 5 (L) 07/30/2018    CREATININE 0.6 07/30/2018    GLUCOSE 101 07/30/2018    CALCIUM 8.6 07/30/2018    PROT 6.0 07/30/2018    ALBUMIN 3.1 (L) 07/30/2018    AST 16 07/30/2018    ALT 13 07/30/2018    ALKPHOS 60 07/30/2018    BILITOT 0.34 07/30/2018     Coags: No results found for: PT, APTT, INR  C-Reactive Protein Screen: No results found for: CRP  Magnesium: No results found for: MG  Blood (Aerobic and Anaerobic):  No results found for: BLOODCX  Body Fluid Culture: No results found for: CULTURE  Wound: No results found for: WOUNDCX    X-ray Humerus Right    Result Date: 7/24/2018  Narrative: Exam: Right humerus 07/24/2018    Clinical History:  fall off horse  pain    Comparison/s: None available Views: 2 views  Findings: A fixation plate and multiple screws are noted over the lateral cortex of the mid humerus. An acute fracture is not identified. There are 2 focal radiopacities over the lateral soft tissues with associated soft tissue edema. One of these appears likely to be a button.     Impression: IMPRESSION: No acute displaced fracture. Recommend clinical correlation to exclude soft tissue foreign body.    X-ray Forearm Right 2 Views    Result Date: 7/26/2018  Narrative: Exam:  Two-view right forearm and three-view view right wrist at 750 07/26/2018    Clinical History:  Postoperative Care; . Post fixation of distal radius fracture Comparison:  7/24/2018 Findings: Plaster splint obscures some of the bony detail. Previously noted comminuted intra-articular distal radius fracture has been reduced and fixated with compression plate and screws.    Soft tissue swelling.     Impression: Impression: 1.  Interval reduction and internal fixation of distal radius fracture.    X-ray Forearm 2 Views Right    Result Date: 7/24/2018  Narrative: Exam: Right forearm series 07/24/2018    Clinical History:  fall off horse  pain Procedure/Views: 2 views Comparison/s: None Findings: There is a mildly comminuted fracture of the distal radius with dorsal angulation and approximately 50% dorsal displacement of the distal fragments. A distinct fracture of the ulna is not seen. There are tiny radiopacities over the soft tissues of the proximal forearm which could be small foreign bodies.     Impression: IMPRESSION: Comminuted fracture of the distal radius. Possible proximal soft tissue foreign bodies.    X-ray Wrist 2 Views Right    Result Date: 7/24/2018  Narrative: Exam: Right wrist series 07/24/2018    Clinical History:  reduction Procedure/Views: 3 views Comparison/s: None Findings: Distal radius fracture is again evident. There is decreased dorsal displacement and angulation of the distal fragments relative to the radial shaft.  Approximately 6 mm dorsal displacement remains. A splint is present.     Impression: IMPRESSION: Decreased displacement and angulation of the distal radius fracture fragments with splint in place.    X-ray Wrist Right 3+ Views    Result Date: 7/26/2018  Narrative: Exam:  Two-view right forearm and three-view view right wrist at 750 07/26/2018    Clinical History:  Postoperative Care; . Post fixation of distal radius fracture Comparison:  7/24/2018 Findings: Plaster splint obscures some of the bony detail. Previously noted comminuted intra-articular distal radius fracture has been reduced and fixated with compression plate and screws.    Soft tissue swelling.     Impression: Impression: 1.  Interval reduction and internal fixation of distal radius fracture.    X-ray Chest Portable 1 View    Result Date: 7/30/2018  Narrative: XR CHEST 1 VIEW 07/30/2018 HISTORY:  Pneumothorax TECHNIQUE:  Chest, 1 view. COMPARISON:  Prior day FINDINGS: The right-sided chest tube has been removed. Subsegmental bibasilar opacities are similar, with a persistent small right effusion. No pneumothorax identified. No edema. Normal heart size and mediastinal contours.     Impression: IMPRESSION:  1.  Stable chest x-ray with basilar opacities and a small right effusion. No visible pneumothorax.    X-ray Chest Portable 1 View    Result Date: 7/29/2018  Narrative: Exam: Single portable view of the chest. 07/29/2018 Clinical History:  f/u pneumo - placed to water seal 07/28/18 Comparison: 7/28/2018 Discussion: Single portable frontal view of the chest The right chest tube is no longer within the hemithorax. Previously seen pneumothorax is not definitely visualized on today's film. I basilar atelectasis appears unchanged. Heart size is stable.     Impression: IMPRESSION: 1.  Right-sided chest tube is no longer within the right hemithorax is tip is projected lateral to the ribs. 2.  No definite pneumothorax. 3.  Bibasilar atelectasis.    X-ray Chest  Portable 1 View    Result Date: 7/28/2018  Narrative: Exam: Single portable view of the chest. 07/28/2018 Clinical History:  pneumo f/u Comparison: 7/27/2018 Discussion: Single portable frontal view of the chest Tiny right apical pneumothorax, unchanged. Bibasilar atelectasis, right greater than left not significant fully changed. Heart size is stable.     Impression: IMPRESSION: 1.  Tiny right apical pneumothorax is unchanged. 2.  Right greater than left bibasilar atelectasis appears unchanged.    X-ray Chest Portable 1 View    Result Date: 7/27/2018  Narrative: EXAM : Chest x-ray 07/27/2018 0730 hours CLINICAL HISTORY :  pneumo    COMPARISON(S) : 7/26/2018 VIEWS : AP portable chest FINDINGS : Lungs:    Stable right basal pulmonary atelectasis. Pleural spaces:     Minor residual right apical pneumothorax may be slightly decreased from previous exam. Heart:  Unchanged Tubes and lines:   The tip of the right chest tube is not clearly seen medial to the rib margin and is likely outside the thoracic cavity.     Impression: IMPRESSION: Slight reduction in size of the very small residual right apical pneumothorax.     X-ray Chest Portable 1 View    Result Date: 7/26/2018  Narrative: Chest x-ray one view- AP upright portable at 843 07/26/2018 Clinical History:  Pneumothorax  known  follow up; .    Comparison/s: 7/26/2018 at 755 Findings: Small-caliber right sided chest tube noted. The tip is not seen and the tip could be outside the pleural space. Small right apical pneumothorax is stable. Given the positioning and degree of inspiration, the heart, pulmonary vessels and mediastinum are normal. Moderate basilar atelectasis and slight elevation right hemidiaphragm are stable. Left lung is clear. Bones are unremarkable.        Impression: IMPRESSION: 1.  Small right apical pneumothorax is stable. Tip of the right-sided chest tube is not clearly seen but it has probably been withdrawn slightly and is outside the pleural  space.     X-ray Chest Portable 1 View    Result Date: 7/26/2018  Narrative: EXAM : Chest x-ray 07/26/2018    CLINICAL HISTORY :  Pneumothorax  known  follow up;     COMPARISON(S) : 7/25/2018 VIEWS : AP portable chest FINDINGS : Lungs:    Residual right basal pulmonary atelectasis. Pleural spaces:     Tiny right apical pneumothorax with the pleural surface approximately 3 mm from the chest wall at the lung apex. Heart:  Unchanged Tubes and lines:   The location of the tip of the right chest tube is not clearly evident on this exam.     Impression: IMPRESSION: Tiny right apical pneumothorax. Location of the chest tube tip is not clearly seen on this exam.     X-ray Chest Portable 1 View    Result Date: 7/25/2018  Narrative: PORTABLE CHEST:  07/25/2018, 1252 hours. CLINICAL HISTORY:  Pneumothorax  known  follow up;     PROCEDURE/VIEW(S): Portable AP semiupright study COMPARISON(S): Portable chest, 7/25/2018, 0759 hours. FINDINGS: LUNGS: Bibasilar infiltrates and atelectasis. HEART: Top limits of normal in size. AORTA: Obscured. PULMONARY VASCULARITY: Normal. PLEURAL SPACES: Dry. No pneumothorax. SOFT TISSUES: Normal. OSSEOUS STRUCTURES: Intact. TUBES AND LINES: None. COMPARISON STUDIES: The small right apical pneumothorax seen on this morning's study is not clearly seen on this exam. This may be due to the difference in positioning between the 2 exams. This morning's exam was performed upright.     Impression: IMPRESSION: 1.  No acute cardiothoracic complication. 2.  Bibasilar pneumonia/atelectasis. REPORT NOTE: An upright, in the department, PA and lateral chest may need to be considered.    X-ray Chest Portable 1 View    Result Date: 7/25/2018  Narrative: EXAM: Portable upright chest x-ray on 07/25/2018 at  0759 hours. CLINICAL HISTORY:  Pneumothorax  known  follow up;  recent trauma COMPARISON: Available FINDINGS: Small caliber chest tube is identified laterally and inferiorly on the right side the chest. Small  right apical pneumothorax is identified. Top of the lung lies between the posterior second and third ribs. No evidence of any definite pulmonary contusion. A hemothorax is not seen. The right diaphragm is higher than the left. There is no widening of the mediastinum. The heart size is normal.     Impression: IMPRESSION: Small right apical pneumothorax. Elevated right diaphragm.     Xr Chest Portable 1 View    Result Date: 7/24/2018  Narrative: EXAM : Chest x-ray 07/24/2018 2207 hours CLINICAL HISTORY :  chest tube    COMPARISON(S) : 7/19/2017 chest radiograph and 7/24/2018 CT VIEWS : AP supine chest FINDINGS : Lungs:    Mild right basal atelectasis. Pleural spaces:     Some anteromedial lucency the right lower thorax. Heart:  Normal Tubes and lines:   Small caliber right chest tube now in place with tip at the level of the mid thorax.     Impression: IMPRESSION: Interval placement of right chest tube with apparent decrease of pneumothorax. Some anteromedial lucency remains suggesting there is likely small residual pneumothorax.     Ct Chest Abdomen Pelvis W Iv Contrast No Oral Contrast    Result Date: 7/24/2018  Narrative: CT OF THE CHEST, ABDOMEN AND PELVIS WITH CONTRAST    07/24/2018    . CLINICAL HISTORY:  fall off horse  right chest and abd pain    COMPARISON(S): none TECHNIQUE: Helical axial imaging was performed through the chest, abdomen and pelvis after the intravenous administration of 115 cc of Isovue-370.  Sagittal and coronal reformations were constructed and reviewed. Dose reduction technique utilized: automated/anatomic modulation of tube current (auto mA). FINDINGS: CT chest: There is large right pneumothorax which occupies approximately 50% of the right thorax. There is mild posterior pulmonary atelectasis. A mildly displaced fracture of the right posterior fifth rib is noted. There is some air in the soft tissues of the right lateral chest wall. There are nondisplaced fractures of the right lateral  seventh and eighth ribs. There is minor posterior dependent change in the left lung. Left lung aeration is otherwise clear. The heart is not enlarged. No pericardial effusion is seen. The thoracic aorta appears unremarkable. No hilar or mediastinal lymphadenopathy is seen. There is no segmental vertebral subluxation. Height of the thoracic vertebral bodies appears intact. Note is made of a fracture of the distal right radius. CT abdomen: The liver and spleen appear intact. Cholecystectomy changes are evident.  There is no adrenal mass.  The pancreas appears unremarkable.  Both kidneys enhance well without hydronephrosis. There are no perinephric fluid collections. Bowel loops are not dilated or thickened.  There are changes of prior appendectomy.  There is no retroperitoneal lymphadenopathy.  The abdominal aorta appears unremarkable for age.  No free abdominal fluid is seen. Lumbar vertebral alignment is normal. Height of the vertebral bodies is maintained. CT pelvis: The urinary bladder is unremarkable.  Free pelvic fluid is not seen.  Pelvic bones appear intact.         Impression: IMPRESSION: 1.  Right rib fractures with right pneumothorax. 2.  Fracture of the distal right radius.    Fl Fluoro Charge    Result Date: 7/25/2018  Narrative: NOTE: This study was stored without submission for formal interpretation.      Assessment and Plan  Principal Problem:    Open fracture of right distal radius      Assessment/Plan   Assessment:  Status post ORIF right distal radius    Plan:  Again the patient felt better immediately after removing the dressings as I do think they were just a little bit too tight.  We will redress the wound including Xeroform on her skin abrasion on the right forearm.  We will either put the original splint back on with just a little less tight dressing or ulcer removable splint is also acceptable at this time.  She will follow-up as currently directed.  She can call if there is any worsening or  other changes with her right hand be happy to see her.    Tye Arevalo, CNP

## 2018-07-30 NOTE — DISCHARGE SUMMARY
General Surgery Discharge Summary      Admitting Provider: Mynor Lancaster MD  Discharge Provider: Alyson Bone, MSN, AGACNP-BC alongside of Dr. Sanchez  Primary Care Physician at Discharge: ALEXANDRA DOUGHERTY [phone]     Admission Date: 7/24/2018     Discharge Date: 7/30/2018    Primary Discharge Diagnosis  -fall from horse  -R sided pneumothorax  -S/P chest tube placement  -S/P ORIF of R distal radius  -R sided rib fx    Discharge Disposition  Home  Code Status at Discharge: Full Code    Outpatient Follow-Up  Tye ALEJO in 2 wks  Alexandra Dougherty MD in 3 days for BMP r/t hypokalemia    Presenting Problem/History of Present Illness  Closed fracture of right distal radius [S52.501A]  Fall from horse, initial encounter [V80.010A]  Traumatic pneumothorax, initial encounter [S27.0XXA]    Operative Procedures Performed  Open reduction internal fixation right distal radius, irrigation debridement right elbow laceration, complex closure right elbow, placement of vacuum-assisted closure device right elbow - 07/25/18    Hospital Course  On 07/24/18 this 54yo presented to [facility] ED s/p fall from a horse. Pt was noted to have R sided pneumothorax, R displaced radial fx, multiple R sided rib fx. Pigtail chest tube was placed in the ER and eventually d/c'd on 07/29/18.  Pt was taken to the OR 07/25/18 for repair of R radial fx. Pt had multiple episodes of hypotension which eventually resolved after limiting narcotics and fluid bolus. Pain well controlled on PO pain medication, tolerating diet without difficulty, able to ambulate independently at time of d/c. Pt d/c'd home in fair condition with . VSS.    Physical Exam at Discharge  Discharge Condition: fair  Heart Rate: 85  Resp: 16  BP: 154/57  Temp: 37.1 °C (98.8 °F)  Weight: 83 kg (182 lb 15.7 oz)     Physical Exam:  General: alert, orient, no acute distress  HEENT: Normocephalic, ecchymosis noted to forehead, pupils equal round reactive, trachea midline, no  JVD  Respiratory: Respirations shallow, unlabored, clear to auscultation. Previous Chest tube site dressing CDI.  Cardiovascular: Regular rate and rhythm, no murmurs, rubs, clicks, gallops auscultated  Abdomen: Soft, nontender to palpation, nondistended, bowel sounds present  Extremities: Right forearm in surgical cast that extends from elbow to mid digit area, able to wiggle fingers, fingers warm to touch, capillary refill less than 3 seconds, acknowledges pain with movement

## 2018-07-30 NOTE — PLAN OF CARE
Problem: Discharge Barriers  Goal: Patient's discharge needs are met  INTERVENTIONS:  1. Assess patient for self-management skills  2. Encourage participation in management  3. Identify potential discharge barriers on admission and throughout hospital stay  4. Involve patient/family/caregiver in discharge planning process  5. Collaborate with case management/ for discharge needs   Outcome: Progressing   07/27/18 0255   Interventions Appropriate for this Patient   Patient discharge needs are met Assess patient for self-management skills;Encourage participation in management;Identify potential discharge barriers on admission and throughout hospital stay;Involve patient/family/caregiver in discharge planning process;Collaborate with case management/ for discharge needs

## 2018-08-01 DIAGNOSIS — S27.0XXA TRAUMATIC PNEUMOTHORAX, INITIAL ENCOUNTER: ICD-10-CM

## 2018-08-01 DIAGNOSIS — S52.571B OTHER TYPE I OR II OPEN INTRA-ARTICULAR FRACTURE OF DISTAL END OF RIGHT RADIUS, INITIAL ENCOUNTER: ICD-10-CM
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
## 2018-08-01 NOTE — TELEPHONE ENCOUNTER
Patient called in today stating that she is doing well overall after her surgery but that she has several abrasions on her upper arm from her accident and was wondering if she could clean these carefully with mild soap and dry them off. 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 Advised that this should be fine to carefully clean them with mild soap and water and to thoroughly dry this after. Patient stated understanding.   She also stated that she will be out of her pain medication in the next few days and was wondering how to go about getting a refill. Advised I would speak to her provider and let her know when we get her refill.

## 2018-08-02 RX ORDER — OXYCODONE HYDROCHLORIDE 5 MG/1
5 TABLET ORAL EVERY 6 HOURS PRN
Qty: 30 TABLET | Refills: 0 | Status: SHIPPED | OUTPATIENT
Start: 2018-08-02 | End: 2018-08-09

## 2018-08-07 ENCOUNTER — OFFICE VISIT (OUTPATIENT)
Dept: ORTHOPEDIC SURGERY | Facility: CLINIC | Age: 53
End: 2018-08-07
Payer: OTHER GOVERNMENT

## 2018-08-07 ENCOUNTER — HOME HEALTH ADMISSION (OUTPATIENT)
Dept: HOME HEALTH SERVICES | Facility: HOME HEALTH | Age: 53
End: 2018-08-07
Payer: OTHER GOVERNMENT

## 2018-08-07 ENCOUNTER — TELEPHONE (OUTPATIENT)
Dept: ORTHOPEDIC SURGERY | Facility: CLINIC | Age: 53
End: 2018-08-07

## 2018-08-07 VITALS — HEART RATE: 81 BPM | DIASTOLIC BLOOD PRESSURE: 80 MMHG | SYSTOLIC BLOOD PRESSURE: 116 MMHG

## 2018-08-07 DIAGNOSIS — S52.501E TYPE I OR II OPEN FRACTURE OF DISTAL END OF RIGHT RADIUS WITH ROUTINE HEALING, UNSPECIFIED FRACTURE MORPHOLOGY, SUBSEQUENT ENCOUNTER: ICD-10-CM

## 2018-08-07 DIAGNOSIS — Z48.89 ENCOUNTER FOR POSTOPERATIVE CARE: Primary | ICD-10-CM

## 2018-08-07 PROCEDURE — 73110 X-RAY EXAM OF WRIST: CPT | Mod: RT | Performed by: ORTHOPAEDIC SURGERY

## 2018-08-07 PROCEDURE — 99024 POSTOP FOLLOW-UP VISIT: CPT | Performed by: NURSE PRACTITIONER

## 2018-08-07 RX ORDER — TRAMADOL HYDROCHLORIDE 50 MG/1
50-100 TABLET ORAL EVERY 4 HOURS
Qty: 30 TABLET | Refills: 0 | Status: SHIPPED | OUTPATIENT
Start: 2018-08-07 | End: 2018-08-14

## 2018-08-07 ASSESSMENT — PAIN - FUNCTIONAL ASSESSMENT: PAIN_FUNCTIONAL_ASSESSMENT: 0-10

## 2018-08-07 NOTE — PROGRESS NOTES
Post-op of the Right Wrist      HPI  This is a pleasant 53-year-old female here today for follow-up ORIF of right wrist after an open distal radius fracture as well as a repair of right elbow laceration approximately 2 weeks ago.  Overall she seems to doing relatively well.  She did have a wound VAC in place on her elbow laceration which is now removed.  She does have sutures still in place.  She is also wearing a splint at this point for her wrist.  She states she still having some discomfort especially at night and in the morning.  Otherwise denies any symptoms of infection in her elbow.  She has been nonweightbearing on her wrist as directed.    X-rays today show the hardware in good position no evidence of loosening fracture line shows callus formation.    Past Medical History:   Diagnosis Date   • Fibromyalgia 5/3/2018   • History of migraine 9/14/2017   • Other insomnia 5/3/2018   • Situational anxiety 5/3/2018     Past Surgical History:   Procedure Laterality Date   • COLONOSCOPY  03/2018    no polyps   • FOOT SURGERY Right 2009    Screws   • HUMERUS SURGERY Right 2013    plate/screws   • HYSTERECTOMY     • ORIF WRIST FRACTURE Right 7/25/2018    Procedure: RIGHT ORIF WRIST;  Surgeon: Saad Zuniga MD;  Location: [facility] OR;  Service: Orthopedics;  Laterality: Right;   • SIGMOIDECTOMY  2016    diverticular ds   • UPPER EXTREMITY DEBRIDEMENT Right 7/25/2018    Procedure: irrigation and debridement of right upper extremity and all indicated procedures;  Surgeon: Saad Zuniga MD;  Location: [facility] OR;  Service: Orthopedics;  Laterality: Right;     Prior to Admission medications    Medication Sig Start Date End Date Taking? Authorizing Provider   acetaminophen (TYLENOL) 325 mg tablet Take 2 tablets (650 mg total) by mouth every 6 (six) hours. 7/30/18  Yes Silke Bone CNP   ALPRAZolam (XANAX) 0.25 mg tablet Take 1 tablet (0.25 mg total) by mouth 2 (two) times a day as needed for anxiety. 7/17/18  Yes Carlos A  MD [place]   butorphanol (STADOL) 10 mg/mL nasal spray Administer 1 spray into one nostril every 6 (six) hours as needed for pain scale 4-7/10 for up to 2 doses. 7/17/18  Yes Carlos A Dougherty MD   docusate sodium (COLACE) 100 mg capsule Take 2 capsules (200 mg total) by mouth 2 (two) times a day. 7/30/18  Yes Silke Bone CNP   gabapentin (NEURONTIN) 100 mg capsule Take 100 mg by mouth 4 (four) times a day.   Yes Historical Provider, MD   ibuprofen (ADVIL,MOTRIN) 800 mg tablet Take 1 tablet (800 mg total) by mouth every 8 (eight) hours. 7/30/18  Yes Silke Bone CNP   lidocaine 4 % patch Apply 1 patch topically daily.  Remove & discard patch within 12 hours or as directed by MD. 7/30/18  Yes Silke Bone CNP   oxyCODONE (ROXICODONE) 5 mg immediate release tablet Take 1 tablet (5 mg total) by mouth every 6 (six) hours as needed for pain scale 4-7/10 for up to 7 days. 8/2/18 8/9/18 Yes Flor Reyes CNP   potassium chloride (K-TAB) 20 mEq CR tablet Take 2 tablets (40 mEq total) by mouth 2 (two) times a day with meals. 7/30/18  Yes Silke Bone CNP   tiZANidine (ZANAFLEX) 4 mg tablet Take 4 mg by mouth nightly.     Yes Historical Provider, MD   TRULANCE 3 mg tablet Take 3 mg by mouth daily.   4/2/18  Yes Historical Provider, MD   zolpidem (AMBIEN) 10 mg tablet Take 1 tablet (10 mg total) by mouth nightly as needed for sleep. 5/3/18  Yes Carlos A Dougherty MD   traMADol (ULTRAM 50) 50 mg tablet Take 1-2 tablets ( mg total) by mouth every 4 (four) hours for 7 days. 8/7/18 8/14/18  Tye Arevalo CNP     Allergies   Allergen Reactions   • Desvenlafaxine Succinate      Other reaction(s): Headache   • Hydromorphone Hives   • Morphine      Other reaction(s): Headache   • Trazodone      nightmares   • Clindamycin Rash   • Sumatriptan Rash     Social History     Social History   • Marital status:      Spouse name: N/A   • Number of children: N/A   • Years of education: N/A     Occupational  History   • Not on file.     Social History Main Topics   • Smoking status: Never Smoker   • Smokeless tobacco: Never Used   • Alcohol use Yes   • Drug use: No   • Sexual activity: Yes     Other Topics Concern   • Not on file     Social History Narrative   • No narrative on file     No family history on file.    /80   Pulse 81     Ortho Exam  On exam today, the right wrist show Steri-Strips in place.  There is no stated significant swelling, she is neurovascularly intact.  I can do some gentle range of motion with her wrist without any significant discomfort.  As for her right elbow sutures are still in place there is some exudative material present but no signs of infection at this time.  She also had a small lack on her right upper arm which is healed at this point.  As far as her right elbow there is no fluctuance no erythema no discharge or drainage seems to be healing well.  Assessment/Plan   Assessment:  Status post right distal radius ORIF and right elbow laceration repair    Plan:  I will have the patient see wound care for treatment of her right elbow laceration.  I would leave the sutures in for now as I do think the wound edges are still little bit friable and I do not want to remove those at this point.  I will have her come back in about 3 weeks for reevaluation prior to moving back to [place] for the winter to reevaluate her wrist and elbow.  I did discuss with her that at this point I will continue her nonweightbearing right wrist but she can do range of motion as tolerated and I did demonstrate that for her today.  I also demonstrated some elbow range of motion as I think she is afraid to move it from her laceration but she can do that and we talked about that as well.  She has any issues prior I will see her back before the scheduled appointment otherwise we will see her in about 3 weeks.

## 2018-08-08 ENCOUNTER — TELEPHONE (OUTPATIENT)
Dept: ORTHOPEDIC SURGERY | Facility: CLINIC | Age: 53
End: 2018-08-08

## 2018-08-08 ENCOUNTER — HOME CARE VISIT (OUTPATIENT)
Dept: HOME HEALTH SERVICES | Facility: HOME HEALTH | Age: 53
End: 2018-08-08

## 2018-08-08 NOTE — TELEPHONE ENCOUNTER
Order for home health canceled and new order placed to have patient seen by [facility] wound care.

## 2018-08-08 NOTE — TELEPHONE ENCOUNTER
Spoke with Luiza and answer her questions.  She sees wound care on 08/15/18.  We will see her back in clinic at 2 weeks to reassess and remove sutures.

## 2018-08-08 NOTE — TELEPHONE ENCOUNTER
Spoke to patient again in regards to getting her wound care orders in [place] and that as the [facility] [facility] told us the only way to get wound care there is via a home health order. Repeatedly explained to home health that the patient is not in need of the typical home health but the only way to get her wound care in [place] is with Home Health. Patient states she also spoke with the home health nurse several time explaining the situation and that she was told they would not be comfortable doing wound care but could help her with bathing and dressing. Patient again advised she does not need help with this just wound care.   Will have to cancel the order for home health and have patient come up to [place] and be seen by the wound care team in order to get her the care she needs.

## 2018-08-09 ENCOUNTER — HOME CARE VISIT (OUTPATIENT)
Dept: HOME HEALTH SERVICES | Facility: HOME HEALTH | Age: 53
End: 2018-08-09

## 2018-08-09 ENCOUNTER — TELEPHONE (OUTPATIENT)
Dept: ORTHOPEDIC SURGERY | Facility: CLINIC | Age: 53
End: 2018-08-09

## 2018-08-09 NOTE — TELEPHONE ENCOUNTER
Luiza call the office today stating she is still getting phone calls from Home Health and PT/OT and every time she is reminding them that she has declined home health at this time and just wants wound care. Advised that we notified home health yesterday that the order is canceled but we will speak to them again today. Patient states understanding.

## 2018-08-13 ENCOUNTER — HOSPITAL ENCOUNTER (OUTPATIENT)
Dept: WOUND CARE | Facility: CLINIC | Age: 53
Discharge: HOME | End: 2018-08-13
Payer: OTHER GOVERNMENT

## 2018-08-13 VITALS
TEMPERATURE: 98.2 F | HEART RATE: 86 BPM | DIASTOLIC BLOOD PRESSURE: 67 MMHG | SYSTOLIC BLOOD PRESSURE: 137 MMHG | RESPIRATION RATE: 16 BRPM

## 2018-08-13 DIAGNOSIS — Z48.89 ENCOUNTER FOR POSTOPERATIVE CARE: Primary | ICD-10-CM

## 2018-08-13 PROCEDURE — 99215 OFFICE O/P EST HI 40 MIN: CPT

## 2018-08-13 PROCEDURE — 6370000100 HC RX 637 (ALT 250 FOR IP): Performed by: NURSE PRACTITIONER

## 2018-08-13 RX ORDER — LIDOCAINE HYDROCHLORIDE 20 MG/ML
1 JELLY TOPICAL AS NEEDED
Status: DISCONTINUED | OUTPATIENT
Start: 2018-08-13 | End: 2020-06-24 | Stop reason: ALTCHOICE

## 2018-08-13 RX ADMIN — LIDOCAINE HYDROCHLORIDE 1 APPLICATION: 20 JELLY TOPICAL at 11:47

## 2018-08-13 NOTE — PLAN OF CARE
Problem: Wound Care  Goal: Wound will progress through the stages of healing  INTERVENTIONS:  1. Assess decrease in wound size.  2. Assess for presence of granulation tissue.  3. Assess for signs and symptoms of infection.  4. Assess drainage.  Outcome: Progressing   08/13/18 1051   Interventions Appropriate for this Patient   WC WOUND WILL PROGRESS THROUGH THE STAGES OF HEALING Assess decrease in wound size.;Assess for presence of granulation tissue.;Assess for signs and symptoms of infection.;Assess drainage.

## 2018-08-13 NOTE — TREATMENT PLAN
"8/13/18:  Pt called this morning to see if we could work her in to be seen prior to her scheduled appt on Wednesday morning. As luck would have it, as soon as I hung up the phone we got a cancellation. Called her back and she came over right away. She was mildly distressed and quite anxious. 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 Three sutures removed, one had grown into the tissue and the knot was very difficult to find. All were removed intact. A plan for her wound was initiated and pt will return on Tuesday of next week. She is from out of town and working with her  at [facility]. He just underwent back surgery this morning. Pt is very easily taught the plan and will change her dressing every three days.    R elbow wound:  1. Cleanse with normal saline or in shower  2. Apply a small amount of hydrating gel to wound bed to aid in autolytic debridement of nonviable tissue.  3. Adaptic contact layer  4. Mepilex Flex dressing  5. Moisturizer for dry skin  6. Dressing change every three days and PRN  Pt to return on Tuesday, 8/21.     08/13/18 1000   Incision 07/25/18   Date First Assessed/Time First Assessed: 07/25/18 1115   Location Descriptor: Right  Location: Elbow  Incision Type: Post-op incision dehiscence  Number of Sutures in Place: 3   Dressing Status No dressing - open to air   Dressing Changed Yes   (R) Drainage Amount Small (25% or less of dressing)   (R) Drainage Description Serosanguineous   Wound Odor None   Signs of Infection None   (R) Site Assessment Prior to Treatment Brown;Pink;Red;Slough;Tan;Yellow   (R) Surrounding Skin Assessment Dry;Intact   WCT Margins Attached edges   Date Measured 08/13/18   Wound Length (cm) 3.2 cm   Wound Width (cm) 1.5 cm   Wound Depth (cm) 0.2   Calculated Wound Size (cm^3)  0.96 cm^3   Calculated Wound " Size (cm^2) FOR BILLING REASON 4.8 cm^2   Wound Cleansing:  Normal saline   Number of Sutures Removed 3   Wound Bed Topical Treatment Hydration gel   Dressing Contact layer;Silicone Border Foam   WCT Debridement Performed No   Wound 07/25/18 Laceration Hand   Date First Assessed/Time First Assessed: 07/25/18 0133   Pre-Existing Wound: Yes  Wound Type: Laceration  Location Descriptor: Right  Location: Hand  Number of Sutures in Place: 3   Dressing Status (Wound Clinic not ordered on this wound)

## 2018-08-13 NOTE — WCT INITIAL ASSESSMENT
"Wound Care Team Initial Evaluation    08/13/18    Patient Care Team:  Carlos A Dougherty MD as PCP - General (Family Medicine)    Onset Date: July 24, 2018    Start of Care (SOC) Date: August 13, 2018    Certification Period: August 13-September 12, 2018    Physician Order: Evaluate and treat R elbow per wound care team protocol.    Treatment Diagnosis:    Diagnosis Plan   1. Encounter for postoperative care  lidocaine (XYLOCAINE) 2 % jelly 1 application        Wound History: Pt was in an accident while riding a horse.    Occupation, Living and Lifestyle Assessment: Pt and her  work at [employer] during the summer.    Activities Limited by Current Condition: Working, horse-back riding.    Treatment included:   8/13/18:  Pt called this morning to see if we could work her in to be seen prior to her scheduled appt on Wednesday morning. As luck would have it, as soon as I hung up the phone we got a cancellation. Called her back and she came over right away. She was mildly distressed and quite anxious. 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 Three sutures removed, one had grown into the tissue and the knot was very difficult to find. All were removed intact. A plan for her wound was initiated and pt will return on Tuesday of next week. She is from out of town and working with her  at [employer]. He just underwent back surgery this morning. Pt is very easily taught the plan and will change her dressing every three days.     R elbow wound:  1. Cleanse with normal saline or in shower  2. Apply a small amount of hydrating gel to wound bed to aid in autolytic debridement of nonviable tissue.  3. Adaptic contact layer  4. Mepilex Flex dressing  5. Moisturizer for dry skin  6. Dressing change every three days and PRN  Pt to return on Tuesday, " 8/21.    Current Assessment:      08/13/18 1000   Incision 07/25/18   Date First Assessed/Time First Assessed: 07/25/18 1115   Location Descriptor: Right  Location: Elbow  Incision Type: Post-op incision dehiscence  Number of Sutures in Place: 3   Dressing Status No dressing - open to air   Dressing Changed Yes   (R) Drainage Amount Small (25% or less of dressing)   (R) Drainage Description Serosanguineous   Wound Odor None   Signs of Infection None   (R) Site Assessment Prior to Treatment Brown;Pink;Red;Slough;Tan;Yellow   (R) Surrounding Skin Assessment Dry;Intact   WCT Margins Attached edges   Date Measured 08/13/18   Wound Length (cm) 3.2 cm   Wound Width (cm) 1.5 cm   Wound Depth (cm) 0.2   Calculated Wound Size (cm^3)  0.96 cm^3   Calculated Wound Size (cm^2) FOR BILLING REASON 4.8 cm^2   Wound Cleansing:  Normal saline   Number of Sutures Removed 3   Wound Bed Topical Treatment Hydration gel   Dressing Contact layer;Silicone Border Foam   WCT Debridement Performed No   Wound 07/25/18 Laceration Hand   Date First Assessed/Time First Assessed: 07/25/18 0133   Pre-Existing Wound: Yes  Wound Type: Laceration  Location Descriptor: Right  Location: Hand  Number of Sutures in Place: 3   Dressing Status (Wound Clinic not ordered on this wound)       Edema:   Peripheral Vascular (WDL): Within Defined Limits    Education:The following education has been completed with patient: Current Wound Status.     Goals for Treatment:   Site will be free from signs or symptoms of infection.    Frequency and Duration of Treatment: The patient will be seen 1 time(s) per week for approximately 45-60 minutes for 4 weeks.    Continued treatment plan will be implemented per wound care protocol, policies, and procedures.    Thank you for allowing us to share in the care of this patient. If you have any questions, comments or concerns regarding this patient, please feel free to contact the outpatient wound care center.

## 2018-08-14 DIAGNOSIS — R11.0 NAUSEA: Primary | ICD-10-CM

## 2018-08-14 RX ORDER — ONDANSETRON 4 MG/1
TABLET, ORALLY DISINTEGRATING ORAL
Qty: 6 TABLET | Refills: 2 | Status: SHIPPED | OUTPATIENT
Start: 2018-08-14 | End: 2020-06-24 | Stop reason: SDUPTHER

## 2018-08-16 ENCOUNTER — HOSPITAL ENCOUNTER (OUTPATIENT)
Dept: RADIOLOGY | Facility: HOSPITAL | Age: 53
Discharge: HOME | End: 2018-08-16
Attending: PHYSICIAN ASSISTANT
Payer: OTHER GOVERNMENT

## 2018-08-16 ENCOUNTER — OFFICE VISIT (OUTPATIENT)
Dept: URGENT CARE | Facility: CLINIC | Age: 53
End: 2018-08-16
Payer: OTHER GOVERNMENT

## 2018-08-16 VITALS — HEART RATE: 70 BPM | SYSTOLIC BLOOD PRESSURE: 149 MMHG | OXYGEN SATURATION: 96 % | DIASTOLIC BLOOD PRESSURE: 63 MMHG

## 2018-08-16 DIAGNOSIS — S22.41XD CLOSED FRACTURE OF MULTIPLE RIBS OF RIGHT SIDE WITH ROUTINE HEALING, SUBSEQUENT ENCOUNTER: Primary | ICD-10-CM

## 2018-08-16 DIAGNOSIS — S22.41XD CLOSED FRACTURE OF MULTIPLE RIBS OF RIGHT SIDE WITH ROUTINE HEALING, SUBSEQUENT ENCOUNTER: ICD-10-CM

## 2018-08-16 PROCEDURE — 71101 X-RAY EXAM UNILAT RIBS/CHEST: CPT | Mod: RT

## 2018-08-16 PROCEDURE — 99213 OFFICE O/P EST LOW 20 MIN: CPT | Mod: 24 | Performed by: PHYSICIAN ASSISTANT

## 2018-08-16 RX ORDER — OXYCODONE HYDROCHLORIDE 5 MG/1
5 TABLET ORAL EVERY 4 HOURS PRN
Qty: 30 TABLET | Refills: 0 | Status: SHIPPED | OUTPATIENT
Start: 2018-08-16 | End: 2018-08-26

## 2018-08-16 NOTE — PROGRESS NOTES
Subjective     Luiza Tay is a 53 y.o. female who presents with Chief Complaint of 2 follow-up on her right rib fracture      HPI      Patient presents clinic today and states that on 7/24/2018 she had an accident with falling off her horse she actually fractured 3 ribs and punctured her lung had to have a chest tube placed and she was in the hospital for 6 days  Patient states that she had injury of her right elbow and she had to have ORIF done of her right wrist  She states she is doing very well with everything now except she is having some increased pain in her right side and whenever she raises her arm up it makes her right side hurt where he did not so bad before  She just wants to make sure she did not make anything worse with her rib fractures  She denies any difficulty with breathing  They did give her a binder to wear that he has metal stays in it and she does not want to wear that all the time sometimes she will wear the nighttime when she is laying down  Her  had surgery on August 13, 2018 and they were doing a lot of things to get around to get him ready for his back surgery and she thinks she overdid it  She is able to climb up in and out of her bunk in the [facility] trailer where they are living.  Had any difficulty  They travel around with her horses and live in the trailer.   She is concerned because the tramadol that they gave her is making her cry all the time and begin her very irritable but the Roxicodone seem to help with her pain and she did not get a migraine from it did not have any adverse side effects with it  Dr. Dougherty is her primary care provider when she lives here  To get an appointment to see him next week to follow-up with him she will be returning back to [place] before 2 long  She states that usually stable October with her 's back surgery and with her injury they are going to go ahead return to [place] for a while and recuperate          Patient Active  Problem List   Diagnosis   • History of migraine   • Situational anxiety   • Other insomnia   • Fibromyalgia   • Open fracture of right distal radius   • Encounter for postoperative care       Past Medical History:   Diagnosis Date   • Fibromyalgia 5/3/2018   • History of migraine 9/14/2017   • Other insomnia 5/3/2018   • Situational anxiety 5/3/2018       Social History     Social History   • Marital status:      Spouse name: N/A   • Number of children: N/A   • Years of education: N/A     Occupational History   • Not on file.     Social History Main Topics   • Smoking status: Never Smoker   • Smokeless tobacco: Never Used   • Alcohol use Yes   • Drug use: No   • Sexual activity: Yes     Other Topics Concern   • Not on file     Social History Narrative   • No narrative on file       No family history on file.    Allergies   Allergen Reactions   • Desvenlafaxine Succinate      Other reaction(s): Headache   • Hydromorphone Hives   • Morphine      Other reaction(s): Headache   • Trazodone      nightmares   • Clindamycin Rash   • Sumatriptan Rash       Current Outpatient Prescriptions   Medication Sig Dispense Refill   • ALPRAZolam (XANAX) 0.25 mg tablet Take 1 tablet (0.25 mg total) by mouth 2 (two) times a day as needed for anxiety. 30 tablet 1   • gabapentin (NEURONTIN) 100 mg capsule Take 100 mg by mouth 4 (four) times a day.     • tiZANidine (ZANAFLEX) 4 mg tablet Take 4 mg by mouth nightly.       • zolpidem (AMBIEN) 10 mg tablet Take 1 tablet (10 mg total) by mouth nightly as needed for sleep. 30 tablet 5   • acetaminophen (TYLENOL) 325 mg tablet Take 2 tablets (650 mg total) by mouth every 6 (six) hours. (Patient not taking: Reported on 8/16/2018 ) 90 tablet 0   • butorphanol (STADOL) 10 mg/mL nasal spray Administer 1 spray into one nostril every 6 (six) hours as needed for pain scale 4-7/10 for up to 2 doses. (Patient not taking: Reported on 8/16/2018 ) 2.5 mL 0   • docusate sodium (COLACE) 100 mg capsule  Take 2 capsules (200 mg total) by mouth 2 (two) times a day. (Patient not taking: Reported on 8/16/2018 ) 90 capsule 0   • ibuprofen (ADVIL,MOTRIN) 800 mg tablet Take 1 tablet (800 mg total) by mouth every 8 (eight) hours. (Patient not taking: Reported on 8/16/2018 ) 90 tablet 0   • lidocaine 4 % patch Apply 1 patch topically daily.  Remove & discard patch within 12 hours or as directed by MD. (Patient not taking: Reported on 8/16/2018 ) 20 patch 0   • ondansetron ODT (ZOFRAN-ODT) 4 mg disintegrating tablet DISSOLVE 1 TABLET 4 TIMES A DAY BY ORAL ROUTE FOR 2 DAYS. (Patient not taking: Reported on 8/16/2018) 6 tablet 2   • oxyCODONE (ROXICODONE) 5 mg immediate release tablet Take 1 tablet (5 mg total) by mouth every 4 (four) hours as needed for pain scale 8-10/10 for up to 10 days. 30 tablet 0   • potassium chloride (K-TAB) 20 mEq CR tablet Take 2 tablets (40 mEq total) by mouth 2 (two) times a day with meals. (Patient not taking: Reported on 8/16/2018 ) 10 tablet 0   • TRULANCE 3 mg tablet Take 3 mg by mouth daily.    5     Current Facility-Administered Medications   Medication Dose Route Frequency Provider Last Rate Last Dose   • lidocaine (XYLOCAINE) 2 % jelly 1 application  1 application Topical PRN Tye Arevalo CNP   1 application at 08/13/18 1147         Review of Systems    Point review of systems is negative except as discussed in the HPI    Objective   /63   Pulse 70   SpO2 96%     Physical Exam   Constitutional: She is oriented to person, place, and time. She appears well-developed and well-nourished. No distress.   HENT:   Head: Normocephalic and atraumatic.   Right Ear: External ear normal.   Left Ear: External ear normal.   Nose: Nose normal.   Eyes: Pupils are equal, round, and reactive to light. Conjunctivae and EOM are normal. No scleral icterus.   Neck: Normal range of motion. Neck supple.   Cardiovascular: Normal rate, regular rhythm and intact distal pulses.    Murmur  heard.  Pulmonary/Chest: Effort normal and breath sounds normal. No respiratory distress. She exhibits tenderness.   She does have some tenderness with palpation and #7 8 #8 rib on the right  Is no erythema no edema no ecchymosis present  The place where they had the chest tube is very very small scabbed area without any signs of infection well healing   Musculoskeletal:   Does have a lace up wrist splint on the right wrist  Able to perform full abduction of the bilateral shoulders  Pulses 2+ cap refill is less than 3 seconds  Tactile stimulation is equal present on all fingers on the right hand they are warm to touch   Lymphadenopathy:     She has no cervical adenopathy.   Neurological: She is alert and oriented to person, place, and time. She exhibits normal muscle tone.   Skin: Skin is warm and dry. Capillary refill takes less than 2 seconds.   Psychiatric: She has a normal mood and affect. Her behavior is normal. Judgment and thought content normal.   Nursing note and vitals reviewed.            No results found for this or any previous visit (from the past 6 hour(s)).  X-ray Ribs With Pa Chest Right    Result Date: 8/16/2018  Exam: Right rib series with single view chest from 08/16/2018    Clinical History: Closed fracture of multiple ribs of right side with routine healing  subsequent encounter; right rib displaced fx Procedure/Views: Right ribs 2 views with single view chest Comparison(s): CT chest 7/24/2018 Findings: There are multiple right lateral rib fractures including the 3rd, 4th, 5th, 7th and 8th ribs. No significant bony callus formation noted. When compared to the prior CT, the 3rd through 5th ribs are hard to see but are probably present on the CT. There may be slight increase in the amount of diastases of the 3rd, 4th and 5th rib fractures. No significant change in the 7th and 8th rib fracture alignment. No other new fractures are noted. Heart and lungs are unremarkable. No pneumothorax.      IMPRESSION: Right 3rd, 4th, 5th, 7th and 8th rib fractures. Slight increase in the amount of diastases of the 3rd 4th and 5th rib fractures when compared with CT of 7/24/2018. No other significant change. No other acute process.        Assessment/Plan   Diagnoses and all orders for this visit:    Closed fracture of multiple ribs of right side with routine healing, subsequent encounter  -     X-ray ribs with PA chest right; Future  -     oxyCODONE (ROXICODONE) 5 mg immediate release tablet; Take 1 tablet (5 mg total) by mouth every 4 (four) hours as needed for pain scale 8-10/10 for up to 10 days.        Discussion:   Find patient that she actually has rib fractures #3, 4, 5, 7, 8  No pneumothorax was noted  Discussed with her that there is some slight increase in the #3, 4, 5 rib where they meet with the fractures  She should not be picking up anything heavier than a cup of coffee or glass of tea with that right upper extremity  I would not recommend anything heavier than 10 pounds with the left arm  Discussed splinting with a pillow whenever she is going to be coughing taking deep breaths  She should continue keeping her good pulmonary hygiene  Only use her abdominal splint as needed not on a continuous basis  She will take her pain medication as ordered  She will follow-up with Dr. Dougherty next week if she has any problems before that time she will follow-up in the walk-in clinic or the emergency room patient is in agreement with this treatment plan        A voice recognition program was used to aid in medical record documentation. Sometimes words are printed not exactly as they were spoken. While efforts were made to carefully edit and correct any inaccuracies, some errors may be present. Errors should be taken within the context of the discussion.  Please contact our office if you need assistance interpreting this medical record or notice any mistakes.      GENA Waldrop  08/16/18

## 2018-08-21 ENCOUNTER — ANCILLARY PROCEDURE (OUTPATIENT)
Dept: RADIOLOGY | Facility: CLINIC | Age: 53
End: 2018-08-21
Payer: OTHER GOVERNMENT

## 2018-08-21 ENCOUNTER — HOSPITAL ENCOUNTER (OUTPATIENT)
Dept: WOUND CARE | Facility: CLINIC | Age: 53
Discharge: HOME | End: 2018-08-21
Payer: OTHER GOVERNMENT

## 2018-08-21 ENCOUNTER — OFFICE VISIT (OUTPATIENT)
Dept: ORTHOPEDIC SURGERY | Facility: CLINIC | Age: 53
End: 2018-08-21
Payer: OTHER GOVERNMENT

## 2018-08-21 VITALS
RESPIRATION RATE: 16 BRPM | SYSTOLIC BLOOD PRESSURE: 146 MMHG | DIASTOLIC BLOOD PRESSURE: 66 MMHG | TEMPERATURE: 98.1 F | HEART RATE: 71 BPM

## 2018-08-21 VITALS — DIASTOLIC BLOOD PRESSURE: 74 MMHG | HEART RATE: 74 BPM | SYSTOLIC BLOOD PRESSURE: 146 MMHG

## 2018-08-21 DIAGNOSIS — S51.001D ELBOW WOUND, RIGHT, SUBSEQUENT ENCOUNTER: Primary | ICD-10-CM

## 2018-08-21 DIAGNOSIS — S52.501E TYPE I OR II OPEN FRACTURE OF DISTAL END OF RIGHT RADIUS WITH ROUTINE HEALING, UNSPECIFIED FRACTURE MORPHOLOGY, SUBSEQUENT ENCOUNTER: ICD-10-CM

## 2018-08-21 PROCEDURE — 73080 X-RAY EXAM OF ELBOW: CPT | Mod: RT | Performed by: ORTHOPAEDIC SURGERY

## 2018-08-21 PROCEDURE — 13121 CMPLX RPR S/A/L 2.6-7.5 CM: CPT | Mod: 55,59,51,NC | Performed by: ORTHOPAEDIC SURGERY

## 2018-08-21 PROCEDURE — 99214 OFFICE O/P EST MOD 30 MIN: CPT

## 2018-08-21 PROCEDURE — 6370000100 HC RX 637 (ALT 250 FOR IP): Performed by: NURSE PRACTITIONER

## 2018-08-21 PROCEDURE — 25608 OPTX DST RD XART FX/EPI SEP2: CPT | Mod: 55,NC | Performed by: ORTHOPAEDIC SURGERY

## 2018-08-21 RX ORDER — OXYCODONE HYDROCHLORIDE 5 MG/1
5 TABLET ORAL EVERY 4 HOURS PRN
Qty: 10 TABLET | Refills: 0 | Status: SHIPPED | OUTPATIENT
Start: 2018-08-21 | End: 2018-08-31

## 2018-08-21 ASSESSMENT — PAIN DESCRIPTION - DESCRIPTORS: DESCRIPTORS: ACHING;SHOOTING;DISCOMFORT

## 2018-08-21 NOTE — WCT DISCHARGE ASSESSMENT
Wound Care Team Discharge Final Evaluation    08/21/18    Patient Care Team:  Carlos A Dougherty MD as PCP - General (Family Medicine)    Onset Date: July 24, 2018    Start of Care (SOC) Date: August 13, 2018    Recent Hospitalization: NA    Physician Order: Evaluate and treat R elbow wound per wound care team protocol.    Treatment Diagnosis: Traumatic wound to R elbow.    Visits From Start Of Care: 2 treatments have been provided since first initiated on August 13, 2018.    Treatments ranged from 45-60 minutes and were provided 1 time(s) per week.    Patient and Caregiver Compliance: Pt was compliant with all visits and instructions between appointments    Treatment within the last 30 days: Treatments provided per wound care protocol. See initial plan of care and progress notes.      Current Assessment:     08/21/18 1100   Incision 07/25/18   Date First Assessed/Time First Assessed: 07/25/18 1115   Location Descriptor: Right  Location: Elbow  Incision Type: Post-op incision dehiscence  Number of Sutures in Place: 3   Dressing Status Dressing in place, not as prescribed   Dressing Changed Yes   (R) Drainage Amount Small (25% or less of dressing)   (R) Drainage Description Serosanguineous   Wound Odor None   Signs of Infection None   (R) Site Assessment Prior to Treatment Granulation - Early/partial;Moist;Red;Slough;Yellow   (R) Surrounding Skin Assessment Dry;Intact;Scarred   WCT Margins Attached edges   Date Measured 08/21/18   Wound Length (cm) 2.7 cm   Wound Width (cm) 1.4 cm   Wound Depth (cm) 0.2   Calculated Wound Size (cm^3)  0.76 cm^3   Wound Healing % 20.83   Calculated Wound Size (cm^2) FOR BILLING REASON 3.78 cm^2   Wound Cleansing:  Normal saline   Wound Bed Topical Treatment Hydration gel   Dressing Contact layer;Silicone Border Foam   WCT Debridement Performed No   Wound 07/25/18 Laceration Hand   Date First Assessed/Time First Assessed: 07/25/18 0133   Pre-Existing Wound: Yes  Wound Type: Laceration   Location Descriptor: Right  Location: Hand  Number of Sutures in Place: 3   Dressing Status (Open to air)        Wound/Sites Healed/Resolved Since SOC: no     Are all sites healed? no      Peripheral Vascular (WDL): Within Defined Limits    Education:The following education has been completed with patient and significant other: Current Wound Status.    Established Goals and Progress Made:   Site will be free from signs or symptoms of infection.  met    Discharged from Wound Care due to her returning to her home town out of state..    Discharge Recommendations:     8/21/18:     Pt came in today as a follow up visit. She is doing quite well and her wound is healing nicely.  She is leaving for home on Thursday, so will not be back. Wound is granulating and debriding well. Extra dressings sent for her journey. Will discharge at this time.     R elbow wound:  1. Cleanse with normal saline or in shower  2. Apply a small amount of hydrating gel to wound bed to aid in autolytic debridement of nonviable tissue.  3. Adaptic contact layer  4. Mepilex Flex dressing  5. Moisturizer for dry skin  6. Dressing change every five days and PRN  7. Follow up with primary care providers for wound care or therapy needs in the future.    Thank you for allowing us to share in the care of this patient. If you have any questions, comments or concerns regarding this patient, please feel free to contact the outpatient wound care center.

## 2018-08-21 NOTE — TREATMENT PLAN
8/21/18:    Pt came in today as a follow up visit. She is doing quite well and her wound is healing nicely.  She is leaving for home on Thursday, so will not be back. Wound is granulating and debriding well. Extra dressings sent for her journey. Will discharge at this time.     R elbow wound:  1. Cleanse with normal saline or in shower  2. Apply a small amount of hydrating gel to wound bed to aid in autolytic debridement of nonviable tissue.  3. Adaptic contact layer  4. Mepilex Flex dressing  5. Moisturizer for dry skin  6. Dressing change every three days and PRN  Pt to return on Tuesday, 8/21.     08/21/18 1100   Incision 07/25/18   Date First Assessed/Time First Assessed: 07/25/18 1115   Location Descriptor: Right  Location: Elbow  Incision Type: Post-op incision dehiscence  Number of Sutures in Place: 3   Dressing Status Dressing in place, not as prescribed   Dressing Changed Yes   (R) Drainage Amount Small (25% or less of dressing)   (R) Drainage Description Serosanguineous   Wound Odor None   Signs of Infection None   (R) Site Assessment Prior to Treatment Granulation - Early/partial;Moist;Red;Slough;Yellow   (R) Surrounding Skin Assessment Dry;Intact;Scarred   WCT Margins Attached edges   Date Measured 08/21/18   Wound Length (cm) 2.7 cm   Wound Width (cm) 1.4 cm   Wound Depth (cm) 0.2   Calculated Wound Size (cm^3)  0.76 cm^3   Wound Healing % 20.83   Calculated Wound Size (cm^2) FOR BILLING REASON 3.78 cm^2   Wound Cleansing:  Normal saline   Wound Bed Topical Treatment Hydration gel   Dressing Contact layer;Silicone Border Foam   WCT Debridement Performed No

## 2018-08-21 NOTE — PROGRESS NOTES
ORTHOPAEDIC POST-OP VISIT    Patient: Luiza Tay   MRN: [id number]   CSN: [id number]     DOS: July 25, 2018    HPI:  Luiza Tay is a 53 y.o. female who presents today for follow-up status post ORIF of her right distal radius and irrigation debridement of right elbow wound.  She has been receiving wound care to her right elbow wound and feels that this is healing nicely.  She does complain of some pain fairly diffusely through the elbow.  She feels the wrist is doing well and she is not having much pain.  She does complain of stiffness in the wrist.  Denies any fevers, chills, drainage from her incision.    Physical Examination:  /74   Pulse 74   General: Healthy, well-developed, well-nourished, no acute distress  Musculoskeletal: Right upper extremity: Wrist incision is healing well with no signs of infection.  Small area with eschar over the central portion of the incision.  Wrist range of motion is very limited.  She is neuro and motor intact over the radial, median, ulnar nerves.  Occlusive dressing is in place over the right elbow wound.  Patient states she did not want to remove the dressing as it was just placed by wound care earlier this morning.  There is some mild tenderness to palpation over the medial and lateral epicondyles as well as the olecranon.  Elbow range of motion shows she is just a few degrees short of full extension with at least 110° of flexion.    Imaging:  Plain films of the right elbow taken today demonstrate no obvious fractures.     Assessment:  53 y.o. female approximately 4 weeks status post ORIF right distal radius and I&D of right elbow wound by Dr. Zuniga.     Plan: I have gone over the x-rays and my findings with Luiza.  Though she did not want me to remove her right elbow dressing she showed me pictures on her phone that she has taken sequentially of the wound.  The most recent one was a few days ago and the wound appears to be healing well without concern  for infection.  She is still to be nonweightbearing to her right wrist.  I would like her to continue wearing the removable wrist splint at this time but she may remove this to work on some gentle wrist range of motion.  She will continue with her local wound care regimen to the right elbow.  They are planning to return to [place] on Thursday and I have provided her 10 tablets of Roxicodone to assist during her travels.  She states she will follow-up with her orthopedic surgeon when she returns home and I recommend she see him in approximately 2 weeks.  In the interim she will call our office with any concerns.  All questions answered today.    [unfilled]  1:28 PM

## 2018-09-24 ENCOUNTER — TELEPHONE (OUTPATIENT)
Dept: ORTHOPEDIC SURGERY | Facility: CLINIC | Age: 53
End: 2018-09-24

## 2018-11-30 DIAGNOSIS — G43.009 MIGRAINE WITHOUT AURA AND WITHOUT STATUS MIGRAINOSUS, NOT INTRACTABLE: ICD-10-CM

## 2018-12-10 RX ORDER — BUTORPHANOL TARTRATE 10 MG/ML
1 SPRAY NASAL EVERY 6 HOURS PRN
OUTPATIENT
Start: 2018-12-10

## 2019-05-16 ENCOUNTER — HOSPITAL ENCOUNTER (OUTPATIENT)
Dept: RADIOLOGY | Facility: HOSPITAL | Age: 54
Discharge: HOME | End: 2019-05-16
Attending: ORTHOPAEDIC SURGERY
Payer: OTHER GOVERNMENT

## 2019-05-16 ENCOUNTER — OFFICE VISIT (OUTPATIENT)
Dept: ORTHOPEDIC SURGERY | Facility: CLINIC | Age: 54
End: 2019-05-16
Payer: OTHER GOVERNMENT

## 2019-05-16 VITALS
DIASTOLIC BLOOD PRESSURE: 74 MMHG | HEART RATE: 79 BPM | BODY MASS INDEX: 27.64 KG/M2 | SYSTOLIC BLOOD PRESSURE: 148 MMHG | WEIGHT: 172 LBS | HEIGHT: 66 IN

## 2019-05-16 DIAGNOSIS — S52.501E TYPE I OR II OPEN FRACTURE OF DISTAL END OF RIGHT RADIUS WITH ROUTINE HEALING, UNSPECIFIED FRACTURE MORPHOLOGY, SUBSEQUENT ENCOUNTER: ICD-10-CM

## 2019-05-16 DIAGNOSIS — S42.301D: ICD-10-CM

## 2019-05-16 DIAGNOSIS — M25.521 RIGHT ELBOW PAIN: ICD-10-CM

## 2019-05-16 DIAGNOSIS — M77.11 LATERAL EPICONDYLITIS OF RIGHT ELBOW: Primary | ICD-10-CM

## 2019-05-16 PROCEDURE — 99213 OFFICE O/P EST LOW 20 MIN: CPT | Mod: 25 | Performed by: PHYSICIAN ASSISTANT

## 2019-05-16 PROCEDURE — 73110 X-RAY EXAM OF WRIST: CPT | Mod: 26,RT | Performed by: ORTHOPAEDIC SURGERY

## 2019-05-16 PROCEDURE — 73110 X-RAY EXAM OF WRIST: CPT | Mod: RT

## 2019-05-16 PROCEDURE — 73080 X-RAY EXAM OF ELBOW: CPT | Mod: RT | Performed by: ORTHOPAEDIC SURGERY

## 2019-05-16 PROCEDURE — 73080 X-RAY EXAM OF ELBOW: CPT | Mod: RT

## 2019-05-16 PROCEDURE — 20605 DRAIN/INJ JOINT/BURSA W/O US: CPT | Performed by: PHYSICIAN ASSISTANT

## 2019-05-16 RX ORDER — LIDOCAINE HYDROCHLORIDE 10 MG/ML
INJECTION, SOLUTION INFILTRATION; PERINEURAL
Status: COMPLETED | OUTPATIENT
Start: 2019-05-16 | End: 2019-05-16

## 2019-05-16 RX ORDER — TRIAMCINOLONE ACETONIDE 40 MG/ML
40 INJECTION, SUSPENSION INTRA-ARTICULAR; INTRAMUSCULAR
Status: COMPLETED | OUTPATIENT
Start: 2019-05-16 | End: 2019-05-16

## 2019-05-16 RX ADMIN — TRIAMCINOLONE ACETONIDE 40 MG: 40 INJECTION, SUSPENSION INTRA-ARTICULAR; INTRAMUSCULAR at 12:56

## 2019-05-16 RX ADMIN — LIDOCAINE HYDROCHLORIDE: 10 INJECTION, SOLUTION INFILTRATION; PERINEURAL at 12:56

## 2019-05-16 ASSESSMENT — ENCOUNTER SYMPTOMS
CONFUSION: 0
FEVER: 0
HEADACHES: 0
DIZZINESS: 0
CHILLS: 0
WOUND: 1
ACTIVITY CHANGE: 1
DYSPHORIC MOOD: 0
ARTHRALGIAS: 1
LIGHT-HEADEDNESS: 0
NUMBNESS: 1
JOINT SWELLING: 0
AGITATION: 0
NERVOUS/ANXIOUS: 0
WEAKNESS: 1
COLOR CHANGE: 0

## 2019-05-16 ASSESSMENT — PAIN - FUNCTIONAL ASSESSMENT: PAIN_FUNCTIONAL_ASSESSMENT: 0-10

## 2019-05-16 ASSESSMENT — PAIN DESCRIPTION - DESCRIPTORS: DESCRIPTORS: ACHING;THROBBING

## 2019-05-16 NOTE — PATIENT INSTRUCTIONS
Patient is seen today with complaint of pain at the lateral aspect of her right elbow.  She has had a serious injury in this area in her past and has suffered a fractured distal radius with ORIF and a fractured humerus with ORIF.  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    On review of patient's x-rays today I see some degenerative changes about the right elbow but no heena fractures are noted.  On physical exam patient is tender at the lateral epicondyle of the right elbow and has a positive chair sign which is indicative of lateral epicondylitis.  Considering the injuries patient has had about the elbow it would not be surprising that she may have some difficulty of this type.    Patient advises that she has pursued over-the-counter medication such as nonsteroidal anti-inflammatories and Tylenol, she has also pursued formal physical therapy and finally home exercises to no avail with essentially no relief of her symptoms.    Plan: Patient has requested a steroid injection and I think this is reasonable and will provide her with that today.    I have provided the patient with 1cc of Kenalog 40 and 1cc of 1% lidocaine without epinephrine at the point of maximal tenderness at the lateral epicondyle at her right elbow consistent with a tennis elbow diagnosis.  We have discussed pros and cons of injection.    Follow-up with Venancio ROBISON.  No new imaging needed

## 2019-05-16 NOTE — PROGRESS NOTES
Medium Jt Arthrocentesis/Injection: R elbow on 5/16/2019 12:56 PM  Indications: pain  Details: 22 G needle, anterior approach  Medications: 40 mg triamcinolone acetonide 40 mg/mL; lidocaine 10 mg/mL (1 %)    CPT 63452   Procedure, treatment alternatives, risks and benefits explained, specific risks discussed. Consent was given by the patient.       A 25-gauge inch and half needle was used for this injection.

## 2019-05-16 NOTE — PROGRESS NOTES
Patient ID: Luiza Tay is a 54 y.o. female.    Chief Complaint:  Chief Complaint   Patient presents with   • Right Wrist - Follow-up, Pain       HPI:    Patient is a 54-year-old female seen today with complaint of right elbow pain.  Onset approximately 4 to 5 months ago gradual in nature and is constant and has worsened.  Injury was a fall from a horse and striking her arm on a rock.  Location is at the lateral aspect of the right elbow.  Aggravating factors are weight bearing and stress and relieving factors are rest, ice, peppermint oil and reduced .  Quality the pain is throbbing and aching.  Timing is awakening at night.  Severity is 4-5/10.  Occupation is an orthopedic RN and a camp host.  Recreation is riding horses.  Associated symptoms are weakness of the right upper extremity.  Assistive devices are none.  Previous treatments for this condition include a wrist hand orthosis, formal physical therapy, use of nonsteroidal anti-inflammatories and Tylenol, ice and reduction in activity.    Past Medical History:   Diagnosis Date   • Fibromyalgia 5/3/2018   • History of migraine 9/14/2017   • Other insomnia 5/3/2018   • Situational anxiety 5/3/2018       Allergies   Allergen Reactions   • Desvenlafaxine Succinate      Other reaction(s): Headache   • Hydromorphone Hives   • Morphine      Other reaction(s): Headache   • Trazodone      nightmares   • Clindamycin Rash   • Sumatriptan Rash       Social History     Occupational History   • Not on file   Tobacco Use   • Smoking status: Never Smoker   • Smokeless tobacco: Never Used   Substance and Sexual Activity   • Alcohol use: Yes   • Drug use: No   • Sexual activity: Yes        Review of Systems   Constitutional: Positive for activity change. Negative for chills and fever.   Musculoskeletal: Positive for arthralgias. Negative for gait problem and joint swelling.   Skin: Positive for wound. Negative for color change, pallor and rash.        Significant  "scarring about the lateral aspect of the right elbow secondary to a fall from a horse, historic in nature.   Neurological: Positive for weakness and numbness. Negative for dizziness, syncope, light-headedness and headaches.        Some vague numbness is described by the patient about the right upper extremity distally.  Mild weakness at the right upper extremity generally.   Psychiatric/Behavioral: Negative for agitation, confusion and dysphoric mood. The patient is not nervous/anxious.        PHYSICAL EXAMINATION:    /74 (BP Location: Left arm, Patient Position: Sitting, Cuff Size: Reg)   Pulse 79   Ht 1.676 m (5' 6\")   Wt 78 kg (172 lb)   BMI 27.76 kg/m²       Constitutional:  WD, WN.              Cardiovascular:  temperature equal in upper extremities bilaterally.  Edema absent.   Neurologic:  Sensation intact   Psychiatric:  Alert and Oriented x3.  NAD.   Skin:  Dry and intact    MUSCULOSKELETAL EXAM:                    Right upper extremity    -Gait :   Normal                      Assist Device: None           -Inspection/ Palpation:   Exquisitely tender to palpation at the lateral epicondyle right elbow         -ROM: Range of motion full at the elbow wrist hand and fingers  -Strength: 4-5/5 at right upper extremity generally      -Special Tests/ Stability: Varus valgus stress test negative               IMAGING:    Right three-view elbow obtained in clinic today is reviewed and mild degenerative changes are noted about the elbow generally but no heena fracture is noted.      Assessment   Diagnoses and all orders for this visit:    Lateral epicondylitis of right elbow    Type I or II open fracture of distal end of right radius with routine healing, unspecified fracture morphology, subsequent encounter  -     X-ray wrist 3 or more views right  -     X-ray elbow 3 or more views right    Right elbow pain  -     Medium Jt Arthrocentesis/Injection: R elbow  -     lidocaine (XYLOCAINE) 10 mg/mL (1 %) " injection  -     triamcinolone acetonide (KENALOG-40) 40 mg/mL injection 40 mg    Traumatic closed displaced fracture of shaft of humerus with routine healing, right            Plan      Patient is seen today with complaint of pain at the lateral aspect of her right elbow.  She has had a serious injury in this area in her past and has suffered a fractured distal radius with ORIF and a fractured humerus with ORIF.  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    On review of patient's x-rays today I see some degenerative changes about the right elbow but no heena fractures are noted.  On physical exam patient is tender at the lateral epicondyle of the right elbow and has a positive chair sign which is indicative of lateral epicondylitis.  Considering the injuries patient has had about the elbow it would not be surprising that she may have some difficulty of this type.    Patient advises that she has pursued over-the-counter medication such as nonsteroidal anti-inflammatories and Tylenol, she has also pursued formal physical therapy and finally home exercises to no avail with essentially no relief of her symptoms.    Plan: Patient has requested a steroid injection and I think this is reasonable and will provide her with that today.    I have provided the patient with 1cc of Kenalog 40 and 1cc of 1% lidocaine without epinephrine at the point of maximal tenderness at the lateral epicondyle at her right elbow consistent with a tennis elbow diagnosis.  We have discussed pros and cons of injection.    Follow-up with Venancio ROBISON.  No new imaging needed      Patient Instructions   Patient is seen today with complaint of pain at the lateral aspect of her right elbow.  She has had a serious injury in this area in her past and has suffered a fractured distal radius with ORIF and a fractured humerus with ORIF.  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    On review of patient's x-rays today I see some degenerative changes about the right elbow but no heena fractures are noted.  On physical exam patient is tender at the lateral epicondyle of the right elbow and has a positive chair sign which is indicative of lateral epicondylitis.  Considering the injuries patient has had about the elbow it would not be surprising that she may have some difficulty of this type.    Patient advises that she has pursued over-the-counter medication such as nonsteroidal anti-inflammatories and Tylenol, she has also pursued formal physical therapy and finally home exercises to no avail with essentially no relief of her symptoms.    Plan: Patient has requested a steroid injection and I think this is reasonable and will provide her with that today.    I have provided the patient with 1cc of Kenalog 40 and 1cc of 1% lidocaine without epinephrine at the point of maximal tenderness at the lateral epicondyle at her right elbow consistent with a tennis elbow diagnosis.  We have discussed pros and cons of injection.    Follow-up with Venancio ROBISON.  No new imaging needed

## 2019-07-08 ENCOUNTER — HOSPITAL ENCOUNTER (OUTPATIENT)
Dept: MAMMOGRAPHY | Facility: HOSPITAL | Age: 54
Discharge: HOME | End: 2019-07-08
Attending: FAMILY MEDICINE
Payer: OTHER GOVERNMENT

## 2019-07-08 ENCOUNTER — HOSPITAL ENCOUNTER (OUTPATIENT)
Dept: MAMMOGRAPHY | Facility: HOSPITAL | Age: 54
End: 2019-07-08
Attending: FAMILY MEDICINE
Payer: OTHER GOVERNMENT

## 2019-07-08 DIAGNOSIS — Z12.31 VISIT FOR SCREENING MAMMOGRAM: ICD-10-CM

## 2019-07-08 PROCEDURE — 77067 SCR MAMMO BI INCL CAD: CPT

## 2019-07-29 ENCOUNTER — OFFICE VISIT (OUTPATIENT)
Dept: URGENT CARE | Facility: CLINIC | Age: 54
End: 2019-07-29
Payer: OTHER GOVERNMENT

## 2019-07-29 VITALS
TEMPERATURE: 97.4 F | WEIGHT: 179 LBS | BODY MASS INDEX: 28.89 KG/M2 | DIASTOLIC BLOOD PRESSURE: 52 MMHG | OXYGEN SATURATION: 97 % | RESPIRATION RATE: 18 BRPM | SYSTOLIC BLOOD PRESSURE: 136 MMHG | HEART RATE: 52 BPM

## 2019-07-29 DIAGNOSIS — G43.009 MIGRAINE WITHOUT AURA AND WITHOUT STATUS MIGRAINOSUS, NOT INTRACTABLE: Primary | ICD-10-CM

## 2019-07-29 PROCEDURE — 96375 TX/PRO/DX INJ NEW DRUG ADDON: CPT | Performed by: PHYSICIAN ASSISTANT

## 2019-07-29 PROCEDURE — 99214 OFFICE O/P EST MOD 30 MIN: CPT | Mod: 25 | Performed by: PHYSICIAN ASSISTANT

## 2019-07-29 PROCEDURE — 96374 THER/PROPH/DIAG INJ IV PUSH: CPT | Performed by: PHYSICIAN ASSISTANT

## 2019-07-29 RX ORDER — DIPHENHYDRAMINE HYDROCHLORIDE 50 MG/ML
25 INJECTION INTRAMUSCULAR; INTRAVENOUS ONCE
Status: DISCONTINUED | OUTPATIENT
Start: 2019-07-29 | End: 2019-07-29

## 2019-07-29 RX ORDER — KETOROLAC TROMETHAMINE 30 MG/ML
30 INJECTION, SOLUTION INTRAMUSCULAR; INTRAVENOUS ONCE
Status: COMPLETED | OUTPATIENT
Start: 2019-07-29 | End: 2019-07-29

## 2019-07-29 RX ORDER — METOCLOPRAMIDE HYDROCHLORIDE 5 MG/ML
10 INJECTION INTRAMUSCULAR; INTRAVENOUS ONCE
Status: DISCONTINUED | OUTPATIENT
Start: 2019-07-29 | End: 2019-07-29

## 2019-07-29 RX ORDER — METOCLOPRAMIDE HYDROCHLORIDE 5 MG/ML
10 INJECTION INTRAMUSCULAR; INTRAVENOUS ONCE
Status: COMPLETED | OUTPATIENT
Start: 2019-07-29 | End: 2019-07-29

## 2019-07-29 RX ORDER — DIPHENHYDRAMINE HYDROCHLORIDE 50 MG/ML
25 INJECTION INTRAMUSCULAR; INTRAVENOUS ONCE
Status: COMPLETED | OUTPATIENT
Start: 2019-07-29 | End: 2019-07-29

## 2019-07-29 RX ORDER — KETOROLAC TROMETHAMINE 30 MG/ML
30 INJECTION, SOLUTION INTRAMUSCULAR; INTRAVENOUS ONCE
Status: DISCONTINUED | OUTPATIENT
Start: 2019-07-29 | End: 2019-07-29

## 2019-07-29 RX ORDER — SODIUM CHLORIDE 9 MG/ML
1000 INJECTION, SOLUTION INTRAVENOUS ONCE
Status: COMPLETED | OUTPATIENT
Start: 2019-07-29 | End: 2019-07-29

## 2019-07-29 RX ADMIN — DIPHENHYDRAMINE HYDROCHLORIDE 25 MG: 50 INJECTION INTRAMUSCULAR; INTRAVENOUS at 15:58

## 2019-07-29 RX ADMIN — SODIUM CHLORIDE 1000 ML: 9 INJECTION, SOLUTION INTRAVENOUS at 16:00

## 2019-07-29 RX ADMIN — METOCLOPRAMIDE HYDROCHLORIDE 10 MG: 5 INJECTION INTRAMUSCULAR; INTRAVENOUS at 16:02

## 2019-07-29 RX ADMIN — KETOROLAC TROMETHAMINE 30 MG: 30 INJECTION, SOLUTION INTRAMUSCULAR; INTRAVENOUS at 16:00

## 2019-07-29 ASSESSMENT — PAIN SCALES - GENERAL: PAINLEVEL: 8

## 2019-07-29 NOTE — PROGRESS NOTES
Subjective     Luiza Tay is a 54 y.o. female who presents with Chief Complaint of Migraine (started Sat pm)       HPI  Patient presents to the clinic today with a complaint of headache  Patient presents clinic today with complaint of 1 of her normal migraine headaches that started on 7/29/2019  This headache is without an aura but with that she does have light sensitivity and smell sensitivity  She denies any numbness or tingling in her arms or legs she has not had any falls or injuries she has vomited 3 times since the headache started on Saturday  No blood in her emesis she denies any diarrhea no chest pain or shortness of breath  She has tried her butalbital rest and Tylenol and nothing seems to be helping even laying in a dark room    Patient Active Problem List   Diagnosis   • History of migraine   • Situational anxiety   • Other insomnia   • Fibromyalgia   • Open fracture of right distal radius   • Encounter for postoperative care   • Elbow wound, right, subsequent encounter   • Lateral epicondylitis of right elbow   • Right elbow pain   • Traumatic closed displaced fracture of shaft of humerus with routine healing, right       Past Medical History:   Diagnosis Date   • Appendicitis    • Diverticulitis    • Fibromyalgia 5/3/2018   • Gallstones    • Headache    • History of migraine 9/14/2017   • Insomnia    • Migraines    • Muscle tightness    • Other insomnia 5/3/2018   • Situational anxiety 05/03/2018    after a trauma riding horses       Social History     Socioeconomic History   • Marital status:      Spouse name: Not on file   • Number of children: Not on file   • Years of education: Not on file   • Highest education level: Not on file   Occupational History   • Not on file   Social Needs   • Financial resource strain: Not on file   • Food insecurity:     Worry: Not on file     Inability: Not on file   • Transportation needs:     Medical: Not on file     Non-medical: Not on file    Tobacco Use   • Smoking status: Never Smoker   • Smokeless tobacco: Never Used   Substance and Sexual Activity   • Alcohol use: Yes     Alcohol/week: 0.6 oz     Types: 1 Cans of beer per week   • Drug use: No   • Sexual activity: Yes   Lifestyle   • Physical activity:     Days per week: Not on file     Minutes per session: Not on file   • Stress: Not on file   Relationships   • Social connections:     Talks on phone: Not on file     Gets together: Not on file     Attends [employer] service: Not on file     Active member of club or organization: Not on file     Attends meetings of clubs or organizations: Not on file     Relationship status: Not on file   • Intimate partner violence:     Fear of current or ex partner: Not on file     Emotionally abused: Not on file     Physically abused: Not on file     Forced sexual activity: Not on file   Other Topics Concern   • Not on file   Social History Narrative   • Not on file       Family History   Problem Relation Age of Onset   • Colon cancer Maternal Grandmother    • Ovarian cancer Maternal Grandmother    • Bone cancer Paternal Grandmother    • Hypertension Other    • Breast cancer Other    • Lung cancer Other        Allergies   Allergen Reactions   • Desvenlafaxine Succinate      Other reaction(s): Headache   • Hydromorphone Hives   • Morphine      Other reaction(s): Headache   • Trazodone      nightmares   • Clindamycin Rash   • Sumatriptan Rash       Current Outpatient Medications   Medication Sig Dispense Refill   • ondansetron ODT (ZOFRAN-ODT) 4 mg disintegrating tablet DISSOLVE 1 TABLET 4 TIMES A DAY BY ORAL ROUTE FOR 2 DAYS. 6 tablet 2   • ibuprofen (ADVIL,MOTRIN) 800 mg tablet Take 1 tablet (800 mg total) by mouth every 8 (eight) hours. 90 tablet 0   • gabapentin (NEURONTIN) 100 mg capsule Take 100 mg by mouth 4 (four) times a day.     • tiZANidine (ZANAFLEX) 4 mg tablet Take 4 mg by mouth nightly.       • butorphanol (STADOL) 10 mg/mL nasal spray Administer 1  spray into one nostril every 6 (six) hours as needed for pain scale 4-7/10 for up to 2 doses. 2.5 mL 0   • ALPRAZolam (XANAX) 0.25 mg tablet Take 1 tablet (0.25 mg total) by mouth 2 (two) times a day as needed for anxiety. 30 tablet 1   • TRULANCE 3 mg tablet Take 3 mg by mouth daily.    5   • zolpidem (AMBIEN) 10 mg tablet Take 1 tablet (10 mg total) by mouth nightly as needed for sleep. 30 tablet 5   • acetaminophen (TYLENOL) 325 mg tablet Take 2 tablets (650 mg total) by mouth every 6 (six) hours. (Patient not taking: Reported on 7/29/2019 ) 90 tablet 0   • docusate sodium (COLACE) 100 mg capsule Take 2 capsules (200 mg total) by mouth 2 (two) times a day. (Patient not taking: Reported on 8/16/2018 ) 90 capsule 0   • lidocaine 4 % patch Apply 1 patch topically daily.  Remove & discard patch within 12 hours or as directed by MD. (Patient not taking: Reported on 8/16/2018 ) 20 patch 0   • potassium chloride (K-TAB) 20 mEq CR tablet Take 2 tablets (40 mEq total) by mouth 2 (two) times a day with meals. (Patient not taking: Reported on 8/16/2018 ) 10 tablet 0     Current Facility-Administered Medications   Medication Dose Route Frequency Provider Last Rate Last Dose   • lidocaine (XYLOCAINE) 2 % jelly 1 application  1 application Topical PRN Tye Arevalo CNP   1 application at 08/13/18 1147         Review of Systems  12 point review of systems is negative except as discussed in HPI.    Objective   /52 (BP Location: Left arm, Patient Position: Sitting, Cuff Size: Reg)   Pulse 52   Temp 36.3 °C (97.4 °F) (Oral)   Resp 18   Wt 81.2 kg (179 lb)   SpO2 97%   BMI 28.89 kg/m²      Physical Exam   Constitutional: She is oriented to person, place, and time. She appears well-developed and well-nourished. No distress.   HENT:   Head: Normocephalic and atraumatic.   Right Ear: Tympanic membrane, external ear and ear canal normal.   Left Ear: Tympanic membrane, external ear and ear canal normal.   Nose: Nose normal.  No rhinorrhea. Right sinus exhibits no maxillary sinus tenderness and no frontal sinus tenderness. Left sinus exhibits no maxillary sinus tenderness and no frontal sinus tenderness.   Mouth/Throat: Uvula is midline and oropharynx is clear and moist. Mucous membranes are dry. No uvula swelling. No oropharyngeal exudate, posterior oropharyngeal edema or posterior oropharyngeal erythema. Tonsils are 0 on the right. Tonsils are 0 on the left.   Eyes: Pupils are equal, round, and reactive to light. Conjunctivae and EOM are normal. No scleral icterus.   Neck: Normal range of motion. Neck supple.   Cardiovascular: Normal rate, regular rhythm, normal heart sounds and intact distal pulses.   No murmur heard.  Pulmonary/Chest: Effort normal and breath sounds normal. No respiratory distress.   Abdominal: Soft. Bowel sounds are normal. She exhibits no distension and no mass. There is no tenderness. There is no guarding.   Musculoskeletal:   Moves all extremities without difficulty   Lymphadenopathy:     She has no cervical adenopathy.   Neurological: She is alert and oriented to person, place, and time. She displays abnormal reflex. No cranial nerve deficit. She exhibits normal muscle tone. Coordination normal.   Romberg pronator drift normal  Finger-to-nose normal bilateral  Heel down shin normal bilateral  Patient's deep tendon reflexes of the biceps triceps brachioradialis are 2 of 4 on the left and 1 of 4 on the right  Patient states that she has nerve damage on the right arm because of a degloving that she had with an injury years ago  Patellar reflex and he is 2 of 4 bilateral Achilles reflex is 1 of 4 bilateral   Skin: Skin is warm and dry. Capillary refill takes less than 2 seconds.   Psychiatric: She has a normal mood and affect. Her behavior is normal. Judgment and thought content normal.   Nursing note and vitals reviewed.       The following have been reviewed and updated as appropriate in this  visit      Assessment/Plan   Diagnoses and all orders for this visit:    Migraine without aura and without status migrainosus, not intractable  -     sodium chloride 0.9 % bolus 1,000 mL  -     diphenhydrAMINE (BENADRYL) injection 25 mg  -     ketorolac (TORADOL) injection 30 mg  -     metoclopramide (REGLAN) injection 10 mg        Discussion/Plan:  Patient verbalized she is doing much better after the fluids and the migraine cocktail. Her headache has resolved and feels that she is ready to be released home she does have Zofran available to her if she needs  Patient Instructions   Make sure you drink plenty fluids  Follow-up in the clinic if you should have a reoccurrence of your headache and it does not go away with your normal treatment  If you should notice that you are having any numbness or tingling of your extremities vision changes, dizziness         A voice recognition program was used to aid in medical record documentation. Sometimes words are printed not exactly as they were spoken. While efforts were made to carefully edit and correct any inaccuracies, some errors may be present. Errors should be taken within the context of the discussion.  Please contact our office if you need assistance interpreting this medical record or notice any mistakes.      GENA Waldrop  07/29/19

## 2019-07-29 NOTE — PATIENT INSTRUCTIONS
Make sure you drink plenty fluids  Follow-up in the clinic if you should have a reoccurrence of your headache and it does not go away with your normal treatment  If you should notice that you are having any numbness or tingling of your extremities vision changes, dizziness  Patient Education     Migraine Headache    A migraine headache is a very strong throbbing pain on one side or both sides of your head. Migraines can also cause other symptoms. Talk with your doctor about what things may bring on (trigger) your migraine headaches.  Follow these instructions at home:  Medicines  · Take over-the-counter and prescription medicines only as told by your doctor.  · Do not drive or use heavy machinery while taking prescription pain medicine.  · To prevent or treat constipation while you are taking prescription pain medicine, your doctor may recommend that you:  ? Drink enough fluid to keep your pee (urine) clear or pale yellow.  ? Take over-the-counter or prescription medicines.  ? Eat foods that are high in fiber. These include fresh fruits and vegetables, whole grains, and beans.  ? Limit foods that are high in fat and processed sugars. These include fried and sweet foods.  Lifestyle  · Avoid alcohol.  · Do not use any products that contain nicotine or tobacco, such as cigarettes and e-cigarettes. If you need help quitting, ask your doctor.  · Get at least 8 hours of sleep every night.  · Limit your stress.  General instructions    · Keep a journal to find out what may bring on your migraines. For example, write down:  ? What you eat and drink.  ? How much sleep you get.  ? Any change in what you eat or drink.  ? Any change in your medicines.  · If you have a migraine:  ? Avoid things that make your symptoms worse, such as bright lights.  ? It may help to lie down in a dark, quiet room.  ? Do not drive or use heavy machinery.  ? Ask your doctor what activities are safe for you.  · Keep all follow-up visits as told by  your doctor. This is important.  Contact a doctor if:  · You get a migraine that is different or worse than your usual migraines.  Get help right away if:  · Your migraine gets very bad.  · You have a fever.  · You have a stiff neck.  · You have trouble seeing.  · Your muscles feel weak or like you cannot control them.  · You start to lose your balance a lot.  · You start to have trouble walking.  · You pass out (faint).  This information is not intended to replace advice given to you by your health care provider. Make sure you discuss any questions you have with your health care provider.  Document Released: 09/26/2009 Document Revised: 07/07/2017 Document Reviewed: 06/05/2017  [employer] Interactive Patient Education © 2019 [employer]       Patient Education     Migraine Headache    A migraine headache is a very strong throbbing pain on one side or both sides of your head. Migraines can also cause other symptoms. Talk with your doctor about what things may bring on (trigger) your migraine headaches.  Follow these instructions at home:  Medicines  · Take over-the-counter and prescription medicines only as told by your doctor.  · Do not drive or use heavy machinery while taking prescription pain medicine.  · To prevent or treat constipation while you are taking prescription pain medicine, your doctor may recommend that you:  ? Drink enough fluid to keep your pee (urine) clear or pale yellow.  ? Take over-the-counter or prescription medicines.  ? Eat foods that are high in fiber. These include fresh fruits and vegetables, whole grains, and beans.  ? Limit foods that are high in fat and processed sugars. These include fried and sweet foods.  Lifestyle  · Avoid alcohol.  · Do not use any products that contain nicotine or tobacco, such as cigarettes and e-cigarettes. If you need help quitting, ask your doctor.  · Get at least 8 hours of sleep every night.  · Limit your stress.  General instructions    · Keep a journal  to find out what may bring on your migraines. For example, write down:  ? What you eat and drink.  ? How much sleep you get.  ? Any change in what you eat or drink.  ? Any change in your medicines.  · If you have a migraine:  ? Avoid things that make your symptoms worse, such as bright lights.  ? It may help to lie down in a dark, quiet room.  ? Do not drive or use heavy machinery.  ? Ask your doctor what activities are safe for you.  · Keep all follow-up visits as told by your doctor. This is important.  Contact a doctor if:  · You get a migraine that is different or worse than your usual migraines.  Get help right away if:  · Your migraine gets very bad.  · You have a fever.  · You have a stiff neck.  · You have trouble seeing.  · Your muscles feel weak or like you cannot control them.  · You start to lose your balance a lot.  · You start to have trouble walking.  · You pass out (faint).  This information is not intended to replace advice given to you by your health care provider. Make sure you discuss any questions you have with your health care provider.  Document Released: 09/26/2009 Document Revised: 07/07/2017 Document Reviewed: 06/05/2017  [employer] Interactive Patient Education © 2019 [employer]       Patient Education     Migraine Headache    A migraine headache is a very strong throbbing pain on one side or both sides of your head. Migraines can also cause other symptoms. Talk with your doctor about what things may bring on (trigger) your migraine headaches.  Follow these instructions at home:  Medicines  · Take over-the-counter and prescription medicines only as told by your doctor.  · Do not drive or use heavy machinery while taking prescription pain medicine.  · To prevent or treat constipation while you are taking prescription pain medicine, your doctor may recommend that you:  ? Drink enough fluid to keep your pee (urine) clear or pale yellow.  ? Take over-the-counter or prescription  medicines.  ? Eat foods that are high in fiber. These include fresh fruits and vegetables, whole grains, and beans.  ? Limit foods that are high in fat and processed sugars. These include fried and sweet foods.  Lifestyle  · Avoid alcohol.  · Do not use any products that contain nicotine or tobacco, such as cigarettes and e-cigarettes. If you need help quitting, ask your doctor.  · Get at least 8 hours of sleep every night.  · Limit your stress.  General instructions    · Keep a journal to find out what may bring on your migraines. For example, write down:  ? What you eat and drink.  ? How much sleep you get.  ? Any change in what you eat or drink.  ? Any change in your medicines.  · If you have a migraine:  ? Avoid things that make your symptoms worse, such as bright lights.  ? It may help to lie down in a dark, quiet room.  ? Do not drive or use heavy machinery.  ? Ask your doctor what activities are safe for you.  · Keep all follow-up visits as told by your doctor. This is important.  Contact a doctor if:  · You get a migraine that is different or worse than your usual migraines.  Get help right away if:  · Your migraine gets very bad.  · You have a fever.  · You have a stiff neck.  · You have trouble seeing.  · Your muscles feel weak or like you cannot control them.  · You start to lose your balance a lot.  · You start to have trouble walking.  · You pass out (faint).  This information is not intended to replace advice given to you by your health care provider. Make sure you discuss any questions you have with your health care provider.  Document Released: 09/26/2009 Document Revised: 07/07/2017 Document Reviewed: 06/05/2017  [employer] Interactive Patient Education © 2019 [employer]

## 2019-08-07 ENCOUNTER — TELEPHONE (OUTPATIENT)
Dept: FAMILY MEDICINE | Facility: CLINIC | Age: 54
End: 2019-08-07

## 2019-08-07 ENCOUNTER — OFFICE VISIT (OUTPATIENT)
Dept: URGENT CARE | Facility: CLINIC | Age: 54
End: 2019-08-07
Payer: OTHER GOVERNMENT

## 2019-08-07 VITALS
DIASTOLIC BLOOD PRESSURE: 56 MMHG | TEMPERATURE: 97.8 F | HEART RATE: 60 BPM | OXYGEN SATURATION: 95 % | SYSTOLIC BLOOD PRESSURE: 127 MMHG | RESPIRATION RATE: 18 BRPM

## 2019-08-07 DIAGNOSIS — M62.89 MUSCLE TIGHTNESS: ICD-10-CM

## 2019-08-07 DIAGNOSIS — R11.0 NAUSEA: ICD-10-CM

## 2019-08-07 DIAGNOSIS — J01.91 ACUTE RECURRENT SINUSITIS, UNSPECIFIED LOCATION: ICD-10-CM

## 2019-08-07 DIAGNOSIS — R51.9 NONINTRACTABLE HEADACHE, UNSPECIFIED CHRONICITY PATTERN, UNSPECIFIED HEADACHE TYPE: Primary | ICD-10-CM

## 2019-08-07 DIAGNOSIS — R42 DIZZINESS: ICD-10-CM

## 2019-08-07 LAB
ALBUMIN SERPL-MCNC: 4.1 G/DL (ref 3.5–5.3)
ALP SERPL-CCNC: 59 U/L (ref 41–108)
ALT SERPL-CCNC: 23 U/L (ref 0–52)
ANION GAP SERPL CALC-SCNC: 11 MMOL/L (ref 3–11)
AST SERPL-CCNC: 26 U/L (ref 0–39)
BASOPHILS # BLD AUTO: 0 10*3/UL
BASOPHILS NFR BLD AUTO: 0 % (ref 0–2)
BILIRUB SERPL-MCNC: 0.66 MG/DL (ref 0–1.4)
BUN SERPL-MCNC: 7 MG/DL (ref 7–25)
CALCIUM ALBUM COR SERPL-MCNC: 9.5 MG/DL (ref 8.6–10.3)
CALCIUM SERPL-MCNC: 9.6 MG/DL (ref 8.6–10.3)
CHLORIDE SERPL-SCNC: 100 MMOL/L (ref 98–107)
CO2 SERPL-SCNC: 26 MMOL/L (ref 21–32)
CREAT SERPL-MCNC: 0.73 MG/DL (ref 0.6–1.2)
CRP SERPL-MCNC: 24.7 MG/L
EOSINOPHIL # BLD AUTO: 0.1 10*3/UL
EOSINOPHIL NFR BLD AUTO: 1 % (ref 0–3)
ERYTHROCYTE [DISTWIDTH] IN BLOOD BY AUTOMATED COUNT: 12.9 % (ref 11.5–14)
ERYTHROCYTE [SEDIMENTATION RATE] IN BLOOD: 15 MM/HR
GFR SERPL CREATININE-BSD FRML MDRD: 95 ML/MIN/1.73M*2
GLUCOSE SERPL-MCNC: 82 MG/DL (ref 70–105)
HCT VFR BLD AUTO: 43.7 % (ref 34–45)
HGB BLD-MCNC: 15 G/DL (ref 11.5–15.5)
LYMPHOCYTES # BLD AUTO: 1.8 10*3/UL
LYMPHOCYTES NFR BLD AUTO: 29 % (ref 11–47)
MCH RBC QN AUTO: 31.9 PG (ref 28–33)
MCHC RBC AUTO-ENTMCNC: 34.4 G/DL (ref 32–36)
MCV RBC AUTO: 92.7 FL (ref 81–97)
MONOCYTES # BLD AUTO: 0.3 10*3/UL
MONOCYTES NFR BLD AUTO: 5 % (ref 3–11)
NEUTROPHILS # BLD AUTO: 4 10*3/UL
NEUTROPHILS NFR BLD AUTO: 64 % (ref 41–81)
PLATELET # BLD AUTO: 240 10*3/UL (ref 140–350)
PMV BLD AUTO: 7.9 FL (ref 6.9–10.8)
POTASSIUM SERPL-SCNC: 3.7 MMOL/L (ref 3.5–5.1)
PROT SERPL-MCNC: 7.6 G/DL (ref 6–8.3)
RBC # BLD AUTO: 4.71 10*6/ΜL (ref 3.7–5.3)
SODIUM SERPL-SCNC: 137 MMOL/L (ref 135–145)
WBC # BLD AUTO: 6.3 10*3/UL (ref 4.5–10.5)

## 2019-08-07 PROCEDURE — 85652 RBC SED RATE AUTOMATED: CPT | Performed by: PHYSICIAN ASSISTANT

## 2019-08-07 PROCEDURE — 86140 C-REACTIVE PROTEIN: CPT | Performed by: PHYSICIAN ASSISTANT

## 2019-08-07 PROCEDURE — 80053 COMPREHEN METABOLIC PANEL: CPT | Performed by: PHYSICIAN ASSISTANT

## 2019-08-07 PROCEDURE — 99214 OFFICE O/P EST MOD 30 MIN: CPT | Mod: 25 | Performed by: PHYSICIAN ASSISTANT

## 2019-08-07 PROCEDURE — 85025 COMPLETE CBC W/AUTO DIFF WBC: CPT | Performed by: PHYSICIAN ASSISTANT

## 2019-08-07 PROCEDURE — 36415 COLL VENOUS BLD VENIPUNCTURE: CPT | Performed by: PHYSICIAN ASSISTANT

## 2019-08-07 PROCEDURE — 96372 THER/PROPH/DIAG INJ SC/IM: CPT | Performed by: PHYSICIAN ASSISTANT

## 2019-08-07 RX ORDER — KETOROLAC TROMETHAMINE 30 MG/ML
30 INJECTION, SOLUTION INTRAMUSCULAR; INTRAVENOUS ONCE
Status: COMPLETED | OUTPATIENT
Start: 2019-08-07 | End: 2019-08-07

## 2019-08-07 RX ORDER — ORPHENADRINE CITRATE 30 MG/ML
60 INJECTION INTRAMUSCULAR; INTRAVENOUS ONCE
Status: COMPLETED | OUTPATIENT
Start: 2019-08-07 | End: 2019-08-07

## 2019-08-07 RX ORDER — ONDANSETRON 4 MG/1
4 TABLET, ORALLY DISINTEGRATING ORAL ONCE
Status: COMPLETED | OUTPATIENT
Start: 2019-08-07 | End: 2019-08-07

## 2019-08-07 RX ORDER — AMOXICILLIN AND CLAVULANATE POTASSIUM 875; 125 MG/1; MG/1
1 TABLET, FILM COATED ORAL 2 TIMES DAILY
Qty: 20 TABLET | Refills: 0 | Status: SHIPPED | OUTPATIENT
Start: 2019-08-07 | End: 2019-08-17

## 2019-08-07 RX ADMIN — ONDANSETRON 4 MG: 4 TABLET, ORALLY DISINTEGRATING ORAL at 13:02

## 2019-08-07 RX ADMIN — KETOROLAC TROMETHAMINE 30 MG: 30 INJECTION, SOLUTION INTRAMUSCULAR; INTRAVENOUS at 13:20

## 2019-08-07 RX ADMIN — ORPHENADRINE CITRATE 60 MG: 30 INJECTION INTRAMUSCULAR; INTRAVENOUS at 13:20

## 2019-08-07 ASSESSMENT — PAIN SCALES - GENERAL: PAINLEVEL: 6
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
## 2019-08-07 NOTE — TELEPHONE ENCOUNTER
Pt was seen in [facility] this week for headaches Tara would like for her to follow up with Dr next week before she goes on a trip from 8/17 thru 8/25. Please call patient with appt. Time.Thanks

## 2019-08-07 NOTE — PATIENT INSTRUCTIONS
I want you to take the Flonase nasal spray 2 sprays each nostril twice daily for no more than 10 days  Claritin or Zyrtec 10 mg twice a day for no more than 10 days and then you can go to once a day after that  The nasal rinses would be very beneficial for you the nasal saline rinses  A coolmist humidifier would be helpful steam vaporizer or something like that to open up your sinuses  I want you to follow-up with a primary care provider whether it be Dr. Dougherty or somebody here at the clinic probably next week would be a good time to follow-up with her  Follow-up here in the clinic if you get worse before that time

## 2019-08-28 DIAGNOSIS — G43.009 MIGRAINE WITHOUT AURA AND WITHOUT STATUS MIGRAINOSUS, NOT INTRACTABLE: ICD-10-CM

## 2019-08-29 RX ORDER — BUTORPHANOL TARTRATE 10 MG/ML
SPRAY NASAL
Qty: 2.5 ML | Refills: 0 | Status: SHIPPED | OUTPATIENT
Start: 2019-08-29 | End: 2020-06-24 | Stop reason: ALTCHOICE

## 2019-08-30 ENCOUNTER — OFFICE VISIT (OUTPATIENT)
Dept: FAMILY MEDICINE | Facility: CLINIC | Age: 54
End: 2019-08-30
Attending: FAMILY MEDICINE
Payer: OTHER GOVERNMENT

## 2019-08-30 VITALS
SYSTOLIC BLOOD PRESSURE: 128 MMHG | DIASTOLIC BLOOD PRESSURE: 78 MMHG | OXYGEN SATURATION: 97 % | RESPIRATION RATE: 20 BRPM | HEART RATE: 51 BPM

## 2019-08-30 DIAGNOSIS — R21 RASH: Primary | ICD-10-CM

## 2019-08-30 DIAGNOSIS — L23.9 ALLERGIC DERMATITIS: ICD-10-CM

## 2019-08-30 PROCEDURE — 99213 OFFICE O/P EST LOW 20 MIN: CPT | Mod: 25 | Performed by: FAMILY MEDICINE

## 2019-08-30 PROCEDURE — 96372 THER/PROPH/DIAG INJ SC/IM: CPT | Performed by: FAMILY MEDICINE

## 2019-08-30 RX ORDER — PREDNISONE 20 MG/1
40 TABLET ORAL DAILY
Qty: 10 TABLET | Refills: 0 | Status: SHIPPED | OUTPATIENT
Start: 2019-08-30 | End: 2019-09-04

## 2019-08-30 NOTE — PATIENT INSTRUCTIONS
Take prednisone starting tomorrow. Take 2 tablets with food once daily for the next four days.  Take zyrtec or claritin daily.   Take bendaryl as needed for worsening itching.

## 2019-08-30 NOTE — PROGRESS NOTES
Patient Instructions   Take prednisone starting tomorrow. Take 2 tablets with food once daily for the next four days.  Take zyrtec or claritin daily.   Take bendaryl as needed for worsening itching.      IMPRESSION AND PLAN  Diagnoses and all orders for this visit:    Rash  -     methylPREDNISolone sod suc(PF) (Solu-MEDROL) injection 125 mg  -     predniSONE (DELTASONE) 20 mg tablet; Take 2 tablets (40 mg total) by mouth daily for 5 days    Allergic dermatitis      Noe is a 54-year-old is presenting for few days of a worsening rash is very itchy on her bilateral dorsal upper extremities.  Unknown trigger.  Recently completed Augmentin for sinusitis.  However do some prednisone as well as antihistamines with Zyrtec and Claritin.  I have her follow-up if she is not having improvement in her rash or if it worsens or if she starts having trouble breathing or facial swelling.  She endorsed good understanding.    HPI  Chief complaint for visit per nursing.       Chief Complaint   Patient presents with   • Rash         Luiza is a 54 y.o. who presents for evaluation of rash that started yesterday morning  And has not gotten any better   It is very burning and located on her bilateral dorsal upper extremities.  She has tried some topical benadryl with minimal relief.  Unknown trigger.  She was seen in walk-in clinic on August 7 for sinusitis and migraine which was treated with Toradol and Norflex.  Sinusitis was treated with Augmentin for 10 days.  She does have a history of a rash to clindamycin but no other antibiotics.  Her migraine is since resolved as has her sinusitis symptoms.     She has no sensation of her throat closing up or any lip or tongue swelling.  She denies any stridor or trouble breathing.    PROBLEM LIST  Patient Active Problem List   Diagnosis   • History of migraine   • Situational anxiety   • Other insomnia   • Fibromyalgia   • Open fracture of right distal radius   • Encounter for postoperative care    • Elbow wound, right, subsequent encounter   • Lateral epicondylitis of right elbow   • Right elbow pain   • Traumatic closed displaced fracture of shaft of humerus with routine healing, right         SOCIAL HX  Social History     Socioeconomic History   • Marital status:      Spouse name: Not on file   • Number of children: Not on file   • Years of education: Not on file   • Highest education level: Not on file   Occupational History   • Not on file   Social Needs   • Financial resource strain: Not on file   • Food insecurity:     Worry: Not on file     Inability: Not on file   • Transportation needs:     Medical: Not on file     Non-medical: Not on file   Tobacco Use   • Smoking status: Never Smoker   • Smokeless tobacco: Never Used   Substance and Sexual Activity   • Alcohol use: Yes     Alcohol/week: 1.0 standard drinks     Types: 1 Cans of beer per week   • Drug use: No   • Sexual activity: Yes   Lifestyle   • Physical activity:     Days per week: Not on file     Minutes per session: Not on file   • Stress: Not on file   Relationships   • Social connections:     Talks on phone: Not on file     Gets together: Not on file     Attends Holiness service: Not on file     Active member of club or organization: Not on file     Attends meetings of clubs or organizations: Not on file     Relationship status: Not on file   • Intimate partner violence:     Fear of current or ex partner: Not on file     Emotionally abused: Not on file     Physically abused: Not on file     Forced sexual activity: Not on file   Other Topics Concern   • Not on file   Social History Narrative    Retired nurse.  Now works with horses.       FAMILY HX  Family History   Problem Relation Age of Onset   • Colon cancer Maternal Grandmother    • Ovarian cancer Maternal Grandmother    • Bone cancer Paternal Grandmother    • Hypertension Other    • Breast cancer Other    • Lung cancer Other        ROS  12 point review of systems performed is  negative unless otherwise mentioned per HPI.    Physical Exam  /78 (BP Location: Left arm, Patient Position: Sitting, Cuff Size: Reg)   Pulse 51   Resp 20   SpO2 97%   BP Readings from Last 3 Encounters:   09/23/19 126/68   09/14/19 100/62   08/30/19 128/78       Physical Exam  Vitals signs and nursing note reviewed.   Constitutional:       General: She is not in acute distress.     Appearance: She is not toxic-appearing.   HENT:      Head: Normocephalic and atraumatic.      Nose: Nose normal.      Mouth/Throat:      Mouth: Mucous membranes are moist.   Eyes:      Extraocular Movements: Extraocular movements intact.   Cardiovascular:      Rate and Rhythm: Normal rate and regular rhythm.      Pulses: Normal pulses.   Pulmonary:      Effort: Pulmonary effort is normal. No respiratory distress.      Breath sounds: Normal breath sounds. No stridor. No wheezing.   Abdominal:      General: Bowel sounds are normal. There is no distension.      Palpations: Abdomen is soft.   Skin:     General: Skin is warm.      Capillary Refill: Capillary refill takes less than 2 seconds.      Findings: Rash (Urticaria-like lesions noted on bilateral dorsal surface of upper extremities near elbow.) present.   Neurological:      General: No focal deficit present.      Mental Status: She is oriented to person, place, and time.            LABS AND XRAYS  Lab Results   Component Value Date    GLUCOSE 78 09/14/2019    CALCIUM 8.5 (L) 09/14/2019     09/14/2019    K 3.5 09/14/2019    CO2 24 09/14/2019     09/14/2019    BUN 11 09/14/2019    CREATININE 0.64 09/14/2019    ANIONGAP 7 09/14/2019     Lab Results   Component Value Date    WBC 5.2 09/14/2019    HGB 12.3 09/14/2019    HCT 35.8 09/14/2019    MCV 93.2 09/14/2019     09/14/2019     Lab Results   Component Value Date    TSH 2.559 09/14/2019     No results found for: HGBA1C  Lab Results   Component Value Date    CREATININE 0.64 09/14/2019     Lab Results   Component  Value Date    SEDRATE 15 08/07/2019     No results found for: URACSRM  No results found for: AMYLASE  No results found for: LIPASE      No results found.    Procedures     MEDICATIONS    Current Outpatient Medications:   •  ondansetron ODT (ZOFRAN-ODT) 4 mg disintegrating tablet, DISSOLVE 1 TABLET 4 TIMES A DAY BY ORAL ROUTE FOR 2 DAYS., Disp: 6 tablet, Rfl: 2  •  ibuprofen (ADVIL,MOTRIN) 800 mg tablet, Take 1 tablet (800 mg total) by mouth every 8 (eight) hours. (Patient not taking: Reported on 9/23/2019 ), Disp: 90 tablet, Rfl: 0  •  gabapentin (NEURONTIN) 100 mg capsule, Take 100 mg by mouth 4 (four) times a day., Disp: , Rfl:   •  tiZANidine (ZANAFLEX) 4 mg tablet, Take 4 mg by mouth nightly.  , Disp: , Rfl:   •  ALPRAZolam (XANAX) 0.25 mg tablet, Take 1 tablet (0.25 mg total) by mouth 2 (two) times a day as needed for anxiety., Disp: 30 tablet, Rfl: 1  •  TRULANCE 3 mg tablet, Take 3 mg by mouth daily.  , Disp: , Rfl: 5  •  zolpidem (AMBIEN) 10 mg tablet, Take 1 tablet (10 mg total) by mouth nightly as needed for sleep., Disp: 30 tablet, Rfl: 5  •  butalbit/acetamin/caff/codeine (FIORICET WITH CODEINE ORAL), Take by mouth, Disp: , Rfl:   •  butorphanol (STADOL) 10 mg/mL nasal spray, ADMINISTER 1 SPRAY INTO ONE NOSTRIL EVERY 6 HOURS AS NEEDED FOR PAIN 4-7, Disp: 2.5 mL, Rfl: 0  •  acetaminophen (TYLENOL) 325 mg tablet, Take 2 tablets (650 mg total) by mouth every 6 (six) hours. (Patient not taking: Reported on 7/29/2019 ), Disp: 90 tablet, Rfl: 0  •  docusate sodium (COLACE) 100 mg capsule, Take 2 capsules (200 mg total) by mouth 2 (two) times a day. (Patient not taking: Reported on 8/30/2019 ), Disp: 90 capsule, Rfl: 0  •  lidocaine 4 % patch, Apply 1 patch topically daily.  Remove & discard patch within 12 hours or as directed by MD. (Patient not taking: Reported on 8/16/2018 ), Disp: 20 patch, Rfl: 0  •  potassium chloride (K-TAB) 20 mEq CR tablet, Take 2 tablets (40 mEq total) by mouth 2 (two) times a day  with meals. (Patient not taking: Reported on 8/16/2018 ), Disp: 10 tablet, Rfl: 0    Current Facility-Administered Medications:   •  lidocaine (XYLOCAINE) 2 % jelly 1 application, 1 application, Topical, PRN, Tye Arevalo CNP, 1 application at 08/13/18 1147      SEE ABOVE FOR IMPRESSION AND PLAN    Tiffany Lacy MD  09/24/19  1:30 AM

## 2019-09-14 ENCOUNTER — HOSPITAL ENCOUNTER (EMERGENCY)
Facility: HOSPITAL | Age: 54
Discharge: HOME | End: 2019-09-14
Attending: FAMILY MEDICINE
Payer: OTHER GOVERNMENT

## 2019-09-14 ENCOUNTER — HOSPITAL ENCOUNTER (OUTPATIENT)
Dept: MEDSURG UNIT | Facility: HOSPITAL | Age: 54
Discharge: HOME | End: 2019-09-14
Attending: FAMILY MEDICINE
Payer: OTHER GOVERNMENT

## 2019-09-14 VITALS
RESPIRATION RATE: 18 BRPM | DIASTOLIC BLOOD PRESSURE: 62 MMHG | OXYGEN SATURATION: 97 % | TEMPERATURE: 97.5 F | SYSTOLIC BLOOD PRESSURE: 100 MMHG | HEART RATE: 38 BPM

## 2019-09-14 DIAGNOSIS — G43.909 MIGRAINE HEADACHE: Primary | ICD-10-CM

## 2019-09-14 DIAGNOSIS — R00.1 BRADYCARDIA: ICD-10-CM

## 2019-09-14 LAB
ANION GAP SERPL CALC-SCNC: 7 MMOL/L (ref 3–11)
BASOPHILS # BLD AUTO: 0 10*3/UL
BASOPHILS NFR BLD AUTO: 1 % (ref 0–2)
BUN SERPL-MCNC: 11 MG/DL (ref 7–25)
CALCIUM SERPL-MCNC: 8.5 MG/DL (ref 8.6–10.3)
CHLORIDE SERPL-SCNC: 105 MMOL/L (ref 98–107)
CO2 SERPL-SCNC: 24 MMOL/L (ref 21–32)
CREAT SERPL-MCNC: 0.64 MG/DL (ref 0.6–1.2)
EOSINOPHIL # BLD AUTO: 0.1 10*3/UL
EOSINOPHIL NFR BLD AUTO: 2 % (ref 0–3)
ERYTHROCYTE [DISTWIDTH] IN BLOOD BY AUTOMATED COUNT: 13.2 % (ref 11.5–14)
GFR SERPL CREATININE-BSD FRML MDRD: 103 ML/MIN/1.73M*2
GLUCOSE SERPL-MCNC: 78 MG/DL (ref 70–105)
HCT VFR BLD AUTO: 35.8 % (ref 34–45)
HGB BLD-MCNC: 12.3 G/DL (ref 11.5–15.5)
LYMPHOCYTES # BLD AUTO: 2.3 10*3/UL
LYMPHOCYTES NFR BLD AUTO: 44 % (ref 11–47)
MAGNESIUM SERPL-MCNC: 1.9 MG/DL (ref 1.8–2.4)
MCH RBC QN AUTO: 32 PG (ref 28–33)
MCHC RBC AUTO-ENTMCNC: 34.3 G/DL (ref 32–36)
MCV RBC AUTO: 93.2 FL (ref 81–97)
MONOCYTES # BLD AUTO: 0.5 10*3/UL
MONOCYTES NFR BLD AUTO: 9 % (ref 3–11)
NEUTROPHILS # BLD AUTO: 2.3 10*3/UL
NEUTROPHILS NFR BLD AUTO: 45 % (ref 41–81)
PLATELET # BLD AUTO: 238 10*3/UL (ref 140–350)
PMV BLD AUTO: 7.9 FL (ref 6.9–10.8)
POTASSIUM SERPL-SCNC: 3.5 MMOL/L (ref 3.5–5.1)
RBC # BLD AUTO: 3.84 10*6/ΜL (ref 3.7–5.3)
SODIUM SERPL-SCNC: 136 MMOL/L (ref 135–145)
TSH SERPL DL<=0.05 MIU/L-ACNC: 2.56 UIU/ML (ref 0.34–4.82)
WBC # BLD AUTO: 5.2 10*3/UL (ref 4.5–10.5)

## 2019-09-14 PROCEDURE — 2580000300 HC RX 258: Performed by: FAMILY MEDICINE

## 2019-09-14 PROCEDURE — 96375 TX/PRO/DX INJ NEW DRUG ADDON: CPT

## 2019-09-14 PROCEDURE — 99282 EMERGENCY DEPT VISIT SF MDM: CPT | Performed by: FAMILY MEDICINE

## 2019-09-14 PROCEDURE — 36415 COLL VENOUS BLD VENIPUNCTURE: CPT | Performed by: FAMILY MEDICINE

## 2019-09-14 PROCEDURE — 96374 THER/PROPH/DIAG INJ IV PUSH: CPT

## 2019-09-14 PROCEDURE — 93005 ELECTROCARDIOGRAM TRACING: CPT | Performed by: FAMILY MEDICINE

## 2019-09-14 PROCEDURE — 86431 RHEUMATOID FACTOR QUANT: CPT | Performed by: FAMILY MEDICINE

## 2019-09-14 PROCEDURE — 85025 COMPLETE CBC W/AUTO DIFF WBC: CPT | Performed by: FAMILY MEDICINE

## 2019-09-14 PROCEDURE — 84443 ASSAY THYROID STIM HORMONE: CPT | Performed by: FAMILY MEDICINE

## 2019-09-14 PROCEDURE — 93227 XTRNL ECG REC<48 HR R&I: CPT | Performed by: INTERNAL MEDICINE

## 2019-09-14 PROCEDURE — 93010 ELECTROCARDIOGRAM REPORT: CPT | Performed by: FAMILY MEDICINE

## 2019-09-14 PROCEDURE — 93226 XTRNL ECG REC<48 HR SCAN A/R: CPT

## 2019-09-14 PROCEDURE — 86039 ANTINUCLEAR ANTIBODIES (ANA): CPT | Performed by: FAMILY MEDICINE

## 2019-09-14 PROCEDURE — 99284 EMERGENCY DEPT VISIT MOD MDM: CPT | Mod: 25 | Performed by: FAMILY MEDICINE

## 2019-09-14 PROCEDURE — 83735 ASSAY OF MAGNESIUM: CPT | Performed by: FAMILY MEDICINE

## 2019-09-14 PROCEDURE — 96361 HYDRATE IV INFUSION ADD-ON: CPT

## 2019-09-14 PROCEDURE — 6360000200 HC RX 636 W HCPCS (ALT 250 FOR IP): Performed by: FAMILY MEDICINE

## 2019-09-14 PROCEDURE — 80048 BASIC METABOLIC PNL TOTAL CA: CPT | Performed by: FAMILY MEDICINE

## 2019-09-14 RX ORDER — METOCLOPRAMIDE HYDROCHLORIDE 5 MG/ML
10 INJECTION INTRAMUSCULAR; INTRAVENOUS ONCE
Status: COMPLETED | OUTPATIENT
Start: 2019-09-14 | End: 2019-09-14

## 2019-09-14 RX ORDER — KETOROLAC TROMETHAMINE 30 MG/ML
30 INJECTION, SOLUTION INTRAMUSCULAR; INTRAVENOUS ONCE
Status: COMPLETED | OUTPATIENT
Start: 2019-09-14 | End: 2019-09-14

## 2019-09-14 RX ORDER — DIPHENHYDRAMINE HYDROCHLORIDE 50 MG/ML
50 INJECTION INTRAMUSCULAR; INTRAVENOUS ONCE
Status: COMPLETED | OUTPATIENT
Start: 2019-09-14 | End: 2019-09-14

## 2019-09-14 RX ORDER — SODIUM CHLORIDE 9 MG/ML
1000 INJECTION, SOLUTION INTRAVENOUS ONCE
Status: COMPLETED | OUTPATIENT
Start: 2019-09-14 | End: 2019-09-14

## 2019-09-14 RX ORDER — ORPHENADRINE CITRATE 30 MG/ML
60 INJECTION INTRAMUSCULAR; INTRAVENOUS ONCE
Status: COMPLETED | OUTPATIENT
Start: 2019-09-14 | End: 2019-09-14

## 2019-09-14 RX ORDER — ORPHENADRINE CITRATE 30 MG/ML
60 INJECTION INTRAMUSCULAR; INTRAVENOUS ONCE
Status: DISCONTINUED | OUTPATIENT
Start: 2019-09-14 | End: 2019-09-14

## 2019-09-14 RX ADMIN — DIPHENHYDRAMINE HYDROCHLORIDE 50 MG: 50 INJECTION INTRAMUSCULAR; INTRAVENOUS at 14:03

## 2019-09-14 RX ADMIN — SODIUM CHLORIDE 1000 ML: 9 INJECTION, SOLUTION INTRAVENOUS at 13:55

## 2019-09-14 RX ADMIN — METOCLOPRAMIDE 10 MG: 5 INJECTION, SOLUTION INTRAMUSCULAR; INTRAVENOUS at 14:02

## 2019-09-14 RX ADMIN — SODIUM CHLORIDE 1000 ML: 9 INJECTION, SOLUTION INTRAVENOUS at 15:55

## 2019-09-14 RX ADMIN — KETOROLAC TROMETHAMINE 30 MG: 30 INJECTION INTRAMUSCULAR; INTRAVENOUS at 14:04

## 2019-09-14 RX ADMIN — ORPHENADRINE CITRATE 60 MG: 30 INJECTION INTRAMUSCULAR; INTRAVENOUS at 15:22

## 2019-09-14 NOTE — ED PROCEDURE NOTE
Procedure  ECG 12 lead  Date/Time: 9/14/2019 4:51 PM  Performed by: Gerard Loya MD  Authorized by: Gerard Loya MD     Comments:      Sinus bradycardia with a heart rate of 39, no significant ST elevations or depressions noted                 Gerard Loya MD  09/14/19 7582

## 2019-09-14 NOTE — DISCHARGE INSTRUCTIONS
You were given fluids, Toradol, Reglan, Benadryl and Norflex for your migraine.  You are hydrated with fluids as well  EKG shows a low heart rate.  Therefore we will have your Holter monitor for 48 hours and bring it back after that time  We will have you follow-up with Dr. Lacy in the next week to review results  Return if any of your symptoms significantly worsen

## 2019-09-14 NOTE — ED PROVIDER NOTES
EMERGENCY NOTE    Chief Complaint:  Chief Complaint   Patient presents with   • Headache         HPI:  Luiza Tay is a 54 y.o. female who has a history of migraines presents the [place] emergency department with her  for concern of having another migraine that started early this morning that woke her up around 2 AM  She tells me that she has had migraines for 25 years and she has not even had been hospitalized 3 days for one migraine it was so severe.  She tells me that last night she ate at [employer] and had a steak that was extra salty and she is afraid that that precipitated this migraine.  She says she is pretty sensitive as far as different triggers that will start a migraine.  She states that this feels similar to previous migraines and is located over her right eye and radiates down to her occiput.  She states that it is very light sensitive.  She is had some nausea but no vomiting.  This is not the worst headache of her life.  She states that she is not had any fevers, chills, chest pain, shortness of breath, abdominal pain or dysuria.  She has not been around any sick contacts.  She states that normally she needs some fluids and a couple shots of medicine and then she feels better.  She does tell me that in the past she has been worked up for autoimmune issues and is wondering if she can have a couple pieces of blood work drawn again for that today because she has been feeling more tired than normal.    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       Past Surgical History:   Procedure Laterality Date   • APPENDECTOMY     • BREAST SURGERY      reduction   • BREAST SURGERY Bilateral     reduction   •  CHOLECYSTECTOMY     • COLONOSCOPY  03/2018    no polyps   • FOOT SURGERY Right 2009    Screws   • HERNIA REPAIR     • HUMERUS SURGERY Right 2013    plate/screws   • HYSTERECTOMY      endometriosis   • ORIF WRIST FRACTURE Right 7/25/2018    Procedure: RIGHT ORIF WRIST;  Surgeon: Saad Zuniga MD;  Location: [facility] OR;  Service: Orthopedics;  Laterality: Right;   • ORTHOPEDIC SURGERY     • SHOULDER SURGERY     • SIGMOIDECTOMY  2016    diverticular ds   • TONSILLECTOMY AND ADENOIDECTOMY     • UPPER EXTREMITY DEBRIDEMENT Right 7/25/2018    Procedure: irrigation and debridement of right upper extremity and all indicated procedures;  Surgeon: Saad Zuniga MD;  Location: [facility] OR;  Service: Orthopedics;  Laterality: Right;       Family History   Problem Relation Age of Onset   • Colon cancer Maternal Grandmother    • Ovarian cancer Maternal Grandmother    • Bone cancer Paternal Grandmother    • Hypertension Other    • Breast cancer Other    • Lung cancer Other        Social History     Tobacco Use   • Smoking status: Never Smoker   • Smokeless tobacco: Never Used   Substance Use Topics   • Alcohol use: Yes     Alcohol/week: 1.0 standard drinks     Types: 1 Cans of beer per week   • Drug use: No       Allergies   Allergen Reactions   • Desvenlafaxine Succinate      Other reaction(s): Headache   • Hydromorphone Hives   • Meperidine      cause migraines   • Morphine      Other reaction(s): Headache   • Trazodone      nightmares   • Clindamycin Rash   • Sumatriptan Rash         Current Outpatient Medications:   •  butalbit/acetamin/caff/codeine (FIORICET WITH CODEINE ORAL), Take by mouth, Disp: , Rfl:   •  ondansetron ODT (ZOFRAN-ODT) 4 mg disintegrating tablet, DISSOLVE 1 TABLET 4 TIMES A DAY BY ORAL ROUTE FOR 2 DAYS., Disp: 6 tablet, Rfl: 2  •  ibuprofen (ADVIL,MOTRIN) 800 mg tablet, Take 1 tablet (800 mg total) by mouth every 8 (eight) hours., Disp: 90 tablet, Rfl: 0  •  gabapentin (NEURONTIN) 100 mg capsule, Take 100 mg  by mouth 4 (four) times a day., Disp: , Rfl:   •  tiZANidine (ZANAFLEX) 4 mg tablet, Take 4 mg by mouth nightly.  , Disp: , Rfl:   •  ALPRAZolam (XANAX) 0.25 mg tablet, Take 1 tablet (0.25 mg total) by mouth 2 (two) times a day as needed for anxiety., Disp: 30 tablet, Rfl: 1  •  TRULANCE 3 mg tablet, Take 3 mg by mouth daily.  , Disp: , Rfl: 5  •  zolpidem (AMBIEN) 10 mg tablet, Take 1 tablet (10 mg total) by mouth nightly as needed for sleep., Disp: 30 tablet, Rfl: 5  •  butorphanol (STADOL) 10 mg/mL nasal spray, ADMINISTER 1 SPRAY INTO ONE NOSTRIL EVERY 6 HOURS AS NEEDED FOR PAIN 4-7, Disp: 2.5 mL, Rfl: 0  •  acetaminophen (TYLENOL) 325 mg tablet, Take 2 tablets (650 mg total) by mouth every 6 (six) hours. (Patient not taking: Reported on 7/29/2019 ), Disp: 90 tablet, Rfl: 0  •  docusate sodium (COLACE) 100 mg capsule, Take 2 capsules (200 mg total) by mouth 2 (two) times a day. (Patient not taking: Reported on 8/30/2019 ), Disp: 90 capsule, Rfl: 0  •  lidocaine 4 % patch, Apply 1 patch topically daily.  Remove & discard patch within 12 hours or as directed by MD. (Patient not taking: Reported on 8/16/2018 ), Disp: 20 patch, Rfl: 0  •  potassium chloride (K-TAB) 20 mEq CR tablet, Take 2 tablets (40 mEq total) by mouth 2 (two) times a day with meals. (Patient not taking: Reported on 8/16/2018 ), Disp: 10 tablet, Rfl: 0      ROS:  Review of Systems  12 point review of systems was obtained otherwise negative except for the pertinent positives in HPI      PE:  ED Triage Vitals [09/14/19 1246]   Temp Heart Rate Resp BP SpO2   36.4 °C (97.5 °F) (!) 38 18 104/57 94 %      Temp src Heart Rate Source Patient Position BP Location FiO2 (%)   -- -- Head of bed 30 degrees or higher -- --       Physical Exam  Vitals signs and nursing note reviewed.   Constitutional:       General: She is not in acute distress.     Appearance: Normal appearance. She is well-developed and normal weight. She is not ill-appearing, toxic-appearing  or diaphoretic.   HENT:      Head: Normocephalic and atraumatic.      Right Ear: Tympanic membrane, ear canal and external ear normal. There is no impacted cerumen.      Left Ear: Tympanic membrane, ear canal and external ear normal. There is no impacted cerumen.      Nose: Nose normal. No congestion or rhinorrhea.      Mouth/Throat:      Mouth: Mucous membranes are moist.      Pharynx: Oropharynx is clear. No oropharyngeal exudate or posterior oropharyngeal erythema.   Eyes:      General: No scleral icterus.        Right eye: No discharge.         Left eye: No discharge.      Extraocular Movements: Extraocular movements intact.      Conjunctiva/sclera: Conjunctivae normal.      Pupils: Pupils are equal, round, and reactive to light.   Neck:      Musculoskeletal: Normal range of motion and neck supple. No neck rigidity or muscular tenderness.   Cardiovascular:      Rate and Rhythm: Normal rate and regular rhythm.      Pulses: Normal pulses.      Heart sounds: Normal heart sounds. No murmur.   Pulmonary:      Effort: Pulmonary effort is normal. No respiratory distress.      Breath sounds: Normal breath sounds. No stridor. No wheezing, rhonchi or rales.   Abdominal:      General: Abdomen is flat. Bowel sounds are normal. There is no distension.      Palpations: Abdomen is soft. There is no mass.      Tenderness: There is no tenderness. There is no guarding or rebound.   Musculoskeletal: Normal range of motion.         General: No swelling, tenderness, deformity or signs of injury.      Right lower leg: No edema.      Left lower leg: No edema.   Lymphadenopathy:      Cervical: No cervical adenopathy.   Skin:     General: Skin is warm.      Capillary Refill: Capillary refill takes less than 2 seconds.      Coloration: Skin is not jaundiced or pale.      Findings: No bruising, erythema, lesion or rash.   Neurological:      General: No focal deficit present.      Mental Status: She is alert. Mental status is at baseline.  She is disoriented.      Cranial Nerves: No cranial nerve deficit.   Psychiatric:         Mood and Affect: Mood normal.         Behavior: Behavior normal.         Thought Content: Thought content normal.         Judgement: Judgment normal.           ED LABS:  Labs Reviewed   BASIC METABOLIC PANEL - Abnormal       Result Value    Sodium 136      Potassium 3.5      Chloride 105      CO2 24      BUN 11      Creatinine 0.64      Glucose 78      Calcium 8.5 (*)     Anion Gap 7      eGFR 103      Narrative:     Estimated GFR calculated using the 2009 CKD-EPI creatinine equation.   TSH - Normal    TSH 2.559     MAGNESIUM - Normal    Magnesium 1.9     CBC WITH AUTO DIFFERENTIAL    WBC 5.2      RBC 3.84      Hemoglobin 12.3      Hematocrit 35.8      MCV 93.2      MCH 32.0      MCHC 34.3      RDW 13.2      Platelets 238      MPV 7.9      Neutrophils% 45      Lymphocytes% 44      Monocytes% 9      Eosinophils% 2      Basophils% 1      Neutrophils Absolute 2.30      Lymphocytes Absolute 2.30      Monocytes Absolute 0.50      Eosinophils Absolute 0.10      Basophils Absolute 0.00     ASHLEY, FLUORESCENT IMMUNOASSAY, W/REFLEX TO TITER   RHEUMATOID FACTOR       ED IMAGES:  No orders to display       PROCEDURE    Procedures      ED MEDS  Medications   sodium chloride 0.9 % bolus 1,000 mL (1,000 mL intravenous New Bag/New Syringe 9/14/19 1555)   sodium chloride 0.9 % bolus 1,000 mL (0 mL intravenous Stopped 9/14/19 1455)   metoclopramide (REGLAN) injection 10 mg (10 mg intravenous Given 9/14/19 1402)   ketorolac (TORADOL) injection 30 mg (30 mg intravenous Given 9/14/19 1404)   diphenhydrAMINE (BENADRYL) injection 50 mg (50 mg intravenous Given 9/14/19 1403)   orphenadrine (NORFLEX) injection 60 mg (60 mg intravenous Given 9/14/19 1522)       ED DIAGNOSIS  Final diagnoses:   [G43.909] Migraine headache   [R00.1] Bradycardia           ED COURSE:  In the emergency department patient's ABCs were attended to and remained stable.  The  patient was treated with fluids as well as Reglan, Toradol, Benadryl and Norflex for her migraine.  Symptoms did improve.  On the cardiac monitor her heart rate was ranging in the 30s to low 50s.  EKG showed sinus bradycardia.  Patient was asymptomatic from that standpoint.  Discussed with Dr. Adorno on-call for cardiology who recommended a 48-hour Holter monitor but no other treatment needed at this time since she is asymptomatic.  Discussed with patient and  who are in agreement with this plan.  We sent her home with a 48-hour monitor and will have her follow-up with Dr. Lacy in the next week to follow-up on those results.  Discussed returning if any of her symptoms worsen or change.  She was in agreement with this plan      Gerard Loya MD  09/14/19    A voice recognition program was used to aid in medical record documentation. Sometimes words are printed not exactly as they were spoken. While efforts were made to carefully edit and correct any inaccuracies, some errors may be present. Errors should be taken within the context of the discussion.  Please contact our office if you need assistance interpreting this medical record or notice any mistakes.        Gerard Loya MD  09/14/19 0929

## 2019-09-16 LAB
ANA PATTERN 1: ABNORMAL
ANA TITER 1: ABNORMAL
ANTI-NUCLEAR ANTIBODY (ANA): POSITIVE
RHEUMATOID FACT SERPL-ACNC: <10 IU/ML

## 2019-09-18 ENCOUNTER — TELEPHONE (OUTPATIENT)
Dept: FAMILY MEDICINE | Facility: CLINIC | Age: 54
End: 2019-09-18

## 2019-09-20 NOTE — TELEPHONE ENCOUNTER
Called pt; she has a mandatory meeting that morning that she cannot miss;  I scheduled her for the 10/10/19 slot and also put her on the wait list;  Thanks for your help anyway

## 2019-09-23 ENCOUNTER — OFFICE VISIT (OUTPATIENT)
Dept: FAMILY MEDICINE | Facility: CLINIC | Age: 54
End: 2019-09-23
Payer: OTHER GOVERNMENT

## 2019-09-23 VITALS
SYSTOLIC BLOOD PRESSURE: 126 MMHG | HEART RATE: 56 BPM | RESPIRATION RATE: 20 BRPM | DIASTOLIC BLOOD PRESSURE: 68 MMHG | OXYGEN SATURATION: 98 %

## 2019-09-23 DIAGNOSIS — R00.1 BRADYCARDIA: Primary | ICD-10-CM

## 2019-09-23 DIAGNOSIS — R76.8 POSITIVE ANA (ANTINUCLEAR ANTIBODY): ICD-10-CM

## 2019-09-23 DIAGNOSIS — G43.719 INTRACTABLE CHRONIC MIGRAINE WITHOUT AURA AND WITHOUT STATUS MIGRAINOSUS: ICD-10-CM

## 2019-09-23 LAB
RH CV SUPRAVENT % TOTAL BEATS: 0.38 %
RH CV TOTAL BEATS: NORMAL BEATS
RH CV VENTRICULAR % TOTAL BEATS: 0.06 %
RH SUPRAVENTRICULAR BEATS: 275 BEATS
RH VENTRICULAR BEATS: 44 BEATS

## 2019-09-23 PROCEDURE — 36415 COLL VENOUS BLD VENIPUNCTURE: CPT | Performed by: FAMILY MEDICINE

## 2019-09-23 PROCEDURE — 86200 CCP ANTIBODY: CPT | Performed by: FAMILY MEDICINE

## 2019-09-23 PROCEDURE — 99214 OFFICE O/P EST MOD 30 MIN: CPT | Performed by: FAMILY MEDICINE

## 2019-09-23 PROCEDURE — 86235 NUCLEAR ANTIGEN ANTIBODY: CPT | Performed by: FAMILY MEDICINE

## 2019-09-23 NOTE — PATIENT INSTRUCTIONS
Lets have you get some blood work to work up your positive ASHLEY  I will call you with the results of this.  I will call you with the results of the holter monitor

## 2019-09-23 NOTE — PROGRESS NOTES
Patient Instructions   Lets have you get some blood work to work up your positive ASHLEY  I will call you with the results of this.  I will call you with the results of the holter monitor            IMPRESSION AND PLAN  Diagnoses and all orders for this visit:    Bradycardia    Intractable chronic migraine without aura and without status migrainosus    Positive ASHLEY (antinuclear antibody)  -     Extractable Nuclear Antigens (MANISHA) 11 PANEL Blood, Venous; Future  -     Cyclic citrul peptide antibody Blood, Venous    Luiza is a 54-year-old that comes in today for follow-up after ER visit for migraine.  Migraine has resolved but she was noted to have some bradycardia with heart rate in the 30s to 40s.  She is asymptomatic at that time.  She did get a Holter monitor but states she only wore it for about 24 hours because she had episodes of really bad diarrhea after eating steak and so needed to take a shower and took a Holter monitor off.  She states that she be happy to wear it again for 48 hours if needed.  I do not have the results back for the Holter monitor just yet but I told her give her a call when that comes back in.  She wanted her ASHLEY rechecked during her ER visit that did come back positive with a titer of 1:160.  Ordered a CCP and MANISHA panel on her.  She does endorse some fatigue.  Will consider a rheumatology referral or cardiology referral pending the results of this test.  She endorsed good understanding    HPI  Chief complaint for visit per nursing.       Chief Complaint   Patient presents with   • Hospital Follow Up         Luiza is a 54 y.o. who presents today after hospitalization for migraine.  She was given Reglan, Toradol, Benadryl and Norflex for migraine.  Her symptoms improved with this.  Her heart rate was noted to be ranging from 30-50 on the cardiac monitor.  EKG done at that time showed sinus bradycardia.  At that time she also had a rheumatoid factor, TSH done which were all grossly within  normal limits.  ASHLEY was positive with a 1:160 speckled titer  She is endorsing fatigue a lot.  She wore a holter monitor for about 21 hours due to needing to take a shower after diarrhea episode.  She states that she had sigmoid colon removed from diverticulitis.  Because of that, she sometimes has issues digesting meat.  She had a sleep study done about 3 years ago that was normal.    PROBLEM LIST  Patient Active Problem List   Diagnosis   • History of migraine   • Situational anxiety   • Other insomnia   • Fibromyalgia   • Open fracture of right distal radius   • Encounter for postoperative care   • Elbow wound, right, subsequent encounter   • Lateral epicondylitis of right elbow   • Right elbow pain   • Traumatic closed displaced fracture of shaft of humerus with routine healing, right         SOCIAL HX  Social History     Socioeconomic History   • Marital status:      Spouse name: Not on file   • Number of children: Not on file   • Years of education: Not on file   • Highest education level: Not on file   Occupational History   • Not on file   Social Needs   • Financial resource strain: Not on file   • Food insecurity:     Worry: Not on file     Inability: Not on file   • Transportation needs:     Medical: Not on file     Non-medical: Not on file   Tobacco Use   • Smoking status: Never Smoker   • Smokeless tobacco: Never Used   Substance and Sexual Activity   • Alcohol use: Yes     Alcohol/week: 1.0 standard drinks     Types: 1 Cans of beer per week   • Drug use: No   • Sexual activity: Yes   Lifestyle   • Physical activity:     Days per week: Not on file     Minutes per session: Not on file   • Stress: Not on file   Relationships   • Social connections:     Talks on phone: Not on file     Gets together: Not on file     Attends [employer] service: Not on file     Active member of club or organization: Not on file     Attends meetings of clubs or organizations: Not on file     Relationship status: Not on  file   • Intimate partner violence:     Fear of current or ex partner: Not on file     Emotionally abused: Not on file     Physically abused: Not on file     Forced sexual activity: Not on file   Other Topics Concern   • Not on file   Social History Narrative   • Not on file       FAMILY HX  Family History   Problem Relation Age of Onset   • Colon cancer Maternal Grandmother    • Ovarian cancer Maternal Grandmother    • Bone cancer Paternal Grandmother    • Hypertension Other    • Breast cancer Other    • Lung cancer Other        ROS  12 point review of systems performed is negative unless otherwise mentioned per HPI.    Physical Exam  /68 (BP Location: Left arm, Patient Position: Sitting, Cuff Size: Reg)   Pulse 56   Resp 20   SpO2 98%   BP Readings from Last 3 Encounters:   09/23/19 126/68   09/14/19 100/62   08/30/19 128/78       Physical Exam  Vitals signs and nursing note reviewed.   Constitutional:       General: She is not in acute distress.  HENT:      Head: Normocephalic and atraumatic.      Nose: Nose normal.      Mouth/Throat:      Mouth: Mucous membranes are moist.   Eyes:      Extraocular Movements: Extraocular movements intact.   Cardiovascular:      Rate and Rhythm: Normal rate and regular rhythm.      Pulses: Normal pulses.      Heart sounds: Normal heart sounds. No murmur.   Pulmonary:      Effort: Pulmonary effort is normal.      Breath sounds: Normal breath sounds.   Abdominal:      General: Bowel sounds are normal. There is no distension.      Palpations: Abdomen is soft.      Tenderness: There is no tenderness.   Skin:     General: Skin is warm.      Capillary Refill: Capillary refill takes less than 2 seconds.   Neurological:      General: No focal deficit present.      Mental Status: She is oriented to person, place, and time. Mental status is at baseline.   Psychiatric:         Mood and Affect: Mood normal.            LABS AND XRAYS  Lab Results   Component Value Date    GLUCOSE 78  09/14/2019    CALCIUM 8.5 (L) 09/14/2019     09/14/2019    K 3.5 09/14/2019    CO2 24 09/14/2019     09/14/2019    BUN 11 09/14/2019    CREATININE 0.64 09/14/2019    ANIONGAP 7 09/14/2019     Lab Results   Component Value Date    WBC 5.2 09/14/2019    HGB 12.3 09/14/2019    HCT 35.8 09/14/2019    MCV 93.2 09/14/2019     09/14/2019     Lab Results   Component Value Date    TSH 2.559 09/14/2019     No results found for: HGBA1C  Lab Results   Component Value Date    CREATININE 0.64 09/14/2019     Lab Results   Component Value Date    SEDRATE 15 08/07/2019     No results found for: URACSRM  No results found for: AMYLASE  No results found for: LIPASE      No results found.    Procedures     MEDICATIONS    Current Outpatient Medications:   •  butalbit/acetamin/caff/codeine (FIORICET WITH CODEINE ORAL), Take by mouth, Disp: , Rfl:   •  butorphanol (STADOL) 10 mg/mL nasal spray, ADMINISTER 1 SPRAY INTO ONE NOSTRIL EVERY 6 HOURS AS NEEDED FOR PAIN 4-7, Disp: 2.5 mL, Rfl: 0  •  ondansetron ODT (ZOFRAN-ODT) 4 mg disintegrating tablet, DISSOLVE 1 TABLET 4 TIMES A DAY BY ORAL ROUTE FOR 2 DAYS., Disp: 6 tablet, Rfl: 2  •  gabapentin (NEURONTIN) 100 mg capsule, Take 100 mg by mouth 4 (four) times a day., Disp: , Rfl:   •  tiZANidine (ZANAFLEX) 4 mg tablet, Take 4 mg by mouth nightly.  , Disp: , Rfl:   •  ALPRAZolam (XANAX) 0.25 mg tablet, Take 1 tablet (0.25 mg total) by mouth 2 (two) times a day as needed for anxiety., Disp: 30 tablet, Rfl: 1  •  TRULANCE 3 mg tablet, Take 3 mg by mouth daily.  , Disp: , Rfl: 5  •  zolpidem (AMBIEN) 10 mg tablet, Take 1 tablet (10 mg total) by mouth nightly as needed for sleep., Disp: 30 tablet, Rfl: 5  •  acetaminophen (TYLENOL) 325 mg tablet, Take 2 tablets (650 mg total) by mouth every 6 (six) hours. (Patient not taking: Reported on 7/29/2019 ), Disp: 90 tablet, Rfl: 0  •  docusate sodium (COLACE) 100 mg capsule, Take 2 capsules (200 mg total) by mouth 2 (two) times a day.  (Patient not taking: Reported on 8/30/2019 ), Disp: 90 capsule, Rfl: 0  •  ibuprofen (ADVIL,MOTRIN) 800 mg tablet, Take 1 tablet (800 mg total) by mouth every 8 (eight) hours. (Patient not taking: Reported on 9/23/2019 ), Disp: 90 tablet, Rfl: 0  •  lidocaine 4 % patch, Apply 1 patch topically daily.  Remove & discard patch within 12 hours or as directed by MD. (Patient not taking: Reported on 8/16/2018 ), Disp: 20 patch, Rfl: 0  •  potassium chloride (K-TAB) 20 mEq CR tablet, Take 2 tablets (40 mEq total) by mouth 2 (two) times a day with meals. (Patient not taking: Reported on 8/16/2018 ), Disp: 10 tablet, Rfl: 0    Current Facility-Administered Medications:   •  lidocaine (XYLOCAINE) 2 % jelly 1 application, 1 application, Topical, PRN, Tye Arevalo, CNP, 1 application at 08/13/18 1147      SEE ABOVE FOR IMPRESSION AND PLAN    Tiffany Lacy MD  09/23/19  3:30 PM

## 2019-09-24 LAB
CENTROMERE B AB SER-ACNC: <0.2 AI
CHROMATIN AB, MIA, INDEX: <0.2 AI
DSDNA IGG SERPL IA-ACNC: 1 IU/ML
ENA JO1 IGG SER-ACNC: <0.2 AI
ENA RNP IGG SER-ACNC: <0.2 AI
ENA SCL70 IGG SER IA-ACNC: <0.2 AI
ENA SM IGG SER-ACNC: <0.2 AI
ENA SM+RNP AB SER-ACNC: <0.2 AI
ENA SS-A AB SER-ACNC: <0.2 AI
ENA SS-B AB SER-ACNC: <0.2 AI
RIBOSOMAL P IGG SER-ACNC: <0.2 AI

## 2019-09-25 LAB — CCP AB SER IA-ACNC: <0.5 U/ML

## 2019-10-31 ENCOUNTER — OFFICE VISIT (OUTPATIENT)
Dept: URGENT CARE | Facility: URGENT CARE | Age: 54
End: 2019-10-31
Payer: OTHER GOVERNMENT

## 2019-10-31 VITALS
OXYGEN SATURATION: 97 % | DIASTOLIC BLOOD PRESSURE: 70 MMHG | SYSTOLIC BLOOD PRESSURE: 120 MMHG | BODY MASS INDEX: 27.32 KG/M2 | TEMPERATURE: 97.1 F | WEIGHT: 170 LBS | RESPIRATION RATE: 16 BRPM | HEIGHT: 66 IN | HEART RATE: 64 BPM

## 2019-10-31 DIAGNOSIS — B37.0 THRUSH, ORAL: Primary | ICD-10-CM

## 2019-10-31 PROBLEM — S42.301D: Status: RESOLVED | Noted: 2019-05-16 | Resolved: 2019-10-31

## 2019-10-31 PROBLEM — M77.11 LATERAL EPICONDYLITIS OF RIGHT ELBOW: Status: RESOLVED | Noted: 2019-05-16 | Resolved: 2019-10-31

## 2019-10-31 PROBLEM — M25.521 RIGHT ELBOW PAIN: Status: RESOLVED | Noted: 2019-05-16 | Resolved: 2019-10-31

## 2019-10-31 PROBLEM — S52.501B OPEN FRACTURE OF RIGHT DISTAL RADIUS: Status: RESOLVED | Noted: 2018-07-24 | Resolved: 2019-10-31

## 2019-10-31 PROBLEM — Z48.89 ENCOUNTER FOR POSTOPERATIVE CARE: Status: RESOLVED | Noted: 2018-08-07 | Resolved: 2019-10-31

## 2019-10-31 PROBLEM — S51.001D ELBOW WOUND, RIGHT, SUBSEQUENT ENCOUNTER: Status: RESOLVED | Noted: 2018-08-21 | Resolved: 2019-10-31

## 2019-10-31 PROBLEM — Z86.69 HISTORY OF MIGRAINE: Status: RESOLVED | Noted: 2017-09-14 | Resolved: 2019-10-31

## 2019-10-31 PROCEDURE — 99202 OFFICE O/P NEW SF 15 MIN: CPT | Performed by: FAMILY MEDICINE

## 2019-10-31 RX ORDER — NYSTATIN 100000 [USP'U]/ML
500000 SUSPENSION ORAL 4 TIMES DAILY
Qty: 200 ML | Refills: 0 | Status: SHIPPED | OUTPATIENT
Start: 2019-10-31 | End: 2019-11-10

## 2019-10-31 ASSESSMENT — PAIN SCALES - GENERAL: PAINLEVEL: 4

## 2019-10-31 NOTE — PROGRESS NOTES
"Subjective      Luiza Tay is a 54 y.o. female who presents for pain and burning sensation in her mouth.  Patient states that a couple of days ago she had been eating lots of sour sort of candies and noticed that her mouth was \"getting raw\" so she quit eating these.  Regardless of her stopping to ED she did notice that her mouth continued to get more and more sore.  She noticed yesterday that there was some white patches in her mouth and at the back of her throat.  She states that she is feeling rundown and tired however she has not had any other upper respiratory symptoms.  No nausea or vomiting.  No abdominal pain.  She does have decreased appetite.  She is feeling all over muscle achy.  No fevers or chills.  No rashes in other areas of the body      The following have been reviewed and updated as appropriate in this visit:  Allergies, Medications, Past Medical, Surgical and Social History, Problem List      Review of Systems  See HPI    Objective   /70 Comment: Manual  Pulse 64   Temp 36.2 °C (97.1 °F) (Temporal)   Resp 16   Ht 1.676 m (5' 6\")   Wt 77.1 kg (170 lb)   SpO2 97%   BMI 27.44 kg/m²     Physical Exam  Vitals signs and nursing note reviewed.   Constitutional:       Appearance: Normal appearance. She is well-developed.   HENT:      Head: Normocephalic and atraumatic.      Right Ear: Tympanic membrane, ear canal and external ear normal.      Left Ear: Tympanic membrane, ear canal and external ear normal.      Nose: No mucosal edema or rhinorrhea.      Mouth/Throat:      Mouth: Mucous membranes are moist.      Pharynx: Uvula midline. Posterior oropharyngeal erythema present. No oropharyngeal exudate.      Tonsils: No tonsillar exudate. Swellin+ on the right. 1+ on the left.      Comments: Scattered white patches along the parapharyngeal area.  Bugle mucosa and roof of mouth is erythematous.  No vesicular lesions.  No exudate.  Eyes:      Conjunctiva/sclera: Conjunctivae normal. "      Pupils: Pupils are equal, round, and reactive to light.   Neck:      Musculoskeletal: Neck supple.   Cardiovascular:      Rate and Rhythm: Normal rate and regular rhythm.      Heart sounds: Normal heart sounds. No murmur.   Pulmonary:      Effort: Pulmonary effort is normal.      Breath sounds: Normal breath sounds.   Skin:     General: Skin is warm and dry.      Comments: No rash on any other part of the body in her skin   Neurological:      Mental Status: She is alert.           Assessment/Plan   Diagnoses and all orders for this visit:    Thrush, oral  -     nystatin (MYCOSTATIN) 100,000 unit/mL suspension; Take 5 mL (500,000 Units total) by mouth 4 (four) times a day for 10 days Swish and swallow  -     magic mouthwash (maalox/lidocaine/diphenhydramine); Swish and spit 10 mL every 6 (six) hours as needed for stomatitis        Advised patient to use the nystatin swish and swallow as directed.  PRN use of the Magic mouthwash to help with the discomfort.  Continue to push fluids.  If other signs or symptoms develop or she is worsening or not improving as anticipated should return to clinic or follow-up with primary care.  Patient expresses understanding and agrees with plan of care.     CECELIA KATE DO  October 31, 2019 11:25 AM

## 2020-06-24 ENCOUNTER — OFFICE VISIT (OUTPATIENT)
Dept: FAMILY MEDICINE | Facility: CLINIC | Age: 55
End: 2020-06-24
Payer: OTHER GOVERNMENT

## 2020-06-24 VITALS
SYSTOLIC BLOOD PRESSURE: 110 MMHG | RESPIRATION RATE: 18 BRPM | WEIGHT: 172 LBS | HEIGHT: 66 IN | DIASTOLIC BLOOD PRESSURE: 70 MMHG | BODY MASS INDEX: 27.64 KG/M2 | HEART RATE: 59 BPM | OXYGEN SATURATION: 97 %

## 2020-06-24 DIAGNOSIS — R05.9 COUGH: ICD-10-CM

## 2020-06-24 DIAGNOSIS — Z12.39 SCREENING FOR MALIGNANT NEOPLASM OF BREAST: ICD-10-CM

## 2020-06-24 DIAGNOSIS — F41.8 SITUATIONAL ANXIETY: Primary | ICD-10-CM

## 2020-06-24 DIAGNOSIS — R11.0 NAUSEA: ICD-10-CM

## 2020-06-24 DIAGNOSIS — M79.7 FIBROMYALGIA: ICD-10-CM

## 2020-06-24 DIAGNOSIS — G47.09 OTHER INSOMNIA: ICD-10-CM

## 2020-06-24 DIAGNOSIS — G43.909 MIGRAINE WITHOUT STATUS MIGRAINOSUS, NOT INTRACTABLE, UNSPECIFIED MIGRAINE TYPE: ICD-10-CM

## 2020-06-24 DIAGNOSIS — Z00.00 ANNUAL PHYSICAL EXAM: ICD-10-CM

## 2020-06-24 PROCEDURE — 86769 SARS-COV-2 COVID-19 ANTIBODY: CPT | Performed by: FAMILY MEDICINE

## 2020-06-24 PROCEDURE — 99396 PREV VISIT EST AGE 40-64: CPT | Performed by: FAMILY MEDICINE

## 2020-06-24 PROCEDURE — 36415 COLL VENOUS BLD VENIPUNCTURE: CPT | Performed by: FAMILY MEDICINE

## 2020-06-24 RX ORDER — ALPRAZOLAM 0.5 MG/1
0.5 TABLET ORAL 2 TIMES DAILY PRN
Qty: 60 TABLET | Refills: 5 | Status: SHIPPED | OUTPATIENT
Start: 2020-06-24 | End: 2020-12-02 | Stop reason: SDUPTHER

## 2020-06-24 RX ORDER — BUTALBITAL, ACETAMINOPHEN AND CAFFEINE 50; 325; 40 MG/1; MG/1; MG/1
1 TABLET ORAL EVERY 4 HOURS PRN
Qty: 30 TABLET | Refills: 1 | Status: SHIPPED | OUTPATIENT
Start: 2020-06-24 | End: 2020-12-02 | Stop reason: SDUPTHER

## 2020-06-24 RX ORDER — TIZANIDINE 4 MG/1
4 TABLET ORAL NIGHTLY
Qty: 90 TABLET | Refills: 3 | Status: SHIPPED | OUTPATIENT
Start: 2020-06-24 | End: 2020-12-02 | Stop reason: SDUPTHER

## 2020-06-24 RX ORDER — ZOLPIDEM TARTRATE 10 MG/1
10 TABLET ORAL NIGHTLY PRN
Qty: 90 TABLET | Refills: 1 | Status: SHIPPED | OUTPATIENT
Start: 2020-06-24 | End: 2020-10-12

## 2020-06-24 RX ORDER — ONDANSETRON 4 MG/1
4 TABLET, ORALLY DISINTEGRATING ORAL EVERY 8 HOURS PRN
Qty: 30 TABLET | Refills: 1 | Status: SHIPPED | OUTPATIENT
Start: 2020-06-24 | End: 2020-12-15 | Stop reason: SDUPTHER

## 2020-06-24 RX ORDER — GABAPENTIN 100 MG/1
100 CAPSULE ORAL 4 TIMES DAILY
Qty: 360 CAPSULE | Refills: 1 | Status: SHIPPED | OUTPATIENT
Start: 2020-06-24 | End: 2020-11-30

## 2020-06-24 ASSESSMENT — ENCOUNTER SYMPTOMS
APPETITE CHANGE: 0
RHINORRHEA: 0
SHORTNESS OF BREATH: 0
WEAKNESS: 0
DIZZINESS: 0
ADENOPATHY: 0
PALPITATIONS: 0
MYALGIAS: 0
BACK PAIN: 0
UNEXPECTED WEIGHT CHANGE: 0
FEVER: 0
SLEEP DISTURBANCE: 0
NUMBNESS: 0
CONSTIPATION: 0
HEADACHES: 1
NERVOUS/ANXIOUS: 0
DIARRHEA: 0
CHILLS: 0
POLYDIPSIA: 0
ARTHRALGIAS: 0
ACTIVITY CHANGE: 0
DYSPHORIC MOOD: 0
DYSURIA: 0
ABDOMINAL PAIN: 0
LIGHT-HEADEDNESS: 0
BLOOD IN STOOL: 0

## 2020-06-24 ASSESSMENT — PAIN SCALES - GENERAL: PAINLEVEL: 0-NO PAIN

## 2020-06-24 NOTE — PROGRESS NOTES
Clinic Note    Chief Complaint   Well Women Visit and Med Refill       Subjective     Luiza Tay is a 55 y.o. female who presents for Well Women Visit and Med Refill   per nursing.  Patient presents today for annual wellness visit.  She tells me that she had a total hysterectomy for endometriosis back in 1995.  She tells me that she does not need any more Paps.  She had a breast reduction client a clinical breast exam because it is a little uncomfortable she states.  She is due for mammogram and would like to do that.  She has not noticed any abnormal breast changes, lumps or nipple discharge.  She tells me that she had part of her sigmoid removed about 4 years ago because she had diverticulitis 4 times.  She had a colonoscopy about 3 years ago and it looked well.  She tells me that her vaccines are up-to-date.  She is a non-smoker.  She does need all of her medications refilled.  She would like to increase her Xanax to 2 tablets when she rides her horse.  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  She is had no ill side effects from the Xanax.  She is on Ambien for sleep and states that she has failed trazodone and Belsomra in the past.  She is had no ill side effects from the Ambien.  Her migraines are stable using Fioricet.  Tolerating gabapentin and tizanidine well for fibromyalgia.  Patient thinks that her and her  had COVID a couple months ago because he did have cough with some shortness of breath.  She would like an antibody test    The following have been reviewed and updated as appropriate in this visit:    Allergies  Meds  Problems  Med Hx  Surg Hx  Fam Hx       Allergies   Allergen Reactions   • Desvenlafaxine Succinate      Other reaction(s): Headache   • Hydromorphone Hives   • Meperidine      cause migraines   • Morphine      Other reaction(s): Headache   • Trazodone      nightmares   • Clindamycin Rash   • Sumatriptan Rash      Current Outpatient Medications   Medication Sig Dispense Refill   • zolpidem (AMBIEN) 10 mg tablet Take 1 tablet (10 mg total) by mouth nightly as needed for sleep 90 tablet 1   • tiZANidine (ZANAFLEX) 4 mg tablet Take 1 tablet (4 mg total) by mouth nightly 90 tablet 3   • ondansetron ODT (ZOFRAN-ODT) 4 mg disintegrating tablet Take 1 tablet (4 mg total) by mouth every 8 (eight) hours as needed for nausea or vomiting 30 tablet 1   • gabapentin (NEURONTIN) 100 mg capsule Take 1 capsule (100 mg total) by mouth 4 (four) times a day 360 capsule 1   • butalbit/acetamin/caff/codeine (FIORICET WITH CODEINE ORAL) Take by mouth     • butalbital-acetaminophen-caff (FIORICET, ESGIC) -40 mg Take 1 tablet by mouth every 4 (four) hours as needed for headaches 30 tablet 1   • ALPRAZolam (XANAX) 0.5 mg tablet Take 1 tablet (0.5 mg total) by mouth 2 (two) times a day as needed for anxiety Max Daily Amount: 1 mg 60 tablet 5     No current facility-administered medications for this visit.      Past Medical History:   Diagnosis Date   • Appendicitis    • Diverticulitis    • Fibromyalgia 5/3/2018   • Gallstones    • Headache    • History of migraine 9/14/2017   • History of migraine 9/14/2017   • Insomnia    • Migraines    • Muscle tightness    • Other insomnia 5/3/2018   • Situational anxiety 05/03/2018    after a trauma riding horses   • Traumatic closed displaced fracture of shaft of humerus with routine healing, right 5/16/2019     Past Surgical History:   Procedure Laterality Date   • APPENDECTOMY     • BREAST SURGERY      reduction   • BREAST SURGERY Bilateral     reduction   • CHOLECYSTECTOMY     • COLONOSCOPY  03/2018    no polyps   • FOOT SURGERY Right 2009    Screws   • HERNIA REPAIR     • HUMERUS SURGERY Right 2013    plate/screws   • HYSTERECTOMY      endometriosis   • ORIF WRIST FRACTURE Right 7/25/2018    Procedure: RIGHT ORIF WRIST;  Surgeon: Saad Zuniga MD;  Location: [facility];  Service: Orthopedics;   Laterality: Right;   • ORTHOPEDIC SURGERY     • SHOULDER SURGERY     • SIGMOIDECTOMY  2016    diverticular ds   • TONSILLECTOMY AND ADENOIDECTOMY     • UPPER EXTREMITY DEBRIDEMENT Right 7/25/2018    Procedure: irrigation and debridement of right upper extremity and all indicated procedures;  Surgeon: Saad Zuniga MD;  Location: [facility];  Service: Orthopedics;  Laterality: Right;     Family History   Problem Relation Age of Onset   • Colon cancer Maternal Grandmother    • Ovarian cancer Maternal Grandmother    • Bone cancer Paternal Grandmother    • Hypertension Other    • Breast cancer Other    • Lung cancer Other      Social History     Occupational History   • Not on file   Tobacco Use   • Smoking status: Never Smoker   • Smokeless tobacco: Never Used   Substance and Sexual Activity   • Alcohol use: Yes     Alcohol/week: 1.0 standard drinks     Types: 1 Cans of beer per week   • Drug use: No   • Sexual activity: Yes   Social History Narrative    Retired nurse.  Now works with horses.       Review of Systems   Constitutional: Negative for activity change, appetite change, chills, fever and unexpected weight change.        No Night Sweats   HENT: Negative for congestion, dental problem and rhinorrhea.         Up To Date on Dental visits   Eyes: Negative for visual disturbance.        Up To Date on Eye exams   Respiratory: Negative for shortness of breath.    Cardiovascular: Negative for chest pain and palpitations.   Gastrointestinal: Negative for abdominal pain, blood in stool, constipation and diarrhea.   Endocrine: Negative for cold intolerance, heat intolerance, polydipsia and polyuria.   Genitourinary: Negative for dysuria, enuresis, menstrual problem, pelvic pain, vaginal bleeding, vaginal discharge and vaginal pain.   Musculoskeletal: Negative for arthralgias, back pain and myalgias.   Skin: Negative for rash.   Allergic/Immunologic: Negative for environmental allergies and food allergies.   Neurological:  "Positive for headaches. Negative for dizziness, weakness, light-headedness and numbness.        No Tingling   Hematological: Negative for adenopathy.   Psychiatric/Behavioral: Negative for dysphoric mood and sleep disturbance. The patient is not nervous/anxious.        Active Problems  Patient Active Problem List   Diagnosis   • Situational anxiety   • Other insomnia   • Fibromyalgia       Objective   BP Readings from Last 3 Encounters:   06/24/20 110/70   10/31/19 120/70   09/23/19 126/68       /70 (BP Location: Left arm, Patient Position: Sitting, Cuff Size: Regular Adult)   Pulse 59   Resp 18   Ht 1.676 m (5' 6\")   Wt 78 kg (172 lb)   SpO2 97%   BMI 27.76 kg/m²     Physical Exam  Vitals signs and nursing note reviewed.   Constitutional:       General: She is not in acute distress.     Appearance: Normal appearance. She is well-developed and normal weight. She is not ill-appearing, toxic-appearing or diaphoretic.   HENT:      Head: Normocephalic and atraumatic.      Right Ear: Tympanic membrane, ear canal and external ear normal. There is no impacted cerumen.      Left Ear: Tympanic membrane, ear canal and external ear normal. There is no impacted cerumen.      Nose: Nose normal. No congestion or rhinorrhea.      Mouth/Throat:      Mouth: Mucous membranes are moist.   Eyes:      General: No scleral icterus.        Right eye: No discharge.         Left eye: No discharge.      Extraocular Movements: Extraocular movements intact.      Conjunctiva/sclera: Conjunctivae normal.   Neck:      Musculoskeletal: Normal range of motion and neck supple. No neck rigidity or muscular tenderness.      Thyroid: No thyromegaly.      Trachea: No tracheal deviation.   Cardiovascular:      Rate and Rhythm: Normal rate and regular rhythm.      Pulses: Normal pulses.      Heart sounds: Normal heart sounds. No murmur.   Pulmonary:      Effort: Pulmonary effort is normal. No respiratory distress.      Breath sounds: Normal " breath sounds. No stridor. No wheezing, rhonchi or rales.   Chest:      Chest wall: No tenderness.   Musculoskeletal: Normal range of motion.         General: No swelling, tenderness, deformity or signs of injury.      Right lower leg: No edema.      Left lower leg: No edema.   Lymphadenopathy:      Cervical: No cervical adenopathy.   Skin:     General: Skin is warm.      Capillary Refill: Capillary refill takes less than 2 seconds.      Coloration: Skin is not jaundiced or pale.      Findings: No bruising, erythema, lesion or rash.   Neurological:      General: No focal deficit present.      Mental Status: She is alert and oriented to person, place, and time. Mental status is at baseline.      Cranial Nerves: No cranial nerve deficit.      Motor: No weakness or abnormal muscle tone.      Coordination: Coordination normal.      Gait: Gait normal.   Psychiatric:         Mood and Affect: Mood normal.         Behavior: Behavior normal.         Thought Content: Thought content normal.         Judgment: Judgment normal.             Assessment and Plan  No problem-specific Assessment & Plan notes found for this encounter.    Annual physical exam: Overall patient is doing well.  Physical exam and vital signs are within normal limits.  Patient declines a clinical breast exam today.  No pelvic today since she is had a total hysterectomy and does not have any unusual symptoms.  Patient recently had blood work done back in September.  She is up-to-date on her colonoscopy.  No need for lung cancer screening.  Vaccines up-to-date.  Next annual exam in 1 year    Anxiety: Stable on Xanax.  Will refill now and increase a little bit for her    Insomnia: Stable on Ambien.  Having no ill side effects    Fibromyalgia: Stable on tizanidine and gabapentin.  Refill now    Migraines: Stable on Fioricet.  Refill now    Cough: Antibody test ordered.  We will call her with those results    Diagnoses and all orders for this  visit:    Situational anxiety  -     ALPRAZolam (XANAX) 0.5 mg tablet; Take 1 tablet (0.5 mg total) by mouth 2 (two) times a day as needed for anxiety Max Daily Amount: 1 mg    Other insomnia  -     zolpidem (AMBIEN) 10 mg tablet; Take 1 tablet (10 mg total) by mouth nightly as needed for sleep    Nausea  -     ondansetron ODT (ZOFRAN-ODT) 4 mg disintegrating tablet; Take 1 tablet (4 mg total) by mouth every 8 (eight) hours as needed for nausea or vomiting    Migraine without status migrainosus, not intractable, unspecified migraine type  -     butalbital-acetaminophen-caff (FIORICET, ESGIC) -40 mg; Take 1 tablet by mouth every 4 (four) hours as needed for headaches    Fibromyalgia  -     tiZANidine (ZANAFLEX) 4 mg tablet; Take 1 tablet (4 mg total) by mouth nightly  -     gabapentin (NEURONTIN) 100 mg capsule; Take 1 capsule (100 mg total) by mouth 4 (four) times a day    Screening for malignant neoplasm of breast  -     BI screening mammogram bilateral; Future    Cough  -     SARS Coronavirus 2 IgG Ab, S Blood, Venous; Future    Annual physical exam          No follow-ups on file.    Gerard Loya MD  06/24/20    A voice recognition program was used to aid in medical record documentation. Sometimes words are printed not exactly as they were spoken. While efforts were made to carefully edit and correct any inaccuracies, some errors may be present. Errors should be taken within the context of the discussion.  Please contact our office if you need assistance interpreting this medical record or notice any mistakes.

## 2020-06-24 NOTE — PATIENT INSTRUCTIONS
Patient Education     Preventive Care 40-64 Years Old, Female  Preventive care refers to visits with your health care provider and lifestyle choices that can promote health and wellness. This includes:  · A yearly physical exam. This may also be called an annual well check.  · Regular dental visits and eye exams.  · Immunizations.  · Screening for certain conditions.  · Healthy lifestyle choices, such as eating a healthy diet, getting regular exercise, not using drugs or products that contain nicotine and tobacco, and limiting alcohol use.  What can I expect for my preventive care visit?  Physical exam  Your health care provider will check your:  · Height and weight. This may be used to calculate body mass index (BMI), which tells if you are at a healthy weight.  · Heart rate and blood pressure.  · Skin for abnormal spots.  Counseling  Your health care provider may ask you questions about your:  · Alcohol, tobacco, and drug use.  · Emotional well-being.  · Home and relationship well-being.  · Sexual activity.  · Eating habits.  · Work and work environment.  · Method of birth control.  · Menstrual cycle.  · Pregnancy history.  What immunizations do I need?    Influenza (flu) vaccine  · This is recommended every year.  Tetanus, diphtheria, and pertussis (Tdap) vaccine  · You may need a Td booster every 10 years.  Varicella (chickenpox) vaccine  · You may need this if you have not been vaccinated.  Zoster (shingles) vaccine  · You may need this after age 60.  Measles, mumps, and rubella (MMR) vaccine  · You may need at least one dose of MMR if you were born in 1957 or later. You may also need a second dose.  Pneumococcal conjugate (PCV13) vaccine  · You may need this if you have certain conditions and were not previously vaccinated.  Pneumococcal polysaccharide (PPSV23) vaccine  · You may need one or two doses if you smoke cigarettes or if you have certain conditions.  Meningococcal conjugate (MenACWY) vaccine  · You  may need this if you have certain conditions.  Hepatitis A vaccine  · You may need this if you have certain conditions or if you travel or work in places where you may be exposed to hepatitis A.  Hepatitis B vaccine  · You may need this if you have certain conditions or if you travel or work in places where you may be exposed to hepatitis B.  Haemophilus influenzae type b (Hib) vaccine  · You may need this if you have certain conditions.  Human papillomavirus (HPV) vaccine  · If recommended by your health care provider, you may need three doses over 6 months.  You may receive vaccines as individual doses or as more than one vaccine together in one shot (combination vaccines). Talk with your health care provider about the risks and benefits of combination vaccines.  What tests do I need?  Blood tests  · Lipid and cholesterol levels. These may be checked every 5 years, or more frequently if you are over 50 years old.  · Hepatitis C test.  · Hepatitis B test.  Screening  · Lung cancer screening. You may have this screening every year starting at age 55 if you have a 30-pack-year history of smoking and currently smoke or have quit within the past 15 years.  · Colorectal cancer screening. All adults should have this screening starting at age 50 and continuing until age 75. Your health care provider may recommend screening at age 45 if you are at increased risk. You will have tests every 1-10 years, depending on your results and the type of screening test.  · Diabetes screening. This is done by checking your blood sugar (glucose) after you have not eaten for a while (fasting). You may have this done every 1-3 years.  · Mammogram. This may be done every 1-2 years. Talk with your health care provider about when you should start having regular mammograms. This may depend on whether you have a family history of breast cancer.  · BRCA-related cancer screening. This may be done if you have a family history of breast, ovarian,  tubal, or peritoneal cancers.  · Pelvic exam and Pap test. This may be done every 3 years starting at age 21. Starting at age 30, this may be done every 5 years if you have a Pap test in combination with an HPV test.  Other tests  · Sexually transmitted disease (STD) testing.  · Bone density scan. This is done to screen for osteoporosis. You may have this scan if you are at high risk for osteoporosis.  Follow these instructions at home:  Eating and drinking  · Eat a diet that includes fresh fruits and vegetables, whole grains, lean protein, and low-fat dairy.  · Take vitamin and mineral supplements as recommended by your health care provider.  · Do not drink alcohol if:  ? Your health care provider tells you not to drink.  ? You are pregnant, may be pregnant, or are planning to become pregnant.  · If you drink alcohol:  ? Limit how much you have to 0-1 drink a day.  ? Be aware of how much alcohol is in your drink. In the [place] one drink equals one 12 oz bottle of beer (355 mL), one 5 oz glass of wine (148 mL), or one 1½ oz glass of hard liquor (44 mL).  Lifestyle  · Take daily care of your teeth and gums.  · Stay active. Exercise for at least 30 minutes on 5 or more days each week.  · Do not use any products that contain nicotine or tobacco, such as cigarettes, e-cigarettes, and chewing tobacco. If you need help quitting, ask your health care provider.  · If you are sexually active, practice safe sex. Use a condom or other form of birth control (contraception) in order to prevent pregnancy and STIs (sexually transmitted infections).  · If told by your health care provider, take low-dose aspirin daily starting at age 50.  What's next?  · Visit your health care provider once a year for a well check visit.  · Ask your health care provider how often you should have your eyes and teeth checked.  · Stay up to date on all vaccines.  This information is not intended to replace advice given to you by your health care  provider. Make sure you discuss any questions you have with your health care provider.  Document Released: 01/13/2017 Document Revised: 08/29/2019 Document Reviewed: 08/29/2019  Elsevier Patient Education © 2020 [employer]

## 2020-06-25 LAB — SARS-COV-2 IGG SERPL QL IA: NEGATIVE

## 2020-07-30 ENCOUNTER — TELEPHONE (OUTPATIENT)
Dept: FAMILY MEDICINE | Facility: CLINIC | Age: 55
End: 2020-07-30

## 2020-08-05 ENCOUNTER — NURSE TRIAGE (OUTPATIENT)
Dept: FAMILY MEDICINE | Facility: CLINIC | Age: 55
End: 2020-08-05

## 2020-08-05 NOTE — TELEPHONE ENCOUNTER
COVID-19 SCREENING ASSESSMENT    CRITERIA FOR TESTING: Yes to Any Symptoms     SYMPTOM QUESTIONS  Are you experiencing a cough, fever, shortness of breath, chills, repeated shaking with chills, muscle pain, headache, sore throat, nausea, vomiting, diarrhea, congestion, runny nose, fatigue or new loss of taste or smell? Fever, Muscle Pain and Fatigue  Describe symptoms: slight fever, muscle pain is common for her  Date of symptom onset: 07/30/2020    RISK CRITERIA  Are you a healthcare worker? No  Are you a ? No  Are you aged 65 or older? No  Are you currently pregnant? N/A  Are you a resident of a health care or long term care facility? No  Do you have chronic comorbidities that include: Yes  • Pulmonary disease (asthma, COPD, pulmonary fibrosis, restrictive lung disease or any other chronic lung disease)  • Cardiac disease (CHF, hypertension, rhythm disorder, valvular disorder, or any other chronic heart disease)  • Immunosuppressed state (cancer treatment, long term corticosteroids, immunodeficiency, history of organ transplant, on other medications that cause immunosuppression)  • Other chronic illnesses (diabetes, chronic kidney disease, on dialysis, chronic liver disease)  Have you had close contact with a lab confirmed case of coronavirus (COVID-19) in the last 14 days? no  Details on close contact: n/a    Reason for Disposition  • COVID-19 symptoms per CDC guidelines AND NO risk factors.    Protocols used: COVID-19 EXPOSURE SCREENING [facility]    She does not want to have a test at this time, but will call if symptoms increase or she feels worse.

## 2020-08-06 ENCOUNTER — HOSPITAL ENCOUNTER (OUTPATIENT)
Facility: HOSPITAL | Age: 55
Setting detail: OBSERVATION
Discharge: HOME | End: 2020-08-08
Attending: FAMILY MEDICINE | Admitting: FAMILY MEDICINE
Payer: OTHER GOVERNMENT

## 2020-08-06 ENCOUNTER — APPOINTMENT (OUTPATIENT)
Dept: CT IMAGING | Facility: HOSPITAL | Age: 55
End: 2020-08-06
Payer: OTHER GOVERNMENT

## 2020-08-06 DIAGNOSIS — K57.92 DIVERTICULITIS: Primary | ICD-10-CM

## 2020-08-06 DIAGNOSIS — D72.828 OTHER ELEVATED WHITE BLOOD CELL (WBC) COUNT: ICD-10-CM

## 2020-08-06 DIAGNOSIS — R10.12 LEFT UPPER QUADRANT PAIN: ICD-10-CM

## 2020-08-06 DIAGNOSIS — R68.83 CHILLS: ICD-10-CM

## 2020-08-06 LAB
ALBUMIN SERPL-MCNC: 3.9 G/DL (ref 3.5–5.3)
ALP SERPL-CCNC: 54 U/L (ref 41–108)
ALT SERPL-CCNC: 24 U/L (ref 7–52)
ANION GAP SERPL CALC-SCNC: 9 MMOL/L (ref 3–11)
AST SERPL-CCNC: 20 U/L
BACTERIA #/AREA URNS HPF: ABNORMAL /HPF
BASOPHILS # BLD AUTO: 0 10*3/UL
BASOPHILS NFR BLD AUTO: 0 % (ref 0–2)
BILIRUB SERPL-MCNC: 0.62 MG/DL (ref 0.2–1.4)
BILIRUB UR QL: NEGATIVE
BUN SERPL-MCNC: 7 MG/DL (ref 7–25)
CALCIUM ALBUM COR SERPL-MCNC: 9.4 MG/DL (ref 8.6–10.3)
CALCIUM SERPL-MCNC: 9.3 MG/DL (ref 8.6–10.3)
CHLORIDE SERPL-SCNC: 102 MMOL/L (ref 98–107)
CLARITY UR: CLEAR
CO2 SERPL-SCNC: 27 MMOL/L (ref 21–32)
COLOR UR: YELLOW
CREAT SERPL-MCNC: 0.62 MG/DL (ref 0.6–1.1)
CRP SERPL-MCNC: 7.5 MG/L
EOSINOPHIL # BLD AUTO: 0.2 10*3/UL
EOSINOPHIL NFR BLD AUTO: 2 % (ref 0–3)
ERYTHROCYTE [DISTWIDTH] IN BLOOD BY AUTOMATED COUNT: 12.6 % (ref 11.5–14)
ERYTHROCYTE [SEDIMENTATION RATE] IN BLOOD: 8 MM/HR
GFR SERPL CREATININE-BSD FRML MDRD: 103 ML/MIN/1.73M*2
GLUCOSE SERPL-MCNC: 100 MG/DL (ref 70–105)
GLUCOSE UR QL: NEGATIVE MG/DL
HCT VFR BLD AUTO: 39.7 % (ref 34–45)
HGB BLD-MCNC: 14 G/DL (ref 11.5–15.5)
HGB UR QL: ABNORMAL
KETONES UR-MCNC: NEGATIVE MG/DL
LACTATE BLDV-SCNC: 1.72 MMOL/L (ref 0.5–1.99)
LEUKOCYTE ESTERASE UR QL STRIP: NEGATIVE
LIPASE SERPL-CCNC: 11 U/L (ref 11–82)
LYMPHOCYTES # BLD AUTO: 1.5 10*3/UL
LYMPHOCYTES NFR BLD AUTO: 13 % (ref 11–47)
MCH RBC QN AUTO: 31.9 PG (ref 28–33)
MCHC RBC AUTO-ENTMCNC: 35.3 G/DL (ref 32–36)
MCV RBC AUTO: 90.4 FL (ref 81–97)
MONOCYTES # BLD AUTO: 0.6 10*3/UL
MONOCYTES NFR BLD AUTO: 6 % (ref 3–11)
NEUTROPHILS # BLD AUTO: 8.7 10*3/UL
NEUTROPHILS NFR BLD AUTO: 79 % (ref 41–81)
NITRITE UR QL: NEGATIVE
PH UR: 6 PH
PLATELET # BLD AUTO: 224 10*3/UL (ref 140–350)
PMV BLD AUTO: 7.7 FL (ref 6.9–10.8)
POTASSIUM SERPL-SCNC: 3.8 MMOL/L (ref 3.5–5.1)
PROT SERPL-MCNC: 6.5 G/DL (ref 6–8.3)
PROT UR STRIP-MCNC: NEGATIVE MG/DL
RBC # BLD AUTO: 4.4 10*6/ΜL (ref 3.7–5.3)
RBC #/AREA URNS HPF: ABNORMAL /HPF
SARS-COV-2 RDRP RESP QL NAA+PROBE: NEGATIVE
SODIUM SERPL-SCNC: 138 MMOL/L (ref 135–145)
SP GR UR: 1.01 (ref 1–1.03)
SQUAMOUS #/AREA URNS HPF: ABNORMAL /HPF
UROBILINOGEN UR-MCNC: 0.2 E.U./DL
WBC # BLD AUTO: 11 10*3/UL (ref 4.5–10.5)
WBC #/AREA URNS HPF: ABNORMAL /HPF

## 2020-08-06 PROCEDURE — C9803 HOPD COVID-19 SPEC COLLECT: HCPCS | Performed by: FAMILY MEDICINE

## 2020-08-06 PROCEDURE — 85025 COMPLETE CBC W/AUTO DIFF WBC: CPT | Performed by: FAMILY MEDICINE

## 2020-08-06 PROCEDURE — 96367 TX/PROPH/DG ADDL SEQ IV INF: CPT

## 2020-08-06 PROCEDURE — 85652 RBC SED RATE AUTOMATED: CPT | Performed by: FAMILY MEDICINE

## 2020-08-06 PROCEDURE — 96376 TX/PRO/DX INJ SAME DRUG ADON: CPT

## 2020-08-06 PROCEDURE — 36415 COLL VENOUS BLD VENIPUNCTURE: CPT | Performed by: FAMILY MEDICINE

## 2020-08-06 PROCEDURE — 2550000100 HC RX 255: Mod: JW | Performed by: FAMILY MEDICINE

## 2020-08-06 PROCEDURE — 83605 ASSAY OF LACTIC ACID: CPT

## 2020-08-06 PROCEDURE — 80053 COMPREHEN METABOLIC PANEL: CPT | Performed by: FAMILY MEDICINE

## 2020-08-06 PROCEDURE — 86140 C-REACTIVE PROTEIN: CPT | Performed by: FAMILY MEDICINE

## 2020-08-06 PROCEDURE — 96366 THER/PROPH/DIAG IV INF ADDON: CPT

## 2020-08-06 PROCEDURE — 81001 URINALYSIS AUTO W/SCOPE: CPT | Performed by: FAMILY MEDICINE

## 2020-08-06 PROCEDURE — G0378 HOSPITAL OBSERVATION PER HR: HCPCS

## 2020-08-06 PROCEDURE — 99220 *NO LONGER ACTIVE 2023 DO NOT USE* PR INITIAL OBSERVATION CARE/DAY 70 MINUTES: CPT | Performed by: FAMILY MEDICINE

## 2020-08-06 PROCEDURE — 2580000300 HC RX 258: Performed by: FAMILY MEDICINE

## 2020-08-06 PROCEDURE — 74177 CT ABD & PELVIS W/CONTRAST: CPT

## 2020-08-06 PROCEDURE — 6360000200 HC RX 636 W HCPCS (ALT 250 FOR IP): Performed by: FAMILY MEDICINE

## 2020-08-06 PROCEDURE — 99356 *NO LONGER ACTIVE 2023 DO NOT USE* PR PROLONGED SVC I/P OR OBS SETTING 1ST HOUR: CPT | Performed by: FAMILY MEDICINE

## 2020-08-06 PROCEDURE — 96375 TX/PRO/DX INJ NEW DRUG ADDON: CPT

## 2020-08-06 PROCEDURE — 83690 ASSAY OF LIPASE: CPT | Performed by: FAMILY MEDICINE

## 2020-08-06 PROCEDURE — 99284 EMERGENCY DEPT VISIT MOD MDM: CPT | Performed by: FAMILY MEDICINE

## 2020-08-06 PROCEDURE — 6370000100 HC RX 637 (ALT 250 FOR IP): Performed by: FAMILY MEDICINE

## 2020-08-06 PROCEDURE — 96365 THER/PROPH/DIAG IV INF INIT: CPT

## 2020-08-06 PROCEDURE — 87635 SARS-COV-2 COVID-19 AMP PRB: CPT | Performed by: FAMILY MEDICINE

## 2020-08-06 RX ORDER — BUTALBITAL, ACETAMINOPHEN AND CAFFEINE 50; 325; 40 MG/1; MG/1; MG/1
1 TABLET ORAL EVERY 4 HOURS PRN
Status: DISCONTINUED | OUTPATIENT
Start: 2020-08-06 | End: 2020-08-08 | Stop reason: HOSPADM

## 2020-08-06 RX ORDER — GABAPENTIN 300 MG/1
300 CAPSULE ORAL NIGHTLY
Status: DISCONTINUED | OUTPATIENT
Start: 2020-08-06 | End: 2020-08-08 | Stop reason: HOSPADM

## 2020-08-06 RX ORDER — ONDANSETRON HYDROCHLORIDE 2 MG/ML
4 INJECTION, SOLUTION INTRAVENOUS EVERY 6 HOURS PRN
Status: DISCONTINUED | OUTPATIENT
Start: 2020-08-06 | End: 2020-08-08 | Stop reason: HOSPADM

## 2020-08-06 RX ORDER — ZOLPIDEM TARTRATE 5 MG/1
10 TABLET ORAL NIGHTLY PRN
Status: DISCONTINUED | OUTPATIENT
Start: 2020-08-06 | End: 2020-08-08 | Stop reason: HOSPADM

## 2020-08-06 RX ORDER — SODIUM CHLORIDE 0.9 % (FLUSH) 0.9 %
3 SYRINGE (ML) INJECTION AS NEEDED
Status: DISCONTINUED | OUTPATIENT
Start: 2020-08-06 | End: 2020-08-08 | Stop reason: HOSPADM

## 2020-08-06 RX ORDER — FENTANYL CITRATE/PF 50 MCG/ML
50 PLASTIC BAG, INJECTION (ML) INTRAVENOUS ONCE
Status: COMPLETED | OUTPATIENT
Start: 2020-08-06 | End: 2020-08-06

## 2020-08-06 RX ORDER — IOPAMIDOL 755 MG/ML
99 INJECTION, SOLUTION INTRAVASCULAR ONCE
Status: COMPLETED | OUTPATIENT
Start: 2020-08-06 | End: 2020-08-06

## 2020-08-06 RX ORDER — ALPRAZOLAM 0.25 MG/1
0.5 TABLET ORAL 2 TIMES DAILY PRN
Status: DISCONTINUED | OUTPATIENT
Start: 2020-08-06 | End: 2020-08-08 | Stop reason: HOSPADM

## 2020-08-06 RX ORDER — CIPROFLOXACIN 2 MG/ML
400 INJECTION, SOLUTION INTRAVENOUS ONCE
Status: DISCONTINUED | OUTPATIENT
Start: 2020-08-06 | End: 2020-08-06

## 2020-08-06 RX ORDER — METRONIDAZOLE 500 MG/100ML
500 INJECTION, SOLUTION INTRAVENOUS EVERY 8 HOURS SCHEDULED
Status: DISCONTINUED | OUTPATIENT
Start: 2020-08-06 | End: 2020-08-08 | Stop reason: HOSPADM

## 2020-08-06 RX ORDER — TIZANIDINE 4 MG/1
4 TABLET ORAL NIGHTLY
Status: DISCONTINUED | OUTPATIENT
Start: 2020-08-06 | End: 2020-08-08 | Stop reason: HOSPADM

## 2020-08-06 RX ORDER — SODIUM CHLORIDE 9 MG/ML
1000 INJECTION, SOLUTION INTRAVENOUS ONCE
Status: COMPLETED | OUTPATIENT
Start: 2020-08-06 | End: 2020-08-06

## 2020-08-06 RX ORDER — OXYCODONE AND ACETAMINOPHEN 5; 325 MG/1; MG/1
1 TABLET ORAL EVERY 4 HOURS PRN
Status: DISCONTINUED | OUTPATIENT
Start: 2020-08-06 | End: 2020-08-07

## 2020-08-06 RX ORDER — FENTANYL CITRATE/PF 50 MCG/ML
25 PLASTIC BAG, INJECTION (ML) INTRAVENOUS
Status: DISCONTINUED | OUTPATIENT
Start: 2020-08-06 | End: 2020-08-08 | Stop reason: HOSPADM

## 2020-08-06 RX ORDER — GABAPENTIN 100 MG/1
100 CAPSULE ORAL 4 TIMES DAILY
Status: DISCONTINUED | OUTPATIENT
Start: 2020-08-06 | End: 2020-08-06

## 2020-08-06 RX ORDER — ONDANSETRON 4 MG/1
4 TABLET, ORALLY DISINTEGRATING ORAL EVERY 8 HOURS PRN
Status: DISCONTINUED | OUTPATIENT
Start: 2020-08-06 | End: 2020-08-07

## 2020-08-06 RX ORDER — ONDANSETRON 4 MG/1
4 TABLET, FILM COATED ORAL EVERY 6 HOURS PRN
Status: DISCONTINUED | OUTPATIENT
Start: 2020-08-06 | End: 2020-08-08 | Stop reason: HOSPADM

## 2020-08-06 RX ORDER — METRONIDAZOLE 500 MG/100ML
500 INJECTION, SOLUTION INTRAVENOUS ONCE
Status: DISCONTINUED | OUTPATIENT
Start: 2020-08-06 | End: 2020-08-06

## 2020-08-06 RX ORDER — CIPROFLOXACIN 2 MG/ML
400 INJECTION, SOLUTION INTRAVENOUS EVERY 12 HOURS SCHEDULED
Status: DISCONTINUED | OUTPATIENT
Start: 2020-08-06 | End: 2020-08-08 | Stop reason: HOSPADM

## 2020-08-06 RX ADMIN — METRONIDAZOLE 500 MG: 500 INJECTION, SOLUTION INTRAVENOUS at 22:07

## 2020-08-06 RX ADMIN — GABAPENTIN 300 MG: 300 CAPSULE ORAL at 20:07

## 2020-08-06 RX ADMIN — TIZANIDINE 4 MG: 4 TABLET ORAL at 20:56

## 2020-08-06 RX ADMIN — Medication 2 CAPSULE: at 20:55

## 2020-08-06 RX ADMIN — OXYCODONE HYDROCHLORIDE AND ACETAMINOPHEN 1 TABLET: 5; 325 TABLET ORAL at 21:03

## 2020-08-06 RX ADMIN — FENTANYL CITRATE 25 MCG: 50 INJECTION, SOLUTION INTRAMUSCULAR; INTRAVENOUS at 13:39

## 2020-08-06 RX ADMIN — FENTANYL CITRATE 25 MCG: 50 INJECTION, SOLUTION INTRAMUSCULAR; INTRAVENOUS at 11:35

## 2020-08-06 RX ADMIN — ONDANSETRON 4 MG: 2 INJECTION INTRAMUSCULAR; INTRAVENOUS at 13:39

## 2020-08-06 RX ADMIN — ZOLPIDEM TARTRATE 10 MG: 5 TABLET ORAL at 22:07

## 2020-08-06 RX ADMIN — METRONIDAZOLE 500 MG: 500 INJECTION, SOLUTION INTRAVENOUS at 12:38

## 2020-08-06 RX ADMIN — SODIUM CHLORIDE 1000 ML: 9 INJECTION, SOLUTION INTRAVENOUS at 09:05

## 2020-08-06 RX ADMIN — OXYCODONE HYDROCHLORIDE AND ACETAMINOPHEN 1 TABLET: 5; 325 TABLET ORAL at 16:14

## 2020-08-06 RX ADMIN — FENTANYL CITRATE 50 MCG: 50 INJECTION, SOLUTION INTRAMUSCULAR; INTRAVENOUS at 10:34

## 2020-08-06 RX ADMIN — IOPAMIDOL 99 ML: 755 INJECTION, SOLUTION INTRAVENOUS at 10:20

## 2020-08-06 RX ADMIN — CIPROFLOXACIN 400 MG: 2 INJECTION, SOLUTION INTRAVENOUS at 11:34

## 2020-08-06 RX ADMIN — CIPROFLOXACIN 400 MG: 2 INJECTION, SOLUTION INTRAVENOUS at 20:51

## 2020-08-06 ASSESSMENT — ACTIVITIES OF DAILY LIVING (ADL)
ADEQUATE_TO_COMPLETE_ADL: YES
ASSISTIVE_DEVICES: EYEGLASSES

## 2020-08-06 NOTE — INTERDISCIPLINARY/THERAPY
Case Management Rounding completed with Luiza during lunch time. She is having a clear liquid lunch.  She was admitted [facility] for abdominal pain with history of diverticulitis. 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  She has no concerns for discharge.  I left my contact information with her.

## 2020-08-06 NOTE — PLAN OF CARE
Problem: Pain - Adult  Goal: Verbalizes/displays adequate comfort level or baseline comfort level  Description: INTERVENTIONS:  1. Encourage patient to monitor pain and request interventions  2. Assess pain using the appropriate pain scale  3. Administer analgesics based on type and severity of pain and evaluate response  4. Educate/Implement non-pharmacological measures as appropriate and evaluate response  5. Consider cultural, developmental and social influences on pain and pain management  6. Notify Provider if interventions unsuccessful or patient reports new pain  Outcome: Progressing     Problem: Infection - Adult  Goal: Absence of infection during hospitalization  Description: INTERVENTIONS:  1. Assess and monitor for signs and symptoms of infection  2. Monitor lab/diagnostic results  3. Monitor all insertion sites/wounds/incisions  4. Monitor secretions for changes in amount and color  5. Administer medications as ordered  6. Educate and encourage patient and family to use good hand hygiene technique  7. Identify and educate in appropriate isolation precautions for identified infection/condition  Outcome: Progressing     Problem: Safety Adult  Goal: Patient will remain safe during hospitalization  Description: INTERVENTIONS    1. Assess patient for fall risk and implement interventions if needed  2. Use safe transport techniques  3. Assess patient using the appropriate Sergei skin assessment scale  4. Assess patient for risk of aspiration  5. Assess patient for risk of elopement  6. Assess patient for risk of suicide  Outcome: Progressing     Problem: Daily Care  Goal: Daily care needs are met  Description: INTERVENTIONS:   1. Assess and monitor skin integrity  2. Identify patients at risk for skin breakdown on admission and per policy  3. Assess and monitor ability to perform self care and identify potential discharge needs  4. Assess skin integrity/risk for skin breakdown  5. Assist patient with  activities of daily living as needed  6. Encourage independent activity per ability   7. Provide mouth care   8. Include patient/family/caregiver in decisions related to daily care   Outcome: Progressing     Problem: Knowledge Deficit  Goal: Patient/family/caregiver demonstrates understanding of disease process, treatment plan, medications, and discharge instructions  Description: INTERVENTIONS:   1. Complete learning assessment and assess knowledge base  2. Provide teaching at level of understanding   3. Provide teaching via preferred learning methods  Outcome: Progressing     Problem: Discharge Barriers  Goal: Patient's discharge needs are met  Description: INTERVENTIONS:  1. Assess patient for self-management skills  2. Encourage participation in management  3. Identify potential discharge barriers on admission and throughout hospital stay  4. Involve patient/family/caregiver in discharge planning process  5. Collaborate with case management/ for discharge needs  Outcome: Progressing     Problem: Safety Adult - Fall  Goal: Free from fall injury  Description: INTERVENTIONS:    Inpatient - Please reference Cares/Safety Flowsheet under [facility] Fall Risk for interventions.  Pediatrics - Please reference Peds Daily Cares/Safety Flowsheet under [facility] Pediatric Fall Assessment Fall Bundle for interventions  LD/OB - Please reference OB Shift Screening Flowsheet under OB Fall Risk for interventions.  Outcome: Progressing

## 2020-08-06 NOTE — ED PROVIDER NOTES
EMERGENCY NOTE    Chief Complaint:  Chief Complaint   Patient presents with   • Headache   • Chills     3 days   • Abdominal Pain     LLQ, nausea   • Generalized Body Aches         HPI:  Luiza Tay is a 55 y.o. female who has history of diverticulitis and presents to the emergency department for concerns of chills and left sided abdominal pain.  Patient states that she has had chills for last 3 days.  She is also generalized body aches mostly in her joints.  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  She is afraid that she has COVID.  She has had a headache last week history of migraines.  Her headache has improved and now just a dull ache.  She states that her abdominal pain just started today.  She has had some associated nausea with this.  No vomiting.  No diarrhea.  He does struggle with constipation.  She has no history of black or bloody stools.  She took this Zofran this morning.  Pain is moderate to severe in severity.  No alleviating factors other than laying down with her knees bent.  No exacerbating factors.    HISTORY:  Past Medical History:   Diagnosis Date   • Appendicitis    • Diverticulitis    • Fibromyalgia 5/3/2018   • Gallstones    • Headache    • History of migraine 9/14/2017   • History of migraine 9/14/2017   • Insomnia    • Migraines    • Muscle tightness    • Other insomnia 5/3/2018   • Situational anxiety 05/03/2018    after a trauma riding horses   • Traumatic closed displaced fracture of shaft of humerus with routine healing, right 5/16/2019       Past Surgical History:   Procedure Laterality Date   • APPENDECTOMY     • BREAST SURGERY      reduction   • BREAST SURGERY Bilateral     reduction   • CHOLECYSTECTOMY     • COLONOSCOPY  03/2018    no polyps   • FOOT SURGERY Right 2009    Screws   • HERNIA REPAIR     • HUMERUS SURGERY Right 2013    plate/screws   • HYSTERECTOMY      endometriosis   • ORIF WRIST FRACTURE Right 7/25/2018    Procedure: RIGHT ORIF WRIST;  Surgeon: Saad EDWARDS  MD Nadia;  Location: [facility] OR;  Service: Orthopedics;  Laterality: Right;   • ORTHOPEDIC SURGERY     • SHOULDER SURGERY     • SIGMOIDECTOMY  2016    diverticular ds   • TONSILLECTOMY AND ADENOIDECTOMY     • UPPER EXTREMITY DEBRIDEMENT Right 7/25/2018    Procedure: irrigation and debridement of right upper extremity and all indicated procedures;  Surgeon: Saad Zuniga MD;  Location: [facility] OR;  Service: Orthopedics;  Laterality: Right;       Family History   Problem Relation Age of Onset   • Colon cancer Maternal Grandmother    • Ovarian cancer Maternal Grandmother    • Bone cancer Paternal Grandmother    • Hypertension Other    • Breast cancer Other    • Lung cancer Other        Social History     Tobacco Use   • Smoking status: Never Smoker   • Smokeless tobacco: Never Used   Substance Use Topics   • Alcohol use: Yes     Comment: 1 every 2 wks   • Drug use: No       Allergies   Allergen Reactions   • Desvenlafaxine Succinate      Other reaction(s): Headache   • Hydromorphone Hives   • Meperidine      cause migraines   • Morphine      Other reaction(s): Headache   • Trazodone      nightmares   • Clindamycin Rash   • Sumatriptan Rash         Current Outpatient Medications:   •  butorphanol (STADOL) 10 mg/mL nasal spray, Administer 1 spray into each nostril every 6 (six) hours as needed for pain scale 8-10/10 for up to 14 days Max Daily Amount: 4 sprays, Disp: 2.5 mL, Rfl: 0  •  zolpidem (AMBIEN) 10 mg tablet, Take 1 tablet (10 mg total) by mouth nightly as needed for sleep, Disp: 90 tablet, Rfl: 1  •  tiZANidine (ZANAFLEX) 4 mg tablet, Take 1 tablet (4 mg total) by mouth nightly, Disp: 90 tablet, Rfl: 3  •  ondansetron ODT (ZOFRAN-ODT) 4 mg disintegrating tablet, Take 1 tablet (4 mg total) by mouth every 8 (eight) hours as needed for nausea or vomiting, Disp: 30 tablet, Rfl: 1  •  gabapentin (NEURONTIN) 100 mg capsule, Take 1 capsule (100 mg total) by mouth 4 (four) times a day, Disp: 360 capsule, Rfl: 1  •   butalbital-acetaminophen-caff (FIORICET, ESGIC) -40 mg, Take 1 tablet by mouth every 4 (four) hours as needed for headaches, Disp: 30 tablet, Rfl: 1  •  ALPRAZolam (XANAX) 0.5 mg tablet, Take 1 tablet (0.5 mg total) by mouth 2 (two) times a day as needed for anxiety Max Daily Amount: 1 mg, Disp: 60 tablet, Rfl: 5  •  butalbit/acetamin/caff/codeine (FIORICET WITH CODEINE ORAL), Take by mouth, Disp: , Rfl:       ROS:  Review of Systems   12 point ROS performed that was negative unless otherwise mentioned per HPI.    PE:  ED Triage Vitals [08/06/20 0755]   Temp Heart Rate Resp BP SpO2   36.9 °C (98.5 °F) 91 16 140/93 94 %      Temp Source Heart Rate Source Patient Position BP Location FiO2 (%)   Oral -- -- -- --       Physical Exam  Vitals signs and nursing note reviewed.   Constitutional:       General: She is in acute distress.      Appearance: She is not ill-appearing or toxic-appearing.   HENT:      Head: Normocephalic and atraumatic.      Nose: Nose normal.      Mouth/Throat:      Mouth: Mucous membranes are moist.   Eyes:      Extraocular Movements: Extraocular movements intact.   Neck:      Musculoskeletal: Neck supple.   Cardiovascular:      Rate and Rhythm: Normal rate and regular rhythm.      Pulses: Normal pulses.      Heart sounds: Normal heart sounds.   Pulmonary:      Effort: Pulmonary effort is normal.      Breath sounds: Normal breath sounds. No wheezing or rales.   Abdominal:      General: Bowel sounds are normal. There is no distension.      Palpations: Abdomen is soft.      Tenderness: There is abdominal tenderness (Left upper quadrant left lower quadrant tenderness). There is no right CVA tenderness, left CVA tenderness, guarding or rebound.   Skin:     General: Skin is warm.      Capillary Refill: Capillary refill takes less than 2 seconds.   Neurological:      Mental Status: She is alert and oriented to person, place, and time.   Psychiatric:         Mood and Affect: Mood normal.            ED LABS:  Labs Reviewed   CBC WITH AUTO DIFFERENTIAL - Abnormal       Result Value    WBC 11.0 (*)     RBC 4.40      Hemoglobin 14.0      Hematocrit 39.7      MCV 90.4      MCH 31.9      MCHC 35.3      RDW 12.6      Platelets 224      MPV 7.7      Neutrophils% 79      Lymphocytes% 13      Monocytes% 6      Eosinophils% 2      Basophils% 0      Neutrophils Absolute 8.70      Lymphocytes Absolute 1.50      Monocytes Absolute 0.60      Eosinophils Absolute 0.20      Basophils Absolute 0.00     URINALYSIS DIPSTICK REFLEX TO CULTURE FOR USE WITH MICROSCOPIC PANEL - Abnormal    Color, Urine Yellow      Clarity, Urine Clear      pH, Urine 6.0      Specific Gravity, Urine 1.010      Protein, Urine Negative      Glucose, Urine Negative      Ketones, Urine Negative      Blood, Urine Trace-intact (*)     Nitrite, Urine Negative      Bilirubin, Urine Negative      Leukocytes, Urine Negative      Urobilinogen, Urine 0.2     URINALYSIS MICROSCOPIC, REFLEX CULTURE - Abnormal    RBC, Urine 3-5 (*)     WBC, Urine None seen      Squamous Epithelial, Urine 0-4      Bacteria, Urine None seen     COVID-19 MOLECULAR (PCR) - Normal    COVID-19 PCR Negative      Narrative:     A negative result may not rule out COVID-19 in the patient, as the sensitivity of the test depends on the timing of the specimen collection, type of specimen, and the quality of the specimen. Result should be correlated with patient's history and clinical presentation.    Negative results should be treated as presumptive and, if inconsistent with clinical signs and symptoms or necessary for patient management, should be tested with an alternative molecular assay.    Performance of this SARS-CoV-2 RNA test has only been established in individuals suspected of having COVID-19 by their healthcare provider.    Testing was performed using the ID NOW COVID-19 assay ([employer]) that detects the SARS coronavirus 2 RdRp gene utilizing isothermal nucleic acid  amplification.   Fact sheets for this Emergency Use Authorization (EUA) assay can be found at the following links:  For Healthcare Providers  https://www.fda.gov/media/968163/download  For Patients  https://www.fda.gov/media/309882/download   COMPREHENSIVE METABOLIC PANEL - Normal    Sodium 138      Potassium 3.8      Chloride 102      CO2 27      Anion Gap 9      BUN 7      Creatinine 0.62      Glucose 100      Calcium 9.3      AST 20      ALT (SGPT) 24      Alkaline Phosphatase 54      Total Protein 6.5      Albumin 3.9      Total Bilirubin 0.62      eGFR 103      Corrected Calcium 9.4      Narrative:     Estimated GFR calculated using the 2009 CKD-EPI creatinine equation.   LIPASE - Normal    Lipase 11     SEDIMENTATION RATE, AUTOMATED - Normal    Sed Rate 8     C-REACTIVE PROTEIN - Normal    CRP 7.5     POCT LACTIC ACID (LACTATE) VENOUS - Normal    POC Lactate 1.72     URINALYSIS WITH MICROSCOPIC, REFLEX CULTURE    Narrative:     The following orders were created for panel order Urinalysis w/microscopic, reflex culture Urine, Clean Catch.  Procedure                               Abnormality         Status                     ---------                               -----------         ------                     Urinalysis, Dip (part 1 o...[[id number]]  Abnormal            Final result               Urinalysis, Micro (part 2...[[id number]]  Abnormal            Final result                 Please view results for these tests on the individual orders.   POCT LACTIC ACID (LACTATE)    Narrative:     The following orders were created for panel order POCT lactic acid (lactate) Blood, Venous.  Procedure                               Abnormality         Status                     ---------                               -----------         ------                     POCT lactic acid (lactate...[[id number]]                      Final result                 Please view results for these tests on the individual orders.   POCT  LACTIC ACID (LACTATE) VENOUS       ED IMAGES:  CT ABDOMEN PELVIS W IV CONTRAST No Oral Contrast   Final Result   IMPRESSION:      1. Acute inflammatory wall thickening involving segment of the descending colon. There is one diverticuli in this region. Differential includes a colitis and/or diverticulitis.          PROCEDURE    Procedures      ED MEDS  Medications   ciprofloxacin (CIPRO) 400 mg in D5W 200 mL IVPB (premix) (has no administration in time range)   metroNIDAZOLE (FLAGYL) 500 mg in  mL IVPB (premix) (has no administration in time range)   sodium chloride 0.9 % bolus 1,000 mL (0 mL intravenous Stopped 8/6/20 1005)   fentaNYL citrate (PF) 50 mcg/mL injection 50 mcg (50 mcg intravenous Given 8/6/20 1034)   iopamidoL (ISOVUE-370) 76 % injection 99 mL (99 mL intravenous Given 8/6/20 1020)       ED DIAGNOSIS  Final diagnoses:   [R10.12] Left upper quadrant pain   [R68.83] Chills   [D72.828] Other elevated white blood cell (WBC) count   [K57.92] Diverticulitis           ED COURSE:  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  Upon arrival to the emergency department, ABCs were attended to.  Patient's vital signs were relatively benign except for blood pressure 140/70.  Physical exam was notable for tenderness to palpation in the left upper and left lower quadrants.  No rebound or guarding was noted.  She also looked slightly dry on clinical exam with dry lips.  She states she was very thirsty.  Normal saline IV fluid bolus of 1000 cc was given.  Labs were obtained and showed very mild hematuria and leukocytosis of 11 but otherwise unremarkable.  CT chest abdomen pelvis was ordered given her history of diverticulitis and colon resection in the past and elevated white count and pain.  Pain was treated with fentanyl and this helped her symptoms.  CT abdomen pelvis did show acute inflammatory wall  thickening of the descending colon.  Radiologist read it as colitis/diverticulitis suggested.  The patient did state that this felt very similar to her prior episode of diverticulitis.  She states that the last time she had this she was given outpatient antibiotics and it did not seem to help her symptoms and she failed it and ended up needing IV antibiotics but ended up having to get a colon resection because her inflammation was too far gone.  Because of these reasons and her pain, we can admit her for observation stay for IV antibiotics and reassess her in the morning get another white count.  If she has a change in her abdominal exam will get repeat imaging and may consider referral for general surgery and transfer if required.  She was in agreement with this plan of care.  She will be admitted to the medical floor.      Luiza is a 55-year-old with prior history of diverticulitis and colon resection who presents to the emergency department for 3 days of chills with 1 day of associated left-sided abdominal pain and nausea and found to have descending colon diverticulitis.      Diverticulitis  Treat with IV ciprofloxacin and IV metronidazole  We will get repeat CBC in the morning to make sure white count is trending down  Pain control with IV fentanyl she has many allergies  Antiemetics with Zofran    Anxiety  Currently stable  Continue PRN alprazolam    Migraine  Currently stable  PRN Fioricet available    Insomnia  Stable  Continue home zolpidem    IVF: SLIV  Diet: clear liquid  DVT ppx: SCD's  CODE: FULL    Dispo: Anticipate discharge likely tomorrow if her pain is better controlled and clinical exam improves on IV antibiotics.    Prolonged care charge of greater than 60 minutes due to patient's initial evaluation in the emergency department with review of diagnostic tests and then counseling with the patient on deciding plan of care with either outpatient therapy or inpatient observation.  This also included  time to put in admission orders.    Start time: 0838  End Time: 0902    Start time: 0940  End time: 1040      Tiffany Lacy MD  08/06/20    A voice recognition program was used to aid in medical record documentation. Sometimes words are printed not exactly as they were spoken. While efforts were made to carefully edit and correct any inaccuracies, some errors may be present. Errors should be taken within the context of the discussion.  Please contact our office if you need assistance interpreting this medical record or notice any mistakes       Tiffany Lacy MD  08/06/20 3072

## 2020-08-07 LAB
BASOPHILS # BLD AUTO: 0 10*3/UL
BASOPHILS NFR BLD AUTO: 0 % (ref 0–2)
EOSINOPHIL # BLD AUTO: 0.3 10*3/UL
EOSINOPHIL NFR BLD AUTO: 6 % (ref 0–3)
ERYTHROCYTE [DISTWIDTH] IN BLOOD BY AUTOMATED COUNT: 12.7 % (ref 11.5–14)
HCT VFR BLD AUTO: 33.2 % (ref 34–45)
HGB BLD-MCNC: 11.7 G/DL (ref 11.5–15.5)
LYMPHOCYTES # BLD AUTO: 2.1 10*3/UL
LYMPHOCYTES NFR BLD AUTO: 36 % (ref 11–47)
MCH RBC QN AUTO: 32.3 PG (ref 28–33)
MCHC RBC AUTO-ENTMCNC: 35.4 G/DL (ref 32–36)
MCV RBC AUTO: 91.4 FL (ref 81–97)
MONOCYTES # BLD AUTO: 0.4 10*3/UL
MONOCYTES NFR BLD AUTO: 8 % (ref 3–11)
NEUTROPHILS # BLD AUTO: 2.9 10*3/UL
NEUTROPHILS NFR BLD AUTO: 51 % (ref 41–81)
PLATELET # BLD AUTO: 178 10*3/UL (ref 140–350)
PMV BLD AUTO: 7.8 FL (ref 6.9–10.8)
RBC # BLD AUTO: 3.63 10*6/ΜL (ref 3.7–5.3)
WBC # BLD AUTO: 5.8 10*3/UL (ref 4.5–10.5)

## 2020-08-07 PROCEDURE — G0378 HOSPITAL OBSERVATION PER HR: HCPCS

## 2020-08-07 PROCEDURE — 96375 TX/PRO/DX INJ NEW DRUG ADDON: CPT

## 2020-08-07 PROCEDURE — 6360000200 HC RX 636 W HCPCS (ALT 250 FOR IP): Performed by: FAMILY MEDICINE

## 2020-08-07 PROCEDURE — 85025 COMPLETE CBC W/AUTO DIFF WBC: CPT | Performed by: FAMILY MEDICINE

## 2020-08-07 PROCEDURE — 2580000300 HC RX 258: Performed by: FAMILY MEDICINE

## 2020-08-07 PROCEDURE — 36415 COLL VENOUS BLD VENIPUNCTURE: CPT | Performed by: FAMILY MEDICINE

## 2020-08-07 PROCEDURE — 96376 TX/PRO/DX INJ SAME DRUG ADON: CPT

## 2020-08-07 PROCEDURE — 96366 THER/PROPH/DIAG IV INF ADDON: CPT

## 2020-08-07 PROCEDURE — 6370000100 HC RX 637 (ALT 250 FOR IP): Performed by: FAMILY MEDICINE

## 2020-08-07 PROCEDURE — 99225 *NO LONGER ACTIVE 2023 DO NOT USE*  PR SBSQ OBSERVATION CARE/DAY 25 MIN: CPT | Performed by: FAMILY MEDICINE

## 2020-08-07 RX ORDER — FLUCONAZOLE 150 MG/1
150 TABLET ORAL ONCE
Status: COMPLETED | OUTPATIENT
Start: 2020-08-07 | End: 2020-08-07

## 2020-08-07 RX ORDER — ACETAMINOPHEN 500 MG
500 TABLET ORAL EVERY 4 HOURS PRN
Status: DISCONTINUED | OUTPATIENT
Start: 2020-08-07 | End: 2020-08-08 | Stop reason: HOSPADM

## 2020-08-07 RX ORDER — DIPHENHYDRAMINE HYDROCHLORIDE 50 MG/ML
50 INJECTION INTRAMUSCULAR; INTRAVENOUS ONCE
Status: COMPLETED | OUTPATIENT
Start: 2020-08-07 | End: 2020-08-07

## 2020-08-07 RX ADMIN — Medication 2 CAPSULE: at 08:05

## 2020-08-07 RX ADMIN — BUTALBITAL, ACETAMINOPHEN, AND CAFFEINE 1 TABLET: 50; 325; 40 TABLET ORAL at 08:06

## 2020-08-07 RX ADMIN — OXYCODONE HYDROCHLORIDE AND ACETAMINOPHEN 1 TABLET: 5; 325 TABLET ORAL at 02:36

## 2020-08-07 RX ADMIN — ZOLPIDEM TARTRATE 10 MG: 5 TABLET ORAL at 21:50

## 2020-08-07 RX ADMIN — METRONIDAZOLE 500 MG: 500 INJECTION, SOLUTION INTRAVENOUS at 14:31

## 2020-08-07 RX ADMIN — GABAPENTIN 300 MG: 300 CAPSULE ORAL at 21:50

## 2020-08-07 RX ADMIN — METRONIDAZOLE 500 MG: 500 INJECTION, SOLUTION INTRAVENOUS at 06:55

## 2020-08-07 RX ADMIN — Medication 500 MG: at 04:40

## 2020-08-07 RX ADMIN — ONDANSETRON HYDROCHLORIDE 4 MG: 4 TABLET, FILM COATED ORAL at 16:37

## 2020-08-07 RX ADMIN — Medication 500 MG: at 14:43

## 2020-08-07 RX ADMIN — METRONIDAZOLE 500 MG: 500 INJECTION, SOLUTION INTRAVENOUS at 22:53

## 2020-08-07 RX ADMIN — TIZANIDINE 4 MG: 4 TABLET ORAL at 21:50

## 2020-08-07 RX ADMIN — CIPROFLOXACIN 400 MG: 2 INJECTION, SOLUTION INTRAVENOUS at 21:14

## 2020-08-07 RX ADMIN — FLUCONAZOLE 150 MG: 150 TABLET ORAL at 10:07

## 2020-08-07 RX ADMIN — Medication 2 CAPSULE: at 20:29

## 2020-08-07 RX ADMIN — FENTANYL CITRATE 25 MCG: 50 INJECTION, SOLUTION INTRAMUSCULAR; INTRAVENOUS at 07:33

## 2020-08-07 RX ADMIN — DIPHENHYDRAMINE HYDROCHLORIDE 50 MG: 50 INJECTION, SOLUTION INTRAMUSCULAR; INTRAVENOUS at 04:40

## 2020-08-07 RX ADMIN — FENTANYL CITRATE 25 MCG: 50 INJECTION, SOLUTION INTRAMUSCULAR; INTRAVENOUS at 18:06

## 2020-08-07 RX ADMIN — CIPROFLOXACIN 400 MG: 2 INJECTION, SOLUTION INTRAVENOUS at 10:10

## 2020-08-07 RX ADMIN — ALPRAZOLAM 0.5 MG: 0.25 TABLET ORAL at 20:34

## 2020-08-07 NOTE — INTERDISCIPLINARY/THERAPY
Case Management rounding attempted, knocked on door entered room, room dark, fan on , pateint had just received warm blankets, she was all covered up with no response to name being called.  Left room, will reattempt later if time allows. Dr. Lacy reported this am, patient will not discharge today.  Will try soft diet later today, if unable to tolerate will return diet to clear liquids.

## 2020-08-07 NOTE — PROGRESS NOTES
DAILY PROGRESS NOTE    Luiza Tay is a 55 y.o. old female admitted on 8/6/2020.    Date of Service: 08/07/20    SUBJECTIVE   Patient seen and examined this morning.  She states that she feels better today than she did yesterday.  She still having left-sided abdominal pain and tenderness.  She states that when her bladder response she has to urinate that is when she has more pain in her abdomen.  She is not had any nausea or vomiting.  She is not had any fevers or chills.  She has been getting IV ciprofloxacin and IV metronidazole.  Overnight she was getting Percocet for pain control and then started having diffuse itching.  We gave her some Benadryl to help with this.  She states that she is had the same reaction with hydrocodone in the past.  So we discontinue this.  She does not seem to have issues with the fentanyl so we will continue that as needed for pain control.  She is tolerating clear liquid diet okay.  No black or bloody stools.  Per nursing no other acute events overnight.    10 point ROS completed and negative other than noted above.      Past Medical History     Past Medical History:   Diagnosis Date   • Appendicitis    • Diverticulitis    • Fibromyalgia 5/3/2018   • Gallstones    • Headache    • History of migraine 9/14/2017   • History of migraine 9/14/2017   • Insomnia    • Migraines    • Muscle tightness    • Other insomnia 5/3/2018   • Situational anxiety 05/03/2018    after a trauma riding horses   • Traumatic closed displaced fracture of shaft of humerus with routine healing, right 5/16/2019       OBJECTIVE     Current Vital Signs:  Vitals:    08/07/20 0615   BP: 90/49   Pulse: 53   Resp: 18   Temp: 36.6 °C (97.9 °F)   SpO2: 95%       Intake and Output Last 24 Hours    Intake/Output Summary (Last 24 hours) at 8/7/2020 0717  Last data filed at 8/7/2020 0238  Gross per 24 hour   Intake 700 ml   Output --   Net 700 ml          Exam     GENERAL: Patient is a 55 y.o. old female who appears  their stated age, and in mild amount of distress  HEENT: PERRLA, EOMI, moist mucus membranes.   CARDIO: Normal S1,S2 heart sounds. RRR. No murmurs, rubs, or gallops.   RESP: Clear to auscultation bilaterally without crackles, wheezes, or rales.   GI: Normoactive bowel sounds. Abdomen soft, tender to palpation along left upper and left lower quadrant, and nondistended. No organomegaly appreciated   NEURO:. No neurological deficits appreciated.    MSK: No erythema noted, no bilateral pitting edema in lower limbs.  DERM: No rashes noted.  PSYCH: No depression noted. Appropriate mood and congruent affect.     Medications:   Scheduled Meds:tiZANidine, 4 mg, oral, Nightly  ciprofloxacin, 400 mg, intravenous, q12h SHAN  metronidazole, 500 mg, intravenous, q8h SHAN  gabapentin, 300 mg, oral, Nightly  Lactobacillus rhamnosus GG, 2 capsule, oral, 2x daily      PRN Meds:.•  acetaminophen  •  ALPRAZolam  •  butalbital-acetaminophen-caff  •  zolpidem  •  Insert peripheral IV **AND** Maintain IV access **AND** Saline lock IV **AND** sodium chloride  •  ondansetron **OR** ondansetron  •  fentaNYL citrate (PF)  D/C Meds:  Medications Discontinued During This Encounter   Medication Reason   • ciprofloxacin (CIPRO) 400 mg in D5W 200 mL IVPB (premix)    • metroNIDAZOLE (FLAGYL) 500 mg in  mL IVPB (premix)    • gabapentin (NEURONTIN) capsule 100 mg    • ondansetron ODT (ZOFRAN-ODT) disintegrating tablet 4 mg    • oxyCODONE-acetaminophen (PERCOCET) 5-325 mg 1 tablet        Diagnostic Data:      Imaging  Ct Abdomen Pelvis W Iv Contrast No Oral Contrast    Result Date: 8/6/2020  Narrative: Exam: CT of the abdomen and pelvis with contrast from 08/06/2020. Clinical History:   left quadrant abd pain. Suspect either kidney stone or diverticulitis. Comparison(s):  2018 Technique: Helical axial imaging was performed through the abdomen and pelvis after the intravenous administration of 99 cc of Isovue-370 . 0 discarded from a single use  vial. Five millimeter axial reconstructions and coronal reformations were constructed and reviewed.Dose reduction technique utilized; automatic/anatomic modulation of X-ray tube current (Auto mA). Findings: Abdomen: The visualized lung bases appear clear.. The liver, spleen, pancreas and kidneys appear grossly unremarkable. No abnormal mass or adenopathy. No evidence for bowel obstruction. There is pericolonic edema involving the mid descending colon. Mild colonic wall thickening in this segment with at least one diverticuli. No abscess. No acute osseous findings identified. Pelvis: No abnormal mass or fluid collection. No evidence for sigmoid diverticulitis. No abscess. Bony pelvis is grossly intact.     Impression: IMPRESSION: 1. Acute inflammatory wall thickening involving segment of the descending colon. There is one diverticuli in this region. Differential includes a colitis and/or diverticulitis.        Laboratory Data  CBC:   Lab Results   Component Value Date    WBC 5.8 08/07/2020    RBC 3.63 (L) 08/07/2020    HGB 11.7 08/07/2020    HCT 33.2 (L) 08/07/2020     08/07/2020     CMP:   Lab Results   Component Value Date     08/06/2020    K 3.8 08/06/2020     08/06/2020    CO2 27 08/06/2020    GLUCOSE 100 08/06/2020    CREATININE 0.62 08/06/2020    CALCIUM 9.3 08/06/2020    ALBUMIN 3.9 08/06/2020    ALKPHOS 54 08/06/2020    BILITOT 0.62 08/06/2020    ALT 24 08/06/2020    AST 20 08/06/2020    BUN 7 08/06/2020    ANIONGAP 9 08/06/2020       ASSESSMENT     Patient Active Problem List   Diagnosis   • Situational anxiety   • Other insomnia   • Fibromyalgia   • Diverticulitis     Luiza is a 55-year-old with prior history of diverticulitis and colon resection who presents to the emergency department for 3 days of chills with 1 day of associated left-sided abdominal pain and nausea and found to have descending colon diverticulitis    PLAN   Diverticulitis  Noted on cat scan  Treat with IV ciprofloxacin  and IV metronidazole  WBC is trending down  Patient still having pain, will keep her today and see how she does with advancing her diet to make sure she is doing well before discharge on outpatient oral antibiotics  She also has history of colonic resection in the past due to diverticulitis so we are going to be more aggressive in getting the inflammation down with IV abx before d/c home.    Pain control with IV fentanyl she has many allergies  Antiemetics with Zofran     Anxiety  Currently stable  Continue PRN alprazolam     Migraine  Currently stable  PRN Fioricet available     Insomnia  Stable  Continue home zolpidem      ADDITIONAL CARE NOTES     IVF: SLIV  Diet: clear liquid diet, advance to mechanical soft as tolerated  VTE Ppx:  Lovenox Sq      CODE STATUS: Full Code     DISPO: Anticipate DC to home pending clinical improvement in her abdominal exam, likely 1 midnights.    Tiffany Lacy MD  7:17 AM, 8/7/2020.

## 2020-08-08 VITALS
SYSTOLIC BLOOD PRESSURE: 97 MMHG | BODY MASS INDEX: 27.32 KG/M2 | HEART RATE: 62 BPM | WEIGHT: 170 LBS | HEIGHT: 66 IN | TEMPERATURE: 98.3 F | DIASTOLIC BLOOD PRESSURE: 46 MMHG | OXYGEN SATURATION: 95 % | RESPIRATION RATE: 16 BRPM

## 2020-08-08 LAB
BASOPHILS # BLD AUTO: 0 10*3/UL
BASOPHILS NFR BLD AUTO: 1 % (ref 0–2)
EOSINOPHIL # BLD AUTO: 0.3 10*3/UL
EOSINOPHIL NFR BLD AUTO: 5 % (ref 0–3)
ERYTHROCYTE [DISTWIDTH] IN BLOOD BY AUTOMATED COUNT: 13 % (ref 11.5–14)
HCT VFR BLD AUTO: 34.5 % (ref 34–45)
HGB BLD-MCNC: 12 G/DL (ref 11.5–15.5)
LYMPHOCYTES # BLD AUTO: 1.6 10*3/UL
LYMPHOCYTES NFR BLD AUTO: 28 % (ref 11–47)
MCH RBC QN AUTO: 32.3 PG (ref 28–33)
MCHC RBC AUTO-ENTMCNC: 34.8 G/DL (ref 32–36)
MCV RBC AUTO: 92.8 FL (ref 81–97)
MONOCYTES # BLD AUTO: 0.5 10*3/UL
MONOCYTES NFR BLD AUTO: 8 % (ref 3–11)
NEUTROPHILS # BLD AUTO: 3.3 10*3/UL
NEUTROPHILS NFR BLD AUTO: 59 % (ref 41–81)
PLATELET # BLD AUTO: 224 10*3/UL (ref 140–350)
PMV BLD AUTO: 7.7 FL (ref 6.9–10.8)
RBC # BLD AUTO: 3.72 10*6/ΜL (ref 3.7–5.3)
WBC # BLD AUTO: 5.7 10*3/UL (ref 4.5–10.5)

## 2020-08-08 PROCEDURE — 2580000300 HC RX 258: Performed by: FAMILY MEDICINE

## 2020-08-08 PROCEDURE — 96366 THER/PROPH/DIAG IV INF ADDON: CPT

## 2020-08-08 PROCEDURE — 6370000100 HC RX 637 (ALT 250 FOR IP): Performed by: FAMILY MEDICINE

## 2020-08-08 PROCEDURE — 85025 COMPLETE CBC W/AUTO DIFF WBC: CPT | Performed by: FAMILY MEDICINE

## 2020-08-08 PROCEDURE — 99217 *NO LONGER ACTIVE 2023 DO NOT USE* PR OBSERVATION CARE DISCHARGE MANAGEMENT: CPT | Performed by: FAMILY MEDICINE

## 2020-08-08 PROCEDURE — 36415 COLL VENOUS BLD VENIPUNCTURE: CPT | Performed by: FAMILY MEDICINE

## 2020-08-08 PROCEDURE — G0378 HOSPITAL OBSERVATION PER HR: HCPCS

## 2020-08-08 RX ORDER — METRONIDAZOLE 500 MG/1
500 TABLET ORAL 3 TIMES DAILY
Qty: 27 TABLET | Refills: 0 | Status: SHIPPED | OUTPATIENT
Start: 2020-08-08 | End: 2020-08-17

## 2020-08-08 RX ORDER — CIPROFLOXACIN 500 MG/1
500 TABLET ORAL 2 TIMES DAILY
Qty: 18 TABLET | Refills: 0 | Status: SHIPPED | OUTPATIENT
Start: 2020-08-08 | End: 2020-08-17

## 2020-08-08 RX ADMIN — METRONIDAZOLE 500 MG: 500 INJECTION, SOLUTION INTRAVENOUS at 06:27

## 2020-08-08 RX ADMIN — ALPRAZOLAM 0.5 MG: 0.25 TABLET ORAL at 02:24

## 2020-08-08 RX ADMIN — Medication 2 CAPSULE: at 08:24

## 2020-08-08 NOTE — DISCHARGE SUMMARY
Inpatient Discharge Summary    BRIEF OVERVIEW  Admitting Provider: Tiffany Lacy MD  Discharge Provider: Zully Cowart MD  Primary Care Physician at Discharge: Tiffany Lacy [phone]    Admission Date: 8/6/2020     Discharge Date: 8/8/2020    Primary Discharge Diagnosis  diverticulitis    Secondary Discharge Diagnosis  Yeast infection    Discharge Disposition  01 - Home or Self-Care  Code Status at Discharge: FULL CODE    Active Issues Requiring Follow-up  -diverticulitis  -yeast infection     DETAILS OF HOSPITAL STAY    Presenting Problem/History of Present Illness  Chills [R68.83]  Diverticulitis [K57.92]  Left upper quadrant pain [R10.12]  Other elevated white blood cell (WBC) count [D72.828]    Hospital Course  56 yo F with h/o of diverticulitis s/p resection 2/2 abscess 2016 who was admitted for diverticulitis. Pt was treated with IV antibiotics with improvement of her symptoms and resolution of leukocytosis. Pt was able to tolerate PO intake and no longer required any pain medications.  Pt was discharged home on oral ciprofloxacin and flagyl to complete a 10 day course. Patient was also treated with diflucan for report of vaginal yeast infection. She was instructed to follow up with PCP on 8/10/20. Discussed signs and symptoms that would warrant further reevaluation.  Pt was in agreement with plan and had no further questions or concerns.     Physical Exam at Discharge  Discharge Condition: stable  Heart Rate: 62  Resp: 16  BP: 97/46  Temp: 36.8 °C (98.3 °F)  Weight: 77.1 kg (170 lb)    Gen: pleasant female who is well-groomed, well nourished, appears stated age, and is in no apparent distress    RESP: clear w/o w/r/r; normal respiratory effort  CV: RRR w/o m/g/r  ABD: BS +, soft,  non-distended with mild tenderness to palpitation in LLQ  MSK: moves all extremities  Etrem: warm and well perfused; no peripheral edema; cap refill < 3. 2+ radial pulses bilaterally  Skin: normal tone; no rash,  abnormal looking nevi or other concerning lesions; no jaundice  Neuro: A &Ox3  Psych: Mood is appropriate and there is no depressed or anxious affect. Patient is cooperative and answers questions appropriately. Speech is clear and coherent.     Time Spent on Discharge: greater than 30 minutes

## 2020-08-08 NOTE — NURSING NOTE
Patients eye glasses fell to floor from night stand below vital sign station.  Landed on lenses.  No visible scratches.

## 2020-08-08 NOTE — PLAN OF CARE
Problem: Pain - Adult  Goal: Verbalizes/displays adequate comfort level or baseline comfort level  Description: INTERVENTIONS:  1. Encourage patient to monitor pain and request interventions  2. Assess pain using the appropriate pain scale  3. Administer analgesics based on type and severity of pain and evaluate response  4. Educate/Implement non-pharmacological measures as appropriate and evaluate response  5. Consider cultural, developmental and social influences on pain and pain management  6. Notify Provider if interventions unsuccessful or patient reports new pain  Outcome: Adequate for Discharge     Problem: Infection - Adult  Goal: Absence of infection during hospitalization  Description: INTERVENTIONS:  1. Assess and monitor for signs and symptoms of infection  2. Monitor lab/diagnostic results  3. Monitor all insertion sites/wounds/incisions  4. Monitor secretions for changes in amount and color  5. Administer medications as ordered  6. Educate and encourage patient and family to use good hand hygiene technique  7. Identify and educate in appropriate isolation precautions for identified infection/condition  Outcome: Adequate for Discharge     Problem: Safety Adult  Goal: Patient will remain safe during hospitalization  Description: INTERVENTIONS    1. Assess patient for fall risk and implement interventions if needed  2. Use safe transport techniques  3. Assess patient using the appropriate Sergei skin assessment scale  4. Assess patient for risk of aspiration  5. Assess patient for risk of elopement  6. Assess patient for risk of suicide  Outcome: Adequate for Discharge     Problem: Daily Care  Goal: Daily care needs are met  Description: INTERVENTIONS:   1. Assess and monitor skin integrity  2. Identify patients at risk for skin breakdown on admission and per policy  3. Assess and monitor ability to perform self care and identify potential discharge needs  4. Assess skin integrity/risk for skin  breakdown  5. Assist patient with activities of daily living as needed  6. Encourage independent activity per ability   7. Provide mouth care   8. Include patient/family/caregiver in decisions related to daily care   Outcome: Adequate for Discharge     Problem: Knowledge Deficit  Goal: Patient/family/caregiver demonstrates understanding of disease process, treatment plan, medications, and discharge instructions  Description: INTERVENTIONS:   1. Complete learning assessment and assess knowledge base  2. Provide teaching at level of understanding   3. Provide teaching via preferred learning methods  Outcome: Adequate for Discharge     Problem: Discharge Barriers  Goal: Patient's discharge needs are met  Description: INTERVENTIONS:  1. Assess patient for self-management skills  2. Encourage participation in management  3. Identify potential discharge barriers on admission and throughout hospital stay  4. Involve patient/family/caregiver in discharge planning process  5. Collaborate with case management/ for discharge needs  Outcome: Adequate for Discharge     Problem: Safety Adult - Fall  Goal: Free from fall injury  Description: INTERVENTIONS:    Inpatient - Please reference Cares/Safety Flowsheet under [facility] Fall Risk for interventions.  Pediatrics - Please reference Peds Daily Cares/Safety Flowsheet under [facility] Pediatric Fall Assessment Fall Bundle for interventions  LD/OB - Please reference OB Shift Screening Flowsheet under OB Fall Risk for interventions.  Outcome: Adequate for Discharge

## 2020-08-10 ENCOUNTER — OFFICE VISIT (OUTPATIENT)
Dept: URGENT CARE | Facility: CLINIC | Age: 55
End: 2020-08-10
Payer: OTHER GOVERNMENT

## 2020-08-10 VITALS
RESPIRATION RATE: 18 BRPM | DIASTOLIC BLOOD PRESSURE: 84 MMHG | OXYGEN SATURATION: 94 % | SYSTOLIC BLOOD PRESSURE: 122 MMHG | HEART RATE: 71 BPM

## 2020-08-10 DIAGNOSIS — R19.7 DIARRHEA, UNSPECIFIED TYPE: ICD-10-CM

## 2020-08-10 DIAGNOSIS — K57.92 DIVERTICULITIS: Primary | ICD-10-CM

## 2020-08-10 DIAGNOSIS — Z09 HOSPITAL DISCHARGE FOLLOW-UP: ICD-10-CM

## 2020-08-10 PROCEDURE — 99213 OFFICE O/P EST LOW 20 MIN: CPT | Performed by: FAMILY MEDICINE

## 2020-08-10 RX ORDER — LUBIPROSTONE 8 UG/1
1 CAPSULE ORAL 2 TIMES DAILY PRN
COMMUNITY
End: 2021-10-06 | Stop reason: ALTCHOICE

## 2020-08-10 RX ORDER — LOPERAMIDE HCL 2 MG
2 TABLET ORAL 3 TIMES DAILY PRN
Qty: 30 TABLET | Refills: 0 | Status: SHIPPED | OUTPATIENT
Start: 2020-08-10 | End: 2020-08-20

## 2020-08-10 ASSESSMENT — ENCOUNTER SYMPTOMS
CHILLS: 0
SHORTNESS OF BREATH: 0
NAUSEA: 0
ABDOMINAL PAIN: 1
VOMITING: 0
LIGHT-HEADEDNESS: 0
DIZZINESS: 0
FEVER: 0
PALPITATIONS: 0
COUGH: 0

## 2020-08-10 NOTE — INTERDISCIPLINARY/THERAPY
Follow up phone call placed this day 8/10.  Luiza tells me she is not eating much yet, went back to a clear liquid diet.  She seen  in the clinic this morning for followup.  She is taking her oral antibiotics without difficulty.

## 2020-08-10 NOTE — PATIENT INSTRUCTIONS
Patient Education     Diverticulitis    Diverticulitis is infection or inflammation of small pouches (diverticula) in the colon that form due to a condition called diverticulosis. Diverticula can trap stool (feces) and bacteria, causing infection and inflammation.  Diverticulitis may cause severe stomach pain and diarrhea. It may lead to tissue damage in the colon that causes bleeding. The diverticula may also burst (rupture) and cause infected stool to enter other areas of the abdomen.  Complications of diverticulitis can include:  · Bleeding.  · Severe infection.  · Severe pain.  · Rupture (perforation) of the colon.  · Blockage (obstruction) of the colon.  What are the causes?  This condition is caused by stool becoming trapped in the diverticula, which allows bacteria to grow in the diverticula. This leads to inflammation and infection.  What increases the risk?  You are more likely to develop this condition if:  · You have diverticulosis. The risk for diverticulosis increases if:  ? You are overweight or obese.  ? You use tobacco products.  ? You do not get enough exercise.  · You eat a diet that does not include enough fiber. High-fiber foods include fruits, vegetables, beans, nuts, and whole grains.  What are the signs or symptoms?  Symptoms of this condition may include:  · Pain and tenderness in the abdomen. The pain is normally located on the left side of the abdomen, but it may occur in other areas.  · Fever and chills.  · Bloating.  · Cramping.  · Nausea.  · Vomiting.  · Changes in bowel routines.  · Blood in your stool.  How is this diagnosed?  This condition is diagnosed based on:  · Your medical history.  · A physical exam.  · Tests to make sure there is nothing else causing your condition. These tests may include:  ? Blood tests.  ? Urine tests.  ? Imaging tests of the abdomen, including X-rays, ultrasounds, MRIs, or CT scans.  How is this treated?  Most cases of this condition are mild and can be  treated at home. Treatment may include:  · Taking over-the-counter pain medicines.  · Following a clear liquid diet.  · Taking antibiotic medicines by mouth.  · Rest.  More severe cases may need to be treated at a hospital. Treatment may include:  · Not eating or drinking.  · Taking prescription pain medicine.  · Receiving antibiotic medicines through an IV tube.  · Receiving fluids and nutrition through an IV tube.  · Surgery.  When your condition is under control, your health care provider may recommend that you have a colonoscopy. This is an exam to look at the entire large intestine. During the exam, a lubricated, bendable tube is inserted into the anus and then passed into the rectum, colon, and other parts of the large intestine. A colonoscopy can show how severe your diverticula are and whether something else may be causing your symptoms.  Follow these instructions at home:  Medicines  · Take over-the-counter and prescription medicines only as told by your health care provider. These include fiber supplements, probiotics, and stool softeners.  · If you were prescribed an antibiotic medicine, take it as told by your health care provider. Do not stop taking the antibiotic even if you start to feel better.  · Do not drive or use heavy machinery while taking prescription pain medicine.  General instructions    · Follow a full liquid diet or another diet as directed by your health care provider. After your symptoms improve, your health care provider may tell you to change your diet. He or she may recommend that you eat a diet that contains at least 25 g (25 grams) of fiber daily. Fiber makes it easier to pass stool. Healthy sources of fiber include:  ? Berries. One cup contains 4-8 grams of fiber.  ? Beans or lentils. One half cup contains 5-8 grams of fiber.  ? Green vegetables. One cup contains 4 grams of fiber.  · Exercise for at least 30 minutes, 3 times each week. You should exercise hard enough to raise your  heart rate and break a sweat.  · Keep all follow-up visits as told by your health care provider. This is important. You may need a colonoscopy.  Contact a health care provider if:  · Your pain does not improve.  · You have a hard time drinking or eating food.  · Your bowel movements do not return to normal.  Get help right away if:  · Your pain gets worse.  · Your symptoms do not get better with treatment.  · Your symptoms suddenly get worse.  · You have a fever.  · You vomit more than one time.  · You have stools that are bloody, black, or tarry.  Summary  · Diverticulitis is infection or inflammation of small pouches (diverticula) in the colon that form due to a condition called diverticulosis. Diverticula can trap stool (feces) and bacteria, causing infection and inflammation.  · You are at higher risk for this condition if you have diverticulosis and you eat a diet that does not include enough fiber.  · Most cases of this condition are mild and can be treated at home. More severe cases may need to be treated at a hospital.  · When your condition is under control, your health care provider may recommend that you have an exam called a colonoscopy. This exam can show how severe your diverticula are and whether something else may be causing your symptoms.  This information is not intended to replace advice given to you by your health care provider. Make sure you discuss any questions you have with your health care provider.  Document Released: 09/27/2006 Document Revised: 11/30/2018 Document Reviewed: 01/20/2018  [employer] Patient Education © 2020 [employer]

## 2020-08-10 NOTE — PROGRESS NOTES
Luiza Tay is a 55 y.o. year old female who presents for   Chief Complaint   Patient presents with   • Follow-up     diverticulitis    .      Patient here for follow up of recent hospitalization for diverticulitis. She is still on oral abx.  Patient feels that she has been gradually improving since her hospital discharge a few days ago.  She has been working on progressing her diet slowly.  The other day, she tried eating mashed potatoes which caused a partial flareup.  Therefore, she backed her diet off to liquids and has been better since that time.  She continues to have a mild to moderate discomfort without significant pain.  She states that Flagyl antibiotic causes her to have watery diarrhea and she is asking about starting Imodium for her symptoms of watery diarrhea.  Patient is asking when she might be able to ride her horse again.               Allergies   Allergen Reactions   • Hydromorphone Hives   • Meperidine      cause migraines   • Trazodone      nightmares   • Clindamycin Rash   • Desvenlafaxine Succinate      Other reaction(s): Headache   • Morphine      Other reaction(s): Headache   • Sumatriptan Rash         Current Outpatient Medications on File Prior to Visit   Medication Sig   • lubiprostone (Amitiza) 8 mcg capsule Take 1 capsule by mouth 2 (two) times a day as needed   • ciprofloxacin (CIPRO) 500 mg tablet Take 1 tablet (500 mg total) by mouth 2 (two) times a day for 9 days   • metroNIDAZOLE (FlagyL) 500 mg tablet Take 1 tablet (500 mg total) by mouth 3 (three) times a day for 9 days   • butorphanol (STADOL) 10 mg/mL nasal spray Administer 1 spray into each nostril every 6 (six) hours as needed for pain scale 8-10/10 for up to 14 days Max Daily Amount: 4 sprays   • zolpidem (AMBIEN) 10 mg tablet Take 1 tablet (10 mg total) by mouth nightly as needed for sleep   • tiZANidine (ZANAFLEX) 4 mg tablet Take 1 tablet (4 mg total) by mouth nightly   • ondansetron ODT (ZOFRAN-ODT) 4 mg  disintegrating tablet Take 1 tablet (4 mg total) by mouth every 8 (eight) hours as needed for nausea or vomiting   • gabapentin (NEURONTIN) 100 mg capsule Take 1 capsule (100 mg total) by mouth 4 (four) times a day   • butalbital-acetaminophen-caff (FIORICET, ESGIC) -40 mg Take 1 tablet by mouth every 4 (four) hours as needed for headaches   • ALPRAZolam (XANAX) 0.5 mg tablet Take 1 tablet (0.5 mg total) by mouth 2 (two) times a day as needed for anxiety Max Daily Amount: 1 mg     No current facility-administered medications on file prior to visit.         Past Medical History:   Diagnosis Date   • Appendicitis    • Diverticulitis    • Fibromyalgia 5/3/2018   • Gallstones    • Headache    • History of migraine 9/14/2017   • History of migraine 9/14/2017   • Insomnia    • Migraines    • Muscle tightness    • Other insomnia 5/3/2018   • Situational anxiety 05/03/2018    after a trauma riding horses   • Traumatic closed displaced fracture of shaft of humerus with routine healing, right 5/16/2019     Social History     Tobacco Use   • Smoking status: Never Smoker   • Smokeless tobacco: Never Used   Substance and Sexual Activity   • Alcohol use: Yes     Comment: 1 every 2 wks   • Drug use: No   • Sexual activity: Yes   Social History Narrative    Retired nurse.  Now works with horses.       Review of Systems   Constitutional: Negative for chills and fever.   Eyes: Negative for visual disturbance.   Respiratory: Negative for cough and shortness of breath.    Cardiovascular: Negative for chest pain and palpitations.   Gastrointestinal: Positive for abdominal pain (Discomfort). Negative for nausea and vomiting.   Neurological: Negative for dizziness and light-headedness.       Vitals:    08/10/20 1321   BP: 122/84   Pulse: 71   Resp: 18   SpO2: 94%       Wt Readings from Last 3 Encounters:   08/06/20 77.1 kg (170 lb)   06/24/20 78 kg (172 lb)   10/31/19 77.1 kg (170 lb)     Temp Readings from Last 3 Encounters:    08/08/20 36.8 °C (98.3 °F) (Oral)   10/31/19 36.2 °C (97.1 °F) (Temporal)   09/14/19 36.4 °C (97.5 °F)     BP Readings from Last 3 Encounters:   08/10/20 122/84   08/08/20 97/46   06/24/20 110/70     Pulse Readings from Last 3 Encounters:   08/10/20 71   08/08/20 62   06/24/20 59       Physical Exam  Vitals signs and nursing note reviewed.   Constitutional:       General: She is not in acute distress.     Appearance: Normal appearance. She is not toxic-appearing.   HENT:      Head: Normocephalic.      Nose: Nose normal.      Mouth/Throat:      Mouth: Mucous membranes are moist.      Pharynx: Oropharynx is clear. No posterior oropharyngeal erythema.   Eyes:      Extraocular Movements: Extraocular movements intact.      Conjunctiva/sclera: Conjunctivae normal.   Neck:      Musculoskeletal: Neck supple.   Cardiovascular:      Rate and Rhythm: Normal rate and regular rhythm.      Pulses: Normal pulses.      Heart sounds: No murmur.   Pulmonary:      Effort: Pulmonary effort is normal.      Breath sounds: Normal breath sounds. No wheezing or rales.   Abdominal:      General: Abdomen is flat. Bowel sounds are normal. There is no distension.      Palpations: Abdomen is soft.      Tenderness: There is abdominal tenderness (Mild tenderness with palpation left lower quadrant).   Skin:     General: Skin is warm and dry.      Findings: No rash.   Neurological:      General: No focal deficit present.      Mental Status: She is alert.      Cranial Nerves: No cranial nerve deficit.      Gait: Gait normal.   Psychiatric:         Mood and Affect: Mood normal.         Behavior: Behavior normal.           No results found for this or any previous visit (from the past 24 hour(s)).        Luiza was seen today for follow-up.    Diagnoses and all orders for this visit:    Diverticulitis    Diarrhea, unspecified type  -     loperamide (Imodium A-D) 2 mg tablet; Take 1 tablet (2 mg total) by mouth 3 (three) times a day as needed for  diarrhea for up to 10 days    Hospital discharge follow-up    Luiza recently discharged from the hospital on 8/8/2020 for diverticulitis.  She was treated with IV antibiotics and transitioned to oral antibiotics.  Overall, patient has been improving.  Denies any recent fevers.  Has had to back her diet off a little bit because of a mild flareup.  She does have some watery diarrhea which she attributes to taking Flagyl.    Luiza is asking about Imodium for her watery diarrhea.  I advised her that she could but to proceed with caution and if she has any worsening of symptoms to stop this medication immediately.  I also advised her to slowly advance her diet.  If she develops a flareup, she should regress to liquid diet.    Luiza has had a partial colectomy in the past secondary to abscess with diverticulitis about 6 years ago.  It may be prudent for the patient to undergo colonoscopy in 6 to 8 weeks later but she should discuss this with her primary care provider and should follow-up at that time.  She may follow-up sooner if needed if she continues to have difficulty advancing her diet, has continued abdominal pain especially uncontrolled pain, if she develops bleeding, fever or other concerning symptoms.    Discussed with patient about repeating labs today including a CBC and CMP and CRP.  However, patient states that she has continued to improve and does not feel a need to check labs at this time.  If she has worsening of symptoms, I would not hesitate to recheck these labs.    Patient Instructions     Patient Education     Diverticulitis    Diverticulitis is infection or inflammation of small pouches (diverticula) in the colon that form due to a condition called diverticulosis. Diverticula can trap stool (feces) and bacteria, causing infection and inflammation.  Diverticulitis may cause severe stomach pain and diarrhea. It may lead to tissue damage in the colon that causes bleeding. The diverticula may also burst  (rupture) and cause infected stool to enter other areas of the abdomen.  Complications of diverticulitis can include:  · Bleeding.  · Severe infection.  · Severe pain.  · Rupture (perforation) of the colon.  · Blockage (obstruction) of the colon.  What are the causes?  This condition is caused by stool becoming trapped in the diverticula, which allows bacteria to grow in the diverticula. This leads to inflammation and infection.  What increases the risk?  You are more likely to develop this condition if:  · You have diverticulosis. The risk for diverticulosis increases if:  ? You are overweight or obese.  ? You use tobacco products.  ? You do not get enough exercise.  · You eat a diet that does not include enough fiber. High-fiber foods include fruits, vegetables, beans, nuts, and whole grains.  What are the signs or symptoms?  Symptoms of this condition may include:  · Pain and tenderness in the abdomen. The pain is normally located on the left side of the abdomen, but it may occur in other areas.  · Fever and chills.  · Bloating.  · Cramping.  · Nausea.  · Vomiting.  · Changes in bowel routines.  · Blood in your stool.  How is this diagnosed?  This condition is diagnosed based on:  · Your medical history.  · A physical exam.  · Tests to make sure there is nothing else causing your condition. These tests may include:  ? Blood tests.  ? Urine tests.  ? Imaging tests of the abdomen, including X-rays, ultrasounds, MRIs, or CT scans.  How is this treated?  Most cases of this condition are mild and can be treated at home. Treatment may include:  · Taking over-the-counter pain medicines.  · Following a clear liquid diet.  · Taking antibiotic medicines by mouth.  · Rest.  More severe cases may need to be treated at a hospital. Treatment may include:  · Not eating or drinking.  · Taking prescription pain medicine.  · Receiving antibiotic medicines through an IV tube.  · Receiving fluids and nutrition through an IV  tube.  · Surgery.  When your condition is under control, your health care provider may recommend that you have a colonoscopy. This is an exam to look at the entire large intestine. During the exam, a lubricated, bendable tube is inserted into the anus and then passed into the rectum, colon, and other parts of the large intestine. A colonoscopy can show how severe your diverticula are and whether something else may be causing your symptoms.  Follow these instructions at home:  Medicines  · Take over-the-counter and prescription medicines only as told by your health care provider. These include fiber supplements, probiotics, and stool softeners.  · If you were prescribed an antibiotic medicine, take it as told by your health care provider. Do not stop taking the antibiotic even if you start to feel better.  · Do not drive or use heavy machinery while taking prescription pain medicine.  General instructions    · Follow a full liquid diet or another diet as directed by your health care provider. After your symptoms improve, your health care provider may tell you to change your diet. He or she may recommend that you eat a diet that contains at least 25 g (25 grams) of fiber daily. Fiber makes it easier to pass stool. Healthy sources of fiber include:  ? Berries. One cup contains 4-8 grams of fiber.  ? Beans or lentils. One half cup contains 5-8 grams of fiber.  ? Green vegetables. One cup contains 4 grams of fiber.  · Exercise for at least 30 minutes, 3 times each week. You should exercise hard enough to raise your heart rate and break a sweat.  · Keep all follow-up visits as told by your health care provider. This is important. You may need a colonoscopy.  Contact a health care provider if:  · Your pain does not improve.  · You have a hard time drinking or eating food.  · Your bowel movements do not return to normal.  Get help right away if:  · Your pain gets worse.  · Your symptoms do not get better with  treatment.  · Your symptoms suddenly get worse.  · You have a fever.  · You vomit more than one time.  · You have stools that are bloody, black, or tarry.  Summary  · Diverticulitis is infection or inflammation of small pouches (diverticula) in the colon that form due to a condition called diverticulosis. Diverticula can trap stool (feces) and bacteria, causing infection and inflammation.  · You are at higher risk for this condition if you have diverticulosis and you eat a diet that does not include enough fiber.  · Most cases of this condition are mild and can be treated at home. More severe cases may need to be treated at a hospital.  · When your condition is under control, your health care provider may recommend that you have an exam called a colonoscopy. This exam can show how severe your diverticula are and whether something else may be causing your symptoms.  This information is not intended to replace advice given to you by your health care provider. Make sure you discuss any questions you have with your health care provider.  Document Released: 09/27/2006 Document Revised: 11/30/2018 Document Reviewed: 01/20/2018  [employer] Patient Education © 2020 [employer]

## 2020-08-17 ENCOUNTER — TELEPHONE (OUTPATIENT)
Dept: FAMILY MEDICINE | Facility: CLINIC | Age: 55
End: 2020-08-17

## 2020-08-17 NOTE — TELEPHONE ENCOUNTER
@9:20 am Patient called back and now experiencing bloody discharge.   She will be coming into [facility], but hoping Dr. Lacy can see her.          Patient has developed a yeast infection due to antibiotics from ED visit prescriptions     Please contact patient to discuss.    Or can please call in a script for patient to . She is not thrilled to come in due to COVID and [facility] just ending.

## 2020-09-16 DIAGNOSIS — G43.909 MIGRAINE WITHOUT STATUS MIGRAINOSUS, NOT INTRACTABLE, UNSPECIFIED MIGRAINE TYPE: ICD-10-CM

## 2020-09-16 RX ORDER — BUTORPHANOL TARTRATE 10 MG/ML
SPRAY NASAL
Qty: 2.5 ML | Refills: 0 | Status: SHIPPED | OUTPATIENT
Start: 2020-09-16 | End: 2020-10-06

## 2020-09-16 NOTE — TELEPHONE ENCOUNTER
Luiza contacted [facility] and [facility] is going to send a refill request for her migraine med;  Can this be filled today please?

## 2020-10-06 ENCOUNTER — OFFICE VISIT (OUTPATIENT)
Dept: FAMILY MEDICINE | Facility: CLINIC | Age: 55
End: 2020-10-06
Payer: OTHER GOVERNMENT

## 2020-10-06 ENCOUNTER — HOSPITAL ENCOUNTER (OUTPATIENT)
Dept: MAMMOGRAPHY | Facility: HOSPITAL | Age: 55
Discharge: HOME | End: 2020-10-06
Payer: OTHER GOVERNMENT

## 2020-10-06 VITALS
HEART RATE: 47 BPM | DIASTOLIC BLOOD PRESSURE: 86 MMHG | OXYGEN SATURATION: 98 % | WEIGHT: 161.5 LBS | BODY MASS INDEX: 26.07 KG/M2 | SYSTOLIC BLOOD PRESSURE: 138 MMHG | RESPIRATION RATE: 18 BRPM

## 2020-10-06 DIAGNOSIS — G43.909 MIGRAINE WITHOUT STATUS MIGRAINOSUS, NOT INTRACTABLE, UNSPECIFIED MIGRAINE TYPE: ICD-10-CM

## 2020-10-06 DIAGNOSIS — F41.8 SITUATIONAL ANXIETY: Primary | ICD-10-CM

## 2020-10-06 DIAGNOSIS — Z12.39 SCREENING FOR MALIGNANT NEOPLASM OF BREAST: ICD-10-CM

## 2020-10-06 DIAGNOSIS — F51.5 NIGHTMARES: ICD-10-CM

## 2020-10-06 PROCEDURE — 99213 OFFICE O/P EST LOW 20 MIN: CPT | Performed by: FAMILY MEDICINE

## 2020-10-06 PROCEDURE — 77067 SCR MAMMO BI INCL CAD: CPT

## 2020-10-06 RX ORDER — PRAZOSIN HYDROCHLORIDE 1 MG/1
1 CAPSULE ORAL NIGHTLY
Qty: 30 CAPSULE | Refills: 11 | Status: SHIPPED | OUTPATIENT
Start: 2020-10-06 | End: 2021-04-28 | Stop reason: SDUPTHER

## 2020-10-06 RX ORDER — FLUOXETINE HYDROCHLORIDE 20 MG/1
20 CAPSULE ORAL DAILY
COMMUNITY
End: 2020-10-06 | Stop reason: SDUPTHER

## 2020-10-06 RX ORDER — FLUOXETINE HYDROCHLORIDE 20 MG/1
20 CAPSULE ORAL DAILY
Qty: 90 CAPSULE | Refills: 0 | Status: SHIPPED | OUTPATIENT
Start: 2020-10-06 | End: 2020-12-02 | Stop reason: SDUPTHER

## 2020-10-06 NOTE — PATIENT INSTRUCTIONS
I ordered you some more fluoxetine  I want you to reach out to those support groups  prazosin to take at night to help with nightmares  Lets follow up in two-four weeks to see how you are doing

## 2020-10-06 NOTE — PROGRESS NOTES
IMPRESSION AND PLAN  Diagnoses and all orders for this visit:    Situational anxiety  -     FLUoxetine (PROzac) 20 mg capsule; Take 1 capsule (20 mg total) by mouth daily Indications: Situational affective disorder    Nightmares  -     prazosin (Minipress) 1 mg capsule; Take 1 capsule (1 mg total) by mouth nightly    Luiza is a nice 55-year-old who is coming in today for concerns of worsening depression anxiety especially in the setting of her  recently being diagnosed with prostate cancer.  She is also having 3 significant nightmares.  We will start her on some fluoxetine to help with depression anxiety and add some prazosin to help with nightmares.  Follow-up with her closely to make sure she is doing well with this and having no adverse effects.  No suicidal ideations.    HPI  Chief complaint for visit per nursing.       Chief Complaint   Patient presents with   • Follow-up         Luiza is a 55 y.o. who presents today for concerns of worsening depression/anxiety.  She is stressed because her  was recently diagnosed with prostate cancer.   She is having bad dreams  Would like medicine to help with these  No suicidal ideations.  Does take Xanax to help with anxiety and to help her sleep.    PROBLEM LIST  Patient Active Problem List   Diagnosis   • Situational anxiety   • Other insomnia   • Fibromyalgia   • Diverticulitis         SOCIAL HX  Social History     Socioeconomic History   • Marital status:      Spouse name: Not on file   • Number of children: Not on file   • Years of education: Not on file   • Highest education level: Not on file   Occupational History   • Not on file   Social Needs   • Financial resource strain: Not on file   • Food insecurity     Worry: Not on file     Inability: Not on file   • Transportation needs     Medical: Not on file     Non-medical: Not on file   Tobacco Use   • Smoking status: Never Smoker   • Smokeless tobacco: Never Used   Substance and Sexual Activity    • Alcohol use: Yes     Comment: 1 every 2 wks   • Drug use: No   • Sexual activity: Yes   Lifestyle   • Physical activity     Days per week: Not on file     Minutes per session: Not on file   • Stress: Not on file   Relationships   • Social connections     Talks on phone: Not on file     Gets together: Not on file     Attends Jew service: Not on file     Active member of club or organization: Not on file     Attends meetings of clubs or organizations: Not on file     Relationship status: Not on file   • Intimate partner violence     Fear of current or ex partner: Not on file     Emotionally abused: Not on file     Physically abused: Not on file     Forced sexual activity: Not on file   Other Topics Concern   • Not on file   Social History Narrative    Retired nurse.  Now works with horses.       FAMILY HX  Family History   Problem Relation Age of Onset   • Colon cancer Maternal Grandmother    • Ovarian cancer Maternal Grandmother    • Bone cancer Paternal Grandmother    • Hypertension Other    • Breast cancer Other    • Lung cancer Other        ROS  12 point review of systems performed is negative unless otherwise mentioned per HPI.    Physical Exam  /86   Pulse 47   Resp 18   Wt 73.3 kg (161 lb 8 oz)   SpO2 98%   BMI 26.07 kg/m²   BP Readings from Last 3 Encounters:   10/06/20 138/86   08/10/20 122/84   08/08/20 97/46       Physical Exam  Vitals signs and nursing note reviewed.   Constitutional:       General: She is not in acute distress.  HENT:      Head: Normocephalic and atraumatic.      Nose: Nose normal.      Mouth/Throat:      Mouth: Mucous membranes are moist.   Eyes:      Extraocular Movements: Extraocular movements intact.   Neck:      Musculoskeletal: Neck supple.   Skin:     General: Skin is warm.   Neurological:      Mental Status: She is alert and oriented to person, place, and time.   Psychiatric:         Mood and Affect: Mood normal.            LABS AND XRAYS  Lab Results    Component Value Date    GLUCOSE 100 08/06/2020    CALCIUM 9.3 08/06/2020     08/06/2020    K 3.8 08/06/2020    CO2 27 08/06/2020     08/06/2020    BUN 7 08/06/2020    CREATININE 0.62 08/06/2020    ANIONGAP 9 08/06/2020     Lab Results   Component Value Date    WBC 5.7 08/08/2020    HGB 12.0 08/08/2020    HCT 34.5 08/08/2020    MCV 92.8 08/08/2020     08/08/2020     Lab Results   Component Value Date    TSH 2.559 09/14/2019     No results found for: HGBA1C  Lab Results   Component Value Date    CREATININE 0.62 08/06/2020     Lab Results   Component Value Date    SEDRATE 8 08/06/2020     No results found for: URACSRM  No results found for: AMYLASE  Lab Results   Component Value Date    LIPASE 11 08/06/2020         No results found.    Procedures     MEDICATIONS    Current Outpatient Medications:   •  FLUoxetine (PROzac) 20 mg capsule, Take 1 capsule (20 mg total) by mouth daily Indications: Situational affective disorder, Disp: 90 capsule, Rfl: 0  •  butorphanol (STADOL) 10 mg/mL nasal spray, ADMINISTER 1 SPRAY INTO EACH NOSTRIL EVERY 6 HOURS AS NEEDED FOR PAIN, Disp: 2.5 mL, Rfl: 0  •  zolpidem (AMBIEN) 10 mg tablet, Take 1 tablet (10 mg total) by mouth nightly as needed for sleep, Disp: 90 tablet, Rfl: 1  •  tiZANidine (ZANAFLEX) 4 mg tablet, Take 1 tablet (4 mg total) by mouth nightly, Disp: 90 tablet, Rfl: 3  •  ondansetron ODT (ZOFRAN-ODT) 4 mg disintegrating tablet, Take 1 tablet (4 mg total) by mouth every 8 (eight) hours as needed for nausea or vomiting, Disp: 30 tablet, Rfl: 1  •  butalbital-acetaminophen-caff (FIORICET, ESGIC) -40 mg, Take 1 tablet by mouth every 4 (four) hours as needed for headaches, Disp: 30 tablet, Rfl: 1  •  ALPRAZolam (XANAX) 0.5 mg tablet, Take 1 tablet (0.5 mg total) by mouth 2 (two) times a day as needed for anxiety Max Daily Amount: 1 mg, Disp: 60 tablet, Rfl: 5  •  prazosin (Minipress) 1 mg capsule, Take 1 capsule (1 mg total) by mouth nightly, Disp: 30  capsule, Rfl: 11  •  lubiprostone (Amitiza) 8 mcg capsule, Take 1 capsule by mouth 2 (two) times a day as needed, Disp: , Rfl:       SEE ABOVE FOR IMPRESSION AND PLAN    Tiffany Lacy MD  10/06/20  3:44 PM

## 2020-10-08 RX ORDER — BUTORPHANOL TARTRATE 10 MG/ML
SPRAY NASAL
Qty: 2.5 ML | Refills: 1 | Status: SHIPPED | OUTPATIENT
Start: 2020-10-08 | End: 2020-12-02 | Stop reason: SDUPTHER

## 2020-10-10 DIAGNOSIS — G47.09 OTHER INSOMNIA: ICD-10-CM

## 2020-10-13 RX ORDER — ZOLPIDEM TARTRATE 10 MG/1
TABLET ORAL
Qty: 90 TABLET | Refills: 1 | Status: SHIPPED | OUTPATIENT
Start: 2020-10-13 | End: 2021-03-19 | Stop reason: SDUPTHER

## 2020-11-30 ENCOUNTER — OFFICE VISIT (OUTPATIENT)
Dept: ORTHOPEDIC SURGERY | Facility: CLINIC | Age: 55
End: 2020-11-30
Payer: OTHER GOVERNMENT

## 2020-11-30 ENCOUNTER — HOSPITAL ENCOUNTER (OUTPATIENT)
Dept: RADIOLOGY | Facility: HOSPITAL | Age: 55
Discharge: HOME | End: 2020-11-30
Payer: OTHER GOVERNMENT

## 2020-11-30 VITALS
OXYGEN SATURATION: 98 % | RESPIRATION RATE: 14 BRPM | DIASTOLIC BLOOD PRESSURE: 54 MMHG | HEART RATE: 62 BPM | SYSTOLIC BLOOD PRESSURE: 146 MMHG

## 2020-11-30 DIAGNOSIS — G43.909 MIGRAINE WITHOUT STATUS MIGRAINOSUS, NOT INTRACTABLE, UNSPECIFIED MIGRAINE TYPE: ICD-10-CM

## 2020-11-30 DIAGNOSIS — F41.8 SITUATIONAL ANXIETY: ICD-10-CM

## 2020-11-30 DIAGNOSIS — M79.7 FIBROMYALGIA: ICD-10-CM

## 2020-11-30 DIAGNOSIS — M75.22 BICEPS TENDONITIS ON LEFT: ICD-10-CM

## 2020-11-30 DIAGNOSIS — G89.29 CHRONIC LEFT SHOULDER PAIN: ICD-10-CM

## 2020-11-30 DIAGNOSIS — M75.42 IMPINGEMENT SYNDROME OF LEFT SHOULDER: Primary | ICD-10-CM

## 2020-11-30 DIAGNOSIS — M25.512 CHRONIC LEFT SHOULDER PAIN: ICD-10-CM

## 2020-11-30 PROCEDURE — 99203 OFFICE O/P NEW LOW 30 MIN: CPT | Mod: 25 | Performed by: PHYSICIAN ASSISTANT

## 2020-11-30 PROCEDURE — 20610 DRAIN/INJ JOINT/BURSA W/O US: CPT | Performed by: PHYSICIAN ASSISTANT

## 2020-11-30 PROCEDURE — 73030 X-RAY EXAM OF SHOULDER: CPT | Mod: TC,LT | Performed by: PHYSICIAN ASSISTANT

## 2020-11-30 RX ORDER — LIDOCAINE HYDROCHLORIDE 10 MG/ML
INJECTION, SOLUTION INFILTRATION; PERINEURAL
Status: COMPLETED | OUTPATIENT
Start: 2020-11-30 | End: 2020-11-30

## 2020-11-30 RX ORDER — METHYLPREDNISOLONE 4 MG/1
4 TABLET ORAL SEE ADMIN INSTRUCTIONS
Qty: 1 DOSEPAK | Refills: 0 | Status: SHIPPED | OUTPATIENT
Start: 2020-11-30 | End: 2020-12-06

## 2020-11-30 RX ORDER — METHYLPREDNISOLONE ACETATE 80 MG/ML
80 INJECTION, SUSPENSION INTRA-ARTICULAR; INTRALESIONAL; INTRAMUSCULAR; SOFT TISSUE
Status: COMPLETED | OUTPATIENT
Start: 2020-11-30 | End: 2020-11-30

## 2020-11-30 RX ORDER — ALPRAZOLAM 0.5 MG/1
0.5 TABLET ORAL 2 TIMES DAILY PRN
Qty: 60 TABLET | Refills: 5 | Status: CANCELLED | OUTPATIENT
Start: 2020-11-30

## 2020-11-30 RX ORDER — METHYLPREDNISOLONE 4 MG/1
4 TABLET ORAL SEE ADMIN INSTRUCTIONS
Qty: 21 TABLET | Refills: 0 | Status: SHIPPED | OUTPATIENT
Start: 2020-11-30 | End: 2020-11-30

## 2020-11-30 RX ORDER — GABAPENTIN 100 MG/1
100 CAPSULE ORAL 4 TIMES DAILY
Qty: 360 CAPSULE | Refills: 1 | Status: SHIPPED | OUTPATIENT
Start: 2020-11-30 | End: 2021-04-28 | Stop reason: SDUPTHER

## 2020-11-30 RX ADMIN — METHYLPREDNISOLONE ACETATE 80 MG: 80 INJECTION, SUSPENSION INTRA-ARTICULAR; INTRALESIONAL; INTRAMUSCULAR; SOFT TISSUE at 12:24

## 2020-11-30 RX ADMIN — LIDOCAINE HYDROCHLORIDE: 10 INJECTION, SOLUTION INFILTRATION; PERINEURAL at 12:24

## 2020-11-30 ASSESSMENT — PAIN - FUNCTIONAL ASSESSMENT: PAIN_FUNCTIONAL_ASSESSMENT: 0-10

## 2020-11-30 ASSESSMENT — PAIN DESCRIPTION - DESCRIPTORS: DESCRIPTORS: ACHING;DULL;TENDER;THROBBING

## 2020-11-30 NOTE — PROGRESS NOTES
New Patient Office Note  Chief Complaint:  Left shoulder pain    HPI/ Subjective:  Patient is a 55-year-old right-hand-dominant female comes in through [facility] today for evaluation of her left shoulder pain.  She reports it has been fairly intermittent since the summer.  The only thing she can think of that may have injured it was when she was leading a horse the horse jerked and pulled her left arm.  She denies any radicular symptoms.  She has history of lupus and chronic neck pain.  She takes a Zanaflex at night to help with some of her muscular tension.  She reports she also has been under quite a bit of stress recently.  Currently she has difficulty with overhead and reaching activities.  She also complains of some anterior left shoulder pain that radiates into the biceps.  This has improved with massage therapy in the past.  Patient reports she is going to be heading to [place] for the winter and wanted to make sure there was nothing else going wrong with her left shoulder.  She denies any symptoms on the right side.    Review of systems: see HPI      Allergies   Allergen Reactions   • Hydromorphone Hives   • Meperidine      cause migraines   • Trazodone      nightmares   • Clindamycin Rash   • Desvenlafaxine Succinate      Other reaction(s): Headache   • Morphine      Other reaction(s): Headache   • Sumatriptan Rash     Vitals:    11/30/20 1125   BP: 146/54   Pulse: 62   Resp: 14   SpO2: 98%     Past Medical History:   Diagnosis Date   • Appendicitis    • Diverticulitis    • Fibromyalgia 5/3/2018   • Gallstones    • Headache    • History of migraine 9/14/2017   • History of migraine 9/14/2017   • Insomnia    • Migraines    • Muscle tightness    • Other insomnia 5/3/2018   • Situational anxiety 05/03/2018    after a trauma riding horses   • Traumatic closed displaced fracture of shaft of humerus with routine healing, right 5/16/2019     Social History     Socioeconomic History   • Marital status:       Spouse name: Not on file   • Number of children: Not on file   • Years of education: Not on file   • Highest education level: Not on file   Occupational History   • Not on file   Social Needs   • Financial resource strain: Not on file   • Food insecurity     Worry: Not on file     Inability: Not on file   • Transportation needs     Medical: Not on file     Non-medical: Not on file   Tobacco Use   • Smoking status: Never Smoker   • Smokeless tobacco: Never Used   Substance and Sexual Activity   • Alcohol use: Yes     Comment: 1 every 2 wks   • Drug use: No   • Sexual activity: Yes   Lifestyle   • Physical activity     Days per week: Not on file     Minutes per session: Not on file   • Stress: Not on file   Relationships   • Social connections     Talks on phone: Not on file     Gets together: Not on file     Attends Nondenominational service: Not on file     Active member of club or organization: Not on file     Attends meetings of clubs or organizations: Not on file     Relationship status: Not on file   • Intimate partner violence     Fear of current or ex partner: Not on file     Emotionally abused: Not on file     Physically abused: Not on file     Forced sexual activity: Not on file   Other Topics Concern   • Not on file   Social History Narrative    Retired nurse.  Now works with horses.     Family History   Problem Relation Age of Onset   • Colon cancer Maternal Grandmother    • Ovarian cancer Maternal Grandmother    • Bone cancer Paternal Grandmother    • Hypertension Other    • Breast cancer Other    • Lung cancer Other      Past Surgical History:   Procedure Laterality Date   • APPENDECTOMY     • BREAST SURGERY      reduction   • BREAST SURGERY Bilateral     reduction   • CHOLECYSTECTOMY     • COLONOSCOPY  03/2018    no polyps   • FOOT SURGERY Right 2009    Screws   • HERNIA REPAIR     • HUMERUS SURGERY Right 2013    plate/screws   • HYSTERECTOMY      endometriosis   • ORIF WRIST FRACTURE Right 7/25/2018     Procedure: RIGHT ORIF WRIST;  Surgeon: Saad Zuniga MD;  Location: [facility] OR;  Service: Orthopedics;  Laterality: Right;   • ORTHOPEDIC SURGERY     • SHOULDER SURGERY     • SIGMOIDECTOMY  2016    diverticular ds   • TONSILLECTOMY AND ADENOIDECTOMY     • UPPER EXTREMITY DEBRIDEMENT Right 7/25/2018    Procedure: irrigation and debridement of right upper extremity and all indicated procedures;  Surgeon: Saad Zuniga MD;  Location: [facility] OR;  Service: Orthopedics;  Laterality: Right;     Current Outpatient Medications   Medication Sig Dispense Refill   • gabapentin (NEURONTIN) 100 mg capsule TAKE 1 CAPSULE (100 MG TOTAL) BY MOUTH 4 (FOUR) TIMES A  capsule 1   • zolpidem (AMBIEN) 10 mg tablet TAKE ONE TABLET BY MOUTH NIGHTLY AS NEEDED FOR SLEEP 90 tablet 1   • butorphanol (STADOL) 10 mg/mL nasal spray SPRAY INHALE 1 SPRAY INTO EACH NOSTRIL EVERY SIX HOURS AS NEEDED FOR PAIN 2.5 mL 1   • FLUoxetine (PROzac) 20 mg capsule Take 1 capsule (20 mg total) by mouth daily Indications: Situational affective disorder 90 capsule 0   • prazosin (Minipress) 1 mg capsule Take 1 capsule (1 mg total) by mouth nightly 30 capsule 11   • lubiprostone (Amitiza) 8 mcg capsule Take 1 capsule by mouth 2 (two) times a day as needed     • tiZANidine (ZANAFLEX) 4 mg tablet Take 1 tablet (4 mg total) by mouth nightly 90 tablet 3   • ondansetron ODT (ZOFRAN-ODT) 4 mg disintegrating tablet Take 1 tablet (4 mg total) by mouth every 8 (eight) hours as needed for nausea or vomiting 30 tablet 1   • butalbital-acetaminophen-caff (FIORICET, ESGIC) -40 mg Take 1 tablet by mouth every 4 (four) hours as needed for headaches 30 tablet 1   • methylPREDNISolone (MEDROL DOSEPACK) 4 mg tablet Take 1 tablet (4 mg total) by mouth See administration instructions for 6 days Use as directed by package instructions 1 DosePak 0   • ALPRAZolam (XANAX) 0.5 mg tablet Take 1 tablet (0.5 mg total) by mouth 2 (two) times a day as needed for anxiety Max Daily  Amount: 1 mg 60 tablet 5     No current facility-administered medications for this visit.        Physical Exam  Ortho Exam  Patient is alert and appears comfortable.  Normal gait.  Nonlabored breathing.  Neck is supple.  She has some diffuse tenderness over the posterior left shoulder in the periscapular region.  Mild tenderness over the anterior lateral shoulder.  Moderate tenderness over the proximal biceps tendon.  No real tenderness over the left elbow or forearm.  She shows active range of motion of the left shoulder with forward elevation to 120 degrees, abduction to 100 degrees, and external rotation to 75 degrees.  She has internal rotation of the back pocket.  She has a painful arc.  Positive impingement signs with Neer and Taylor maneuvers.  Tightness with cross body abduction testing.  Positive speeds test.  Negative Houston's testing.  She offers adequate resistance to external rotation and abduction on the left albeit with some mild discomfort.  Adequate  strength on the left in comparison the right.  The distal digits of the left hand are grossly neurovascularly intact.      IMAGING:  See radiologist report below  X-ray Shoulder 2 Or More Views Left    Result Date: 11/30/2020  Exam: DX SHOULDER 2 OR MORE VW LEFT from 11/30/2020 1146 hours Clinical History: Chronic left shoulder pain; Chronic left shoulder pain; shoulder pain Procedure/Views: 3 views Comparison/s: None Findings: The left glenohumeral and acromioclavicular joints are unremarkable. No fracture or dislocation is present. Soft tissues are unremarkable.     IMPRESSION: Negative left shoulder series.      Orders Placed This Encounter   Procedures   • Injection/Arthrocentesis Joint/Tendon/Bursa     This order was created via procedure documentation   • X-ray shoulder 2 or more views left     AP,Greshey and Y     Order Specific Question:   Reading provider?     Answer:   Radiologist     Order Specific Question:   Radiologist's Resulting  Workstation     Answer:   [id number]       Assessment/ Plan:    Diagnoses and all orders for this visit:    Impingement syndrome of left shoulder    Biceps tendonitis on left    Chronic left shoulder pain  -     X-ray shoulder 2 or more views left  -     methylPREDNISolone (MEDROL DOSEPACK) 4 mg tablet; Take 1 tablet (4 mg total) by mouth See administration instructions for 6 days Use as directed by package instructions    Other orders  -     Injection/Arthrocentesis Joint/Tendon/Bursa        Plan:  Discussed anatomy and possibilities.  Reviewed clinical exam and imaging studies with the patient.  At this time it does not appear that she has signs of a rotator cuff tear.  She describes more impingement type symptoms of the left shoulder as well as some biceps tendinitis.  We discussed conservative treatment options with her.  With the patient leaving the area for the winter we discussed cortisone injections.  She elected to proceed with a subacromial cortisone injection to the left shoulder (see procedure note).  This appeared to provide her the most amount of relief today.  I do think that she may also benefit from a biceps tendon injection as well.  Demonstrated some home exercises for her.  A prescription for a Medrol Dosepak was also provided.  Explained to the patient should she return to the area after winter and continues to have left shoulder problems, recommend a follow-up with Dr. Gee for further evaluation and treatment options.  She understands the current treatment plan.  All questions were answered.         Discussed signs and symptoms of when to return to clinic including redness, fever, drainage, increase in pain, decrease in sensation, or with any questions or concerns.  If we are not open they are to go to urgent care or ER.  Office info provided.  They were agreeable with today's plan of care.    A voice recognition program was used to aid in documentation of this record. Sometimes  words are not printed exactly as they were spoken.  While efforts were made to carefully edit and correct any inaccuracies, some errors may be present; please take these into context.  Please contact the provider's office if you have any questions or concerns.     Return if symptoms worsen or fail to improve, for May follow up with Dr. Gee in the future if persistent left shoulder problems.     GENA Padilla  [facility]  [facility]

## 2020-11-30 NOTE — PROGRESS NOTES
Injection/Arthrocentesis Joint/Tendon/Bursa  Date/Time: 11/30/2020 12:24 PM  Performed by: GENA Padilla       Consent  Verbal consent was obtained from the patient. Written consent was obtained from the patient. Risks, benefits, and alternatives were discussed. Consent given by patient. The patient states understanding of the procedure being performed. Patient ID confirmed verbally with the patient.       Location Details  An injection was given to Luiza in her shoulder. The injection site was the L subacromial space.    Procedure Details   Patient was prepped and draped in the usual sterile fashion. 4 mL of Lidocaine 1% without epinephrine was used for local anesthesia.   A 22 gauge needle was inserted into the L subacromial space . L subacromial space was injected with 80 mg methylPREDNISolone acetate 80 mg/mL, lidocaine 10 mg/mL (1 %),  .     Post-Procedure  Patient tolerated the procedure well with no immediate complications. A standard bandage was applied. Advised patient to avoid strenous activity for 24-48 hours. Advised patient to use ice, NSAIDs or acetaminophen for pain as needed.

## 2020-12-02 ENCOUNTER — OFFICE VISIT (OUTPATIENT)
Dept: FAMILY MEDICINE | Facility: CLINIC | Age: 55
End: 2020-12-02
Payer: OTHER GOVERNMENT

## 2020-12-02 VITALS
BODY MASS INDEX: 26.07 KG/M2 | HEIGHT: 66 IN | DIASTOLIC BLOOD PRESSURE: 72 MMHG | RESPIRATION RATE: 16 BRPM | SYSTOLIC BLOOD PRESSURE: 138 MMHG | OXYGEN SATURATION: 97 % | HEART RATE: 53 BPM

## 2020-12-02 DIAGNOSIS — F41.8 SITUATIONAL ANXIETY: ICD-10-CM

## 2020-12-02 DIAGNOSIS — M79.7 FIBROMYALGIA: ICD-10-CM

## 2020-12-02 DIAGNOSIS — G43.909 MIGRAINE WITHOUT STATUS MIGRAINOSUS, NOT INTRACTABLE, UNSPECIFIED MIGRAINE TYPE: ICD-10-CM

## 2020-12-02 PROCEDURE — 99213 OFFICE O/P EST LOW 20 MIN: CPT | Performed by: FAMILY MEDICINE

## 2020-12-02 RX ORDER — BUTALBITAL, ACETAMINOPHEN AND CAFFEINE 50; 325; 40 MG/1; MG/1; MG/1
1 TABLET ORAL EVERY 4 HOURS PRN
Qty: 30 TABLET | Refills: 1 | Status: SHIPPED | OUTPATIENT
Start: 2020-12-02 | End: 2021-01-14

## 2020-12-02 RX ORDER — FLUOXETINE HYDROCHLORIDE 20 MG/1
20 CAPSULE ORAL DAILY
Qty: 90 CAPSULE | Refills: 1 | Status: SHIPPED | OUTPATIENT
Start: 2020-12-02 | End: 2021-03-04

## 2020-12-02 RX ORDER — ALPRAZOLAM 0.5 MG/1
0.5 TABLET ORAL 2 TIMES DAILY PRN
Qty: 60 TABLET | Refills: 5 | Status: SHIPPED | OUTPATIENT
Start: 2020-12-02 | End: 2021-04-28 | Stop reason: SDUPTHER

## 2020-12-02 RX ORDER — BUTORPHANOL TARTRATE 10 MG/ML
SPRAY NASAL
Qty: 2.5 ML | Refills: 5 | OUTPATIENT
Start: 2020-12-02

## 2020-12-02 RX ORDER — TIZANIDINE 4 MG/1
4 TABLET ORAL EVERY 12 HOURS PRN
Qty: 180 TABLET | Refills: 3 | Status: SHIPPED | OUTPATIENT
Start: 2020-12-02 | End: 2021-04-28 | Stop reason: SDUPTHER

## 2020-12-02 RX ORDER — BUTORPHANOL TARTRATE 10 MG/ML
1 SPRAY NASAL EVERY 6 HOURS PRN
Qty: 2.5 ML | Refills: 1 | Status: SHIPPED | OUTPATIENT
Start: 2020-12-02 | End: 2020-12-15 | Stop reason: SDUPTHER

## 2020-12-02 NOTE — PROGRESS NOTES
IMPRESSION AND PLAN  Diagnoses and all orders for this visit:    Situational anxiety  -     ALPRAZolam (XANAX) 0.5 mg tablet; Take 1 tablet (0.5 mg total) by mouth 2 (two) times a day as needed for anxiety Max Daily Amount: 1 mg  -     FLUoxetine (PROzac) 20 mg capsule; Take 1 capsule (20 mg total) by mouth daily Indications: Situational affective disorder    Migraine without status migrainosus, not intractable, unspecified migraine type  -     butalbital-acetaminophen-caff (FIORICET, ESGIC) -40 mg; Take 1 tablet by mouth every 4 (four) hours as needed for headaches  -     butorphanol (STADOL) 10 mg/mL nasal spray; Administer 1 spray into one nostril every 6 (six) hours as needed for pain scale 8-10/10 Max Daily Amount: 4 sprays    Fibromyalgia  -     tiZANidine (ZANAFLEX) 4 mg tablet; Take 1 tablet (4 mg total) by mouth every 12 (twelve) hours as needed for muscle spasms    Luiza is a 55-year-old comes in today for follow-up of her chronic conditions.  She is going to [place] with her  tomorrow.  She has a history of anxiety that gotten worse with her  being diagnosed with cancer.  She is on alprazolam 0.5 mg twice daily as needed for that as well as fluoxetine 20 mg daily and that is been helpful for her.  We will refill her migraine medications as well as listed above.  They have overall been well controlled but she does not want to be without them for the winter.  She also is endorsing some need for extra dose of tizanidine to help with her shoulder pain that she has had.  She is taking 1 tablet daily and so I will make that 1 tablet twice daily as needed.  She will follow-up when she gets back from [place].    HPI  Chief complaint for visit per nursing.       Chief Complaint   Patient presents with   • Med Refill         Luiza is a 55 y.o. who is coming in today for medication refills.  She has a history of anxiety for which she is on alprazolam and fluoxetine.  Her  was recently  diagnosed with cancer that made her anxiety even worse so now she is taking 1 tablet of alprazolam twice a day.  Her fluoxetine seems to work well for her 20 mg daily.  Her and her  are leaving for [place] for the winter tomorrow.  She needs a refill of her medications that is why she is here today.  Her migraines have been pretty well controlled however she would like prescription for some Fioricet and Stadol before they leave so she has that.  Also has been having issues with her right shoulder for which she saw the orthopedic urgent care clinic they did a corticosteroid injection give her a Medrol Dosepak.  She is wondering if she can take some tizanidine to help with this as well.  She has been taking the once daily but is wondering she can take it maybe twice daily.    PROBLEM LIST  Patient Active Problem List   Diagnosis   • Situational anxiety   • Other insomnia   • Fibromyalgia   • Diverticulitis         SOCIAL HX  Social History     Socioeconomic History   • Marital status:      Spouse name: Not on file   • Number of children: Not on file   • Years of education: Not on file   • Highest education level: Not on file   Occupational History   • Not on file   Social Needs   • Financial resource strain: Not on file   • Food insecurity     Worry: Not on file     Inability: Not on file   • Transportation needs     Medical: Not on file     Non-medical: Not on file   Tobacco Use   • Smoking status: Never Smoker   • Smokeless tobacco: Never Used   Substance and Sexual Activity   • Alcohol use: Yes     Comment: 1 every 2 wks   • Drug use: No   • Sexual activity: Yes   Lifestyle   • Physical activity     Days per week: Not on file     Minutes per session: Not on file   • Stress: Not on file   Relationships   • Social connections     Talks on phone: Not on file     Gets together: Not on file     Attends Pent[employer]al service: Not on file     Active member of club or organization: Not on file     Attends  "meetings of clubs or organizations: Not on file     Relationship status: Not on file   • Intimate partner violence     Fear of current or ex partner: Not on file     Emotionally abused: Not on file     Physically abused: Not on file     Forced sexual activity: Not on file   Other Topics Concern   • Not on file   Social History Narrative    Retired nurse.  Now works with horses.       FAMILY HX  Family History   Problem Relation Age of Onset   • Colon cancer Maternal Grandmother    • Ovarian cancer Maternal Grandmother    • Bone cancer Paternal Grandmother    • Hypertension Other    • Breast cancer Other    • Lung cancer Other        ROS  12 point review of systems performed is negative unless otherwise mentioned per HPI.    Physical Exam  /72   Pulse 53   Resp 16   Ht 1.676 m (5' 6\")   SpO2 97%   BMI 26.07 kg/m²   BP Readings from Last 3 Encounters:   12/02/20 138/72   11/30/20 146/54   10/06/20 138/86       Physical Exam  Vitals signs and nursing note reviewed.   Constitutional:       General: She is not in acute distress.  HENT:      Head: Normocephalic and atraumatic.      Nose: Nose normal.      Mouth/Throat:      Mouth: Mucous membranes are moist.   Eyes:      Extraocular Movements: Extraocular movements intact.   Neck:      Musculoskeletal: Neck supple.   Cardiovascular:      Rate and Rhythm: Normal rate and regular rhythm.   Pulmonary:      Effort: Pulmonary effort is normal.      Breath sounds: Normal breath sounds.   Abdominal:      General: Bowel sounds are normal.      Palpations: Abdomen is soft.      Tenderness: There is no abdominal tenderness.   Skin:     General: Skin is warm.   Neurological:      Mental Status: She is alert and oriented to person, place, and time.   Psychiatric:         Mood and Affect: Mood normal.            LABS AND XRAYS  Lab Results   Component Value Date    GLUCOSE 100 08/06/2020    CALCIUM 9.3 08/06/2020     08/06/2020    K 3.8 08/06/2020    CO2 27 08/06/2020 "     08/06/2020    BUN 7 08/06/2020    CREATININE 0.62 08/06/2020    ANIONGAP 9 08/06/2020     Lab Results   Component Value Date    WBC 5.7 08/08/2020    HGB 12.0 08/08/2020    HCT 34.5 08/08/2020    MCV 92.8 08/08/2020     08/08/2020     Lab Results   Component Value Date    TSH 2.559 09/14/2019     No results found for: HGBA1C  Lab Results   Component Value Date    CREATININE 0.62 08/06/2020     Lab Results   Component Value Date    SEDRATE 8 08/06/2020     No results found for: URACSRM  No results found for: AMYLASE  Lab Results   Component Value Date    LIPASE 11 08/06/2020         X-ray Shoulder 2 Or More Views Left    Result Date: 11/30/2020  Narrative: Exam: DX SHOULDER 2 OR MORE VW LEFT from 11/30/2020 1146 hours Clinical History: Chronic left shoulder pain; Chronic left shoulder pain; shoulder pain Procedure/Views: 3 views Comparison/s: None Findings: The left glenohumeral and acromioclavicular joints are unremarkable. No fracture or dislocation is present. Soft tissues are unremarkable.     Impression: IMPRESSION: Negative left shoulder series.      Procedures     MEDICATIONS    Current Outpatient Medications:   •  tiZANidine (ZANAFLEX) 4 mg tablet, Take 1 tablet (4 mg total) by mouth every 12 (twelve) hours as needed for muscle spasms, Disp: 180 tablet, Rfl: 3  •  gabapentin (NEURONTIN) 100 mg capsule, TAKE 1 CAPSULE (100 MG TOTAL) BY MOUTH 4 (FOUR) TIMES A DAY, Disp: 360 capsule, Rfl: 1  •  methylPREDNISolone (MEDROL DOSEPACK) 4 mg tablet, Take 1 tablet (4 mg total) by mouth See administration instructions for 6 days Use as directed by package instructions, Disp: 1 DosePak, Rfl: 0  •  zolpidem (AMBIEN) 10 mg tablet, TAKE ONE TABLET BY MOUTH NIGHTLY AS NEEDED FOR SLEEP, Disp: 90 tablet, Rfl: 1  •  prazosin (Minipress) 1 mg capsule, Take 1 capsule (1 mg total) by mouth nightly, Disp: 30 capsule, Rfl: 11  •  lubiprostone (Amitiza) 8 mcg capsule, Take 1 capsule by mouth 2 (two) times a day as  needed, Disp: , Rfl:   •  ondansetron ODT (ZOFRAN-ODT) 4 mg disintegrating tablet, Take 1 tablet (4 mg total) by mouth every 8 (eight) hours as needed for nausea or vomiting, Disp: 30 tablet, Rfl: 1  •  ALPRAZolam (XANAX) 0.5 mg tablet, Take 1 tablet (0.5 mg total) by mouth 2 (two) times a day as needed for anxiety Max Daily Amount: 1 mg, Disp: 60 tablet, Rfl: 5  •  FLUoxetine (PROzac) 20 mg capsule, Take 1 capsule (20 mg total) by mouth daily Indications: Situational affective disorder, Disp: 90 capsule, Rfl: 1  •  butalbital-acetaminophen-caff (FIORICET, ESGIC) -40 mg, Take 1 tablet by mouth every 4 (four) hours as needed for headaches, Disp: 30 tablet, Rfl: 1  •  butorphanol (STADOL) 10 mg/mL nasal spray, Administer 1 spray into one nostril every 6 (six) hours as needed for pain scale 8-10/10 Max Daily Amount: 4 sprays, Disp: 2.5 mL, Rfl: 1      SEE ABOVE FOR IMPRESSION AND PLAN    Tiffany Lacy MD  12/02/20  3:18 PM

## 2020-12-14 DIAGNOSIS — G43.909 MIGRAINE WITHOUT STATUS MIGRAINOSUS, NOT INTRACTABLE, UNSPECIFIED MIGRAINE TYPE: ICD-10-CM

## 2020-12-14 RX ORDER — BUTORPHANOL TARTRATE 10 MG/ML
1 SPRAY NASAL EVERY 6 HOURS PRN
Qty: 2.5 ML | Refills: 1 | Status: CANCELLED | OUTPATIENT
Start: 2020-12-14

## 2020-12-15 ENCOUNTER — TELEPHONE - BILLABLE (OUTPATIENT)
Dept: FAMILY MEDICINE | Facility: CLINIC | Age: 55
End: 2020-12-15
Payer: OTHER GOVERNMENT

## 2020-12-15 DIAGNOSIS — G43.909 MIGRAINE WITHOUT STATUS MIGRAINOSUS, NOT INTRACTABLE, UNSPECIFIED MIGRAINE TYPE: ICD-10-CM

## 2020-12-15 DIAGNOSIS — R11.0 NAUSEA: ICD-10-CM

## 2020-12-15 PROCEDURE — 99441 *INACTIVE DO NOT USE* PR PHYS/QHP TELEPHONE EVALUATION 5-10 MIN: CPT | Mod: GT | Performed by: FAMILY MEDICINE

## 2020-12-15 RX ORDER — ONDANSETRON 4 MG/1
4 TABLET, ORALLY DISINTEGRATING ORAL EVERY 8 HOURS PRN
Qty: 30 TABLET | Refills: 1 | Status: SHIPPED | OUTPATIENT
Start: 2020-12-15 | End: 2021-06-23 | Stop reason: SDUPTHER

## 2020-12-15 RX ORDER — BUTORPHANOL TARTRATE 10 MG/ML
1 SPRAY NASAL EVERY 6 HOURS PRN
Qty: 2.5 ML | Refills: 1 | Status: SHIPPED | OUTPATIENT
Start: 2020-12-15 | End: 2021-01-21 | Stop reason: SDUPTHER

## 2020-12-15 NOTE — PROGRESS NOTES
Subjective   Per discussion with Tiffany Lacy MD, Luiza, has verbally consented to be treated via a telephone based visit: Yes. A total of 5 minutes were required for this telephone based visit.   Patient Location: Home  Provider Location: Clinic  Technology used by Provider: Phone    HPI  Luiza Tay is a 55 y.o. female who presents for migraine .  Nausea-3 days with associated headache  She thinks she's triggered by the weather  Is in [place] right now.  She states she states that during the day it is 70 degrees and then at night it falls to 30 degrees.  She states that she thought she could transfer her prescriptions from [facility] in [facility] to the [facility] in [place] but she is not able to with the Stadol.  She is also been using Zofran to help with her nausea.  No fevers.  No chills.  No vomiting.  No other symptoms.      HPI    The following have been reviewed and updated as appropriate in this visit:    Allergies   Allergen Reactions   • Hydromorphone Hives   • Meperidine      cause migraines   • Trazodone      nightmares   • Clindamycin Rash   • Desvenlafaxine Succinate      Other reaction(s): Headache   • Morphine      Other reaction(s): Headache   • Sumatriptan Rash     Current Outpatient Medications   Medication Sig Dispense Refill   • butorphanol (STADOL) 10 mg/mL nasal spray Administer 1 spray into one nostril every 6 (six) hours as needed for pain scale 8-10/10 Max Daily Amount: 4 sprays 2.5 mL 1   • ondansetron ODT (ZOFRAN-ODT) 4 mg disintegrating tablet Take 1 tablet (4 mg total) by mouth every 8 (eight) hours as needed for nausea or vomiting 30 tablet 1   • ALPRAZolam (XANAX) 0.5 mg tablet Take 1 tablet (0.5 mg total) by mouth 2 (two) times a day as needed for anxiety Max Daily Amount: 1 mg 60 tablet 5   • FLUoxetine (PROzac) 20 mg capsule Take 1 capsule (20 mg total) by mouth daily Indications: Situational affective disorder 90 capsule 1   • butalbital-acetaminophen-caff (FIORICET, ESGIC)  -40 mg Take 1 tablet by mouth every 4 (four) hours as needed for headaches 30 tablet 1   • tiZANidine (ZANAFLEX) 4 mg tablet Take 1 tablet (4 mg total) by mouth every 12 (twelve) hours as needed for muscle spasms 180 tablet 3   • gabapentin (NEURONTIN) 100 mg capsule TAKE 1 CAPSULE (100 MG TOTAL) BY MOUTH 4 (FOUR) TIMES A  capsule 1   • zolpidem (AMBIEN) 10 mg tablet TAKE ONE TABLET BY MOUTH NIGHTLY AS NEEDED FOR SLEEP 90 tablet 1   • prazosin (Minipress) 1 mg capsule Take 1 capsule (1 mg total) by mouth nightly 30 capsule 11   • lubiprostone (Amitiza) 8 mcg capsule Take 1 capsule by mouth 2 (two) times a day as needed       No current facility-administered medications for this visit.      Past Medical History:   Diagnosis Date   • Appendicitis    • Diverticulitis    • Fibromyalgia 5/3/2018   • Gallstones    • Headache    • History of migraine 9/14/2017   • History of migraine 9/14/2017   • Insomnia    • Migraines    • Muscle tightness    • Other insomnia 5/3/2018   • Situational anxiety 05/03/2018    after a trauma riding horses   • Traumatic closed displaced fracture of shaft of humerus with routine healing, right 5/16/2019     Past Surgical History:   Procedure Laterality Date   • APPENDECTOMY     • BREAST SURGERY      reduction   • BREAST SURGERY Bilateral     reduction   • CHOLECYSTECTOMY     • COLONOSCOPY  03/2018    no polyps   • FOOT SURGERY Right 2009    Screws   • HERNIA REPAIR     • HUMERUS SURGERY Right 2013    plate/screws   • HYSTERECTOMY      endometriosis   • ORIF WRIST FRACTURE Right 7/25/2018    Procedure: RIGHT ORIF WRIST;  Surgeon: Saad Zuniga MD;  Location: [facility];  Service: Orthopedics;  Laterality: Right;   • ORTHOPEDIC SURGERY     • SHOULDER SURGERY     • SIGMOIDECTOMY  2016    diverticular ds   • TONSILLECTOMY AND ADENOIDECTOMY     • UPPER EXTREMITY DEBRIDEMENT Right 7/25/2018    Procedure: irrigation and debridement of right upper extremity and all indicated procedures;   Surgeon: Saad Zuniga MD;  [facility];  Service: Orthopedics;  Laterality: Right;     Family History   Problem Relation Age of Onset   • Colon cancer Maternal Grandmother    • Ovarian cancer Maternal Grandmother    • Bone cancer Paternal Grandmother    • Hypertension Other    • Breast cancer Other    • Lung cancer Other      Social History     Occupational History   • Not on file   Tobacco Use   • Smoking status: Never Smoker   • Smokeless tobacco: Never Used   Substance and Sexual Activity   • Alcohol use: Yes     Comment: 1 every 2 wks   • Drug use: No   • Sexual activity: Yes   Social History Narrative    Retired nurse.  Now works with horses.       Review of Systems   12 point ROS performed that was negative unless otherwise mentioned per HPI.    Objective   Physical Exam    Assessment/Plan   Diagnoses and all orders for this visit:    Migraine without status migrainosus, not intractable, unspecified migraine type  -     butorphanol (STADOL) 10 mg/mL nasal spray; Administer 1 spray into one nostril every 6 (six) hours as needed for pain scale 8-10/10 Max Daily Amount: 4 sprays    Nausea  -     ondansetron ODT (ZOFRAN-ODT) 4 mg disintegrating tablet; Take 1 tablet (4 mg total) by mouth every 8 (eight) hours as needed for nausea or vomiting    3 days of migraine associated with nausea since being in [place].  Patient thinks it is triggered by the weather.  She needs her Stadol sent to the pharmacy in [place] so that was done with her along with some Zofran to help her nausea.  She will follow-up if she has worsening or persistent symptoms.  She endorsed good understanding.

## 2021-01-14 DIAGNOSIS — G43.909 MIGRAINE WITHOUT STATUS MIGRAINOSUS, NOT INTRACTABLE, UNSPECIFIED MIGRAINE TYPE: ICD-10-CM

## 2021-01-14 RX ORDER — BUTALBITAL, ACETAMINOPHEN AND CAFFEINE 50; 325; 40 MG/1; MG/1; MG/1
1 TABLET ORAL EVERY 4 HOURS PRN
Qty: 30 TABLET | Refills: 1 | Status: SHIPPED | OUTPATIENT
Start: 2021-01-14 | End: 2021-05-26 | Stop reason: SDUPTHER

## 2021-01-18 DIAGNOSIS — G43.909 MIGRAINE WITHOUT STATUS MIGRAINOSUS, NOT INTRACTABLE, UNSPECIFIED MIGRAINE TYPE: ICD-10-CM

## 2021-01-18 RX ORDER — BUTORPHANOL TARTRATE 10 MG/ML
SPRAY NASAL
Qty: 2.5 ML | OUTPATIENT
Start: 2021-01-18

## 2021-01-21 DIAGNOSIS — G43.909 MIGRAINE WITHOUT STATUS MIGRAINOSUS, NOT INTRACTABLE, UNSPECIFIED MIGRAINE TYPE: ICD-10-CM

## 2021-01-21 RX ORDER — BUTORPHANOL TARTRATE 10 MG/ML
1 SPRAY NASAL EVERY 6 HOURS PRN
Qty: 2.5 ML | Refills: 0 | Status: SHIPPED | OUTPATIENT
Start: 2021-01-21 | End: 2021-05-25

## 2021-01-21 NOTE — TELEPHONE ENCOUNTER
Patient states she is having daily migraines first thing when she wakes up in the morning. Refill for her stadol nasal spray sent to  for signature.  She will call and schedule an appointment if further refills are needed.

## 2021-02-17 ENCOUNTER — TELEPHONE (OUTPATIENT)
Dept: FAMILY MEDICINE | Facility: CLINIC | Age: 56
End: 2021-02-17

## 2021-02-17 NOTE — TELEPHONE ENCOUNTER
Patient called up and is struggling with migraines and wanted a telephone visit, so I have her set up tomorrow for that video call and just wanted to let you know.  She is aware it is your ER day, but she is really struggling and wanted in today but there was no room.

## 2021-03-04 DIAGNOSIS — F41.8 SITUATIONAL ANXIETY: ICD-10-CM

## 2021-03-04 RX ORDER — FLUOXETINE HYDROCHLORIDE 20 MG/1
20 CAPSULE ORAL DAILY
Qty: 90 CAPSULE | Refills: 1 | Status: SHIPPED | OUTPATIENT
Start: 2021-03-04 | End: 2021-05-26 | Stop reason: ALTCHOICE

## 2021-03-19 DIAGNOSIS — G47.09 OTHER INSOMNIA: ICD-10-CM

## 2021-03-22 RX ORDER — ZOLPIDEM TARTRATE 10 MG/1
10 TABLET ORAL NIGHTLY PRN
Qty: 90 TABLET | Refills: 1 | Status: SHIPPED | OUTPATIENT
Start: 2021-03-22 | End: 2021-04-28 | Stop reason: SDUPTHER

## 2021-04-27 ENCOUNTER — TELEPHONE (OUTPATIENT)
Dept: FAMILY MEDICINE | Facility: CLINIC | Age: 56
End: 2021-04-27

## 2021-04-27 NOTE — TELEPHONE ENCOUNTER
STAT....    Patient called and Gerber Coffey sent her prescriptions to [place] and she is not going to be in [place].    Patient advised she has sent messages and needs a nurse to call to discuss.

## 2021-04-28 ENCOUNTER — TELEPHONE (OUTPATIENT)
Dept: FAMILY MEDICINE | Facility: CLINIC | Age: 56
End: 2021-04-28

## 2021-04-28 DIAGNOSIS — F41.8 SITUATIONAL ANXIETY: ICD-10-CM

## 2021-04-28 DIAGNOSIS — F51.5 NIGHTMARES: ICD-10-CM

## 2021-04-28 DIAGNOSIS — M79.7 FIBROMYALGIA: ICD-10-CM

## 2021-04-28 DIAGNOSIS — G47.09 OTHER INSOMNIA: ICD-10-CM

## 2021-04-28 RX ORDER — GABAPENTIN 100 MG/1
100 CAPSULE ORAL 4 TIMES DAILY
Qty: 120 CAPSULE | Refills: 0 | Status: SHIPPED | OUTPATIENT
Start: 2021-04-28 | End: 2021-05-26 | Stop reason: SDUPTHER

## 2021-04-28 RX ORDER — TIZANIDINE 4 MG/1
4 TABLET ORAL EVERY 12 HOURS PRN
Qty: 60 TABLET | Refills: 0 | Status: SHIPPED | OUTPATIENT
Start: 2021-04-28 | End: 2021-07-29

## 2021-04-28 RX ORDER — PRAZOSIN HYDROCHLORIDE 1 MG/1
1 CAPSULE ORAL NIGHTLY
Qty: 30 CAPSULE | Refills: 0 | Status: SHIPPED | OUTPATIENT
Start: 2021-04-28 | End: 2021-10-06 | Stop reason: ALTCHOICE

## 2021-04-28 RX ORDER — ALPRAZOLAM 0.5 MG/1
0.5 TABLET ORAL 2 TIMES DAILY PRN
Qty: 60 TABLET | Refills: 0 | Status: SHIPPED | OUTPATIENT
Start: 2021-04-28 | End: 2021-05-26 | Stop reason: SDUPTHER

## 2021-04-28 RX ORDER — ZOLPIDEM TARTRATE 10 MG/1
10 TABLET ORAL NIGHTLY PRN
Qty: 30 TABLET | Refills: 0 | Status: SHIPPED | OUTPATIENT
Start: 2021-04-28 | End: 2021-05-26 | Stop reason: SDUPTHER

## 2021-04-28 NOTE — TELEPHONE ENCOUNTER
Patient is requesting a nurse to call when available. She mentioned Olga has been messaging with her through [employer]. But she would like a call if possible. Thanks

## 2021-05-18 DIAGNOSIS — F41.8 SITUATIONAL ANXIETY: ICD-10-CM

## 2021-05-18 RX ORDER — ALPRAZOLAM 0.5 MG/1
TABLET ORAL
Qty: 60 TABLET | Refills: 5 | OUTPATIENT
Start: 2021-05-18

## 2021-05-22 DIAGNOSIS — G43.909 MIGRAINE WITHOUT STATUS MIGRAINOSUS, NOT INTRACTABLE, UNSPECIFIED MIGRAINE TYPE: ICD-10-CM

## 2021-05-25 RX ORDER — BUTORPHANOL TARTRATE 10 MG/ML
SPRAY NASAL
Qty: 2.5 ML | Refills: 5 | Status: SHIPPED | OUTPATIENT
Start: 2021-05-25 | End: 2021-08-12

## 2021-05-26 ENCOUNTER — OFFICE VISIT (OUTPATIENT)
Dept: FAMILY MEDICINE | Facility: CLINIC | Age: 56
End: 2021-05-26
Payer: OTHER GOVERNMENT

## 2021-05-26 VITALS
BODY MASS INDEX: 25.39 KG/M2 | RESPIRATION RATE: 16 BRPM | SYSTOLIC BLOOD PRESSURE: 134 MMHG | HEIGHT: 66 IN | WEIGHT: 158 LBS | DIASTOLIC BLOOD PRESSURE: 92 MMHG | OXYGEN SATURATION: 98 % | HEART RATE: 68 BPM

## 2021-05-26 DIAGNOSIS — J30.9 ALLERGIC RHINITIS, UNSPECIFIED SEASONALITY, UNSPECIFIED TRIGGER: Primary | ICD-10-CM

## 2021-05-26 DIAGNOSIS — F51.5 NIGHTMARES: ICD-10-CM

## 2021-05-26 DIAGNOSIS — G47.09 OTHER INSOMNIA: ICD-10-CM

## 2021-05-26 DIAGNOSIS — M79.7 FIBROMYALGIA: ICD-10-CM

## 2021-05-26 DIAGNOSIS — G43.909 MIGRAINE WITHOUT STATUS MIGRAINOSUS, NOT INTRACTABLE, UNSPECIFIED MIGRAINE TYPE: ICD-10-CM

## 2021-05-26 DIAGNOSIS — F41.8 SITUATIONAL ANXIETY: ICD-10-CM

## 2021-05-26 PROCEDURE — 96372 THER/PROPH/DIAG INJ SC/IM: CPT | Performed by: FAMILY MEDICINE

## 2021-05-26 PROCEDURE — 99214 OFFICE O/P EST MOD 30 MIN: CPT | Mod: 25 | Performed by: FAMILY MEDICINE

## 2021-05-26 RX ORDER — ZOLPIDEM TARTRATE 10 MG/1
10 TABLET ORAL NIGHTLY PRN
Qty: 30 TABLET | Refills: 5 | Status: SHIPPED | OUTPATIENT
Start: 2021-05-26 | End: 2021-10-06 | Stop reason: SDUPTHER

## 2021-05-26 RX ORDER — PRAZOSIN HYDROCHLORIDE 1 MG/1
1 CAPSULE ORAL NIGHTLY
Qty: 30 CAPSULE | Refills: 5 | Status: CANCELLED | OUTPATIENT
Start: 2021-05-26 | End: 2022-05-26

## 2021-05-26 RX ORDER — ALPRAZOLAM 0.5 MG/1
0.5 TABLET ORAL 2 TIMES DAILY PRN
Qty: 60 TABLET | Refills: 0 | Status: SHIPPED | OUTPATIENT
Start: 2021-05-26 | End: 2021-06-23 | Stop reason: SDUPTHER

## 2021-05-26 RX ORDER — BUTALBITAL, ACETAMINOPHEN AND CAFFEINE 50; 325; 40 MG/1; MG/1; MG/1
1 TABLET ORAL EVERY 4 HOURS PRN
Qty: 60 TABLET | Refills: 0 | Status: SHIPPED | OUTPATIENT
Start: 2021-05-26 | End: 2021-10-25 | Stop reason: SDUPTHER

## 2021-05-26 RX ORDER — ESCITALOPRAM OXALATE 10 MG/1
10 TABLET ORAL DAILY
Qty: 30 TABLET | Refills: 1 | Status: SHIPPED | OUTPATIENT
Start: 2021-05-26 | End: 2021-06-23 | Stop reason: SDUPTHER

## 2021-05-26 RX ORDER — GABAPENTIN 100 MG/1
100 CAPSULE ORAL 4 TIMES DAILY
Qty: 120 CAPSULE | Refills: 2 | Status: SHIPPED | OUTPATIENT
Start: 2021-05-26 | End: 2021-08-02

## 2021-05-26 RX ORDER — TRIAMCINOLONE ACETONIDE 40 MG/ML
40 INJECTION, SUSPENSION INTRA-ARTICULAR; INTRAMUSCULAR ONCE
Status: COMPLETED | OUTPATIENT
Start: 2021-05-26 | End: 2021-05-26

## 2021-05-26 RX ORDER — BUTORPHANOL TARTRATE 10 MG/ML
SPRAY NASAL
Status: CANCELLED | OUTPATIENT
Start: 2021-05-26

## 2021-05-26 RX ADMIN — TRIAMCINOLONE ACETONIDE 40 MG: 40 INJECTION, SUSPENSION INTRA-ARTICULAR; INTRAMUSCULAR at 15:57

## 2021-05-26 NOTE — PROGRESS NOTES
There are no Patient Instructions on file for this visit.        IMPRESSION AND PLAN  Diagnoses and all orders for this visit:    Allergic rhinitis, unspecified seasonality, unspecified trigger  -     triamcinolone acetonide (KENALOG-40) 40 mg/mL injection 40 mg    Situational anxiety  -     ALPRAZolam (XANAX) 0.5 mg tablet; Take 1 tablet (0.5 mg total) by mouth 2 (two) times a day as needed for anxiety Max Daily Amount: 1 mg  -     escitalopram (Lexapro) 10 mg tablet; Take 1 tablet (10 mg total) by mouth daily    Migraine without status migrainosus, not intractable, unspecified migraine type  -     butalbital-acetaminophen-caff (FIORICET, ESGIC) -40 mg; Take 1 tablet by mouth every 4 (four) hours as needed for headaches    Other insomnia  -     zolpidem (AMBIEN) 10 mg tablet; Take 1 tablet (10 mg total) by mouth nightly as needed for sleep Max Daily Amount: 10 mg    Nightmares    Fibromyalgia  -     gabapentin (NEURONTIN) 100 mg capsule; Take 1 capsule (100 mg total) by mouth 4 (four) times a day    Luiza is a 56-year-old woman who presents today for follow-up of her chronic conditions.  Her and her  are back in town.  They spend the winter in [place].  She has had a lot of stress lately with his new diagnosis of prostate cancer although he is undergoing treatment and done well with that.  He does take alprazolam to help with her anxiety.  We will do a trial of some Lexapro today to see if that helps her as well.  We will refill her medications as above her chronic conditions.  These are thus far well controlled.  For her allergic rhinitis has become severe I am going to do a trial of a Kenalog shot here for her today.  To see if we can help her out.  She will follow up and see how she is doing with her Lexapro.    HPI  Chief complaint for visit per nursing.       Chief Complaint   Patient presents with   • Annual Exam         Luiza is a 56 y.o. who presents today for follow-up of her chronic  conditions.  She has a history of some situational anxiety insomnia.  She has some stress from taking care of her  was recently diagnosed with prostate cancer although that is going well for him now actually.  She does take alprazolam to help with her anxiety.  She also has a history of migraines for which she is on Fioricet and Stadol nasal spray.    She also takes zolpidem for insomnia.  She has been struggling a lot with her allergic rhinitis symptoms.  She is doing antihistamines at home.  She is wonder if there is a better option for her.  She is very much in outdoorsy person and does a lot with her horses outside.    PROBLEM LIST  Patient Active Problem List   Diagnosis   • Situational anxiety   • Other insomnia   • Fibromyalgia   • Diverticulitis         SOCIAL HX  Social History     Socioeconomic History   • Marital status:      Spouse name: Not on file   • Number of children: Not on file   • Years of education: Not on file   • Highest education level: Not on file   Occupational History   • Not on file   Tobacco Use   • Smoking status: Never Smoker   • Smokeless tobacco: Never Used   Substance and Sexual Activity   • Alcohol use: Yes     Comment: 1 every 2 wks   • Drug use: No   • Sexual activity: Yes   Other Topics Concern   • Not on file   Social History Narrative    Retired nurse.  Now works with horses.     Social Determinants of Health     Financial Resource Strain:    • Difficulty of Paying Living Expenses:    Food Insecurity:    • Worried About Running Out of Food in the Last Year:    • Ran Out of Food in the Last Year:    Transportation Needs:    • Lack of Transportation (Medical):    • Lack of Transportation (Non-Medical):    Physical Activity:    • Days of Exercise per Week:    • Minutes of Exercise per Session:    Stress:    • Feeling of Stress :    Social Connections:    • Frequency of Communication with Friends and Family:    • Frequency of Social Gatherings with Friends and  "Family:    • Attends Judaism Services:    • Active Member of Clubs or Organizations:    • Attends Club or Organization Meetings:    • Marital Status:    Intimate Partner Violence:    • Fear of Current or Ex-Partner:    • Emotionally Abused:    • Physically Abused:    • Sexually Abused:        FAMILY HX  Family History   Problem Relation Age of Onset   • Colon cancer Maternal Grandmother    • Ovarian cancer Maternal Grandmother    • Bone cancer Paternal Grandmother    • Hypertension Other    • Breast cancer Other    • Lung cancer Other        ROS  12 point review of systems performed is negative unless otherwise mentioned per HPI.    Physical Exam  /92   Pulse 68   Resp 16   Ht 1.676 m (5' 6\")   Wt 71.7 kg (158 lb)   SpO2 98%   BMI 25.50 kg/m²   BP Readings from Last 3 Encounters:   05/26/21 134/92   12/02/20 138/72   11/30/20 146/54       Physical Exam  Vitals and nursing note reviewed.   Constitutional:       General: She is not in acute distress.  HENT:      Head: Normocephalic and atraumatic.      Nose: Nose normal.      Mouth/Throat:      Mouth: Mucous membranes are moist.   Eyes:      Extraocular Movements: Extraocular movements intact.   Cardiovascular:      Rate and Rhythm: Normal rate and regular rhythm.      Heart sounds: Normal heart sounds.   Pulmonary:      Effort: Pulmonary effort is normal.      Breath sounds: Normal breath sounds.   Abdominal:      Palpations: Abdomen is soft.   Musculoskeletal:      Cervical back: Neck supple.   Skin:     General: Skin is warm.   Neurological:      Mental Status: She is alert and oriented to person, place, and time.   Psychiatric:         Mood and Affect: Mood normal.            LABS AND XRAYS  Lab Results   Component Value Date    GLUCOSE 100 08/06/2020    CALCIUM 9.3 08/06/2020     08/06/2020    K 3.8 08/06/2020    CO2 27 08/06/2020     08/06/2020    BUN 7 08/06/2020    CREATININE 0.62 08/06/2020    ANIONGAP 9 08/06/2020     Lab Results "   Component Value Date    WBC 5.7 08/08/2020    HGB 12.0 08/08/2020    HCT 34.5 08/08/2020    MCV 92.8 08/08/2020     08/08/2020     Lab Results   Component Value Date    TSH 2.559 09/14/2019     No results found for: HGBA1C  Lab Results   Component Value Date    CREATININE 0.62 08/06/2020     Lab Results   Component Value Date    SEDRATE 8 08/06/2020     No results found for: URACSRM  No results found for: AMYLASE  Lab Results   Component Value Date    LIPASE 11 08/06/2020         No results found.    Procedures     MEDICATIONS    Current Outpatient Medications:   •  ALPRAZolam (XANAX) 0.5 mg tablet, Take 1 tablet (0.5 mg total) by mouth 2 (two) times a day as needed for anxiety Max Daily Amount: 1 mg, Disp: 60 tablet, Rfl: 0  •  butalbital-acetaminophen-caff (FIORICET, ESGIC) -40 mg, Take 1 tablet by mouth every 4 (four) hours as needed for headaches, Disp: 60 tablet, Rfl: 0  •  zolpidem (AMBIEN) 10 mg tablet, Take 1 tablet (10 mg total) by mouth nightly as needed for sleep Max Daily Amount: 10 mg, Disp: 30 tablet, Rfl: 5  •  gabapentin (NEURONTIN) 100 mg capsule, Take 1 capsule (100 mg total) by mouth 4 (four) times a day, Disp: 120 capsule, Rfl: 2  •  butorphanol (STADOL) 10 mg/mL nasal spray, SPRAY INHALE 1 SPRAY INTO EACH NOSTRIL EVERY SIX HOURS AS NEEDED FOR PAIN, Disp: 2.5 mL, Rfl: 5  •  tiZANidine (ZANAFLEX) 4 mg tablet, Take 1 tablet (4 mg total) by mouth every 12 (twelve) hours as needed for muscle spasms, Disp: 60 tablet, Rfl: 0  •  prazosin (Minipress) 1 mg capsule, Take 1 capsule (1 mg total) by mouth nightly, Disp: 30 capsule, Rfl: 0  •  ondansetron ODT (ZOFRAN-ODT) 4 mg disintegrating tablet, Take 1 tablet (4 mg total) by mouth every 8 (eight) hours as needed for nausea or vomiting, Disp: 30 tablet, Rfl: 1  •  escitalopram (Lexapro) 10 mg tablet, Take 1 tablet (10 mg total) by mouth daily, Disp: 30 tablet, Rfl: 1  •  lubiprostone (Amitiza) 8 mcg capsule, Take 1 capsule by mouth 2 (two)  times a day as needed, Disp: , Rfl:       SEE ABOVE FOR IMPRESSION AND PLAN    Tiffany Lacy MD  06/14/21  2:27 PM

## 2021-06-09 ENCOUNTER — TELEPHONE (OUTPATIENT)
Dept: FAMILY MEDICINE | Facility: CLINIC | Age: 56
End: 2021-06-09

## 2021-06-09 NOTE — TELEPHONE ENCOUNTER
Patient has family come into town and unfortunately will not be able to make appt tomorrow. She would like to reschedule but Dr. Lacy's earliest availability would be 7/12. Is it possible to fit her in sooner? I saw 2 acute slots together on 6/23. Please advise. Thanks

## 2021-06-23 ENCOUNTER — OFFICE VISIT (OUTPATIENT)
Dept: FAMILY MEDICINE | Facility: CLINIC | Age: 56
End: 2021-06-23
Payer: OTHER GOVERNMENT

## 2021-06-23 VITALS
SYSTOLIC BLOOD PRESSURE: 108 MMHG | RESPIRATION RATE: 16 BRPM | HEART RATE: 48 BPM | DIASTOLIC BLOOD PRESSURE: 76 MMHG | OXYGEN SATURATION: 97 %

## 2021-06-23 DIAGNOSIS — F41.8 SITUATIONAL ANXIETY: ICD-10-CM

## 2021-06-23 DIAGNOSIS — M79.7 FIBROMYALGIA: Primary | ICD-10-CM

## 2021-06-23 DIAGNOSIS — G43.909 MIGRAINE WITHOUT STATUS MIGRAINOSUS, NOT INTRACTABLE, UNSPECIFIED MIGRAINE TYPE: ICD-10-CM

## 2021-06-23 DIAGNOSIS — R11.0 NAUSEA: ICD-10-CM

## 2021-06-23 PROCEDURE — A9270 NON-COVERED ITEM OR SERVICE: HCPCS | Performed by: FAMILY MEDICINE

## 2021-06-23 PROCEDURE — 99214 OFFICE O/P EST MOD 30 MIN: CPT | Mod: 25 | Performed by: FAMILY MEDICINE

## 2021-06-23 PROCEDURE — 96372 THER/PROPH/DIAG INJ SC/IM: CPT | Performed by: FAMILY MEDICINE

## 2021-06-23 RX ORDER — ESCITALOPRAM OXALATE 10 MG/1
TABLET ORAL
Qty: 30 TABLET | Refills: 1 | OUTPATIENT
Start: 2021-06-23

## 2021-06-23 RX ORDER — ESCITALOPRAM OXALATE 20 MG/1
20 TABLET ORAL DAILY
Qty: 90 TABLET | Refills: 0 | Status: SHIPPED | OUTPATIENT
Start: 2021-06-23 | End: 2021-09-21

## 2021-06-23 RX ORDER — ALPRAZOLAM 0.5 MG/1
0.5 TABLET ORAL 2 TIMES DAILY PRN
Qty: 60 TABLET | Refills: 0 | Status: SHIPPED | OUTPATIENT
Start: 2021-06-23 | End: 2021-07-21

## 2021-06-23 RX ORDER — DIPHENHYDRAMINE HYDROCHLORIDE 50 MG/ML
25 INJECTION INTRAMUSCULAR; INTRAVENOUS ONCE
Status: COMPLETED | OUTPATIENT
Start: 2021-06-23 | End: 2021-06-23

## 2021-06-23 RX ORDER — PROMETHAZINE HYDROCHLORIDE 50 MG/ML
25 INJECTION, SOLUTION INTRAMUSCULAR; INTRAVENOUS ONCE
Qty: 1 ML | Refills: 0
Start: 2021-06-23 | End: 2021-06-23

## 2021-06-23 RX ORDER — ONDANSETRON 4 MG/1
4 TABLET, ORALLY DISINTEGRATING ORAL EVERY 8 HOURS PRN
Qty: 30 TABLET | Refills: 0 | Status: SHIPPED | OUTPATIENT
Start: 2021-06-23 | End: 2021-08-02 | Stop reason: SDUPTHER

## 2021-06-23 RX ORDER — KETOROLAC TROMETHAMINE 30 MG/ML
60 INJECTION, SOLUTION INTRAMUSCULAR; INTRAVENOUS
Status: DISCONTINUED | OUTPATIENT
Start: 2021-06-23 | End: 2021-06-23

## 2021-06-23 RX ORDER — DICLOFENAC SODIUM 50 MG/1
50 TABLET, DELAYED RELEASE ORAL 2 TIMES DAILY PRN
Qty: 60 TABLET | Refills: 3 | Status: SHIPPED | OUTPATIENT
Start: 2021-06-23 | End: 2021-10-06 | Stop reason: SDUPTHER

## 2021-06-23 RX ORDER — ALPRAZOLAM 0.5 MG/1
TABLET ORAL
Qty: 60 TABLET | Refills: 0 | OUTPATIENT
Start: 2021-06-23

## 2021-06-23 RX ORDER — PROMETHAZINE HYDROCHLORIDE 25 MG/1
25 SUPPOSITORY RECTAL ONCE
Status: COMPLETED | OUTPATIENT
Start: 2021-06-23 | End: 2021-06-23

## 2021-06-23 RX ORDER — KETOROLAC TROMETHAMINE 30 MG/ML
60 INJECTION, SOLUTION INTRAMUSCULAR; INTRAVENOUS ONCE
Status: COMPLETED | OUTPATIENT
Start: 2021-06-23 | End: 2021-06-23

## 2021-06-23 RX ADMIN — DIPHENHYDRAMINE HYDROCHLORIDE 25 MG: 50 INJECTION INTRAMUSCULAR; INTRAVENOUS at 13:10

## 2021-06-23 RX ADMIN — PROMETHAZINE HYDROCHLORIDE 25 MG: 25 SUPPOSITORY RECTAL at 13:19

## 2021-06-23 RX ADMIN — KETOROLAC TROMETHAMINE 60 MG: 30 INJECTION, SOLUTION INTRAMUSCULAR; INTRAVENOUS at 13:17

## 2021-06-23 NOTE — PROGRESS NOTES
There are no Patient Instructions on file for this visit.        IMPRESSION AND PLAN  Diagnoses and all orders for this visit:    Fibromyalgia  -     diclofenac (VOLTAREN) 50 mg EC tablet; Take 1 tablet (50 mg total) by mouth 2 (two) times a day as needed (pain)    Situational anxiety  -     ALPRAZolam (XANAX) 0.5 mg tablet; Take 1 tablet (0.5 mg total) by mouth 2 (two) times a day as needed for anxiety Max Daily Amount: 1 mg  -     escitalopram (LEXAPRO) 20 mg tablet; Take 1 tablet (20 mg total) by mouth daily    Nausea  -     ondansetron ODT (ZOFRAN-ODT) 4 mg disintegrating tablet; Take 1 tablet (4 mg total) by mouth every 8 (eight) hours as needed for nausea or vomiting    Migraine without status migrainosus, not intractable, unspecified migraine type  -     diphenhydrAMINE (BENADRYL) injection 25 mg  -     promethazine (PHENERGAN) suppository 25 mg  -     ketorolac (TORADOL) injection 60 mg  -     promethazine (PHENERGAN) 50 mg/mL injection; Inject 0.5 mL (25 mg total) into the shoulder, thigh, or buttocks once for 1 dose Not in CAM.   NDC 4577-4327-86 Lot [id number] Exp 09/21    Luiza 56-year-old that presents for follow-up of her anxiety depression.  Will increase her Lexapro to 20 mg nightly hopefully to help her with sleep.  We will also start her on some diclofenac to help with her fibromyalgia pain.  She came in with an acute migraine and so we did give her a migraine cocktail here today.    HPI  Chief complaint for visit per nursing.       Chief Complaint   Patient presents with   • Headache         Luiza is a 56 y.o. who presents for follow-up of depression and anxiety.  We put her on 10 mg Lexapro at the last visit.  She states that is working very well for her.  She did have improvement in her insomnia when she first started it but now she feels like it still takes her little while to go to sleep although when she does get to sleep she feels refreshed in the morning and has more motivation to do the things  she normally likes to do.  She needs a refill of alprazolam for her anxiety.  She would like to see if she could go up on the Lexapro a little bit as well to see if that will help her with her sleep.  She did wake up with a migraine this morning.  She states that they have had some unruly campers at the horse camp and that is been a bit little bit stressful although her personal stress life has improved.  She is wondering if there is something she can do for the pain from her fibromyalgia is mainly in her bilateral hips after she is been very physically active that day or riding a lot.  [place].    PROBLEM LIST  Patient Active Problem List   Diagnosis   • Situational anxiety   • Other insomnia   • Fibromyalgia   • Diverticulitis         SOCIAL HX  Social History     Socioeconomic History   • Marital status:      Spouse name: Not on file   • Number of children: Not on file   • Years of education: Not on file   • Highest education level: Not on file   Occupational History   • Not on file   Tobacco Use   • Smoking status: Never Smoker   • Smokeless tobacco: Never Used   Substance and Sexual Activity   • Alcohol use: Yes     Comment: 1 every 2 wks   • Drug use: No   • Sexual activity: Yes   Other Topics Concern   • Not on file   Social History Narrative    Retired nurse.  Now works with horses.     Social Determinants of Health     Financial Resource Strain:    • Difficulty of Paying Living Expenses:    Food Insecurity:    • Worried About Running Out of Food in the Last Year:    • Ran Out of Food in the Last Year:    Transportation Needs:    • Lack of Transportation (Medical):    • Lack of Transportation (Non-Medical):    Physical Activity:    • Days of Exercise per Week:    • Minutes of Exercise per Session:    Stress:    • Feeling of Stress :    Social Connections:    • Frequency of Communication with Friends and Family:    • Frequency of Social Gatherings  with Friends and Family:    • Attends [employer] Services:    • Active Member of Clubs or Organizations:    • Attends Club or Organization Meetings:    • Marital Status:    Intimate Partner Violence:    • Fear of Current or Ex-Partner:    • Emotionally Abused:    • Physically Abused:    • Sexually Abused:        FAMILY HX  Family History   Problem Relation Age of Onset   • Colon cancer Maternal Grandmother    • Ovarian cancer Maternal Grandmother    • Bone cancer Paternal Grandmother    • Hypertension Other    • Breast cancer Other    • Lung cancer Other        ROS  12 point review of systems performed is negative unless otherwise mentioned per HPI.    Physical Exam  /76   Pulse 48   Resp 16   SpO2 97%   BP Readings from Last 3 Encounters:   06/23/21 108/76   05/26/21 134/92   12/02/20 138/72       Physical Exam  Vitals and nursing note reviewed.   HENT:      Head: Normocephalic and atraumatic.      Nose: Nose normal.      Mouth/Throat:      Mouth: Mucous membranes are moist.   Eyes:      Extraocular Movements: Extraocular movements intact.   Cardiovascular:      Rate and Rhythm: Normal rate.   Pulmonary:      Effort: Pulmonary effort is normal.      Breath sounds: Normal breath sounds.   Musculoskeletal:      Cervical back: Neck supple.   Skin:     General: Skin is warm.   Neurological:      Mental Status: She is alert and oriented to person, place, and time.   Psychiatric:         Mood and Affect: Mood normal.            LABS AND XRAYS  Lab Results   Component Value Date    GLUCOSE 100 08/06/2020    CALCIUM 9.3 08/06/2020     08/06/2020    K 3.8 08/06/2020    CO2 27 08/06/2020     08/06/2020    BUN 7 08/06/2020    CREATININE 0.62 08/06/2020    ANIONGAP 9 08/06/2020     Lab Results   Component Value Date    WBC 5.7 08/08/2020    HGB 12.0 08/08/2020    HCT 34.5 08/08/2020    MCV 92.8 08/08/2020     08/08/2020     Lab Results   Component Value Date    TSH 2.559 09/14/2019     No results  found for: HGBA1C  Lab Results   Component Value Date    CREATININE 0.62 08/06/2020     Lab Results   Component Value Date    SEDRATE 8 08/06/2020     No results found for: URACSRM  No results found for: AMYLASE  Lab Results   Component Value Date    LIPASE 11 08/06/2020         No results found.    Procedures     MEDICATIONS    Current Outpatient Medications:   •  ALPRAZolam (XANAX) 0.5 mg tablet, Take 1 tablet (0.5 mg total) by mouth 2 (two) times a day as needed for anxiety Max Daily Amount: 1 mg, Disp: 60 tablet, Rfl: 0  •  ondansetron ODT (ZOFRAN-ODT) 4 mg disintegrating tablet, Take 1 tablet (4 mg total) by mouth every 8 (eight) hours as needed for nausea or vomiting, Disp: 30 tablet, Rfl: 0  •  escitalopram (LEXAPRO) 20 mg tablet, Take 1 tablet (20 mg total) by mouth daily, Disp: 90 tablet, Rfl: 0  •  butalbital-acetaminophen-caff (FIORICET, ESGIC) -40 mg, Take 1 tablet by mouth every 4 (four) hours as needed for headaches, Disp: 60 tablet, Rfl: 0  •  zolpidem (AMBIEN) 10 mg tablet, Take 1 tablet (10 mg total) by mouth nightly as needed for sleep Max Daily Amount: 10 mg, Disp: 30 tablet, Rfl: 5  •  gabapentin (NEURONTIN) 100 mg capsule, Take 1 capsule (100 mg total) by mouth 4 (four) times a day, Disp: 120 capsule, Rfl: 2  •  butorphanol (STADOL) 10 mg/mL nasal spray, SPRAY INHALE 1 SPRAY INTO EACH NOSTRIL EVERY SIX HOURS AS NEEDED FOR PAIN, Disp: 2.5 mL, Rfl: 5  •  tiZANidine (ZANAFLEX) 4 mg tablet, Take 1 tablet (4 mg total) by mouth every 12 (twelve) hours as needed for muscle spasms, Disp: 60 tablet, Rfl: 0  •  diclofenac (VOLTAREN) 50 mg EC tablet, Take 1 tablet (50 mg total) by mouth 2 (two) times a day as needed (pain), Disp: 60 tablet, Rfl: 3  •  promethazine (PHENERGAN) 50 mg/mL injection, Inject 0.5 mL (25 mg total) into the shoulder, thigh, or buttocks once for 1 dose Not in CAM.  NDC 3116-9471-89 Lot [id number] Exp 09/21, Disp: 1 mL, Rfl: 0  •  prazosin (Minipress) 1 mg capsule, Take 1 capsule  (1 mg total) by mouth nightly, Disp: 30 capsule, Rfl: 0  •  lubiprostone (Amitiza) 8 mcg capsule, Take 1 capsule by mouth 2 (two) times a day as needed, Disp: , Rfl:       SEE ABOVE FOR IMPRESSION AND PLAN    Tiffany Lacy MD  06/23/21  1:23 PM

## 2021-07-03 ENCOUNTER — HOSPITAL ENCOUNTER (EMERGENCY)
Facility: HOSPITAL | Age: 56
Discharge: HOME | End: 2021-07-03
Attending: FAMILY MEDICINE
Payer: OTHER GOVERNMENT

## 2021-07-03 VITALS
TEMPERATURE: 97.5 F | DIASTOLIC BLOOD PRESSURE: 54 MMHG | SYSTOLIC BLOOD PRESSURE: 109 MMHG | OXYGEN SATURATION: 95 % | HEART RATE: 69 BPM | HEIGHT: 66 IN | RESPIRATION RATE: 18 BRPM | BODY MASS INDEX: 24.91 KG/M2 | WEIGHT: 155 LBS

## 2021-07-03 DIAGNOSIS — G43.909 MIGRAINE WITHOUT STATUS MIGRAINOSUS, NOT INTRACTABLE, UNSPECIFIED MIGRAINE TYPE: Primary | ICD-10-CM

## 2021-07-03 PROCEDURE — 96375 TX/PRO/DX INJ NEW DRUG ADDON: CPT

## 2021-07-03 PROCEDURE — 99283 EMERGENCY DEPT VISIT LOW MDM: CPT | Performed by: FAMILY MEDICINE

## 2021-07-03 PROCEDURE — 96361 HYDRATE IV INFUSION ADD-ON: CPT

## 2021-07-03 PROCEDURE — 2580000300 HC RX 258: Performed by: FAMILY MEDICINE

## 2021-07-03 PROCEDURE — 99284 EMERGENCY DEPT VISIT MOD MDM: CPT | Performed by: FAMILY MEDICINE

## 2021-07-03 PROCEDURE — 96374 THER/PROPH/DIAG INJ IV PUSH: CPT

## 2021-07-03 PROCEDURE — 6360000200 HC RX 636 W HCPCS (ALT 250 FOR IP): Performed by: FAMILY MEDICINE

## 2021-07-03 RX ORDER — DIPHENHYDRAMINE HYDROCHLORIDE 50 MG/ML
25 INJECTION INTRAMUSCULAR; INTRAVENOUS ONCE
Status: COMPLETED | OUTPATIENT
Start: 2021-07-03 | End: 2021-07-03

## 2021-07-03 RX ORDER — SODIUM CHLORIDE 9 MG/ML
1000 INJECTION, SOLUTION INTRAVENOUS ONCE
Status: COMPLETED | OUTPATIENT
Start: 2021-07-03 | End: 2021-07-03

## 2021-07-03 RX ORDER — KETOROLAC TROMETHAMINE 30 MG/ML
30 INJECTION, SOLUTION INTRAMUSCULAR; INTRAVENOUS ONCE
Status: COMPLETED | OUTPATIENT
Start: 2021-07-03 | End: 2021-07-03

## 2021-07-03 RX ORDER — PROCHLORPERAZINE EDISYLATE 5 MG/ML
10 INJECTION INTRAMUSCULAR; INTRAVENOUS ONCE
Status: COMPLETED | OUTPATIENT
Start: 2021-07-03 | End: 2021-07-03

## 2021-07-03 RX ADMIN — PROCHLORPERAZINE EDISYLATE 10 MG: 5 INJECTION INTRAMUSCULAR; INTRAVENOUS at 05:42

## 2021-07-03 RX ADMIN — DIPHENHYDRAMINE HYDROCHLORIDE 25 MG: 50 INJECTION INTRAMUSCULAR; INTRAVENOUS at 05:38

## 2021-07-03 RX ADMIN — KETOROLAC TROMETHAMINE 30 MG: 30 INJECTION, SOLUTION INTRAMUSCULAR; INTRAVENOUS at 05:33

## 2021-07-03 RX ADMIN — SODIUM CHLORIDE 1000 ML: 9 INJECTION, SOLUTION INTRAVENOUS at 06:05

## 2021-07-03 ASSESSMENT — ENCOUNTER SYMPTOMS
HEADACHES: 1
VOMITING: 1
PHOTOPHOBIA: 1
NAUSEA: 1

## 2021-07-03 NOTE — ED PROVIDER NOTES
HPI:  Chief Complaint   Patient presents with   • Migraine     started all day yesterday, worsening around 10pm   • Nausea     tried PO zofran did not help   • Vomiting       56-year-old female with a history of migraine presents today with acute migraine that started yesterday morning and has been progressively worsening.  Patient endorses associated symptoms of nausea and vomiting.  She attempted to take a Zofran but reports there is no significant improvement in her symptoms.  Patient reports she has had symptoms similar to this in the past but has not had an episode this severe for quite some time.    Per chart review, patient was last seen for migraine in the ER in 2019.  She was treated successfully with IV Reglan, Toradol, and Benadryl.          HISTORY:  Past Medical History:   Diagnosis Date   • Appendicitis    • Diverticulitis    • Fibromyalgia 5/3/2018   • Gallstones    • Headache    • History of migraine 9/14/2017   • History of migraine 9/14/2017   • Insomnia    • Migraines    • Muscle tightness    • Other insomnia 5/3/2018   • Situational anxiety 05/03/2018    after a trauma riding horses   • Traumatic closed displaced fracture of shaft of humerus with routine healing, right 5/16/2019       Past Surgical History:   Procedure Laterality Date   • APPENDECTOMY     • BREAST SURGERY      reduction   • BREAST SURGERY Bilateral     reduction   • CHOLECYSTECTOMY     • COLONOSCOPY  03/2018    no polyps   • FOOT SURGERY Right 2009    Screws   • HERNIA REPAIR     • HUMERUS SURGERY Right 2013    plate/screws   • HYSTERECTOMY      endometriosis   • ORIF WRIST FRACTURE Right 7/25/2018    Procedure: RIGHT ORIF WRIST;  Surgeon: Saad Zuniga MD;  Location: [facility];  Service: Orthopedics;  Laterality: Right;   • ORTHOPEDIC SURGERY     • SHOULDER SURGERY     • SIGMOIDECTOMY  2016    diverticular ds   • TONSILLECTOMY AND ADENOIDECTOMY     • UPPER EXTREMITY DEBRIDEMENT Right 7/25/2018    Procedure: irrigation and  debridement of right upper extremity and all indicated procedures;  Surgeon: Saad Zuniga MD;  Location: [facility] OR;  Service: Orthopedics;  Laterality: Right;       Family History   Problem Relation Age of Onset   • Colon cancer Maternal Grandmother    • Ovarian cancer Maternal Grandmother    • Bone cancer Paternal Grandmother    • Hypertension Other    • Breast cancer Other    • Lung cancer Other        Social History     Tobacco Use   • Smoking status: Never Smoker   • Smokeless tobacco: Never Used   Substance Use Topics   • Alcohol use: Yes     Comment: socially   • Drug use: No         ROS:  Review of Systems   Eyes: Positive for photophobia.   Gastrointestinal: Positive for nausea and vomiting.   Neurological: Positive for headaches.   All other systems reviewed and are negative.      PE:  ED Triage Vitals   Temp Heart Rate Resp BP SpO2   07/03/21 0435 07/03/21 0435 07/03/21 0435 07/03/21 0435 07/03/21 0435   36.4 °C (97.5 °F) 82 22 147/86 98 %      Temp Source Heart Rate Source Patient Position BP Location FiO2 (%)   07/03/21 0435 -- 07/03/21 0445 -- --   Oral  Head of bed 30 degrees or higher         Physical Exam  Vitals and nursing note reviewed.   Constitutional:       General: She is in acute distress.      Appearance: She is normal weight.   HENT:      Mouth/Throat:      Mouth: Mucous membranes are moist.      Pharynx: Oropharynx is clear. No oropharyngeal exudate.   Eyes:      Extraocular Movements: Extraocular movements intact.      Conjunctiva/sclera: Conjunctivae normal.      Pupils: Pupils are equal, round, and reactive to light.   Cardiovascular:      Pulses: Normal pulses.      Heart sounds: Normal heart sounds.   Pulmonary:      Effort: Pulmonary effort is normal.      Breath sounds: Normal breath sounds.   Skin:     General: Skin is warm.      Capillary Refill: Capillary refill takes 2 to 3 seconds.   Neurological:      Mental Status: She is alert and oriented to person, place, and time.       GCS: GCS eye subscore is 4. GCS verbal subscore is 5. GCS motor subscore is 6.      Cranial Nerves: Cranial nerves are intact.      Motor: Motor function is intact.      Gait: Gait is intact.         ED LABS:  Labs Reviewed - No data to display      ED IMAGES:  No orders to display       ED PROCEDURES:  Procedures    ED COURSE:   Patient was hemodynamically stable with no overt neurologic deficits noted throughout ED evaluation.  Patient was treated with IV Toradol, Benadryl, Compazine.  She was also given a fluid bolus as well.  Upon reevaluation, patient reported that her migraine significantly improved with treatment and felt safe for discharge home.  Discussed signs and symptoms that warrant emergent reevaluation, further information was placed under patient instructions.  Patient was in agreement with plan and had no further questions or concerns.       Sepsis Quality Bundle     MDM:  MDM    Final diagnoses:   [G43.909] Migraine without status migrainosus, not intractable, unspecified migraine type        Zully Cowart MD  07/03/21 0043

## 2021-07-06 DIAGNOSIS — F41.8 SITUATIONAL ANXIETY: ICD-10-CM

## 2021-07-06 RX ORDER — ALPRAZOLAM 0.5 MG/1
TABLET ORAL
Qty: 60 TABLET | Refills: 0 | OUTPATIENT
Start: 2021-07-06

## 2021-07-07 DIAGNOSIS — F41.8 SITUATIONAL ANXIETY: ICD-10-CM

## 2021-07-07 RX ORDER — ALPRAZOLAM 0.5 MG/1
TABLET ORAL
Qty: 60 TABLET | Refills: 0 | OUTPATIENT
Start: 2021-07-07

## 2021-07-08 ENCOUNTER — HOSPITAL ENCOUNTER (EMERGENCY)
Facility: HOSPITAL | Age: 56
Discharge: HOME | End: 2021-07-08
Attending: FAMILY MEDICINE
Payer: OTHER GOVERNMENT

## 2021-07-08 VITALS
HEART RATE: 75 BPM | RESPIRATION RATE: 17 BRPM | TEMPERATURE: 98.3 F | OXYGEN SATURATION: 97 % | WEIGHT: 152 LBS | SYSTOLIC BLOOD PRESSURE: 147 MMHG | HEIGHT: 66 IN | DIASTOLIC BLOOD PRESSURE: 66 MMHG | BODY MASS INDEX: 24.43 KG/M2

## 2021-07-08 DIAGNOSIS — G43.119 INTRACTABLE MIGRAINE WITH AURA WITHOUT STATUS MIGRAINOSUS: ICD-10-CM

## 2021-07-08 DIAGNOSIS — K08.89 PAIN, DENTAL: Primary | ICD-10-CM

## 2021-07-08 PROCEDURE — 96372 THER/PROPH/DIAG INJ SC/IM: CPT

## 2021-07-08 PROCEDURE — 6360000200 HC RX 636 W HCPCS (ALT 250 FOR IP): Performed by: FAMILY MEDICINE

## 2021-07-08 PROCEDURE — 2590000100 HC RX 259: Performed by: FAMILY MEDICINE

## 2021-07-08 PROCEDURE — 99282 EMERGENCY DEPT VISIT SF MDM: CPT

## 2021-07-08 PROCEDURE — 99284 EMERGENCY DEPT VISIT MOD MDM: CPT | Performed by: FAMILY MEDICINE

## 2021-07-08 PROCEDURE — 99282 EMERGENCY DEPT VISIT SF MDM: CPT | Performed by: FAMILY MEDICINE

## 2021-07-08 PROCEDURE — 6370000100 HC RX 637 (ALT 250 FOR IP): Performed by: FAMILY MEDICINE

## 2021-07-08 RX ORDER — DIPHENHYDRAMINE HYDROCHLORIDE 50 MG/ML
25 INJECTION INTRAMUSCULAR; INTRAVENOUS ONCE
Status: DISCONTINUED | OUTPATIENT
Start: 2021-07-08 | End: 2021-07-08

## 2021-07-08 RX ORDER — KETOROLAC TROMETHAMINE 30 MG/ML
30 INJECTION, SOLUTION INTRAMUSCULAR; INTRAVENOUS ONCE
Status: DISCONTINUED | OUTPATIENT
Start: 2021-07-08 | End: 2021-07-08

## 2021-07-08 RX ORDER — OXYCODONE AND ACETAMINOPHEN 5; 325 MG/1; MG/1
1 TABLET ORAL ONCE
Status: DISCONTINUED | OUTPATIENT
Start: 2021-07-08 | End: 2021-07-08 | Stop reason: HOSPADM

## 2021-07-08 RX ORDER — METOCLOPRAMIDE HYDROCHLORIDE 5 MG/ML
10 INJECTION INTRAMUSCULAR; INTRAVENOUS ONCE
Status: DISCONTINUED | OUTPATIENT
Start: 2021-07-08 | End: 2021-07-08

## 2021-07-08 RX ORDER — KETOROLAC TROMETHAMINE 30 MG/ML
30 INJECTION, SOLUTION INTRAMUSCULAR; INTRAVENOUS ONCE
Status: COMPLETED | OUTPATIENT
Start: 2021-07-08 | End: 2021-07-08

## 2021-07-08 RX ORDER — METOCLOPRAMIDE 10 MG/1
10 TABLET ORAL ONCE
Status: COMPLETED | OUTPATIENT
Start: 2021-07-08 | End: 2021-07-08

## 2021-07-08 RX ORDER — DIPHENHYDRAMINE HYDROCHLORIDE 12.5 MG/5ML
25 LIQUID ORAL ONCE
Status: COMPLETED | OUTPATIENT
Start: 2021-07-08 | End: 2021-07-08

## 2021-07-08 RX ADMIN — DIPHENHYDRAMINE HYDROCHLORIDE 25 MG: 12.5 SOLUTION ORAL at 18:32

## 2021-07-08 RX ADMIN — KETOROLAC TROMETHAMINE 30 MG: 30 INJECTION, SOLUTION INTRAMUSCULAR; INTRAVENOUS at 18:37

## 2021-07-08 RX ADMIN — METOCLOPRAMIDE 10 MG: 10 TABLET ORAL at 18:35

## 2021-07-09 NOTE — DISCHARGE INSTRUCTIONS
Your evaluated in the emergency department for migraine.  We gave you some Toradol Reglan and Benadryl for this.  I do think your migraine is probably being triggered by her dental pain.  We will send you home with some Percocet for this.  You can take 1 tablet every 4-6 hours as needed for severe pain.  Otherwise he can use Tylenol and ibuprofen as needed if it is less severe.  This medicine is constipating so be mindful of that and also do not drive or operate any heavy machinery after taking this medicine.

## 2021-07-09 NOTE — ED PROVIDER NOTES
EMERGENCY NOTE    Chief Complaint:  Chief Complaint   Patient presents with   • Dental Pain   • Headache         HPI:  Luiza Tay is a 56 y.o. female who has a history of migraines comes in today for another migraine and some dental pain for the last 3 days with a migraine lasting 1 day.  Patient states that she recently went to the dentist a couple days ago and in the right upper part of her mouth her back teeth she has couple teeth that are broken and also infected.  She was given amoxicillin for antibiotic for this.  She has persistent pain there.  She states they will not definitively take care of this with extraction or root canal until the end of the month.  Eating makes the pain worse.  She did recently come to the emergency department about 5 days ago for migraine and was treated with a migraine cocktail of Toradol Compazine and Benadryl along with 1 L IV fluid bolus.    HISTORY:  Past Medical History:   Diagnosis Date   • Appendicitis    • Diverticulitis    • Fibromyalgia 5/3/2018   • Gallstones    • Headache    • History of migraine 9/14/2017   • History of migraine 9/14/2017   • Insomnia    • Migraines    • Muscle tightness    • Other insomnia 5/3/2018   • Situational anxiety 05/03/2018    after a trauma riding horses   • Traumatic closed displaced fracture of shaft of humerus with routine healing, right 5/16/2019       Past Surgical History:   Procedure Laterality Date   • APPENDECTOMY     • BREAST SURGERY      reduction   • BREAST SURGERY Bilateral     reduction   • CHOLECYSTECTOMY     • COLONOSCOPY  03/2018    no polyps   • FOOT SURGERY Right 2009    Screws   • HERNIA REPAIR     • HUMERUS SURGERY Right 2013    plate/screws   • HYSTERECTOMY      endometriosis   • ORIF WRIST FRACTURE Right 7/25/2018    Procedure: RIGHT ORIF WRIST;  Surgeon: Saad Zuniga MD;  Location: [facility];  Service: Orthopedics;  Laterality: Right;   • ORTHOPEDIC SURGERY     • SHOULDER SURGERY     • SIGMOIDECTOMY  2016     diverticular ds   • TONSILLECTOMY AND ADENOIDECTOMY     • UPPER EXTREMITY DEBRIDEMENT Right 7/25/2018    Procedure: irrigation and debridement of right upper extremity and all indicated procedures;  Surgeon: Saad Zuniga MD;  Location: [facility];  Service: Orthopedics;  Laterality: Right;       Family History   Problem Relation Age of Onset   • Colon cancer Maternal Grandmother    • Ovarian cancer Maternal Grandmother    • Bone cancer Paternal Grandmother    • Hypertension Other    • Breast cancer Other    • Lung cancer Other        Social History     Tobacco Use   • Smoking status: Never Smoker   • Smokeless tobacco: Never Used   Substance Use Topics   • Alcohol use: Yes     Comment: socially   • Drug use: No       Allergies   Allergen Reactions   • Hydromorphone Hives   • Meperidine      cause migraines   • Trazodone      nightmares   • Clindamycin Rash   • Desvenlafaxine Succinate      Other reaction(s): Headache   • Morphine      Other reaction(s): Headache   • Sumatriptan Rash         Current Outpatient Medications:   •  ALPRAZolam (XANAX) 0.5 mg tablet, Take 1 tablet (0.5 mg total) by mouth 2 (two) times a day as needed for anxiety Max Daily Amount: 1 mg, Disp: 60 tablet, Rfl: 0  •  ondansetron ODT (ZOFRAN-ODT) 4 mg disintegrating tablet, Take 1 tablet (4 mg total) by mouth every 8 (eight) hours as needed for nausea or vomiting, Disp: 30 tablet, Rfl: 0  •  escitalopram (LEXAPRO) 20 mg tablet, Take 1 tablet (20 mg total) by mouth daily, Disp: 90 tablet, Rfl: 0  •  diclofenac (VOLTAREN) 50 mg EC tablet, Take 1 tablet (50 mg total) by mouth 2 (two) times a day as needed (pain), Disp: 60 tablet, Rfl: 3  •  butalbital-acetaminophen-caff (FIORICET, ESGIC) -40 mg, Take 1 tablet by mouth every 4 (four) hours as needed for headaches, Disp: 60 tablet, Rfl: 0  •  zolpidem (AMBIEN) 10 mg tablet, Take 1 tablet (10 mg total) by mouth nightly as needed for sleep Max Daily Amount: 10 mg, Disp: 30 tablet, Rfl: 5  •   gabapentin (NEURONTIN) 100 mg capsule, Take 1 capsule (100 mg total) by mouth 4 (four) times a day, Disp: 120 capsule, Rfl: 2  •  butorphanol (STADOL) 10 mg/mL nasal spray, SPRAY INHALE 1 SPRAY INTO EACH NOSTRIL EVERY SIX HOURS AS NEEDED FOR PAIN, Disp: 2.5 mL, Rfl: 5  •  tiZANidine (ZANAFLEX) 4 mg tablet, Take 1 tablet (4 mg total) by mouth every 12 (twelve) hours as needed for muscle spasms, Disp: 60 tablet, Rfl: 0  •  prazosin (Minipress) 1 mg capsule, Take 1 capsule (1 mg total) by mouth nightly, Disp: 30 capsule, Rfl: 0  •  lubiprostone (Amitiza) 8 mcg capsule, Take 1 capsule by mouth 2 (two) times a day as needed, Disp: , Rfl:       ROS:  Review of Systems   12 point ROS performed that was negative unless otherwise mentioned per HPI.    PE:  ED Triage Vitals [07/08/21 1616]   Temp Heart Rate Resp BP SpO2   36.8 °C (98.3 °F) 75 17 147/66 97 %      Temp Source Heart Rate Source Patient Position BP Location FiO2 (%)   Oral -- -- -- --       Physical Exam  Vitals reviewed.   Constitutional:       General: She is in acute distress.   HENT:      Head: Normocephalic and atraumatic.      Nose: Nose normal.      Mouth/Throat:      Mouth: Mucous membranes are moist.   Eyes:      Extraocular Movements: Extraocular movements intact.   Cardiovascular:      Rate and Rhythm: Normal rate.   Pulmonary:      Effort: Pulmonary effort is normal.   Musculoskeletal:      Cervical back: Neck supple.   Skin:     General: Skin is warm.   Neurological:      Mental Status: She is alert and oriented to person, place, and time.   Psychiatric:         Mood and Affect: Mood normal.           ED LABS:  Labs Reviewed - No data to display    ED IMAGES:  No orders to display       PROCEDURE    Procedures      ED MEDS  Medications   ketorolac (TORADOL) injection 30 mg (30 mg intramuscular Given 7/8/21 1837)   diphenhydrAMINE (BENADRYL) 12.5 mg/5 mL liquid 25 mg (25 mg oral Given 7/8/21 1832)   metoclopramide (REGLAN) tablet 10 mg (10 mg oral  Given 7/8/21 1835)       ED DIAGNOSIS  Final diagnoses:   [K08.89] Pain, dental   [G43.119] Intractable migraine with aura without status migrainosus           ED COURSE:  Luiza is a 56-year-old patient well-known to me with a history of migraines who presents today for another migraine today.  She does have a history of a recent dental infection and is in need of some dental work and this is likely precipitating her migraines.  She was given some Toradol Benadryl and Reglan here.  Upon reevaluation, her migraine had improved.  I did give her some Percocet to take at home if the pain is severe with her dental pain until she can get her teeth definitively cared for.  She will follow-up if she has persistent or worsening pain.  She endorsed good understanding.  She was discharged home in improved condition.         Tiffany Lacy MD  07/09/21    A voice recognition program was used to aid in medical record documentation. Sometimes words are printed not exactly as they were spoken. While efforts were made to carefully edit and correct any inaccuracies, some errors may be present. Errors should be taken within the context of the discussion.  Please contact our office if you need assistance interpreting this medical record or notice any mistakes       Tiffany Lacy MD  07/09/21 0026

## 2021-07-21 DIAGNOSIS — F41.8 SITUATIONAL ANXIETY: ICD-10-CM

## 2021-07-21 RX ORDER — ALPRAZOLAM 0.5 MG/1
TABLET ORAL
Qty: 60 TABLET | Refills: 0 | Status: SHIPPED | OUTPATIENT
Start: 2021-07-21 | End: 2021-08-17

## 2021-07-29 DIAGNOSIS — M79.7 FIBROMYALGIA: ICD-10-CM

## 2021-07-29 RX ORDER — TIZANIDINE 4 MG/1
4 TABLET ORAL EVERY 12 HOURS PRN
Qty: 60 TABLET | Refills: 0 | Status: SHIPPED | OUTPATIENT
Start: 2021-07-29 | End: 2021-09-01 | Stop reason: SDUPTHER

## 2021-08-02 DIAGNOSIS — R11.0 NAUSEA: ICD-10-CM

## 2021-08-02 DIAGNOSIS — M79.7 FIBROMYALGIA: ICD-10-CM

## 2021-08-02 RX ORDER — GABAPENTIN 100 MG/1
CAPSULE ORAL
Qty: 120 CAPSULE | Refills: 2 | Status: SHIPPED | OUTPATIENT
Start: 2021-08-02 | End: 2021-10-06 | Stop reason: SDUPTHER

## 2021-08-02 RX ORDER — ONDANSETRON 4 MG/1
4 TABLET, ORALLY DISINTEGRATING ORAL EVERY 8 HOURS PRN
Qty: 90 TABLET | Refills: 0 | Status: SHIPPED | OUTPATIENT
Start: 2021-08-02 | End: 2022-10-06 | Stop reason: SDUPTHER

## 2021-08-10 DIAGNOSIS — G43.909 MIGRAINE WITHOUT STATUS MIGRAINOSUS, NOT INTRACTABLE, UNSPECIFIED MIGRAINE TYPE: ICD-10-CM

## 2021-08-12 ENCOUNTER — TELEPHONE (OUTPATIENT)
Dept: FAMILY MEDICINE | Facility: CLINIC | Age: 56
End: 2021-08-12

## 2021-08-12 RX ORDER — BUTORPHANOL TARTRATE 10 MG/ML
SPRAY NASAL
Qty: 2.5 ML | Refills: 5 | Status: SHIPPED | OUTPATIENT
Start: 2021-08-12 | End: 2021-10-06 | Stop reason: SDUPTHER

## 2021-08-12 NOTE — TELEPHONE ENCOUNTER
Patient is needing to be seen as she was put on a new medication of Lexapro and she has been on it almost 2 months and she says she thinks that she is not feeling good, no energy, feeling like it is not working.  Not sleeping as well.  She says the Prozac seemed to work better and that she was switched off.  She wanted to be seen to discuss this.  She also said she thought when she last saw Dr. Lacy she had refilled all her meds but it seems like she missed a couple, so she wanted to to discuss that.      She is needing alprazolam 0.5 mg, butorphanol nasal spray, and tizanidine.  She said gabapentin but I explained that was ust filled on 8/2.      I could schedule out in September, but on notes it says that she was put on ER days due to missed appointments.  Do you want her on one of your ER days?  Please advise.

## 2021-08-16 DIAGNOSIS — F41.8 SITUATIONAL ANXIETY: ICD-10-CM

## 2021-08-17 DIAGNOSIS — F41.8 SITUATIONAL ANXIETY: ICD-10-CM

## 2021-08-17 RX ORDER — ALPRAZOLAM 0.5 MG/1
0.5 TABLET ORAL 2 TIMES DAILY
Qty: 60 TABLET | Refills: 0 | OUTPATIENT
Start: 2021-08-17 | End: 2021-09-16

## 2021-08-17 RX ORDER — ALPRAZOLAM 0.5 MG/1
TABLET ORAL
Qty: 60 TABLET | Refills: 0 | Status: SHIPPED | OUTPATIENT
Start: 2021-08-17 | End: 2021-09-01 | Stop reason: SDUPTHER

## 2021-09-01 DIAGNOSIS — F41.8 SITUATIONAL ANXIETY: ICD-10-CM

## 2021-09-01 DIAGNOSIS — M79.7 FIBROMYALGIA: ICD-10-CM

## 2021-09-01 RX ORDER — TIZANIDINE 4 MG/1
4 TABLET ORAL EVERY 12 HOURS PRN
Qty: 60 TABLET | Refills: 0 | Status: SHIPPED | OUTPATIENT
Start: 2021-09-01 | End: 2021-10-06 | Stop reason: SDUPTHER

## 2021-09-01 RX ORDER — ALPRAZOLAM 0.5 MG/1
0.5 TABLET ORAL 2 TIMES DAILY PRN
Qty: 30 TABLET | Refills: 0 | Status: SHIPPED | OUTPATIENT
Start: 2021-09-01 | End: 2021-10-06 | Stop reason: SDUPTHER

## 2021-10-06 ENCOUNTER — OFFICE VISIT (OUTPATIENT)
Dept: FAMILY MEDICINE | Facility: CLINIC | Age: 56
End: 2021-10-06
Payer: OTHER GOVERNMENT

## 2021-10-06 VITALS
HEART RATE: 66 BPM | OXYGEN SATURATION: 97 % | RESPIRATION RATE: 12 BRPM | DIASTOLIC BLOOD PRESSURE: 50 MMHG | BODY MASS INDEX: 23.33 KG/M2 | WEIGHT: 145.2 LBS | SYSTOLIC BLOOD PRESSURE: 110 MMHG | TEMPERATURE: 98.5 F | HEIGHT: 66 IN

## 2021-10-06 DIAGNOSIS — F43.10 PTSD (POST-TRAUMATIC STRESS DISORDER): Primary | ICD-10-CM

## 2021-10-06 DIAGNOSIS — G47.09 OTHER INSOMNIA: ICD-10-CM

## 2021-10-06 DIAGNOSIS — F41.9 ANXIETY AND DEPRESSION: ICD-10-CM

## 2021-10-06 DIAGNOSIS — G43.909 MIGRAINE WITHOUT STATUS MIGRAINOSUS, NOT INTRACTABLE, UNSPECIFIED MIGRAINE TYPE: ICD-10-CM

## 2021-10-06 DIAGNOSIS — F41.8 SITUATIONAL ANXIETY: ICD-10-CM

## 2021-10-06 DIAGNOSIS — M79.7 FIBROMYALGIA: ICD-10-CM

## 2021-10-06 DIAGNOSIS — F32.A ANXIETY AND DEPRESSION: ICD-10-CM

## 2021-10-06 PROCEDURE — 99214 OFFICE O/P EST MOD 30 MIN: CPT | Performed by: FAMILY MEDICINE

## 2021-10-06 RX ORDER — TIZANIDINE 4 MG/1
4 TABLET ORAL DAILY
Qty: 90 TABLET | Refills: 2 | Status: SHIPPED | OUTPATIENT
Start: 2021-10-06 | End: 2022-05-23 | Stop reason: SDUPTHER

## 2021-10-06 RX ORDER — ALPRAZOLAM 0.5 MG/1
0.5 TABLET ORAL DAILY PRN
Qty: 30 TABLET | Refills: 2 | Status: SHIPPED | OUTPATIENT
Start: 2021-10-06 | End: 2021-12-13

## 2021-10-06 RX ORDER — GABAPENTIN 100 MG/1
100 CAPSULE ORAL 3 TIMES DAILY
Qty: 90 CAPSULE | Refills: 5 | Status: SHIPPED | OUTPATIENT
Start: 2021-10-06 | End: 2021-10-20

## 2021-10-06 RX ORDER — FLUOXETINE HYDROCHLORIDE 20 MG/1
20 CAPSULE ORAL DAILY
Qty: 90 CAPSULE | Refills: 1 | Status: SHIPPED | OUTPATIENT
Start: 2021-10-06 | End: 2022-04-04

## 2021-10-06 RX ORDER — FLUOXETINE HYDROCHLORIDE 20 MG/1
CAPSULE ORAL
COMMUNITY
Start: 2021-08-16 | End: 2021-10-06 | Stop reason: SDUPTHER

## 2021-10-06 RX ORDER — ZOLPIDEM TARTRATE 10 MG/1
10 TABLET ORAL NIGHTLY PRN
Qty: 30 TABLET | Refills: 3 | Status: SHIPPED | OUTPATIENT
Start: 2021-12-27 | End: 2022-06-02 | Stop reason: SDUPTHER

## 2021-10-06 RX ORDER — DICLOFENAC SODIUM 50 MG/1
50 TABLET, DELAYED RELEASE ORAL DAILY
Qty: 90 TABLET | Refills: 2 | Status: SHIPPED | OUTPATIENT
Start: 2021-10-06 | End: 2022-06-05 | Stop reason: ALTCHOICE

## 2021-10-06 RX ORDER — BUTORPHANOL TARTRATE 10 MG/ML
1 SPRAY NASAL EVERY 6 HOURS PRN
Qty: 2.5 ML | Refills: 1 | Status: SHIPPED | OUTPATIENT
Start: 2021-10-06 | End: 2021-12-13

## 2021-10-06 ASSESSMENT — ENCOUNTER SYMPTOMS
COUGH: 0
HEADACHES: 1
PALPITATIONS: 0
VOMITING: 0
FEVER: 0
NERVOUS/ANXIOUS: 1
SLEEP DISTURBANCE: 1
CHILLS: 0
SHORTNESS OF BREATH: 0
NAUSEA: 0
AGITATION: 1

## 2021-10-06 NOTE — ASSESSMENT & PLAN NOTE
Patient rarely takes Xanax for panic attacks/situational anxiety when riding her horse.  Patient advised to take only if absolutely needed and not mix with butorphanol or other sedating medications.

## 2021-10-06 NOTE — PROGRESS NOTES
Subjective     Luiza Tay is a 56 y.o. year old female who presents for   Chief Complaint   Patient presents with   • Med Refill   .      Luiza has come to the clinic today for medication review and refills.  She was unable to see her primary care provider because her PCP is currently on leave.  She is planning on traveling to [facility] for the next 6 to 7 months and is needing refills of her medications.  She relies on Ambien for her insomnia, needs refills of alprazolam for occasional anxiety attacks, has occasional migraines and uses butorphanol nasal spray for severe migraines, takes diclofenac twice a day for musculoskeletal pain, takes Prozac 20 mg daily for PTSD/depression/anxiety, is on gabapentin and tizanidine for fibromyalgia.  Otherwise denies any other concerns other than needing her refills.  She has been quite stable on his medications for some time.               Allergies   Allergen Reactions   • Hydromorphone Hives   • Meperidine      cause migraines   • Trazodone      nightmares   • Clindamycin Rash   • Desvenlafaxine Succinate      Other reaction(s): Headache   • Morphine      Other reaction(s): Headache   • Sumatriptan Rash         Current Outpatient Medications on File Prior to Visit   Medication Sig   • ondansetron ODT (ZOFRAN-ODT) 4 mg disintegrating tablet Take 1 tablet (4 mg total) by mouth every 8 (eight) hours as needed for nausea or vomiting   • butalbital-acetaminophen-caff (FIORICET, ESGIC) -40 mg Take 1 tablet by mouth every 4 (four) hours as needed for headaches     No current facility-administered medications on file prior to visit.        Past Medical History:   Diagnosis Date   • Appendicitis    • Diverticulitis    • Fibromyalgia 5/3/2018   • Gallstones    • Headache    • History of migraine 9/14/2017   • History of migraine 9/14/2017   • Insomnia    • Migraines    • Muscle tightness    • Other insomnia 5/3/2018   • Situational anxiety 05/03/2018    after a  trauma riding horses   • Traumatic closed displaced fracture of shaft of humerus with routine healing, right 5/16/2019     Social History     Tobacco Use   • Smoking status: Never Smoker   • Smokeless tobacco: Never Used   Substance and Sexual Activity   • Alcohol use: Yes     Comment: socially   • Drug use: No   • Sexual activity: Yes   Social History Narrative    Retired nurse.  Now works with horses.       Review of Systems   Constitutional: Negative for chills and fever.   Eyes: Negative for visual disturbance.   Respiratory: Negative for cough and shortness of breath.    Cardiovascular: Negative for chest pain and palpitations.   Gastrointestinal: Negative for nausea and vomiting.   Neurological: Positive for headaches (migraines).        Fibromyalgia.    Psychiatric/Behavioral: Positive for agitation and sleep disturbance. The patient is nervous/anxious.        Objective     Vitals:    10/06/21 1025   BP: 110/50   Pulse: 66   Resp: 12   Temp: 36.9 °C (98.5 °F)   SpO2: 97%       Wt Readings from Last 3 Encounters:   10/06/21 65.9 kg (145 lb 3.2 oz)   07/08/21 68.9 kg (152 lb)   07/03/21 70.3 kg (155 lb)     Temp Readings from Last 3 Encounters:   10/06/21 36.9 °C (98.5 °F) (Oral)   07/08/21 36.8 °C (98.3 °F) (Oral)   07/03/21 36.4 °C (97.5 °F) (Oral)     BP Readings from Last 3 Encounters:   10/06/21 110/50   07/08/21 147/66   07/03/21 109/54     Pulse Readings from Last 3 Encounters:   10/06/21 66   07/08/21 75   07/03/21 69       Physical Exam  Vitals and nursing note reviewed.   Constitutional:       General: She is not in acute distress.     Appearance: Normal appearance. She is not toxic-appearing.   HENT:      Head: Normocephalic.      Nose: Nose normal.      Mouth/Throat:      Mouth: Mucous membranes are moist.      Pharynx: Oropharynx is clear. No posterior oropharyngeal erythema.   Eyes:      Extraocular Movements: Extraocular movements intact.      Conjunctiva/sclera: Conjunctivae normal.    Cardiovascular:      Rate and Rhythm: Normal rate and regular rhythm.      Pulses: Normal pulses.      Heart sounds: No murmur heard.     Pulmonary:      Effort: Pulmonary effort is normal.      Breath sounds: Normal breath sounds. No wheezing or rales.   Musculoskeletal:         General: No deformity.      Cervical back: Neck supple.   Skin:     General: Skin is warm and dry.      Findings: No rash.   Neurological:      General: No focal deficit present.      Mental Status: She is alert.      Cranial Nerves: No cranial nerve deficit.      Gait: Gait normal.   Psychiatric:         Mood and Affect: Mood normal.         Behavior: Behavior normal.           No results found for this or any previous visit (from the past 24 hour(s)).      Assessment     Problem List        Nervous    Fibromyalgia    Current Assessment & Plan     Patient has been stable on her current treatments of tizanidine and gabapentin which have been refilled for her.         Relevant Medications    tiZANidine (ZANAFLEX) 4 mg tablet    gabapentin (NEURONTIN) 100 mg capsule    diclofenac (VOLTAREN) 50 mg EC tablet    Migraine without status migrainosus, not intractable    Current Assessment & Plan     Patient takes butorphanol for severe migraines.  I agreed to provide a limited amount but cautioned her not to take with Xanax or other sedating medications.  Patient voiced understanding and agreed.         Relevant Medications    tiZANidine (ZANAFLEX) 4 mg tablet    FLUoxetine (PROzac) 20 mg capsule    gabapentin (NEURONTIN) 100 mg capsule    diclofenac (VOLTAREN) 50 mg EC tablet    butorphanol (STADOL) 10 mg/mL nasal spray       Musculoskeletal    PTSD (post-traumatic stress disorder) - Primary    Current Assessment & Plan     Currently taking fluoxetine and has been stable on this medication.  Refill provided.         Relevant Medications    FLUoxetine (PROzac) 20 mg capsule    ALPRAZolam (XANAX) 0.5 mg tablet    zolpidem (AMBIEN) 10 mg tablet  (Start on 12/27/2021)       Other    Situational anxiety    Current Assessment & Plan     Patient rarely takes Xanax for panic attacks/situational anxiety when riding her horse.  Patient advised to take only if absolutely needed and not mix with butorphanol or other sedating medications.         Relevant Medications    ALPRAZolam (XANAX) 0.5 mg tablet    Other insomnia    Current Assessment & Plan     Patient has longstanding history of insomnia and has been unsuccessful with other treatments.  Refills of Zofran provided.         Relevant Medications    zolpidem (AMBIEN) 10 mg tablet (Start on 12/27/2021)    Anxiety and depression    Current Assessment & Plan     Prozac refilled         Relevant Medications    FLUoxetine (PROzac) 20 mg capsule    ALPRAZolam (XANAX) 0.5 mg tablet    zolpidem (AMBIEN) 10 mg tablet (Start on 12/27/2021)

## 2021-10-06 NOTE — ASSESSMENT & PLAN NOTE
Patient has longstanding history of insomnia and has been unsuccessful with other treatments.  Refills of Zofran provided.

## 2021-10-06 NOTE — ASSESSMENT & PLAN NOTE
Patient has been stable on her current treatments of tizanidine and gabapentin which have been refilled for her.

## 2021-10-06 NOTE — ASSESSMENT & PLAN NOTE
Patient takes butorphanol for severe migraines.  I agreed to provide a limited amount but cautioned her not to take with Xanax or other sedating medications.  Patient voiced understanding and agreed.

## 2021-10-20 DIAGNOSIS — M79.7 FIBROMYALGIA: ICD-10-CM

## 2021-10-20 RX ORDER — GABAPENTIN 100 MG/1
CAPSULE ORAL
Qty: 120 CAPSULE | Refills: 2 | Status: SHIPPED | OUTPATIENT
Start: 2021-10-20 | End: 2022-09-12

## 2021-10-25 ENCOUNTER — TELEPHONE (OUTPATIENT)
Dept: FAMILY MEDICINE | Facility: CLINIC | Age: 56
End: 2021-10-25

## 2021-10-25 DIAGNOSIS — G43.909 MIGRAINE WITHOUT STATUS MIGRAINOSUS, NOT INTRACTABLE, UNSPECIFIED MIGRAINE TYPE: ICD-10-CM

## 2021-10-25 RX ORDER — BUTALBITAL, ACETAMINOPHEN AND CAFFEINE 50; 325; 40 MG/1; MG/1; MG/1
1 TABLET ORAL EVERY 4 HOURS PRN
Qty: 60 TABLET | Refills: 0 | Status: SHIPPED | OUTPATIENT
Start: 2021-10-25 | End: 2022-10-17 | Stop reason: ALTCHOICE

## 2021-10-25 NOTE — TELEPHONE ENCOUNTER
Luiza needs a refill on her migraine meds because she is completely out;  Dr. Ulrich prescribed it once before but Dr. Loya has seen Luiza;  [place] ([address] [place] [address] ph # [phone]; she's really hurting and with extreme weather there in [place] they cannot get out to a doctor.

## 2021-10-26 DIAGNOSIS — G43.909 MIGRAINE WITHOUT STATUS MIGRAINOSUS, NOT INTRACTABLE, UNSPECIFIED MIGRAINE TYPE: ICD-10-CM

## 2021-12-13 DIAGNOSIS — G43.909 MIGRAINE WITHOUT STATUS MIGRAINOSUS, NOT INTRACTABLE, UNSPECIFIED MIGRAINE TYPE: ICD-10-CM

## 2021-12-13 DIAGNOSIS — F41.8 SITUATIONAL ANXIETY: ICD-10-CM

## 2021-12-13 RX ORDER — ALPRAZOLAM 0.5 MG/1
TABLET ORAL
Qty: 30 TABLET | Refills: 2 | Status: SHIPPED | OUTPATIENT
Start: 2021-12-13 | End: 2022-05-11

## 2021-12-13 RX ORDER — BUTORPHANOL TARTRATE 10 MG/ML
SPRAY NASAL
Qty: 2.5 ML | Refills: 1 | Status: SHIPPED | OUTPATIENT
Start: 2021-12-13 | End: 2022-05-17 | Stop reason: SDUPTHER

## 2021-12-13 NOTE — TELEPHONE ENCOUNTER
Care Due:                  Date            Visit Type   Department     Provider  --------------------------------------------------------------------------------                              ESTABLISHED   [facility]  Last Visit: 10-      PATIENT      MEDICINE       SAMPSON NUÑEZ  Next Visit: None Scheduled  None         None Found                                                            Last  Test          Frequency    Reason                     Performed    Due Date  --------------------------------------------------------------------------------  Office Visit  3 months...  ALPRAZolam, butorphanol,   10-   01-                             ondansetron, tiZANidine,                             zolpidem.................    CMP.........  12 months..  diclofenac, gabapentin...  Not Found    Overdue    HGB.........  12 months..  diclofenac...............  08- 08-    Powered by [employer]. Reference number: [id number]. 12/13/2021 1:59:42 PM MST

## 2022-01-05 DIAGNOSIS — G43.909 MIGRAINE WITHOUT STATUS MIGRAINOSUS, NOT INTRACTABLE, UNSPECIFIED MIGRAINE TYPE: ICD-10-CM

## 2022-01-05 RX ORDER — BUTORPHANOL TARTRATE 10 MG/ML
SPRAY NASAL
Qty: 2.5 ML | Refills: 1 | OUTPATIENT
Start: 2022-01-05

## 2022-01-05 NOTE — TELEPHONE ENCOUNTER
No new care gaps identified.  Powered by [employer]. Reference number: [id number]. 1/05/2022 8:47:53 AM MST

## 2022-05-11 DIAGNOSIS — F41.8 SITUATIONAL ANXIETY: ICD-10-CM

## 2022-05-11 NOTE — TELEPHONE ENCOUNTER
Care Due:                  Date            Visit Type   Department     Provider  --------------------------------------------------------------------------------                              ESTABLISHED   [facility]  Last Visit: 10-      PATIENT      MEDICINE       SAMPSON NUÑEZ  Next Visit: None Scheduled  None         None Found                                                            Last  Test          Frequency    Reason                     Performed    Due Date  --------------------------------------------------------------------------------  Office Visit  3 months...  ALPRAZolam, butorphanol,   10-   01-                             ondansetron, tiZANidine,                             zolpidem.................    CMP.........  12 months..  diclofenac, gabapentin...  Not Found    Overdue    HGB.........  12 months..  diclofenac...............  08- 08-    [facility] Embedded Care Gaps. Reference number: [id number]. 5/11/2022 2:59:32 PM YAHIR

## 2022-05-12 RX ORDER — ALPRAZOLAM 0.5 MG/1
TABLET ORAL
Qty: 30 TABLET | Refills: 2 | Status: SHIPPED | OUTPATIENT
Start: 2022-05-12 | End: 2022-08-03

## 2022-05-17 ENCOUNTER — OFFICE VISIT (OUTPATIENT)
Dept: URGENT CARE | Facility: CLINIC | Age: 57
End: 2022-05-17
Payer: OTHER GOVERNMENT

## 2022-05-17 VITALS
RESPIRATION RATE: 18 BRPM | BODY MASS INDEX: 24.83 KG/M2 | TEMPERATURE: 98.4 F | OXYGEN SATURATION: 98 % | HEART RATE: 62 BPM | HEIGHT: 66 IN | DIASTOLIC BLOOD PRESSURE: 90 MMHG | WEIGHT: 154.5 LBS | SYSTOLIC BLOOD PRESSURE: 160 MMHG

## 2022-05-17 DIAGNOSIS — G43.909 MIGRAINE WITHOUT STATUS MIGRAINOSUS, NOT INTRACTABLE, UNSPECIFIED MIGRAINE TYPE: ICD-10-CM

## 2022-05-17 PROCEDURE — 96372 THER/PROPH/DIAG INJ SC/IM: CPT | Performed by: FAMILY MEDICINE

## 2022-05-17 PROCEDURE — 99213 OFFICE O/P EST LOW 20 MIN: CPT | Mod: 25 | Performed by: FAMILY MEDICINE

## 2022-05-17 RX ORDER — DIPHENHYDRAMINE HYDROCHLORIDE 50 MG/ML
25 INJECTION INTRAMUSCULAR; INTRAVENOUS ONCE
Status: COMPLETED | OUTPATIENT
Start: 2022-05-17 | End: 2022-05-17

## 2022-05-17 RX ORDER — DIPHENHYDRAMINE HYDROCHLORIDE 50 MG/ML
25 INJECTION INTRAMUSCULAR; INTRAVENOUS ONCE
Status: DISCONTINUED | OUTPATIENT
Start: 2022-05-17 | End: 2022-05-17

## 2022-05-17 RX ORDER — BUTORPHANOL TARTRATE 10 MG/ML
1 SPRAY NASAL EVERY 6 HOURS PRN
Qty: 2.5 ML | Refills: 0 | Status: SHIPPED | OUTPATIENT
Start: 2022-05-17 | End: 2022-06-09 | Stop reason: SDUPTHER

## 2022-05-17 RX ORDER — KETOROLAC TROMETHAMINE 30 MG/ML
60 INJECTION, SOLUTION INTRAMUSCULAR; INTRAVENOUS ONCE
Status: COMPLETED | OUTPATIENT
Start: 2022-05-17 | End: 2022-05-17

## 2022-05-17 RX ORDER — METOCLOPRAMIDE HYDROCHLORIDE 5 MG/ML
10 INJECTION INTRAMUSCULAR; INTRAVENOUS ONCE
Status: COMPLETED | OUTPATIENT
Start: 2022-05-17 | End: 2022-05-17

## 2022-05-17 RX ADMIN — METOCLOPRAMIDE HYDROCHLORIDE 10 MG: 5 INJECTION INTRAMUSCULAR; INTRAVENOUS at 14:50

## 2022-05-17 RX ADMIN — KETOROLAC TROMETHAMINE 60 MG: 30 INJECTION, SOLUTION INTRAMUSCULAR; INTRAVENOUS at 14:51

## 2022-05-17 RX ADMIN — DIPHENHYDRAMINE HYDROCHLORIDE 25 MG: 50 INJECTION INTRAMUSCULAR; INTRAVENOUS at 14:53

## 2022-05-17 ASSESSMENT — PAIN SCALES - GENERAL: PAINLEVEL: 8

## 2022-05-17 NOTE — PROGRESS NOTES
There are no Patient Instructions on file for this visit.        IMPRESSION AND PLAN  Diagnoses and all orders for this visit:    Migraine without status migrainosus, not intractable, unspecified migraine type  -     butorphanol (STADOL) 10 mg/mL nasal spray; Administer 1 spray into one nostril every 6 (six) hours as needed for pain scale 8-10/10 Max Daily Amount: 4 sprays  -     ketorolac (TORADOL) injection 60 mg  -     metoclopramide (REGLAN) injection 10 mg  -     diphenhydrAMINE (BENADRYL) injection 25 mg    Acute migraine treated with Toradol Reglan and Benadryl.  Outpatient prescription for Stadol nasal spray given as this is worked for her in the past as well.  She is currently on Aimovig and Ubrelvy for migraine treatment as well  HPI  Chief complaint for visit per nursing.       Chief Complaint   Patient presents with   • Migraine     Migraine started late Sunday. Has a hx of migraine. [place]. Is not used to the dry climate.         Luiza is a 57 y.o. who is presenting today for evaluation of migraine.  This started 3 days ago on Sunday.  It has not let up.  She is now on Abbo vague and also you probably had taken that yesterday and that did not seem to take care of this.  She would like a refill of Refill all the Stadol nasal spray    PROBLEM LIST  Patient Active Problem List   Diagnosis   • Situational anxiety   • Other insomnia   • Fibromyalgia   • Diverticulitis   • PTSD (post-traumatic stress disorder)   • Anxiety and depression   • Migraine without status migrainosus, not intractable         SOCIAL HX  Social History     Socioeconomic History   • Marital status:      Spouse name: Not on file   • Number of children: Not on file   • Years of education: Not on file   • Highest education level: Not on file   Occupational History   • Not on file   Tobacco Use   • Smoking status: Never Smoker   • Smokeless tobacco: Never Used   Substance and Sexual Activity   • Alcohol use:  "Yes     Comment: socially   • Drug use: No   • Sexual activity: Yes   Other Topics Concern   • Not on file   Social History Narrative    Retired nurse.  Now works with horses.     Social Determinants of Health     Financial Resource Strain: Not on file   Food Insecurity: Not on file   Transportation Needs: Not on file   Physical Activity: Not on file   Stress: Not on file   Social Connections: Not on file   Intimate Partner Violence: Not on file   Housing Stability: Not on file       FAMILY HX  Family History   Problem Relation Age of Onset   • Colon cancer Maternal Grandmother    • Ovarian cancer Maternal Grandmother    • Bone cancer Paternal Grandmother    • Hypertension Other    • Breast cancer Other    • Lung cancer Other        ROS  12 point review of systems performed is negative unless otherwise mentioned per HPI.    Physical Exam  /90 (BP Location: Right arm, Patient Position: Sitting, Cuff Size: Regular Adult)   Pulse 62   Temp 36.9 °C (98.4 °F)   Resp 18   Ht 1.676 m (5' 6\")   Wt 70.1 kg (154 lb 8 oz)   SpO2 98%   BMI 24.94 kg/m²   BP Readings from Last 3 Encounters:   05/17/22 160/90   10/06/21 110/50   07/08/21 147/66       Physical Exam  Vitals and nursing note reviewed.   HENT:      Head: Normocephalic and atraumatic.      Nose: Nose normal.      Mouth/Throat:      Mouth: Mucous membranes are moist.   Eyes:      Extraocular Movements: Extraocular movements intact.   Cardiovascular:      Rate and Rhythm: Normal rate.   Pulmonary:      Effort: Pulmonary effort is normal.   Musculoskeletal:      Cervical back: Neck supple.   Neurological:      Mental Status: She is alert and oriented to person, place, and time.   Psychiatric:         Mood and Affect: Mood normal.            LABS AND XRAYS  Lab Results   Component Value Date    GLUCOSE 100 08/06/2020    CALCIUM 9.3 08/06/2020     08/06/2020    K 3.8 08/06/2020    CO2 27 08/06/2020     08/06/2020    BUN 7 08/06/2020    CREATININE " 0.62 08/06/2020    ANIONGAP 9 08/06/2020     Lab Results   Component Value Date    WBC 5.7 08/08/2020    HGB 12.0 08/08/2020    HCT 34.5 08/08/2020    MCV 92.8 08/08/2020     08/08/2020     Lab Results   Component Value Date    TSH 2.559 09/14/2019     No results found for: HGBA1C  Lab Results   Component Value Date    CREATININE 0.62 08/06/2020     Lab Results   Component Value Date    SEDRATE 8 08/06/2020     No results found for: URACSRM  No results found for: AMYLASE  Lab Results   Component Value Date    LIPASE 11 08/06/2020         No results found.    Procedures     MEDICATIONS    Current Outpatient Medications:   •  butorphanol (STADOL) 10 mg/mL nasal spray, Administer 1 spray into one nostril every 6 (six) hours as needed for pain scale 8-10/10 Max Daily Amount: 4 sprays, Disp: 2.5 mL, Rfl: 0  •  ALPRAZolam (XANAX) 0.5 mg tablet, TAKE ONE TABLET BY MOUTH DAILY AS NEEDED FOR ANXIETY, Disp: 30 tablet, Rfl: 2  •  butalbital-acetaminophen-caff (FIORICET, ESGIC) -40 mg, Take 1 tablet by mouth every 4 (four) hours as needed for headaches, Disp: 60 tablet, Rfl: 0  •  gabapentin (NEURONTIN) 100 mg capsule, TAKE ONE CAPSULE BY MOUTH FOUR TIMES DAILY, Disp: 120 capsule, Rfl: 2  •  tiZANidine (ZANAFLEX) 4 mg tablet, Take 1 tablet (4 mg total) by mouth daily, Disp: 90 tablet, Rfl: 2  •  diclofenac (VOLTAREN) 50 mg EC tablet, Take 1 tablet (50 mg total) by mouth daily, Disp: 90 tablet, Rfl: 2  •  zolpidem (AMBIEN) 10 mg tablet, Take 1 tablet (10 mg total) by mouth nightly as needed for sleep Max Daily Amount: 10 mg, Disp: 30 tablet, Rfl: 3  •  ondansetron ODT (ZOFRAN-ODT) 4 mg disintegrating tablet, Take 1 tablet (4 mg total) by mouth every 8 (eight) hours as needed for nausea or vomiting, Disp: 90 tablet, Rfl: 0      SEE ABOVE FOR IMPRESSION AND PLAN    Tiffany Lacy MD  05/18/22  4:16 PM

## 2022-05-23 DIAGNOSIS — M79.7 FIBROMYALGIA: ICD-10-CM

## 2022-05-23 RX ORDER — TIZANIDINE 4 MG/1
4 TABLET ORAL DAILY
Qty: 90 TABLET | Refills: 3 | Status: SHIPPED | OUTPATIENT
Start: 2022-05-23 | End: 2022-06-20

## 2022-05-23 NOTE — TELEPHONE ENCOUNTER
No new care gaps identified.  [facility] Embedded Care Gaps. Reference number: [id number]. 5/23/2022 12:34:25 PM YAHIR   Will be back around 230 or 3

## 2022-06-02 ENCOUNTER — OFFICE VISIT (OUTPATIENT)
Dept: FAMILY MEDICINE | Facility: CLINIC | Age: 57
End: 2022-06-02
Payer: OTHER GOVERNMENT

## 2022-06-02 VITALS
HEIGHT: 66 IN | SYSTOLIC BLOOD PRESSURE: 104 MMHG | RESPIRATION RATE: 16 BRPM | BODY MASS INDEX: 24.75 KG/M2 | WEIGHT: 154 LBS | DIASTOLIC BLOOD PRESSURE: 52 MMHG | OXYGEN SATURATION: 98 % | HEART RATE: 62 BPM

## 2022-06-02 DIAGNOSIS — F43.10 PTSD (POST-TRAUMATIC STRESS DISORDER): ICD-10-CM

## 2022-06-02 DIAGNOSIS — M79.7 FIBROMYALGIA: ICD-10-CM

## 2022-06-02 DIAGNOSIS — F41.8 SITUATIONAL ANXIETY: ICD-10-CM

## 2022-06-02 DIAGNOSIS — G47.09 OTHER INSOMNIA: ICD-10-CM

## 2022-06-02 DIAGNOSIS — F32.A ANXIETY AND DEPRESSION: ICD-10-CM

## 2022-06-02 DIAGNOSIS — B00.1 HERPES LABIALIS: Primary | ICD-10-CM

## 2022-06-02 DIAGNOSIS — F41.9 ANXIETY AND DEPRESSION: ICD-10-CM

## 2022-06-02 PROCEDURE — 99213 OFFICE O/P EST LOW 20 MIN: CPT | Performed by: FAMILY MEDICINE

## 2022-06-02 RX ORDER — TIZANIDINE 4 MG/1
4 TABLET ORAL 2 TIMES DAILY
Qty: 60 TABLET | Refills: 1 | Status: CANCELLED | OUTPATIENT
Start: 2022-06-02 | End: 2022-08-01

## 2022-06-02 RX ORDER — ALPRAZOLAM 0.5 MG/1
0.5 TABLET ORAL DAILY PRN
Qty: 30 TABLET | Refills: 1 | Status: CANCELLED | OUTPATIENT
Start: 2022-06-02 | End: 2022-11-29

## 2022-06-02 RX ORDER — FLUOXETINE HYDROCHLORIDE 20 MG/1
20 CAPSULE ORAL DAILY PRN
Qty: 30 CAPSULE | Refills: 2 | Status: CANCELLED | OUTPATIENT
Start: 2022-06-02

## 2022-06-02 RX ORDER — DICLOFENAC SODIUM 50 MG/1
50 TABLET, DELAYED RELEASE ORAL DAILY
Qty: 90 TABLET | Refills: 3 | Status: CANCELLED | OUTPATIENT
Start: 2022-06-02 | End: 2023-02-27

## 2022-06-02 RX ORDER — ERENUMAB-AOOE 140 MG/ML
140 INJECTION, SOLUTION SUBCUTANEOUS
COMMUNITY
End: 2022-09-15 | Stop reason: SDUPTHER

## 2022-06-02 RX ORDER — FLUOXETINE HYDROCHLORIDE 20 MG/1
20 CAPSULE ORAL DAILY
COMMUNITY
End: 2022-06-05 | Stop reason: ALTCHOICE

## 2022-06-02 RX ORDER — UBROGEPANT 100 MG/1
1 TABLET ORAL
COMMUNITY
End: 2023-03-20 | Stop reason: ENTERED-IN-ERROR

## 2022-06-02 RX ORDER — ZOLPIDEM TARTRATE 10 MG/1
10 TABLET ORAL NIGHTLY PRN
Qty: 30 TABLET | Refills: 3 | Status: SHIPPED | OUTPATIENT
Start: 2022-06-02 | End: 2022-09-15 | Stop reason: SDUPTHER

## 2022-06-02 RX ORDER — VALACYCLOVIR HYDROCHLORIDE 1 G/1
1000 TABLET, FILM COATED ORAL 3 TIMES DAILY
Qty: 21 TABLET | Refills: 3 | Status: SHIPPED | OUTPATIENT
Start: 2022-06-02 | End: 2022-09-12

## 2022-06-02 RX ORDER — VENLAFAXINE HYDROCHLORIDE 75 MG/1
75 CAPSULE, EXTENDED RELEASE ORAL DAILY
Qty: 30 CAPSULE | Refills: 3 | Status: SHIPPED | OUTPATIENT
Start: 2022-06-02 | End: 2022-09-15 | Stop reason: SDUPTHER

## 2022-06-02 NOTE — PROGRESS NOTES
Patient Instructions   Stop the fluoxetine  Start the venlafaxine daily and we will see if this helps your pain and anxiety      IMPRESSION AND PLAN  Diagnoses and all orders for this visit:    Herpes labialis  -     valACYclovir (Valtrex) 1 gram tablet; Take 1 tablet (1,000 mg total) by mouth 3 (three) times a day for 7 days    Fibromyalgia    Other insomnia  -     zolpidem (AMBIEN) 10 mg tablet; Take 1 tablet (10 mg total) by mouth nightly as needed for sleep Max Daily Amount: 10 mg    Situational anxiety    PTSD (post-traumatic stress disorder)  -     venlafaxine XR (EFFEXOR-XR) 75 mg 24 hr capsule; Take 1 capsule (75 mg total) by mouth daily    Anxiety and depression  -     venlafaxine XR (EFFEXOR-XR) 75 mg 24 hr capsule; Take 1 capsule (75 mg total) by mouth daily      I will have patient stop her fluoxetine and start venlafaxine and see if that helps with her anxiety a bit more and also can help with her arthralgias as well.  Refilled her zolpidem.  Follow-up in 4 weeks to see if she is doing the venlafaxine or sooner if needed.  She endorsed good understanding    HPI  Chief complaint for visit per nursing.       Chief Complaint   Patient presents with   • Annual Exam         Luiza is a 57 y.o. who presents for concerns of some Lip lesions.  These appear to be herpes simplex.  Joint pain arthralgias  She states that she had a shay test done in [place] that was positive  She states that she had good relationship with   She states that she is super stressed out especially with her campground  For her anxiety she states that the fluoxetine wraps her up so she can get things down but does not really help with the anxiety as much.  She has been on Lexapro and duloxetine in the past.  Not sure if these worked very well.  She is not ever been on venlafaxine.  She needs a refill of zolpidem for insomnia.    PROBLEM LIST  Patient Active Problem List   Diagnosis   • Situational anxiety   • Other insomnia   •  "Fibromyalgia   • Diverticulitis   • PTSD (post-traumatic stress disorder)   • Anxiety and depression   • Migraine without status migrainosus, not intractable         SOCIAL HX  Social History     Socioeconomic History   • Marital status:      Spouse name: Not on file   • Number of children: Not on file   • Years of education: Not on file   • Highest education level: Not on file   Occupational History   • Not on file   Tobacco Use   • Smoking status: Never Smoker   • Smokeless tobacco: Never Used   Substance and Sexual Activity   • Alcohol use: Yes     Alcohol/week: 1.0 standard drink     Types: 1 Standard drinks or equivalent per week     Comment: socially   • Drug use: No   • Sexual activity: Yes     Partners: Male     Birth control/protection: Post-menopausal, Male Sterilization   Other Topics Concern   • Not on file   Social History Narrative    Retired nurse.  Now works with horses.     Social Determinants of Health     Financial Resource Strain: Not on file   Food Insecurity: Not on file   Transportation Needs: Not on file   Physical Activity: Not on file   Stress: Not on file   Social Connections: Not on file   Intimate Partner Violence: Not on file   Housing Stability: Not on file       FAMILY HX  Family History   Problem Relation Age of Onset   • Colon cancer Maternal Grandmother    • Ovarian cancer Maternal Grandmother    • Cancer Maternal Grandmother    • Bone cancer Paternal Grandmother    • Hypertension Other    • Breast cancer Other    • Lung cancer Other    • Hypertension Mother    • Drug abuse Father    • Heart disease Father    • Cancer Paternal Grandfather    • Cancer Mother's Sister    • Drug abuse Sister        ROS  12 point review of systems performed is negative unless otherwise mentioned per HPI.    Physical Exam  /52   Pulse 62   Resp 16   Ht 1.676 m (5' 6\")   Wt 69.9 kg (154 lb)   SpO2 98%   BMI 24.86 kg/m²   BP Readings from Last 3 Encounters:   06/02/22 104/52   05/17/22 " 160/90   10/06/21 110/50       Physical Exam  Vitals and nursing note reviewed.   Constitutional:       General: She is not in acute distress.  HENT:      Head: Normocephalic and atraumatic.      Nose: Nose normal.      Mouth/Throat:      Comments: Herpetic lesion noted on the bottom  lip  Eyes:      Extraocular Movements: Extraocular movements intact.   Cardiovascular:      Rate and Rhythm: Normal rate.   Pulmonary:      Effort: Pulmonary effort is normal.   Musculoskeletal:      Cervical back: Neck supple.   Skin:     General: Skin is warm.   Neurological:      Mental Status: She is alert and oriented to person, place, and time.   Psychiatric:         Mood and Affect: Mood normal.            LABS AND XRAYS  Lab Results   Component Value Date    GLUCOSE 100 08/06/2020    CALCIUM 9.3 08/06/2020     08/06/2020    K 3.8 08/06/2020    CO2 27 08/06/2020     08/06/2020    BUN 7 08/06/2020    CREATININE 0.62 08/06/2020    ANIONGAP 9 08/06/2020     Lab Results   Component Value Date    WBC 5.7 08/08/2020    HGB 12.0 08/08/2020    HCT 34.5 08/08/2020    MCV 92.8 08/08/2020     08/08/2020     Lab Results   Component Value Date    TSH 2.559 09/14/2019     No results found for: HGBA1C  Lab Results   Component Value Date    CREATININE 0.62 08/06/2020     Lab Results   Component Value Date    SEDRATE 8 08/06/2020     No results found for: URACSRM  No results found for: AMYLASE  Lab Results   Component Value Date    LIPASE 11 08/06/2020         No results found.    Procedures     MEDICATIONS    Current Outpatient Medications:   •  FLUoxetine (PROzac) 20 mg capsule, Take 20 mg by mouth daily, Disp: , Rfl:   •  ubrogepant (Ubrelvy) 100 mg tablet, Take 1 tablet by mouth, Disp: , Rfl:   •  erenumab-aooe (Aimovig Autoinjector) 140 mg/mL auto-injector, Inject 140 mg under the skin every 28 (twenty-eight) days Indications: migraine prevention, Disp: , Rfl:   •  tiZANidine (ZANAFLEX) 4 mg tablet, Take 1 tablet (4 mg  total) by mouth daily, Disp: 90 tablet, Rfl: 3  •  butorphanol (STADOL) 10 mg/mL nasal spray, Administer 1 spray into one nostril every 6 (six) hours as needed for pain scale 8-10/10 Max Daily Amount: 4 sprays, Disp: 2.5 mL, Rfl: 0  •  ALPRAZolam (XANAX) 0.5 mg tablet, TAKE ONE TABLET BY MOUTH DAILY AS NEEDED FOR ANXIETY, Disp: 30 tablet, Rfl: 2  •  butalbital-acetaminophen-caff (FIORICET, ESGIC) -40 mg, Take 1 tablet by mouth every 4 (four) hours as needed for headaches, Disp: 60 tablet, Rfl: 0  •  gabapentin (NEURONTIN) 100 mg capsule, TAKE ONE CAPSULE BY MOUTH FOUR TIMES DAILY, Disp: 120 capsule, Rfl: 2  •  ondansetron ODT (ZOFRAN-ODT) 4 mg disintegrating tablet, Take 1 tablet (4 mg total) by mouth every 8 (eight) hours as needed for nausea or vomiting, Disp: 90 tablet, Rfl: 0  •  zolpidem (AMBIEN) 10 mg tablet, Take 1 tablet (10 mg total) by mouth nightly as needed for sleep Max Daily Amount: 10 mg, Disp: 30 tablet, Rfl: 3  •  valACYclovir (Valtrex) 1 gram tablet, Take 1 tablet (1,000 mg total) by mouth 3 (three) times a day for 7 days, Disp: 21 tablet, Rfl: 3  •  venlafaxine XR (EFFEXOR-XR) 75 mg 24 hr capsule, Take 1 capsule (75 mg total) by mouth daily, Disp: 30 capsule, Rfl: 3  •  diclofenac (VOLTAREN) 50 mg EC tablet, Take 1 tablet (50 mg total) by mouth daily (Patient not taking: Reported on 6/2/2022), Disp: 90 tablet, Rfl: 2      SEE ABOVE FOR IMPRESSION AND PLAN    Tiffany Lacy MD  06/05/22  7:12 PM

## 2022-06-02 NOTE — PATIENT INSTRUCTIONS
Stop the fluoxetine  Start the venlafaxine daily and we will see if this helps your pain and anxiety

## 2022-06-09 ENCOUNTER — PATIENT MESSAGE (OUTPATIENT)
Dept: FAMILY MEDICINE | Facility: CLINIC | Age: 57
End: 2022-06-09
Payer: OTHER GOVERNMENT

## 2022-06-09 DIAGNOSIS — G43.909 MIGRAINE WITHOUT STATUS MIGRAINOSUS, NOT INTRACTABLE, UNSPECIFIED MIGRAINE TYPE: ICD-10-CM

## 2022-06-09 RX ORDER — BUTORPHANOL TARTRATE 10 MG/ML
1 SPRAY NASAL EVERY 6 HOURS PRN
Qty: 2.5 ML | Refills: 0 | Status: SHIPPED | OUTPATIENT
Start: 2022-06-09 | End: 2022-06-20

## 2022-06-20 DIAGNOSIS — G43.909 MIGRAINE WITHOUT STATUS MIGRAINOSUS, NOT INTRACTABLE, UNSPECIFIED MIGRAINE TYPE: ICD-10-CM

## 2022-06-20 DIAGNOSIS — M79.7 FIBROMYALGIA: ICD-10-CM

## 2022-06-20 RX ORDER — TIZANIDINE 4 MG/1
4 TABLET ORAL 2 TIMES DAILY
Qty: 180 TABLET | Refills: 3
Start: 2022-06-20 | End: 2023-08-30 | Stop reason: SDUPTHER

## 2022-06-20 RX ORDER — BUTORPHANOL TARTRATE 10 MG/ML
1 SPRAY NASAL EVERY 6 HOURS PRN
OUTPATIENT
Start: 2022-06-20

## 2022-06-20 RX ORDER — BUTORPHANOL TARTRATE 10 MG/ML
1 SPRAY NASAL EVERY 6 HOURS PRN
Qty: 2.5 ML | Refills: 0 | Status: SHIPPED | OUTPATIENT
Start: 2022-06-20 | End: 2022-07-06 | Stop reason: SDUPTHER

## 2022-06-20 NOTE — TELEPHONE ENCOUNTER
Care Due:                  Date            Visit Type   Department     Provider  --------------------------------------------------------------------------------                              ESTABLISHED   [facility]  Last Visit: 06-      PATIENT      MEDICINE       [facility]  Next Visit: 06-      PATIENT      [facility]                                                            Last  Test          Frequency    Reason                     Performed    Due Date  --------------------------------------------------------------------------------  Cr..........  12 months..  gabapentin...............  08- 08-    [facility] Embedded Care Gaps. Reference number: [id number]. 6/20/2022 10:36:54 AM YAHIR

## 2022-06-20 NOTE — TELEPHONE ENCOUNTER
No new care gaps identified.  [facility] Embedded Care Gaps. Reference number: [id number]. 6/20/2022 10:52:27 AM YAHIR

## 2022-06-27 ENCOUNTER — OFFICE VISIT (OUTPATIENT)
Dept: FAMILY MEDICINE | Facility: CLINIC | Age: 57
End: 2022-06-27
Payer: OTHER GOVERNMENT

## 2022-06-27 VITALS
WEIGHT: 148 LBS | OXYGEN SATURATION: 96 % | BODY MASS INDEX: 23.89 KG/M2 | SYSTOLIC BLOOD PRESSURE: 116 MMHG | DIASTOLIC BLOOD PRESSURE: 68 MMHG | RESPIRATION RATE: 14 BRPM | HEART RATE: 73 BPM

## 2022-06-27 DIAGNOSIS — G47.09 OTHER INSOMNIA: ICD-10-CM

## 2022-06-27 DIAGNOSIS — F32.A ANXIETY AND DEPRESSION: Primary | ICD-10-CM

## 2022-06-27 DIAGNOSIS — F41.9 ANXIETY AND DEPRESSION: Primary | ICD-10-CM

## 2022-06-27 DIAGNOSIS — F41.8 SITUATIONAL ANXIETY: ICD-10-CM

## 2022-06-27 DIAGNOSIS — M79.7 FIBROMYALGIA: ICD-10-CM

## 2022-06-27 DIAGNOSIS — G43.909 MIGRAINE WITHOUT STATUS MIGRAINOSUS, NOT INTRACTABLE, UNSPECIFIED MIGRAINE TYPE: ICD-10-CM

## 2022-06-27 PROBLEM — K52.9 COLITIS: Status: ACTIVE | Noted: 2022-01-18

## 2022-06-27 PROBLEM — R76.8 ANA POSITIVE: Status: ACTIVE | Noted: 2022-06-27

## 2022-06-27 PROCEDURE — 99214 OFFICE O/P EST MOD 30 MIN: CPT | Performed by: FAMILY MEDICINE

## 2022-06-27 ASSESSMENT — PAIN SCALES - GENERAL: PAINLEVEL: 0-NO PAIN

## 2022-06-27 NOTE — PATIENT INSTRUCTIONS
Shoot me a message in two weeks and let me know how you are doing  If at that point we need to we can increase dose to 150mg

## 2022-06-27 NOTE — PROGRESS NOTES
Patient Instructions   Shoot me a message in two weeks and let me know how you are doing  If at that point we need to we can increase dose to 150mg          IMPRESSION AND PLAN  Diagnoses and all orders for this visit:    Anxiety and depression    Fibromyalgia    Migraine without status migrainosus, not intractable, unspecified migraine type    Other insomnia    Situational anxiety    I will have the patient continue on the venlafaxine 75 mg daily for the next couple weeks.  She was sent me a [employer] message to see how she is doing with this dose.  If doing well we can continue with it or we can go up to 150 mg daily.  Patient endorsed good understanding.  PHQ-9 is 10 today and CARMELA-7 is 0.    HPI  Chief complaint for visit per nursing.       Chief Complaint   Patient presents with   • Med Review     Wants to discuss Effexor. Pt reports being less wound-up but has started to feel flat. Sleeping better at night.         Luiza is a 57 y.o. who is here for follow-up on anxiety and depression.  We started her on venlafaxine and switch from fluoxetine.  She states that the first week she noticed improvement.  She is sleeping better.  She does not feel the ups and downs as much and feels more even.  She states that she does feel little flat this past week.  She is also not sure if her home situation has something to do with it.  Her  has been a bit more flat and is more frustrated with his new horse so they have not been riding together as much and this might play a difference as well.  Overall no suicidal ideations and doing well so far on 75 mg daily.  PHQ-9 is 10 today and CARMELA-7 is 0.    PROBLEM LIST  Patient Active Problem List   Diagnosis   • Situational anxiety   • Other insomnia   • Fibromyalgia   • Diverticulitis   • PTSD (post-traumatic stress disorder)   • Anxiety and depression   • Migraine without status migrainosus, not intractable   • ASHLEY positive   • Colitis         SOCIAL HX  Social History      Socioeconomic History   • Marital status:      Spouse name: Not on file   • Number of children: Not on file   • Years of education: Not on file   • Highest education level: Not on file   Occupational History   • Not on file   Tobacco Use   • Smoking status: Never Smoker   • Smokeless tobacco: Never Used   Substance and Sexual Activity   • Alcohol use: Yes     Alcohol/week: 1.0 standard drink     Types: 1 Standard drinks or equivalent per week     Comment: socially   • Drug use: No   • Sexual activity: Yes     Partners: Male     Birth control/protection: Post-menopausal, Male Sterilization   Other Topics Concern   • Not on file   Social History Narrative    Retired nurse.  Now works with horses.     Social Determinants of Health     Financial Resource Strain: Not on file   Food Insecurity: Not on file   Transportation Needs: Not on file   Physical Activity: Not on file   Stress: Not on file   Social Connections: Not on file   Intimate Partner Violence: Not on file   Housing Stability: Not on file       FAMILY HX  Family History   Problem Relation Age of Onset   • Colon cancer Maternal Grandmother    • Ovarian cancer Maternal Grandmother    • Cancer Maternal Grandmother    • Bone cancer Paternal Grandmother    • Hypertension Other    • Breast cancer Other    • Lung cancer Other    • Hypertension Mother    • Drug abuse Father    • Heart disease Father    • Cancer Paternal Grandfather    • Cancer Mother's Sister    • Drug abuse Sister        ROS  12 point review of systems performed is negative unless otherwise mentioned per HPI.    Physical Exam  /68 (BP Location: Left arm, Patient Position: Sitting, Cuff Size: Regular Adult)   Pulse 73   Resp 14   Wt 67.1 kg (148 lb)   SpO2 96%   BMI 23.89 kg/m²   BP Readings from Last 3 Encounters:   06/27/22 116/68   06/02/22 104/52   05/17/22 160/90       Physical Exam  Vitals and nursing note reviewed.   Constitutional:       General: She is not in acute  distress.     Appearance: She is not ill-appearing or toxic-appearing.   HENT:      Head: Normocephalic and atraumatic.      Nose: Nose normal.      Mouth/Throat:      Mouth: Mucous membranes are moist.   Eyes:      Extraocular Movements: Extraocular movements intact.   Cardiovascular:      Rate and Rhythm: Normal rate.   Pulmonary:      Effort: Pulmonary effort is normal.   Musculoskeletal:      Cervical back: Neck supple.   Skin:     General: Skin is warm.   Neurological:      Mental Status: She is alert and oriented to person, place, and time.   Psychiatric:         Mood and Affect: Mood normal.            LABS AND XRAYS  Lab Results   Component Value Date    GLUCOSE 100 08/06/2020    CALCIUM 9.3 08/06/2020     08/06/2020    K 3.8 08/06/2020    CO2 27 08/06/2020     08/06/2020    BUN 7 08/06/2020    CREATININE 0.62 08/06/2020    ANIONGAP 9 08/06/2020     Lab Results   Component Value Date    WBC 5.7 08/08/2020    HGB 12.0 08/08/2020    HCT 34.5 08/08/2020    MCV 92.8 08/08/2020     08/08/2020     Lab Results   Component Value Date    TSH 2.559 09/14/2019     No results found for: HGBA1C  Lab Results   Component Value Date    CREATININE 0.62 08/06/2020     Lab Results   Component Value Date    SEDRATE 8 08/06/2020     No results found for: URACSRM  No results found for: AMYLASE  Lab Results   Component Value Date    LIPASE 11 08/06/2020         No results found.    Procedures     MEDICATIONS    Current Outpatient Medications:   •  butorphanol (STADOL) 10 mg/mL nasal spray, Administer 1 spray into each nostril every 6 (six) hours as needed for pain scale 8-10/10 Max Daily Amount: 4 sprays, Disp: 2.5 mL, Rfl: 0  •  tiZANidine (ZANAFLEX) 4 mg tablet, Take 1 tablet (4 mg total) by mouth 2 (two) times a day, Disp: 180 tablet, Rfl: 3  •  ubrogepant (Ubrelvy) 100 mg tablet, Take 1 tablet by mouth, Disp: , Rfl:   •  zolpidem (AMBIEN) 10 mg tablet, Take 1 tablet (10 mg total) by mouth nightly as needed  for sleep Max Daily Amount: 10 mg, Disp: 30 tablet, Rfl: 3  •  erenumab-aooe (Aimovig Autoinjector) 140 mg/mL auto-injector, Inject 140 mg under the skin every 28 (twenty-eight) days Indications: migraine prevention, Disp: , Rfl:   •  venlafaxine XR (EFFEXOR-XR) 75 mg 24 hr capsule, Take 1 capsule (75 mg total) by mouth daily, Disp: 30 capsule, Rfl: 3  •  ALPRAZolam (XANAX) 0.5 mg tablet, TAKE ONE TABLET BY MOUTH DAILY AS NEEDED FOR ANXIETY, Disp: 30 tablet, Rfl: 2  •  gabapentin (NEURONTIN) 100 mg capsule, TAKE ONE CAPSULE BY MOUTH FOUR TIMES DAILY, Disp: 120 capsule, Rfl: 2  •  ondansetron ODT (ZOFRAN-ODT) 4 mg disintegrating tablet, Take 1 tablet (4 mg total) by mouth every 8 (eight) hours as needed for nausea or vomiting, Disp: 90 tablet, Rfl: 0  •  valACYclovir (Valtrex) 1 gram tablet, Take 1 tablet (1,000 mg total) by mouth 3 (three) times a day for 7 days (Patient not taking: Reported on 6/27/2022), Disp: 21 tablet, Rfl: 3  •  butalbital-acetaminophen-caff (FIORICET, ESGIC) -40 mg, Take 1 tablet by mouth every 4 (four) hours as needed for headaches (Patient not taking: Reported on 6/27/2022), Disp: 60 tablet, Rfl: 0      SEE ABOVE FOR IMPRESSION AND PLAN    Tiffany Lacy MD  06/27/22  9:53 AM

## 2022-07-03 ENCOUNTER — HOSPITAL ENCOUNTER (EMERGENCY)
Facility: HOSPITAL | Age: 57
Discharge: HOME | End: 2022-07-03
Attending: STUDENT IN AN ORGANIZED HEALTH CARE EDUCATION/TRAINING PROGRAM
Payer: OTHER GOVERNMENT

## 2022-07-03 VITALS
DIASTOLIC BLOOD PRESSURE: 89 MMHG | HEART RATE: 68 BPM | WEIGHT: 148 LBS | OXYGEN SATURATION: 96 % | SYSTOLIC BLOOD PRESSURE: 158 MMHG | RESPIRATION RATE: 18 BRPM | HEIGHT: 66 IN | TEMPERATURE: 98.1 F | BODY MASS INDEX: 23.78 KG/M2

## 2022-07-03 DIAGNOSIS — B34.9 HEADACHE DUE TO VIRAL INFECTION: ICD-10-CM

## 2022-07-03 DIAGNOSIS — R51.9 HEADACHE DUE TO VIRAL INFECTION: ICD-10-CM

## 2022-07-03 DIAGNOSIS — U07.1 COVID-19: Primary | ICD-10-CM

## 2022-07-03 LAB
ALBUMIN SERPL-MCNC: 4.4 G/DL (ref 3.5–5.3)
ALP SERPL-CCNC: 56 U/L (ref 41–108)
ALT SERPL-CCNC: 19 U/L (ref 7–52)
ANION GAP SERPL CALC-SCNC: 10 MMOL/L (ref 3–11)
AST SERPL-CCNC: 26 U/L
BILIRUB SERPL-MCNC: 0.4 MG/DL (ref 0.2–1.4)
BUN SERPL-MCNC: 9 MG/DL (ref 7–25)
CALCIUM ALBUM COR SERPL-MCNC: 9.3 MG/DL (ref 8.6–10.3)
CALCIUM SERPL-MCNC: 9.6 MG/DL (ref 8.6–10.3)
CHLORIDE SERPL-SCNC: 99 MMOL/L (ref 98–107)
CO2 SERPL-SCNC: 27 MMOL/L (ref 21–32)
CREAT SERPL-MCNC: 0.87 MG/DL (ref 0.6–1.1)
EOSINOPHIL # BLD MANUAL: 0 10*3/UL
EOSINOPHIL NFR BLD MANUAL: 1 % (ref 0–3)
ERYTHROCYTE [DISTWIDTH] IN BLOOD BY AUTOMATED COUNT: 12.7 % (ref 11.5–14)
GFR SERPL CREATININE-BSD FRML MDRD: 79 ML/MIN/1.73M*2
GLUCOSE SERPL-MCNC: 84 MG/DL (ref 70–105)
HCT VFR BLD AUTO: 44 % (ref 34–45)
HGB BLD-MCNC: 15.3 G/DL (ref 11.5–15.5)
LYMPHOCYTES # BLD MANUAL: 1.5 10*3/UL
LYMPHOCYTES NFR BLD MANUAL: 66 % (ref 11–47)
MCH RBC QN AUTO: 32.3 PG (ref 28–33)
MCHC RBC AUTO-ENTMCNC: 34.7 G/DL (ref 32–36)
MCV RBC AUTO: 93 FL (ref 81–97)
MONOCYTES # BLD MANUAL: 0.2 10*3/UL
MONOCYTES NFR BLD MANUAL: 8 % (ref 3–11)
NEUTROPHILS # BLD MANUAL: 0.55 10*3/UL
NEUTS SEG # BLD MANUAL: 0.6 10*3/UL
NEUTS SEG NFR BLD MANUAL: 25 % (ref 41–81)
PLATELET # BLD AUTO: 186 10*3/UL (ref 140–350)
PLATELET CLUMP BLD QL SMEAR: ABNORMAL
PLATELET MORPHOLOGY IN BLOOD: NORMAL
PMV BLD AUTO: 8.3 FL (ref 6.9–10.8)
POTASSIUM SERPL-SCNC: 4.3 MMOL/L (ref 3.5–5.1)
PROT SERPL-MCNC: 7.2 G/DL (ref 6–8.3)
RBC # BLD AUTO: 4.73 10*6/ΜL (ref 3.7–5.3)
RBC MORPH BLD: NORMAL
SODIUM SERPL-SCNC: 136 MMOL/L (ref 135–145)
TOTAL CELLS COUNTED BLD: 100 CELLS
WBC # BLD AUTO: 2.2 10*3/UL (ref 4.5–10.5)
WBC NRBC COR # BLD: 2.2 10*3/UL

## 2022-07-03 PROCEDURE — 99283 EMERGENCY DEPT VISIT LOW MDM: CPT

## 2022-07-03 PROCEDURE — 6360000200 HC RX 636 W HCPCS (ALT 250 FOR IP): Performed by: STUDENT IN AN ORGANIZED HEALTH CARE EDUCATION/TRAINING PROGRAM

## 2022-07-03 PROCEDURE — 96372 THER/PROPH/DIAG INJ SC/IM: CPT

## 2022-07-03 PROCEDURE — 85025 COMPLETE CBC W/AUTO DIFF WBC: CPT | Performed by: STUDENT IN AN ORGANIZED HEALTH CARE EDUCATION/TRAINING PROGRAM

## 2022-07-03 PROCEDURE — 99283 EMERGENCY DEPT VISIT LOW MDM: CPT | Performed by: STUDENT IN AN ORGANIZED HEALTH CARE EDUCATION/TRAINING PROGRAM

## 2022-07-03 PROCEDURE — 36415 COLL VENOUS BLD VENIPUNCTURE: CPT | Performed by: STUDENT IN AN ORGANIZED HEALTH CARE EDUCATION/TRAINING PROGRAM

## 2022-07-03 PROCEDURE — 6370000100 HC RX 637 (ALT 250 FOR IP): Performed by: STUDENT IN AN ORGANIZED HEALTH CARE EDUCATION/TRAINING PROGRAM

## 2022-07-03 PROCEDURE — 80053 COMPREHEN METABOLIC PANEL: CPT | Performed by: STUDENT IN AN ORGANIZED HEALTH CARE EDUCATION/TRAINING PROGRAM

## 2022-07-03 RX ORDER — BENZONATATE 100 MG/1
100 CAPSULE ORAL 3 TIMES DAILY PRN
Qty: 20 CAPSULE | Refills: 0 | Status: SHIPPED | OUTPATIENT
Start: 2022-07-03 | End: 2022-07-10

## 2022-07-03 RX ORDER — KETOROLAC TROMETHAMINE 30 MG/ML
15 INJECTION, SOLUTION INTRAMUSCULAR; INTRAVENOUS ONCE
Status: COMPLETED | OUTPATIENT
Start: 2022-07-03 | End: 2022-07-03

## 2022-07-03 RX ORDER — ONDANSETRON 4 MG/1
4 TABLET, ORALLY DISINTEGRATING ORAL ONCE
Status: COMPLETED | OUTPATIENT
Start: 2022-07-03 | End: 2022-07-03

## 2022-07-03 RX ADMIN — ONDANSETRON 4 MG: 4 TABLET, ORALLY DISINTEGRATING ORAL at 12:15

## 2022-07-03 RX ADMIN — KETOROLAC TROMETHAMINE 15 MG: 30 INJECTION, SOLUTION INTRAMUSCULAR; INTRAVENOUS at 12:15

## 2022-07-03 NOTE — DISCHARGE INSTRUCTIONS
Hold her diclofenac dose for tonight.  Make sure you drink plenty of fluids with electrolytes, you can take scheduled Tylenol for the pain.  Resume your diclofenac tomorrow.  Return if you are unable to keep hydrated, or you develop severe shortness of breath

## 2022-07-04 ASSESSMENT — ENCOUNTER SYMPTOMS
RHINORRHEA: 0
CHILLS: 0
VOMITING: 1
CONSTIPATION: 0
FEVER: 0
APPETITE CHANGE: 0
NAUSEA: 1
HEADACHES: 1
SHORTNESS OF BREATH: 1
DYSURIA: 0
COUGH: 1
DIARRHEA: 0

## 2022-07-04 NOTE — ED PROVIDER NOTES
CC:  Chief Complaint   Patient presents with   • positive covid test   • Cough   • Shortness of Breath   • Headache     HPI:  Luiza Tay is a 57 y.o. female presenting with the above chief complaint.    Patient presents emergency department via private vehicle with chief complaint of a positive home COVID test.  She is also reporting some cough, and some mild shortness of breath.  Her main complaint is a headache, and some associated nausea and vomiting.  She has been taking Tylenol scheduled, but that does not really seem to be helping with the headache.  She wanted to come and get treated with monoclonal antibody infusion for COVID because of what she has heard from some of her physician friends.  She is not interested in the oral COVID treatment    I have reviewed the patient's medical history in detail and updated the computerized patient record:   Tobacco  Allergies  Meds  Problems  Med Hx  Surg Hx  Fam Hx  Soc   Hx        HISTORY:  Active Problem List:  Patient Active Problem List   Diagnosis   • Situational anxiety   • Other insomnia   • Fibromyalgia   • Diverticulitis   • PTSD (post-traumatic stress disorder)   • Anxiety and depression   • Migraine without status migrainosus, not intractable   • ASHLEY positive   • Colitis     Medications:  No current facility-administered medications on file prior to encounter.     Current Outpatient Medications on File Prior to Encounter   Medication Sig   • tiZANidine (ZANAFLEX) 4 mg tablet Take 1 tablet (4 mg total) by mouth 2 (two) times a day   • ubrogepant (Ubrelvy) 100 mg tablet Take 1 tablet by mouth   • zolpidem (AMBIEN) 10 mg tablet Take 1 tablet (10 mg total) by mouth nightly as needed for sleep Max Daily Amount: 10 mg   • erenumab-aooe (Aimovig Autoinjector) 140 mg/mL auto-injector Inject 140 mg under the skin every 28 (twenty-eight) days Indications: migraine prevention   • venlafaxine XR (EFFEXOR-XR) 75 mg 24 hr capsule Take 1 capsule (75 mg total)  by mouth daily   • ALPRAZolam (XANAX) 0.5 mg tablet TAKE ONE TABLET BY MOUTH DAILY AS NEEDED FOR ANXIETY   • gabapentin (NEURONTIN) 100 mg capsule TAKE ONE CAPSULE BY MOUTH FOUR TIMES DAILY   • ondansetron ODT (ZOFRAN-ODT) 4 mg disintegrating tablet Take 1 tablet (4 mg total) by mouth every 8 (eight) hours as needed for nausea or vomiting   • butorphanol (STADOL) 10 mg/mL nasal spray Administer 1 spray into each nostril every 6 (six) hours as needed for pain scale 8-10/10 Max Daily Amount: 4 sprays   • valACYclovir (Valtrex) 1 gram tablet Take 1 tablet (1,000 mg total) by mouth 3 (three) times a day for 7 days (Patient not taking: Reported on 6/27/2022)   • butalbital-acetaminophen-caff (FIORICET, ESGIC) -40 mg Take 1 tablet by mouth every 4 (four) hours as needed for headaches (Patient not taking: No sig reported)      Allergies:  Allergies   Allergen Reactions   • Hydromorphone Hives   • Meperidine      cause migraines   • Trazodone      nightmares   • Clindamycin Rash   • Sumatriptan Rash     PMH:   Past Medical History:   Diagnosis Date   • Appendicitis    • Depression    • Diverticulitis    • Fibromyalgia 05/03/2018   • Gallstones    • Headache    • History of migraine 09/14/2017   • History of migraine 09/14/2017   • Insomnia    • Migraines    • Muscle tightness    • Other insomnia 05/03/2018   • Situational anxiety 05/03/2018    after a trauma riding horses   • Skin abnormalities 1/2022   • Traumatic closed displaced fracture of shaft of humerus with routine healing, right 05/16/2019     PSHx:  Past Surgical History:   Procedure Laterality Date   • APPENDECTOMY     • BREAST SURGERY      reduction   • BREAST SURGERY Bilateral     reduction   • CHOLECYSTECTOMY     • COLONOSCOPY  03/2018    no polyps   • FOOT SURGERY Right 2009    Screws   • HERNIA REPAIR     • HUMERUS SURGERY Right 2013    plate/screws   • HYSTERECTOMY      endometriosis   • ORIF WRIST FRACTURE Right 07/25/2018    Procedure: RIGHT ORIF  WRIST;  Surgeon: Saad Zuniga MD;  Location: [facility] OR;  Service: Orthopedics;  Laterality: Right;   • ORTHOPEDIC SURGERY     • SHOULDER SURGERY     • SIGMOIDECTOMY  2016    diverticular ds   • TONSILLECTOMY AND ADENOIDECTOMY     • TUBAL LIGATION  1992   • UPPER EXTREMITY DEBRIDEMENT Right 07/25/2018    Procedure: irrigation and debridement of right upper extremity and all indicated procedures;  Surgeon: Saad Zuniga MD;  Location: [facility] OR;  Service: Orthopedics;  Laterality: Right;     FH:  Family History   Problem Relation Age of Onset   • Colon cancer Maternal Grandmother    • Ovarian cancer Maternal Grandmother    • Cancer Maternal Grandmother    • Bone cancer Paternal Grandmother    • Hypertension Other    • Breast cancer Other    • Lung cancer Other    • Hypertension Mother    • Drug abuse Father    • Heart disease Father    • Cancer Paternal Grandfather    • Cancer Mother's Sister    • Drug abuse Sister      SH:  Social History     Socioeconomic History   • Marital status:    Tobacco Use   • Smoking status: Never Smoker   • Smokeless tobacco: Never Used   Substance and Sexual Activity   • Alcohol use: Yes     Alcohol/week: 1.0 standard drink     Types: 1 Standard drinks or equivalent per week     Comment: socially   • Drug use: No   • Sexual activity: Yes     Partners: Male     Birth control/protection: Post-menopausal, Male Sterilization   Social History Narrative    Retired nurse.  Now works with horses.      ROS:  Review of Systems   Constitutional: Negative for appetite change, chills and fever.   HENT: Negative for rhinorrhea.    Respiratory: Positive for cough and shortness of breath.    Cardiovascular: Negative for chest pain and leg swelling.   Gastrointestinal: Positive for nausea and vomiting. Negative for constipation and diarrhea.   Genitourinary: Negative for dysuria.   Neurological: Positive for headaches.     PE:  ED Triage Vitals   Temp Heart Rate Resp BP SpO2   07/03/22 0928  07/03/22 0928 07/03/22 0928 07/03/22 0928 07/03/22 0928   36.8 °C (98.3 °F) 60 18 127/73 97 %      Mean BP (mmHg) Temp Source Heart Rate Source Patient Position BP Location   07/03/22 1142 07/03/22 0928 -- 07/03/22 1142 --   96 Oral  Sitting       FiO2 (%)       --                Physical Exam  Constitutional:       General: She is not in acute distress.     Appearance: Normal appearance. She is well-developed. She is not ill-appearing.   HENT:      Head: Normocephalic and atraumatic.      Nose: Nose normal.      Mouth/Throat:      Mouth: Mucous membranes are moist.   Eyes:      General:         Right eye: No discharge.         Left eye: No discharge.      Extraocular Movements: Extraocular movements intact.      Conjunctiva/sclera: Conjunctivae normal.      Pupils: Pupils are equal, round, and reactive to light.   Cardiovascular:      Rate and Rhythm: Normal rate and regular rhythm.      Pulses: Normal pulses.      Heart sounds: Normal heart sounds. No murmur heard.  Pulmonary:      Effort: Pulmonary effort is normal. No respiratory distress.      Breath sounds: Normal breath sounds. No stridor. No decreased breath sounds, wheezing or rhonchi.   Abdominal:      General: Abdomen is flat. Bowel sounds are normal. There is no distension.      Palpations: Abdomen is soft. There is no mass.      Tenderness: There is no abdominal tenderness. There is no guarding.   Musculoskeletal:         General: No swelling.      Cervical back: Normal range of motion and neck supple.      Right lower leg: No edema.      Left lower leg: No edema.   Skin:     General: Skin is warm and dry.      Capillary Refill: Capillary refill takes less than 2 seconds.   Neurological:      General: No focal deficit present.      Mental Status: She is alert and oriented to person, place, and time.   Psychiatric:         Mood and Affect: Mood normal.         Behavior: Behavior normal.         Thought Content: Thought content normal.         Judgment:  Judgment normal.       ED LABS:  Labs Reviewed   CBC WITH AUTO DIFFERENTIAL - Abnormal       Result Value    WBC 2.2 (*)     RBC 4.73      Hemoglobin 15.3      Hematocrit 44.0      MCV 93.0      MCH 32.3      MCHC 34.7      RDW 12.7      Platelets 186      MPV 8.3     COMPREHENSIVE METABOLIC PANEL - Normal    Sodium 136      Potassium 4.3      Chloride 99      CO2 27      Anion Gap 10      BUN 9      Creatinine 0.87      Glucose 84      Calcium 9.6      AST 26      ALT (SGPT) 19      Alkaline Phosphatase 56      Total Protein 7.2      Albumin 4.4      Total Bilirubin 0.40      Corrected Calcium 9.3      eGFR 79      Narrative:     Calculation based on the 2021 Chronic Kidney Disease Epidemiology Collaboration (CKD-EPI) equation refit without adjustment for race.       ED IMAGES:  No orders to display     ED PROCEDURES:  Procedures      ED MEDS:  Medications   ondansetron ODT (ZOFRAN-ODT) disintegrating tablet 4 mg (4 mg oral Given 7/3/22 1215)   ketorolac (TORADOL) injection 15 mg (15 mg intramuscular Given 7/3/22 1215)     ED COURSE:  Luiza Tay is a very pleasant 57 y.o. female with past medical history significant for PTSD, anxiety, diverticulosis, fibromyalgia, positive ASHLEY presenting to the ED for positive home COVID test, headache. Upon arrival to the ED ABCs were attended to, and remained stable throughout.  Labs were obtained which demonstrated mild leukopenia consistent with COVID infection, and normal CMP.  With her headache, I did go ahead and treat her with Toradol, and some Zofran.  Recommended that she could alternate Tylenol at home, and that she could resume her diclofenac tomorrow.  Discussed that unfortunately she does not qualify for monoclonal antibody infusion, and I again offered the Paxlovid which she declined.  Recommended that she follow-up for worsening shortness of breath, inability to keep oxygen saturations greater than 88% at rest, or if she was unable to keep adequately  hydrated.  She expressed understanding this plan.             ASSESSMENT/PLAN/DISCUSSION:   Diagnosis Plan   1. COVID-19  benzonatate (TESSALON) 100 mg capsule   2. Headache due to viral infection       Discharge MED LIST:    Current Outpatient Medications:   •  tiZANidine (ZANAFLEX) 4 mg tablet, Take 1 tablet (4 mg total) by mouth 2 (two) times a day, Disp: 180 tablet, Rfl: 3  •  ubrogepant (Ubrelvy) 100 mg tablet, Take 1 tablet by mouth, Disp: , Rfl:   •  zolpidem (AMBIEN) 10 mg tablet, Take 1 tablet (10 mg total) by mouth nightly as needed for sleep Max Daily Amount: 10 mg, Disp: 30 tablet, Rfl: 3  •  erenumab-aooe (Aimovig Autoinjector) 140 mg/mL auto-injector, Inject 140 mg under the skin every 28 (twenty-eight) days Indications: migraine prevention, Disp: , Rfl:   •  venlafaxine XR (EFFEXOR-XR) 75 mg 24 hr capsule, Take 1 capsule (75 mg total) by mouth daily, Disp: 30 capsule, Rfl: 3  •  ALPRAZolam (XANAX) 0.5 mg tablet, TAKE ONE TABLET BY MOUTH DAILY AS NEEDED FOR ANXIETY, Disp: 30 tablet, Rfl: 2  •  gabapentin (NEURONTIN) 100 mg capsule, TAKE ONE CAPSULE BY MOUTH FOUR TIMES DAILY, Disp: 120 capsule, Rfl: 2  •  ondansetron ODT (ZOFRAN-ODT) 4 mg disintegrating tablet, Take 1 tablet (4 mg total) by mouth every 8 (eight) hours as needed for nausea or vomiting, Disp: 90 tablet, Rfl: 0  •  benzonatate (TESSALON) 100 mg capsule, Take 1 capsule (100 mg total) by mouth 3 (three) times a day as needed for cough for up to 7 days, Disp: 20 capsule, Rfl: 0  •  butorphanol (STADOL) 10 mg/mL nasal spray, Administer 1 spray into each nostril every 6 (six) hours as needed for pain scale 8-10/10 Max Daily Amount: 4 sprays, Disp: 2.5 mL, Rfl: 0  •  valACYclovir (Valtrex) 1 gram tablet, Take 1 tablet (1,000 mg total) by mouth 3 (three) times a day for 7 days (Patient not taking: Reported on 6/27/2022), Disp: 21 tablet, Rfl: 3  •  butalbital-acetaminophen-caff (FIORICET, ESGIC) -40 mg, Take 1 tablet by mouth every 4 (four)  hours as needed for headaches (Patient not taking: No sig reported), Disp: 60 tablet, Rfl: 0  INSTRUCTIONS:  Discharge Instructions       Hold her diclofenac dose for tonight.  Make sure you drink plenty of fluids with electrolytes, you can take scheduled Tylenol for the pain.  Resume your diclofenac tomorrow.  Return if you are unable to keep hydrated, or you develop severe shortness of breath        An After Visit Summary was printed and given to the patient.  FOLLOW UP:  No follow-up provider specified.    Electronically signed by: Sanna Pavon DO  Date: 7/4/2022  Time: 1:19 AM  A voice recognition program was used to aid in documentation of this record. Sometimes words are not printed exactly as they were spoken. While efforts were made to carefully edit and correct any inaccuracies, some errors may be present; please take these into context.  Please contact the provider if errors are identified.       Sanna Pavon DO  07/04/22 0123

## 2022-07-06 DIAGNOSIS — G43.909 MIGRAINE WITHOUT STATUS MIGRAINOSUS, NOT INTRACTABLE, UNSPECIFIED MIGRAINE TYPE: ICD-10-CM

## 2022-07-06 RX ORDER — BUTORPHANOL TARTRATE 10 MG/ML
1 SPRAY NASAL EVERY 6 HOURS PRN
Qty: 2.5 ML | Refills: 1 | Status: SHIPPED | OUTPATIENT
Start: 2022-07-06 | End: 2022-07-27 | Stop reason: SDUPTHER

## 2022-07-06 RX ORDER — BUTORPHANOL TARTRATE 10 MG/ML
SPRAY NASAL
Qty: 2.5 ML | Refills: 0 | OUTPATIENT
Start: 2022-07-06

## 2022-07-06 NOTE — TELEPHONE ENCOUNTER
No new care gaps identified.  [facility] Embedded Care Gaps. Reference number: [id number]. 7/06/2022 8:08:59 AM YAHIR

## 2022-07-06 NOTE — TELEPHONE ENCOUNTER
No new care gaps identified.  [facility] Embedded Care Gaps. Reference number: [id number]. 7/06/2022 8:42:08 AM YAHIR

## 2022-07-07 ENCOUNTER — TELEPHONE (OUTPATIENT)
Dept: PHARMACY | Facility: HOSPITAL | Age: 57
End: 2022-07-07
Payer: OTHER GOVERNMENT

## 2022-07-07 PROBLEM — U07.1 COVID-19: Status: ACTIVE | Noted: 2022-07-07

## 2022-07-08 NOTE — TELEPHONE ENCOUNTER
Spoke with Luiza on the phone about COVID treatment options.  Symptoms started on 6/30/22, which means the last day she could receive treatment would be 7/7/22.  Also, she has no definite high risk factors that would warrant treatment given her age is only 57.  BMI 23.  The soonest treatment could be started is 7/8 and this would be beyond the 7 days of symptom onset.  Her oxygen sats are still 98% on room air.  Luiza was understanding that she didn't qualify for treatment at this time.

## 2022-07-15 DIAGNOSIS — G43.909 MIGRAINE WITHOUT STATUS MIGRAINOSUS, NOT INTRACTABLE, UNSPECIFIED MIGRAINE TYPE: ICD-10-CM

## 2022-07-15 RX ORDER — BUTORPHANOL TARTRATE 10 MG/ML
SPRAY NASAL
Qty: 2.5 ML | Refills: 1 | OUTPATIENT
Start: 2022-07-15

## 2022-07-15 NOTE — TELEPHONE ENCOUNTER
No new care gaps identified.  [facility] Embedded Care Gaps. Reference number: [id number]. 7/15/2022 8:23:29 AM YAHIR

## 2022-07-17 ENCOUNTER — APPOINTMENT (OUTPATIENT)
Dept: CT IMAGING | Facility: HOSPITAL | Age: 57
End: 2022-07-17
Payer: OTHER GOVERNMENT

## 2022-07-17 ENCOUNTER — APPOINTMENT (OUTPATIENT)
Dept: RADIOLOGY | Facility: HOSPITAL | Age: 57
End: 2022-07-17
Payer: OTHER GOVERNMENT

## 2022-07-17 ENCOUNTER — HOSPITAL ENCOUNTER (EMERGENCY)
Facility: HOSPITAL | Age: 57
Discharge: HOME | End: 2022-07-17
Attending: EMERGENCY MEDICINE
Payer: OTHER GOVERNMENT

## 2022-07-17 VITALS
OXYGEN SATURATION: 96 % | BODY MASS INDEX: 22.98 KG/M2 | HEIGHT: 66 IN | HEART RATE: 80 BPM | DIASTOLIC BLOOD PRESSURE: 88 MMHG | RESPIRATION RATE: 16 BRPM | SYSTOLIC BLOOD PRESSURE: 165 MMHG | WEIGHT: 143 LBS | TEMPERATURE: 98 F

## 2022-07-17 DIAGNOSIS — R10.84 GENERALIZED ABDOMINAL PAIN: Primary | ICD-10-CM

## 2022-07-17 LAB
ALBUMIN SERPL-MCNC: 4.3 G/DL (ref 3.5–5.3)
ALP SERPL-CCNC: 58 U/L (ref 41–108)
ALT SERPL-CCNC: 25 U/L (ref 7–52)
ANION GAP SERPL CALC-SCNC: 8 MMOL/L (ref 3–11)
AST SERPL-CCNC: 26 U/L
BACTERIA #/AREA URNS HPF: NORMAL /HPF
BASOPHILS # BLD AUTO: 0.1 10*3/UL
BASOPHILS NFR BLD AUTO: 1.5 % (ref 0–2)
BILIRUB SERPL-MCNC: 0.78 MG/DL (ref 0.2–1.4)
BILIRUB UR QL: NEGATIVE
BUN SERPL-MCNC: 4 MG/DL (ref 7–25)
CALCIUM ALBUM COR SERPL-MCNC: 9.5 MG/DL (ref 8.6–10.3)
CALCIUM SERPL-MCNC: 9.7 MG/DL (ref 8.6–10.3)
CHLORIDE SERPL-SCNC: 106 MMOL/L (ref 98–107)
CLARITY UR: CLEAR
CO2 SERPL-SCNC: 26 MMOL/L (ref 21–32)
COLOR UR: YELLOW
CREAT SERPL-MCNC: 0.49 MG/DL (ref 0.6–1.1)
EOSINOPHIL # BLD AUTO: 0.2 10*3/UL
EOSINOPHIL NFR BLD AUTO: 4 % (ref 0–3)
ERYTHROCYTE [DISTWIDTH] IN BLOOD BY AUTOMATED COUNT: 12.2 % (ref 11.5–14)
GFR SERPL CREATININE-BSD FRML MDRD: 111 ML/MIN/1.73M*2
GLUCOSE SERPL-MCNC: 96 MG/DL (ref 70–105)
GLUCOSE UR QL: NEGATIVE MG/DL
HCT VFR BLD AUTO: 37.1 % (ref 34–45)
HGB BLD-MCNC: 13.1 G/DL (ref 11.5–15.5)
HGB UR QL: NEGATIVE
KETONES UR-MCNC: NEGATIVE MG/DL
LEUKOCYTE ESTERASE UR QL STRIP: NEGATIVE
LYMPHOCYTES # BLD AUTO: 1.4 10*3/UL
LYMPHOCYTES NFR BLD AUTO: 28.6 % (ref 11–47)
MCH RBC QN AUTO: 31.9 PG (ref 28–33)
MCHC RBC AUTO-ENTMCNC: 35.2 G/DL (ref 32–36)
MCV RBC AUTO: 90.4 FL (ref 81–97)
MONOCYTES # BLD AUTO: 0.5 10*3/UL
MONOCYTES NFR BLD AUTO: 10.7 % (ref 3–11)
NEUTROPHILS # BLD AUTO: 2.6 10*3/UL
NEUTROPHILS NFR BLD AUTO: 55.2 % (ref 41–81)
NITRITE UR QL: NEGATIVE
PH UR: 5.5 PH
PLATELET # BLD AUTO: 287 10*3/UL (ref 140–350)
PMV BLD AUTO: 7.7 FL (ref 6.9–10.8)
POTASSIUM SERPL-SCNC: 3.6 MMOL/L (ref 3.5–5.1)
PROT SERPL-MCNC: 6.8 G/DL (ref 6–8.3)
PROT UR STRIP-MCNC: NEGATIVE MG/DL
RBC # BLD AUTO: 4.1 10*6/ΜL (ref 3.7–5.3)
RBC #/AREA URNS HPF: NORMAL /HPF
SODIUM SERPL-SCNC: 140 MMOL/L (ref 135–145)
SP GR UR: <=1.005 (ref 1–1.03)
SQUAMOUS #/AREA URNS HPF: NORMAL /HPF
UROBILINOGEN UR-MCNC: 0.2 E.U./DL
WBC # BLD AUTO: 4.8 10*3/UL (ref 4.5–10.5)
WBC #/AREA URNS HPF: NORMAL /HPF

## 2022-07-17 PROCEDURE — 74022 RADEX COMPL AQT ABD SERIES: CPT

## 2022-07-17 PROCEDURE — 81001 URINALYSIS AUTO W/SCOPE: CPT | Performed by: EMERGENCY MEDICINE

## 2022-07-17 PROCEDURE — 99284 EMERGENCY DEPT VISIT MOD MDM: CPT | Performed by: EMERGENCY MEDICINE

## 2022-07-17 PROCEDURE — 2550000100 HC RX 255: Performed by: EMERGENCY MEDICINE

## 2022-07-17 PROCEDURE — 74177 CT ABD & PELVIS W/CONTRAST: CPT

## 2022-07-17 PROCEDURE — 85025 COMPLETE CBC W/AUTO DIFF WBC: CPT | Performed by: EMERGENCY MEDICINE

## 2022-07-17 PROCEDURE — 80053 COMPREHEN METABOLIC PANEL: CPT | Performed by: EMERGENCY MEDICINE

## 2022-07-17 PROCEDURE — 99285 EMERGENCY DEPT VISIT HI MDM: CPT | Performed by: EMERGENCY MEDICINE

## 2022-07-17 PROCEDURE — 36415 COLL VENOUS BLD VENIPUNCTURE: CPT | Performed by: EMERGENCY MEDICINE

## 2022-07-17 RX ORDER — IOPAMIDOL 755 MG/ML
91 INJECTION, SOLUTION INTRAVASCULAR ONCE
Status: COMPLETED | OUTPATIENT
Start: 2022-07-17 | End: 2022-07-17

## 2022-07-17 RX ADMIN — IOPAMIDOL 91 ML: 755 INJECTION, SOLUTION INTRAVENOUS at 14:25

## 2022-07-17 ASSESSMENT — ENCOUNTER SYMPTOMS
CHILLS: 1
DIAPHORESIS: 0
DYSURIA: 0
ABDOMINAL DISTENTION: 0
DIARRHEA: 0
SHORTNESS OF BREATH: 0
FEVER: 0
VOMITING: 0
COUGH: 1
NAUSEA: 1
CONSTIPATION: 1

## 2022-07-17 NOTE — DISCHARGE INSTRUCTIONS
1) there is no evidence of recurrent diverticulitis or other abdominal/intestinal infection.  2) there is a small collection of fluid in your right lung but as you are recovering from COVID and have no symptoms of fever, cough, shortness of breath or elevated white blood cell count, I do not think that this mandates an antibiotic.  3) keep your stools soft to make it easier to move your bowels.  You may use medication if needed.  4) call your PCP on Tuesday and give an update regarding your symptoms.

## 2022-07-17 NOTE — ED PROVIDER NOTES
"    HPI:  Chief Complaint   Patient presents with   • Abdominal Pain     Patient arrives to ER with c/o abdominal pain and constipation. Patient states she took an enema yesterday with a little relief.       Irasema is a 57-year-old white female who presents with her  stating \"I'm just sick.\"    The patient states she was diagnosed with COVID-19, 19 days ago but has been \"feeling better\" of late.  She states of her symptoms \"fever was the big thing.\"  She did have nausea during this illness and has had only a slight cough but no shortness of breath.  She did lose her sense of taste but only for 3 to 4 days.    For several days now though, Luiza states she has not moved her bowels.  She felt very constipated last night and took some Senokot as well as an enema.  She has now had some liquid stool but still does not feel well.  She denies any vomiting.    Past medical history: The patient has a history of recurrent episodes of diverticulitis.  She has undergone a sigmoidectomy to remove the most problematic portion of her colon but still has approximately 1 episode of diverticulitis per year.  She was hospitalized 6 months ago for this.          HISTORY:  Past Medical History:   Diagnosis Date   • Appendicitis    • Depression    • Diverticulitis    • Fibromyalgia 05/03/2018   • Gallstones    • Headache    • History of migraine 09/14/2017   • History of migraine 09/14/2017   • Insomnia    • Migraines    • Muscle tightness    • Other insomnia 05/03/2018   • Situational anxiety 05/03/2018    after a trauma riding horses   • Skin abnormalities 1/2022   • Traumatic closed displaced fracture of shaft of humerus with routine healing, right 05/16/2019       Past Surgical History:   Procedure Laterality Date   • APPENDECTOMY     • BREAST SURGERY      reduction   • BREAST SURGERY Bilateral     reduction   • CHOLECYSTECTOMY     • COLONOSCOPY  03/2018    no polyps   • FOOT SURGERY Right 2009    Screws   • HERNIA REPAIR     • " HUMERUS SURGERY Right 2013    plate/screws   • HYSTERECTOMY      endometriosis   • ORIF WRIST FRACTURE Right 07/25/2018    Procedure: RIGHT ORIF WRIST;  Surgeon: Saad Zuniga MD;  Location: [facility] OR;  Service: Orthopedics;  Laterality: Right;   • ORTHOPEDIC SURGERY     • SHOULDER SURGERY     • SIGMOIDECTOMY  2016    diverticular ds   • TONSILLECTOMY AND ADENOIDECTOMY     • TUBAL LIGATION  1992   • UPPER EXTREMITY DEBRIDEMENT Right 07/25/2018    Procedure: irrigation and debridement of right upper extremity and all indicated procedures;  Surgeon: Saad Zuniga MD;  Location: [facility] OR;  Service: Orthopedics;  Laterality: Right;       Family History   Problem Relation Age of Onset   • Colon cancer Maternal Grandmother    • Ovarian cancer Maternal Grandmother    • Cancer Maternal Grandmother    • Bone cancer Paternal Grandmother    • Hypertension Other    • Breast cancer Other    • Lung cancer Other    • Hypertension Mother    • Drug abuse Father    • Heart disease Father    • Cancer Paternal Grandfather    • Cancer Mother's Sister    • Drug abuse Sister        Social History     Tobacco Use   • Smoking status: Never Smoker   • Smokeless tobacco: Never Used   Substance Use Topics   • Alcohol use: Yes     Alcohol/week: 1.0 standard drink     Types: 1 Standard drinks or equivalent per week     Comment: socially   • Drug use: No         ROS:  Review of Systems   Constitutional: Positive for chills. Negative for diaphoresis and fever.   Respiratory: Positive for cough. Negative for shortness of breath.    Cardiovascular: Negative for chest pain.   Gastrointestinal: Positive for constipation and nausea. Negative for abdominal distention, diarrhea and vomiting.   Genitourinary: Negative for dysuria.       PE:  ED Triage Vitals   Temp Heart Rate Resp BP SpO2   07/17/22 1038 07/17/22 1038 07/17/22 1038 07/17/22 1038 07/17/22 1038   36.7 °C (98 °F) 76 18 137/63 94 %      Mean BP (mmHg) Temp Source Heart Rate Source Patient  Position BP Location   07/17/22 1038 07/17/22 1038 -- 07/17/22 1136 --   99 Oral  Supine       FiO2 (%)       --                  Physical Exam  Temp and vital signs are all within normal limits.  Room air oximetry is 94%.  In general, the patient is a healthy appearing middle-aged white female who is lying on the stretcher in no acute distress.  The head is normocephalic, atraumatic.  The neck is supple without meningismus or enlarged anterior cervical lymph nodes.  Heart shows a regular rate and rhythm without murmurs, rubs, gallops or tachycardia.  The lungs are clear posteriorly without rales, rhonchi or wheezes.  Abdomen shows hyperactive bowel sounds.  It is soft with some mild diffuse tenderness on palpation, slightly moreso in the left lower quadrant.  There are no masses noted.  The skin shows no rash.  There is a 2 millimeter area of pinkish erythema on the lateral aspect of the right lower leg where the patient states she removed a small tick yesterday.  This does not appear infected.  ED LABS:  Labs Reviewed   CBC WITH AUTO DIFFERENTIAL - Abnormal       Result Value    WBC 4.8      RBC 4.10      Hemoglobin 13.1      Hematocrit 37.1      MCV 90.4      MCH 31.9      MCHC 35.2      RDW 12.2      Platelets 287      MPV 7.7      Neutrophils% 55.2      Lymphocytes% 28.6      Monocytes% 10.7      Eosinophils% 4.0 (*)     Basophils% 1.5      ANC (auto diff) 2.60      Lymphocytes Absolute 1.40      Monocytes Absolute 0.50      Eosinophils Absolute 0.20      Basophils Absolute 0.10     COMPREHENSIVE METABOLIC PANEL - Abnormal    Sodium 140      Potassium 3.6      Chloride 106      CO2 26      Anion Gap 8      BUN 4 (*)     Creatinine 0.49 (*)     Glucose 96      Calcium 9.7      AST 26      ALT (SGPT) 25      Alkaline Phosphatase 58      Total Protein 6.8      Albumin 4.3      Total Bilirubin 0.78      Corrected Calcium 9.5      eGFR 111      Narrative:     Calculation based on the 2021 Chronic Kidney Disease  Epidemiology Collaboration (CKD-EPI) equation refit without adjustment for race.   URINALYSIS, MICROSCOPIC ONLY - Normal    RBC, Urine None seen      WBC, Urine None seen      Squamous Epithelial, Urine 0-4      Bacteria, Urine None seen     URINALYSIS, DIPSTICK ONLY, FOR USE WITH MICROSCOPIC PANEL - Normal    Color, Urine Yellow      Clarity, Urine Clear      Specific Gravity, Urine <=1.005      Leukocytes, Urine Negative      Nitrite, Urine Negative      Protein, Urine Negative      Ketones, Urine Negative      Urobilinogen, Urine 0.2      Bilirubin, Urine Negative      Blood, Urine Negative      Glucose, Urine Negative      pH, Urine 5.5     URINALYSIS WITH MICROSCOPIC    Narrative:     The following orders were created for panel order Urinalysis w/microscopic Urine, Clean Catch.  Procedure                               Abnormality         Status                     ---------                               -----------         ------                     Urinalysis, microscopic U...[[id number]]  Normal              Final result               Urinalysis, dipstick Urin...[[id number]]  Normal              Final result                 Please view results for these tests on the individual orders.         ED IMAGES:  CT ABDOMEN PELVIS W IV CONTRAST   Final Result   IMPRESSION:   1.  No acute CT abnormality of the abdomen or pelvis. Fatty liver.   2.  Focal right lower lobe consolidation. Could represent pneumonia. Recommend follow-up chest CT in 3 months to document resolution.      X-ray abdomen 2 views with chest 1 view   Final Result   IMPRESSION:          No definite acute radiographic abnormality.      Focal nonspecific hyperdensity of the left hemiabdomen which may be within the left colon.             ED PROCEDURES:  Procedures    ED COURSE:  ED Course as of 07/17/22 1510   Sun Jul 17, 2022   1258 CO2: 26 [DS]      ED Course User Index  [DS] Brennan Whitlock MD   CBC and CMP are both entirely unremarkable.  Specifically,  there is no leukocytosis, anemia or hypokalemia.  There is a very slight eosinophilia on the white cell differential.  CMP is normal.    Because of the patient's reports of constipation and her hyperactive bowel sounds on exam, an abdominal series was obtained.  This shows some stool, some fluid levels but not significant constipation.  There is no evidence of obstruction or free air.    At this point in time the patient reported some dysuria.  A urine sample was obtained but it is entirely unremarkable.  Discussing the quality of the patient's symptoms further, she reported that these are the symptoms that she usually has with diverticulitis.  Consequently, as she is now pointing more towards her left abdomen, a CT scan of the abdomen and pelvis was obtained.    This shows no evidence of recurrent diverticulitis or other intestinal infection.  It does however show a small fluid collection in the right lower lung.  But, as the patient is recovering from COVID and has no fever, cough, shortness of breath or leukocytosis I do not feel that this mandates antibiotic therapy.    I did review these findings with the patient.  She is greatly relieved that she does not have recurrent diverticulitis.  I did urge her to call her PCP on Tuesday, 2 days from now and give an update regarding her symptoms.  If she does develop severe abdominal pain, fever, cough or shortness of breath she should return to the ER for reevaluation.    Luiza voiced understanding of her discharge instructions.  I answered her questions prior to departure.               MDM:  GABBIE    Final diagnoses:   [R10.84] Generalized abdominal pain     7/17/2022  3:06 PM                 Brennan Whitlock MD  07/17/22 6505

## 2022-07-25 DIAGNOSIS — G43.909 MIGRAINE WITHOUT STATUS MIGRAINOSUS, NOT INTRACTABLE, UNSPECIFIED MIGRAINE TYPE: ICD-10-CM

## 2022-07-25 RX ORDER — BUTORPHANOL TARTRATE 10 MG/ML
SPRAY NASAL
Qty: 2.5 ML | Refills: 1 | OUTPATIENT
Start: 2022-07-25

## 2022-07-25 NOTE — TELEPHONE ENCOUNTER
No new care gaps identified.  [facility] Embedded Care Gaps. Reference number: [id number]. 7/25/2022 11:19:40 AM YAHIR

## 2022-07-27 ENCOUNTER — TELEPHONE (OUTPATIENT)
Dept: FAMILY MEDICINE | Facility: CLINIC | Age: 57
End: 2022-07-27
Payer: OTHER GOVERNMENT

## 2022-07-27 DIAGNOSIS — G43.909 MIGRAINE WITHOUT STATUS MIGRAINOSUS, NOT INTRACTABLE, UNSPECIFIED MIGRAINE TYPE: ICD-10-CM

## 2022-07-27 RX ORDER — BUTORPHANOL TARTRATE 10 MG/ML
1 SPRAY NASAL EVERY 6 HOURS PRN
Qty: 2.5 ML | Refills: 1 | Status: SHIPPED | OUTPATIENT
Start: 2022-07-27 | End: 2022-08-03

## 2022-07-27 NOTE — TELEPHONE ENCOUNTER
Patient called and there was a My Chart message where Dr. Lacy okayed the Stadol prescription, and I mentioned to her the 7/6 fill, but she said they are only good for a few pumps and she has already done the refill, so she is still needing a new script for that Stadol.  She uses [facility].  Thanks

## 2022-07-27 NOTE — TELEPHONE ENCOUNTER
No new care gaps identified.  [facility] Embedded Care Gaps. Reference number: [id number]. 7/27/2022 8:58:14 AM YAHIR

## 2022-08-03 DIAGNOSIS — G43.909 MIGRAINE WITHOUT STATUS MIGRAINOSUS, NOT INTRACTABLE, UNSPECIFIED MIGRAINE TYPE: ICD-10-CM

## 2022-08-03 DIAGNOSIS — F41.8 SITUATIONAL ANXIETY: ICD-10-CM

## 2022-08-03 RX ORDER — BUTORPHANOL TARTRATE 10 MG/ML
SPRAY NASAL
Qty: 2.5 ML | Refills: 1 | Status: SHIPPED | OUTPATIENT
Start: 2022-08-03 | End: 2022-08-24 | Stop reason: SDUPTHER

## 2022-08-03 RX ORDER — ALPRAZOLAM 0.5 MG/1
TABLET ORAL
Qty: 30 TABLET | Refills: 2 | Status: SHIPPED | OUTPATIENT
Start: 2022-08-03 | End: 2022-12-06

## 2022-08-03 NOTE — TELEPHONE ENCOUNTER
No new care gaps identified.  [facility] Embedded Care Gaps. Reference number: [id number]. 8/03/2022 8:36:33 AM YAHIR

## 2022-08-24 DIAGNOSIS — G43.909 MIGRAINE WITHOUT STATUS MIGRAINOSUS, NOT INTRACTABLE, UNSPECIFIED MIGRAINE TYPE: ICD-10-CM

## 2022-08-24 RX ORDER — BUTORPHANOL TARTRATE 10 MG/ML
1 SPRAY NASAL EVERY 6 HOURS PRN
Qty: 2.5 ML | Refills: 1 | Status: SHIPPED | OUTPATIENT
Start: 2022-08-24 | End: 2022-09-14 | Stop reason: SDUPTHER

## 2022-08-24 NOTE — TELEPHONE ENCOUNTER
No new care gaps identified.  [facility] Embedded Care Gaps. Reference number: [id number]. 8/24/2022 8:29:15 AM YAHIR

## 2022-08-25 ENCOUNTER — TELEPHONE (OUTPATIENT)
Dept: FAMILY MEDICINE | Facility: CLINIC | Age: 57
End: 2022-08-25
Payer: OTHER GOVERNMENT

## 2022-08-25 NOTE — TELEPHONE ENCOUNTER
She is to be scheduled on call days only so, next Thursday at the 1st at 10 am will work or next available.

## 2022-08-25 NOTE — TELEPHONE ENCOUNTER
Luiza missed her appointment 8/23 Tuesday with Dr Lacy.     This was to discuss prescriptions vanlafaxine XR (EFFEXOR) ubrogepant (Ubrelvy) 100 mg.     Next available is 9/19.     Can acute be used for this?     Please advise date and time if so

## 2022-09-06 DIAGNOSIS — G43.909 MIGRAINE WITHOUT STATUS MIGRAINOSUS, NOT INTRACTABLE, UNSPECIFIED MIGRAINE TYPE: ICD-10-CM

## 2022-09-06 RX ORDER — BUTORPHANOL TARTRATE 10 MG/ML
SPRAY NASAL
Qty: 2.5 ML | Refills: 1 | OUTPATIENT
Start: 2022-09-06

## 2022-09-06 NOTE — TELEPHONE ENCOUNTER
No new care gaps identified.  [facility] Embedded Care Gaps. Reference number: [id number]. 9/06/2022 8:28:11 AM YAHIR

## 2022-09-08 DIAGNOSIS — M79.7 FIBROMYALGIA: ICD-10-CM

## 2022-09-08 RX ORDER — DICLOFENAC SODIUM 50 MG/1
TABLET, DELAYED RELEASE ORAL
Qty: 90 TABLET | Refills: 2 | OUTPATIENT
Start: 2022-09-08

## 2022-09-08 NOTE — TELEPHONE ENCOUNTER
No new care gaps identified.  [facility] Embedded Care Gaps. Reference number: [id number]. 9/08/2022 10:50:13 AM YAHIR

## 2022-09-12 DIAGNOSIS — B00.1 HERPES LABIALIS: ICD-10-CM

## 2022-09-12 DIAGNOSIS — M79.7 FIBROMYALGIA: ICD-10-CM

## 2022-09-12 DIAGNOSIS — F32.A ANXIETY AND DEPRESSION: ICD-10-CM

## 2022-09-12 DIAGNOSIS — F43.10 PTSD (POST-TRAUMATIC STRESS DISORDER): ICD-10-CM

## 2022-09-12 DIAGNOSIS — F41.9 ANXIETY AND DEPRESSION: ICD-10-CM

## 2022-09-12 RX ORDER — GABAPENTIN 100 MG/1
CAPSULE ORAL
Qty: 90 CAPSULE | Refills: 5 | Status: SHIPPED | OUTPATIENT
Start: 2022-09-12 | End: 2022-10-17 | Stop reason: SDUPTHER

## 2022-09-12 RX ORDER — VALACYCLOVIR HYDROCHLORIDE 1 G/1
TABLET, FILM COATED ORAL
Qty: 21 TABLET | Refills: 3 | Status: SHIPPED | OUTPATIENT
Start: 2022-09-12 | End: 2024-05-09 | Stop reason: SDUPTHER

## 2022-09-12 RX ORDER — VENLAFAXINE HYDROCHLORIDE 75 MG/1
CAPSULE, EXTENDED RELEASE ORAL
Qty: 30 CAPSULE | Refills: 3 | OUTPATIENT
Start: 2022-09-12

## 2022-09-12 NOTE — TELEPHONE ENCOUNTER
No new care gaps identified.  [facility] Embedded Care Gaps. Reference number: [id number]. 9/12/2022 8:29:28 AM YAHIR

## 2022-09-12 NOTE — TELEPHONE ENCOUNTER
No new care gaps identified.  [facility] Embedded Care Gaps. Reference number: [id number]. 9/12/2022 9:40:21 AM YAHIR

## 2022-09-13 DIAGNOSIS — G47.09 OTHER INSOMNIA: ICD-10-CM

## 2022-09-13 RX ORDER — ZOLPIDEM TARTRATE 10 MG/1
TABLET ORAL
Qty: 30 TABLET | Refills: 3 | OUTPATIENT
Start: 2022-09-13

## 2022-09-13 NOTE — TELEPHONE ENCOUNTER
No new care gaps identified.  [facility] Embedded Care Gaps. Reference number: [id number]. 9/13/2022 10:08:04 AM YAHIR

## 2022-09-14 ENCOUNTER — TELEPHONE (OUTPATIENT)
Dept: FAMILY MEDICINE | Facility: CLINIC | Age: 57
End: 2022-09-14
Payer: OTHER GOVERNMENT

## 2022-09-14 DIAGNOSIS — G43.909 MIGRAINE WITHOUT STATUS MIGRAINOSUS, NOT INTRACTABLE, UNSPECIFIED MIGRAINE TYPE: ICD-10-CM

## 2022-09-14 RX ORDER — BUTORPHANOL TARTRATE 10 MG/ML
1 SPRAY NASAL EVERY 6 HOURS PRN
Qty: 2.5 ML | Refills: 0 | Status: SHIPPED | OUTPATIENT
Start: 2022-09-14 | End: 2022-09-20 | Stop reason: SDUPTHER

## 2022-09-14 RX ORDER — BUTORPHANOL TARTRATE 10 MG/ML
SPRAY NASAL
Qty: 2.5 ML | Refills: 1 | OUTPATIENT
Start: 2022-09-14

## 2022-09-14 NOTE — TELEPHONE ENCOUNTER
Luiza left message in MyChart in regards to getting a refill on her Butorphanol.    Please send to [facility].

## 2022-09-14 NOTE — TELEPHONE ENCOUNTER
Patient called requesting a refill of her meds. States she is completely out. She has an appt for 9/15. She stated she would pay cash for a 1-2 day refill and then come to her appt tomorrow.

## 2022-09-14 NOTE — TELEPHONE ENCOUNTER
This is the 2nd pt call in regards to this. The pt has already sent a [employer] message as well and that has been sent to Dr. Lacy to review when she gets a chance.

## 2022-09-14 NOTE — TELEPHONE ENCOUNTER
No new care gaps identified.  [facility] Embedded Care Gaps. Reference number: [id number]. 9/14/2022 11:51:44 AM YAHIR

## 2022-09-14 NOTE — TELEPHONE ENCOUNTER
No new care gaps identified.  [facility] Embedded Care Gaps. Reference number: [id number]. 9/14/2022 9:50:56 AM YAHIR

## 2022-09-15 ENCOUNTER — OFFICE VISIT (OUTPATIENT)
Dept: FAMILY MEDICINE | Facility: CLINIC | Age: 57
End: 2022-09-15
Payer: OTHER GOVERNMENT

## 2022-09-15 ENCOUNTER — HOSPITAL ENCOUNTER (EMERGENCY)
Facility: HOSPITAL | Age: 57
Discharge: HOME | End: 2022-09-15
Attending: FAMILY MEDICINE
Payer: OTHER GOVERNMENT

## 2022-09-15 VITALS
RESPIRATION RATE: 18 BRPM | HEART RATE: 73 BPM | SYSTOLIC BLOOD PRESSURE: 84 MMHG | WEIGHT: 150 LBS | DIASTOLIC BLOOD PRESSURE: 60 MMHG | OXYGEN SATURATION: 98 % | BODY MASS INDEX: 24.11 KG/M2 | HEIGHT: 66 IN

## 2022-09-15 VITALS
DIASTOLIC BLOOD PRESSURE: 77 MMHG | BODY MASS INDEX: 23.3 KG/M2 | OXYGEN SATURATION: 91 % | RESPIRATION RATE: 18 BRPM | HEIGHT: 66 IN | WEIGHT: 145 LBS | SYSTOLIC BLOOD PRESSURE: 107 MMHG | HEART RATE: 54 BPM | TEMPERATURE: 96.8 F

## 2022-09-15 DIAGNOSIS — G43.909 MIGRAINE WITHOUT STATUS MIGRAINOSUS, NOT INTRACTABLE, UNSPECIFIED MIGRAINE TYPE: Primary | ICD-10-CM

## 2022-09-15 DIAGNOSIS — F43.10 PTSD (POST-TRAUMATIC STRESS DISORDER): ICD-10-CM

## 2022-09-15 DIAGNOSIS — F41.8 SITUATIONAL ANXIETY: ICD-10-CM

## 2022-09-15 DIAGNOSIS — F41.9 ANXIETY AND DEPRESSION: ICD-10-CM

## 2022-09-15 DIAGNOSIS — I95.9 HYPOTENSION: ICD-10-CM

## 2022-09-15 DIAGNOSIS — G43.719 INTRACTABLE CHRONIC MIGRAINE WITHOUT AURA AND WITHOUT STATUS MIGRAINOSUS: Primary | ICD-10-CM

## 2022-09-15 DIAGNOSIS — G47.09 OTHER INSOMNIA: ICD-10-CM

## 2022-09-15 DIAGNOSIS — F32.A ANXIETY AND DEPRESSION: ICD-10-CM

## 2022-09-15 LAB
ALBUMIN SERPL-MCNC: 4.3 G/DL (ref 3.5–5.3)
ALP SERPL-CCNC: 58 U/L (ref 41–108)
ALT SERPL-CCNC: 13 U/L (ref 7–52)
ANION GAP SERPL CALC-SCNC: 9 MMOL/L (ref 3–11)
AST SERPL-CCNC: 14 U/L
BASOPHILS # BLD AUTO: 0.1 10*3/UL
BASOPHILS NFR BLD AUTO: 1.6 % (ref 0–2)
BILIRUB SERPL-MCNC: 0.35 MG/DL (ref 0.2–1.4)
BUN SERPL-MCNC: 5 MG/DL (ref 7–25)
CALCIUM ALBUM COR SERPL-MCNC: 8.8 MG/DL (ref 8.6–10.3)
CALCIUM SERPL-MCNC: 9 MG/DL (ref 8.6–10.3)
CHLORIDE SERPL-SCNC: 101 MMOL/L (ref 98–107)
CO2 SERPL-SCNC: 24 MMOL/L (ref 21–32)
CREAT SERPL-MCNC: 0.63 MG/DL (ref 0.6–1.1)
CRP SERPL-MCNC: <1 MG/L
EOSINOPHIL # BLD AUTO: 0.7 10*3/UL
EOSINOPHIL NFR BLD AUTO: 11.3 % (ref 0–3)
ERYTHROCYTE [DISTWIDTH] IN BLOOD BY AUTOMATED COUNT: 12.7 % (ref 11.5–14)
ERYTHROCYTE [SEDIMENTATION RATE] IN BLOOD: 8 MM/HR
GFR SERPL CREATININE-BSD FRML MDRD: 105 ML/MIN/1.73M*2
GLUCOSE SERPL-MCNC: 69 MG/DL (ref 70–105)
HCT VFR BLD AUTO: 35.6 % (ref 34–45)
HGB BLD-MCNC: 12.5 G/DL (ref 11.5–15.5)
LYMPHOCYTES # BLD AUTO: 3.2 10*3/UL
LYMPHOCYTES NFR BLD AUTO: 48.3 % (ref 11–47)
MAGNESIUM SERPL-MCNC: 1.8 MG/DL (ref 1.8–2.4)
MCH RBC QN AUTO: 32.4 PG (ref 28–33)
MCHC RBC AUTO-ENTMCNC: 35 G/DL (ref 32–36)
MCV RBC AUTO: 92.5 FL (ref 81–97)
MONOCYTES # BLD AUTO: 0.6 10*3/UL
MONOCYTES NFR BLD AUTO: 8.5 % (ref 3–11)
NEUTROPHILS # BLD AUTO: 2 10*3/UL
NEUTROPHILS NFR BLD AUTO: 30.3 % (ref 41–81)
PLATELET # BLD AUTO: 218 10*3/UL (ref 140–350)
PLATELET CLUMP BLD QL SMEAR: ABNORMAL
PLATELET MORPHOLOGY IN BLOOD: NORMAL
PMV BLD AUTO: 7.9 FL (ref 6.9–10.8)
POTASSIUM SERPL-SCNC: 3.6 MMOL/L (ref 3.5–5.1)
PROT SERPL-MCNC: 6.9 G/DL (ref 6–8.3)
RBC # BLD AUTO: 3.85 10*6/ΜL (ref 3.7–5.3)
RBC MORPH BLD: NORMAL
SODIUM SERPL-SCNC: 134 MMOL/L (ref 135–145)
TSH SERPL DL<=0.05 MIU/L-ACNC: 4.29 UIU/ML (ref 0.34–4.82)
WBC # BLD AUTO: 6.5 10*3/UL (ref 4.5–10.5)

## 2022-09-15 PROCEDURE — 6360000200 HC RX 636 W HCPCS (ALT 250 FOR IP): Performed by: FAMILY MEDICINE

## 2022-09-15 PROCEDURE — 85652 RBC SED RATE AUTOMATED: CPT | Performed by: FAMILY MEDICINE

## 2022-09-15 PROCEDURE — 80053 COMPREHEN METABOLIC PANEL: CPT | Performed by: FAMILY MEDICINE

## 2022-09-15 PROCEDURE — 83735 ASSAY OF MAGNESIUM: CPT | Performed by: FAMILY MEDICINE

## 2022-09-15 PROCEDURE — 85025 COMPLETE CBC W/AUTO DIFF WBC: CPT | Performed by: FAMILY MEDICINE

## 2022-09-15 PROCEDURE — 84443 ASSAY THYROID STIM HORMONE: CPT | Performed by: FAMILY MEDICINE

## 2022-09-15 PROCEDURE — 36415 COLL VENOUS BLD VENIPUNCTURE: CPT | Performed by: FAMILY MEDICINE

## 2022-09-15 PROCEDURE — 96361 HYDRATE IV INFUSION ADD-ON: CPT

## 2022-09-15 PROCEDURE — 86140 C-REACTIVE PROTEIN: CPT | Performed by: FAMILY MEDICINE

## 2022-09-15 PROCEDURE — 99284 EMERGENCY DEPT VISIT MOD MDM: CPT | Performed by: FAMILY MEDICINE

## 2022-09-15 PROCEDURE — 99284 EMERGENCY DEPT VISIT MOD MDM: CPT

## 2022-09-15 PROCEDURE — 96375 TX/PRO/DX INJ NEW DRUG ADDON: CPT

## 2022-09-15 PROCEDURE — 2580000300 HC RX 258: Performed by: FAMILY MEDICINE

## 2022-09-15 PROCEDURE — 96374 THER/PROPH/DIAG INJ IV PUSH: CPT

## 2022-09-15 RX ORDER — VENLAFAXINE HYDROCHLORIDE 150 MG/1
150 CAPSULE, EXTENDED RELEASE ORAL DAILY
Qty: 90 CAPSULE | Refills: 3 | Status: SHIPPED | OUTPATIENT
Start: 2022-09-15 | End: 2022-10-17 | Stop reason: SDUPTHER

## 2022-09-15 RX ORDER — UBROGEPANT 100 MG/1
1 TABLET ORAL
Status: CANCELLED | OUTPATIENT
Start: 2022-09-15

## 2022-09-15 RX ORDER — DIPHENHYDRAMINE HYDROCHLORIDE 50 MG/ML
25 INJECTION INTRAMUSCULAR; INTRAVENOUS ONCE
Status: COMPLETED | OUTPATIENT
Start: 2022-09-15 | End: 2022-09-15

## 2022-09-15 RX ORDER — KETOROLAC TROMETHAMINE 30 MG/ML
30 INJECTION, SOLUTION INTRAMUSCULAR; INTRAVENOUS ONCE
Status: COMPLETED | OUTPATIENT
Start: 2022-09-15 | End: 2022-09-15

## 2022-09-15 RX ORDER — SODIUM CHLORIDE 9 MG/ML
1000 INJECTION, SOLUTION INTRAVENOUS ONCE
Status: COMPLETED | OUTPATIENT
Start: 2022-09-15 | End: 2022-09-15

## 2022-09-15 RX ORDER — METOCLOPRAMIDE HYDROCHLORIDE 5 MG/ML
10 INJECTION INTRAMUSCULAR; INTRAVENOUS ONCE
Status: COMPLETED | OUTPATIENT
Start: 2022-09-15 | End: 2022-09-15

## 2022-09-15 RX ORDER — ZOLPIDEM TARTRATE 10 MG/1
10 TABLET ORAL NIGHTLY PRN
Qty: 30 TABLET | Refills: 2 | Status: SHIPPED | OUTPATIENT
Start: 2022-09-15 | End: 2022-10-17 | Stop reason: ALTCHOICE

## 2022-09-15 RX ORDER — ALPRAZOLAM 0.5 MG/1
0.5 TABLET ORAL DAILY PRN
Status: CANCELLED | OUTPATIENT
Start: 2022-09-15

## 2022-09-15 RX ORDER — ERENUMAB-AOOE 140 MG/ML
140 INJECTION, SOLUTION SUBCUTANEOUS
Qty: 3 ML | Refills: 1 | Status: SHIPPED | OUTPATIENT
Start: 2022-09-15 | End: 2022-10-17 | Stop reason: SDUPTHER

## 2022-09-15 RX ADMIN — KETOROLAC TROMETHAMINE 30 MG: 30 INJECTION, SOLUTION INTRAMUSCULAR; INTRAVENOUS at 15:17

## 2022-09-15 RX ADMIN — SODIUM CHLORIDE 1000 ML: 9 INJECTION, SOLUTION INTRAVENOUS at 14:03

## 2022-09-15 RX ADMIN — DIPHENHYDRAMINE HYDROCHLORIDE 25 MG: 50 INJECTION INTRAMUSCULAR; INTRAVENOUS at 15:17

## 2022-09-15 RX ADMIN — METOCLOPRAMIDE 10 MG: 5 INJECTION, SOLUTION INTRAMUSCULAR; INTRAVENOUS at 15:18

## 2022-09-15 NOTE — Clinical Note
Luiza Tay was seen and treated in our emergency department on 9/15/2022.  She may return to work on 09/18/2022.       If you have any questions or concerns, please don't hesitate to call.      Stephanie Martínez RN

## 2022-09-15 NOTE — ED PROVIDER NOTES
EMERGENCY NOTE    Chief Complaint:  Chief Complaint   Patient presents with    Hypotension     Patient arrives from clinic with nurse with hypotension. Clinic nurse states that BP on both arms was 80s/60s. Pt states she's had a migraine on and off for weeks.    Headache         HPI:  Luiza Tay is a 57 y.o. female who has a past medical history significant for migraines presents today for concerns of worsening migraine and actually was seen in the clinic today but was noted to have depressed blood pressure and sent to the emergency department.  She states she has not been feeling well since she had COVID infection.  This was back in July.  She states she has been struggling with fatigue since then.  She is also been struggling with her depression a little bit more she is on venlafaxine 75 mg daily she talked about going up on that dose.  She states her migraine has been ongoing for the last couple days more persistent.  She has associated nausea with this.  She denies any fevers or chills.  Denies any weakness in her lower extremities or upper extremities.  Denies any cough or dysuria or abdominal pain or diarrhea.      HISTORY:  Past Medical History:   Diagnosis Date    Appendicitis     Depression     Diverticulitis     Fibromyalgia 05/03/2018    Gallstones     Headache     History of migraine 09/14/2017    History of migraine 09/14/2017    Insomnia     Migraines     Muscle tightness     Other insomnia 05/03/2018    Situational anxiety 05/03/2018    after a trauma riding horses    Skin abnormalities 1/2022    Traumatic closed displaced fracture of shaft of humerus with routine healing, right 05/16/2019       Past Surgical History:   Procedure Laterality Date    APPENDECTOMY      BREAST SURGERY      reduction    BREAST SURGERY Bilateral     reduction    CHOLECYSTECTOMY      COLONOSCOPY  03/2018    no polyps    FOOT SURGERY Right 2009    Screws    HERNIA REPAIR      HUMERUS SURGERY Right 2013    plate/screws     HYSTERECTOMY      endometriosis    ORIF WRIST FRACTURE Right 07/25/2018    Procedure: RIGHT ORIF WRIST;  Surgeon: Saad Zuniga MD;  Location: [facility] OR;  Service: Orthopedics;  Laterality: Right;    ORTHOPEDIC SURGERY      SHOULDER SURGERY      SIGMOIDECTOMY  2016    diverticular ds    TONSILLECTOMY AND ADENOIDECTOMY      TUBAL LIGATION  1992    UPPER EXTREMITY DEBRIDEMENT Right 07/25/2018    Procedure: irrigation and debridement of right upper extremity and all indicated procedures;  Surgeon: Saad Zuniga MD;  Location: [facility] OR;  Service: Orthopedics;  Laterality: Right;       Family History   Problem Relation Age of Onset    Colon cancer Maternal Grandmother     Ovarian cancer Maternal Grandmother     Cancer Maternal Grandmother     Bone cancer Paternal Grandmother     Hypertension Other     Breast cancer Other     Lung cancer Other     Hypertension Mother     Drug abuse Father     Heart disease Father     Cancer Paternal Grandfather     Cancer Mother's Sister     Drug abuse Sister        Social History     Tobacco Use    Smoking status: Never    Smokeless tobacco: Never   Vaping Use    Vaping Use: Never used   Substance Use Topics    Alcohol use: Yes     Alcohol/week: 1.0 standard drink     Types: 1 Standard drinks or equivalent per week     Comment: socially    Drug use: No       Allergies   Allergen Reactions    Hydromorphone Hives    Meperidine      cause migraines    Trazodone      nightmares    Clindamycin Rash    Sumatriptan Rash         Current Outpatient Medications:     butorphanol (STADOL) 10 mg/mL nasal spray, Administer 1 spray into one nostril every 6 (six) hours as needed for pain scale 8-10/10 Max Daily Amount: 4 sprays, Disp: 2.5 mL, Rfl: 0    valACYclovir (VALTREX) 1 gram tablet, TAKE ONE TABLET BY MOUTH THREE TIMES DAILY FOR SEVEN DAYS, Disp: 21 tablet, Rfl: 3    gabapentin (NEURONTIN) 100 mg capsule, TAKE ONE CAPSULE BY MOUTH THREE TIMES DAILY, Disp: 90 capsule, Rfl: 5    ALPRAZolam  (XANAX) 0.5 mg tablet, TAKE ONE TABLET BY MOUTH DAILY AS NEEDED FOR ANXIETY, Disp: 30 tablet, Rfl: 2    tiZANidine (ZANAFLEX) 4 mg tablet, Take 1 tablet (4 mg total) by mouth 2 (two) times a day, Disp: 180 tablet, Rfl: 3    ubrogepant (Ubrelvy) 100 mg tablet, Take 1 tablet by mouth, Disp: , Rfl:     ondansetron ODT (ZOFRAN-ODT) 4 mg disintegrating tablet, Take 1 tablet (4 mg total) by mouth every 8 (eight) hours as needed for nausea or vomiting, Disp: 90 tablet, Rfl: 0    erenumab-aooe (Aimovig Autoinjector) 140 mg/mL auto-injector, Inject 140 mg under the skin every 28 (twenty-eight) days Indications: migraine prevention, Disp: 3 mL, Rfl: 1    zolpidem (AMBIEN) 10 mg tablet, Take 1 tablet (10 mg total) by mouth nightly as needed for sleep Max Daily Amount: 10 mg, Disp: 30 tablet, Rfl: 2    venlafaxine XR (EFFEXOR-XR) 150 mg 24 hr capsule, Take 1 capsule (150 mg total) by mouth daily, Disp: 90 capsule, Rfl: 3    ubrogepant 100 mg tablet, Take 100 mg by mouth once as needed (for migraine) for up to 1 dose May repeat dose after 2 hours. Not to exceed 200mg in 24 hours, Disp: 16 tablet, Rfl: 2    butalbital-acetaminophen-caff (FIORICET, ESGIC) -40 mg, Take 1 tablet by mouth every 4 (four) hours as needed for headaches (Patient not taking: No sig reported), Disp: 60 tablet, Rfl: 0      ROS:  Review of Systems  12 point ROS performed that was negative unless otherwise mentioned per HPI.    PE:  ED Triage Vitals   Temp Heart Rate Resp BP SpO2   09/15/22 1359 09/15/22 1353 09/15/22 1353 09/15/22 1353 09/15/22 1353   36.5 °C (97.7 °F) 65 18 107/73 100 %      Mean BP (mmHg) Temp Source Heart Rate Source Patient Position BP Location   09/15/22 1353 09/15/22 1353 -- 09/15/22 1404 --   88 Oral  Supine       FiO2 (%)       --                  Physical Exam  Vitals and nursing note reviewed.   Constitutional:       General: She is not in acute distress.     Appearance: She is not ill-appearing or toxic-appearing.   HENT:       Head: Normocephalic and atraumatic.      Nose: Nose normal.      Mouth/Throat:      Mouth: Mucous membranes are moist.   Eyes:      Extraocular Movements: Extraocular movements intact.      Pupils: Pupils are equal, round, and reactive to light.   Cardiovascular:      Rate and Rhythm: Normal rate and regular rhythm.      Heart sounds: Normal heart sounds.   Pulmonary:      Effort: Pulmonary effort is normal.      Breath sounds: Normal breath sounds.   Abdominal:      General: Bowel sounds are normal. There is no distension.      Palpations: Abdomen is soft.      Tenderness: There is no abdominal tenderness. There is no guarding or rebound.   Musculoskeletal:      Cervical back: Neck supple.   Skin:     General: Skin is warm.   Neurological:      Mental Status: She is alert and oriented to person, place, and time.   Psychiatric:         Mood and Affect: Mood normal.         ED LABS:  Labs Reviewed   COMPREHENSIVE METABOLIC PANEL - Abnormal       Result Value    Sodium 134 (*)     Potassium 3.6      Chloride 101      CO2 24      Anion Gap 9      BUN 5 (*)     Creatinine 0.63      Glucose 69 (*)     Calcium 9.0      AST 14      ALT (SGPT) 13      Alkaline Phosphatase 58      Total Protein 6.9      Albumin 4.3      Total Bilirubin 0.35      Corrected Calcium 8.8      eGFR 105      Narrative:     Calculation based on the 2021 Chronic Kidney Disease Epidemiology Collaboration (CKD-EPI) equation refit without adjustment for race.   CBC WITH AUTO DIFFERENTIAL - Abnormal    WBC 6.5      RBC 3.85      Hemoglobin 12.5      Hematocrit 35.6      MCV 92.5      MCH 32.4      MCHC 35.0      RDW 12.7      Platelets 218      MPV 7.9      Neutrophils% 30.3 (*)     Lymphocytes% 48.3 (*)     Monocytes% 8.5      Eosinophils% 11.3 (*)     Basophils% 1.6      ANC (auto diff) 2.00      Lymphocytes Absolute 3.20      Monocytes Absolute 0.60      Eosinophils Absolute 0.70      Basophils Absolute 0.10     SMEAR REVIEW FOR PLT/RBC MORPH (LAB  USE ONLY) - Abnormal    Clumped Platelets Rare (*)     RBC Morphology Normal      Platelet Morphology Normal     MAGNESIUM - Normal    Magnesium 1.8     TSH - Normal    TSH 4.291     SEDIMENTATION RATE, AUTOMATED - Normal    Sed Rate 8     C-REACTIVE PROTEIN - Normal    CRP <1.0         ED IMAGES:  No orders to display       PROCEDURE    Procedures      ED MEDS  Medications   sodium chloride 0.9 % bolus 1,000 mL (0 mL intravenous Stopped 9/15/22 1519)   ketorolac (TORADOL) injection 30 mg (30 mg intravenous Given 9/15/22 1517)   diphenhydrAMINE (BENADRYL) injection 25 mg (25 mg intravenous Given 9/15/22 1517)   metoclopramide (REGLAN) injection 10 mg (10 mg intravenous Given 9/15/22 1518)       ED DIAGNOSIS  Final diagnoses:   [G43.719] Intractable chronic migraine without aura and without status migrainosus   [I95.9] Hypotension           ED COURSE:  Luiza is a nice 57-year-old with a history of migraines who presents today for migraine and some hypotension.  Upon arrival here, ABCs attended to.  Vital signs were actually within normal limits and her blood pressure here in the emergency department was 107/73.  IV access had been established and labs obtained including CMP CBC CRP ESR TSH magnesium.  She was started on a liter normal saline.  She was also given ketorolac diphenhydramine and metoclopramide for her migraine.  Upon reevaluation, her migraine had improved.  Her labs were reviewed and all grossly within normal limits.  Her blood pressure and heart rate remained stable.  We did make some changes to her antidepressants as an outpatient I will follow-up with her in regards to that as an outpatient.  She was discharged in stable and improved condition to her .      Tiffany Lacy MD  09/15/22    A voice recognition program was used to aid in medical record documentation. Sometimes words are printed not exactly as they were spoken. While efforts were made to carefully edit and correct any  inaccuracies, some errors may be present. Errors should be taken within the context of the discussion.  Please contact our office if you need assistance interpreting this medical record or notice any mistakes       Tiffany Lacy MD  09/15/22 0703

## 2022-09-15 NOTE — DISCHARGE INSTRUCTIONS
You were evaluated for your migraine.  We gave you IV fluids and medications to help with your migraine.  Your blood work all look normal.  We will increase your venlafaxine dose.  We have sent these medications to the pharmacy.  Please send a [employer] message to Dr. Lacy  in 2 weeks to let her know how she is doing with that medication change.

## 2022-09-15 NOTE — PROGRESS NOTES
There are no Patient Instructions on file for this visit.        IMPRESSION AND PLAN  Diagnoses and all orders for this visit:    Migraine without status migrainosus, not intractable, unspecified migraine type  -     erenumab-aooe (Aimovig Autoinjector) 140 mg/mL auto-injector; Inject 140 mg under the skin every 28 (twenty-eight) days Indications: migraine prevention  -     ubrogepant 100 mg tablet; Take 100 mg by mouth once as needed (for migraine) for up to 1 dose May repeat dose after 2 hours. Not to exceed 200mg in 24 hours    Situational anxiety    Other insomnia  -     zolpidem (AMBIEN) 10 mg tablet; Take 1 tablet (10 mg total) by mouth nightly as needed for sleep Max Daily Amount: 10 mg    PTSD (post-traumatic stress disorder)  -     venlafaxine XR (EFFEXOR-XR) 150 mg 24 hr capsule; Take 1 capsule (150 mg total) by mouth daily    Anxiety and depression  -     venlafaxine XR (EFFEXOR-XR) 150 mg 24 hr capsule; Take 1 capsule (150 mg total) by mouth daily    57-year-old with a history of migraines who presents today for not feeling well and a migraine headache.  Her blood pressure was low at 84/60 and this was confirmed in the other arm on 2 different occasions and because of that and the need for IV access and further work-up she was sent to the emergency department for further management and work up.  HPI  Chief complaint for visit per nursing.       Chief Complaint   Patient presents with    Headache         Luiza is a 57 y.o. who presents today for evaluation of not feeling well and migraine headaches.  She is also wondering if her venlafaxine could be increased she is currently on 75 mg daily.  She states that she feels like she is having worsening nightmares at night and is wondering about something to help with that.  She and her  plan on being in town at least through sometime in October.  She has been struggling with a migraine for the last few days.    PROBLEM LIST  Patient Active Problem List  "  Diagnosis    Situational anxiety    Other insomnia    Fibromyalgia    Diverticulitis    PTSD (post-traumatic stress disorder)    Anxiety and depression    Migraine without status migrainosus, not intractable    ASHLEY positive    Colitis    COVID-19         SOCIAL HX  Social History     Socioeconomic History    Marital status:      Spouse name: Not on file    Number of children: Not on file    Years of education: Not on file    Highest education level: Not on file   Occupational History    Not on file   Tobacco Use    Smoking status: Never    Smokeless tobacco: Never   Vaping Use    Vaping Use: Never used   Substance and Sexual Activity    Alcohol use: Yes     Alcohol/week: 1.0 standard drink     Types: 1 Standard drinks or equivalent per week     Comment: socially    Drug use: No    Sexual activity: Yes     Partners: Male     Birth control/protection: Post-menopausal, Male Sterilization   Other Topics Concern    Not on file   Social History Narrative    Retired nurse.  Now works with horses.     Social Determinants of Health     Financial Resource Strain: Not on file   Food Insecurity: Not on file   Transportation Needs: Not on file   Physical Activity: Not on file   Stress: Not on file   Social Connections: Not on file   Intimate Partner Violence: Not on file   Housing Stability: Not on file       FAMILY HX  Family History   Problem Relation Age of Onset    Colon cancer Maternal Grandmother     Ovarian cancer Maternal Grandmother     Cancer Maternal Grandmother     Bone cancer Paternal Grandmother     Hypertension Other     Breast cancer Other     Lung cancer Other     Hypertension Mother     Drug abuse Father     Heart disease Father     Cancer Paternal Grandfather     Cancer Mother's Sister     Drug abuse Sister        ROS  12 point review of systems performed is negative unless otherwise mentioned per HPI.    Physical Exam  BP (!) 84/60 (BP Location: Right arm)   Pulse 73   Resp 18   Ht 1.676 m (5' 6\") "   Wt 68 kg (150 lb)   SpO2 98%   BMI 24.21 kg/m²   BP Readings from Last 3 Encounters:   09/15/22 107/77   09/15/22 (!) 84/60   07/17/22 165/88       Physical Exam  Vitals and nursing note reviewed.   Constitutional:       General: She is not in acute distress.  HENT:      Head: Normocephalic and atraumatic.      Nose: Nose normal.      Mouth/Throat:      Mouth: Mucous membranes are moist.   Eyes:      Extraocular Movements: Extraocular movements intact.   Cardiovascular:      Rate and Rhythm: Normal rate.   Pulmonary:      Effort: Pulmonary effort is normal.   Musculoskeletal:      Cervical back: Neck supple.   Skin:     General: Skin is warm.   Neurological:      Mental Status: She is alert and oriented to person, place, and time.   Psychiatric:         Mood and Affect: Mood normal.          LABS AND XRAYS  Lab Results   Component Value Date    GLUCOSE 69 (L) 09/15/2022    CALCIUM 9.0 09/15/2022     (L) 09/15/2022    K 3.6 09/15/2022    CO2 24 09/15/2022     09/15/2022    BUN 5 (L) 09/15/2022    CREATININE 0.63 09/15/2022    ANIONGAP 9 09/15/2022     Lab Results   Component Value Date    WBC 6.5 09/15/2022    HGB 12.5 09/15/2022    HCT 35.6 09/15/2022    MCV 92.5 09/15/2022     09/15/2022     Lab Results   Component Value Date    TSH 4.291 09/15/2022     No results found for: HGBA1C  Lab Results   Component Value Date    CREATININE 0.63 09/15/2022     Lab Results   Component Value Date    SEDRATE 8 09/15/2022     No results found for: URACSRM  No results found for: AMYLASE  Lab Results   Component Value Date    LIPASE 11 08/06/2020         No results found.    Procedures     MEDICATIONS    Current Outpatient Medications:     butorphanol (STADOL) 10 mg/mL nasal spray, Administer 1 spray into one nostril every 6 (six) hours as needed for pain scale 8-10/10 Max Daily Amount: 4 sprays, Disp: 2.5 mL, Rfl: 0    valACYclovir (VALTREX) 1 gram tablet, TAKE ONE TABLET BY MOUTH THREE TIMES DAILY FOR  SEVEN DAYS, Disp: 21 tablet, Rfl: 3    gabapentin (NEURONTIN) 100 mg capsule, TAKE ONE CAPSULE BY MOUTH THREE TIMES DAILY, Disp: 90 capsule, Rfl: 5    ALPRAZolam (XANAX) 0.5 mg tablet, TAKE ONE TABLET BY MOUTH DAILY AS NEEDED FOR ANXIETY, Disp: 30 tablet, Rfl: 2    tiZANidine (ZANAFLEX) 4 mg tablet, Take 1 tablet (4 mg total) by mouth 2 (two) times a day, Disp: 180 tablet, Rfl: 3    ubrogepant (Ubrelvy) 100 mg tablet, Take 1 tablet by mouth, Disp: , Rfl:     ondansetron ODT (ZOFRAN-ODT) 4 mg disintegrating tablet, Take 1 tablet (4 mg total) by mouth every 8 (eight) hours as needed for nausea or vomiting, Disp: 90 tablet, Rfl: 0    erenumab-aooe (Aimovig Autoinjector) 140 mg/mL auto-injector, Inject 140 mg under the skin every 28 (twenty-eight) days Indications: migraine prevention, Disp: 3 mL, Rfl: 1    zolpidem (AMBIEN) 10 mg tablet, Take 1 tablet (10 mg total) by mouth nightly as needed for sleep Max Daily Amount: 10 mg, Disp: 30 tablet, Rfl: 2    venlafaxine XR (EFFEXOR-XR) 150 mg 24 hr capsule, Take 1 capsule (150 mg total) by mouth daily, Disp: 90 capsule, Rfl: 3    ubrogepant 100 mg tablet, Take 100 mg by mouth once as needed (for migraine) for up to 1 dose May repeat dose after 2 hours. Not to exceed 200mg in 24 hours, Disp: 16 tablet, Rfl: 2    butalbital-acetaminophen-caff (FIORICET, ESGIC) -40 mg, Take 1 tablet by mouth every 4 (four) hours as needed for headaches (Patient not taking: No sig reported), Disp: 60 tablet, Rfl: 0      SEE ABOVE FOR IMPRESSION AND PLAN    Tiffany Lacy MD  09/15/22  4:06 PM

## 2022-09-20 ENCOUNTER — TELEPHONE (OUTPATIENT)
Dept: FAMILY MEDICINE | Facility: CLINIC | Age: 57
End: 2022-09-20
Payer: OTHER GOVERNMENT

## 2022-09-20 DIAGNOSIS — G43.909 MIGRAINE WITHOUT STATUS MIGRAINOSUS, NOT INTRACTABLE, UNSPECIFIED MIGRAINE TYPE: ICD-10-CM

## 2022-09-20 NOTE — TELEPHONE ENCOUNTER
Patient calling again, advised her that should call back tomorrow and check.  Patient voiced understanding

## 2022-09-20 NOTE — TELEPHONE ENCOUNTER
Luiza saw Dr Lacy last week and her BP was low: moved to ER.     She reports her BP is stable and is hydrated.     She has taken Ubrelvy 2 tablets a day    Luiza is having horrible migraines and requests that her (STADOL) nasal spray be refilled.     Is this possible?     She uses [facility]: [phone]

## 2022-09-20 NOTE — TELEPHONE ENCOUNTER
No new care gaps identified.  [facility] Embedded Care Gaps. Reference number: [id number]. 9/20/2022 4:45:12 PM MDT

## 2022-09-21 RX ORDER — BUTORPHANOL TARTRATE 10 MG/ML
1 SPRAY NASAL EVERY 6 HOURS PRN
Qty: 2.5 ML | Refills: 1 | Status: SHIPPED | OUTPATIENT
Start: 2022-09-21 | End: 2022-10-07 | Stop reason: SDUPTHER

## 2022-09-28 DIAGNOSIS — G43.909 MIGRAINE WITHOUT STATUS MIGRAINOSUS, NOT INTRACTABLE, UNSPECIFIED MIGRAINE TYPE: ICD-10-CM

## 2022-09-28 RX ORDER — BUTORPHANOL TARTRATE 10 MG/ML
SPRAY NASAL
Qty: 2.5 ML | Refills: 1 | OUTPATIENT
Start: 2022-09-28

## 2022-09-28 NOTE — TELEPHONE ENCOUNTER
No new care gaps identified.  [facility] Embedded Care Gaps. Reference number: [id number]. 9/28/2022 9:25:06 AM YAHIR

## 2022-09-30 ENCOUNTER — OFFICE VISIT (OUTPATIENT)
Dept: URGENT CARE | Facility: CLINIC | Age: 57
End: 2022-09-30
Payer: OTHER GOVERNMENT

## 2022-09-30 ENCOUNTER — HOSPITAL ENCOUNTER (OUTPATIENT)
Facility: HOSPITAL | Age: 57
Discharge: HOME | End: 2022-09-30
Payer: OTHER GOVERNMENT

## 2022-09-30 VITALS
HEART RATE: 82 BPM | SYSTOLIC BLOOD PRESSURE: 128 MMHG | BODY MASS INDEX: 23.3 KG/M2 | WEIGHT: 145 LBS | HEIGHT: 66 IN | DIASTOLIC BLOOD PRESSURE: 78 MMHG | OXYGEN SATURATION: 93 %

## 2022-09-30 DIAGNOSIS — R11.2 NAUSEA AND VOMITING, INTRACTABILITY OF VOMITING NOT SPECIFIED, UNSPECIFIED VOMITING TYPE: ICD-10-CM

## 2022-09-30 DIAGNOSIS — R05.9 COUGH: ICD-10-CM

## 2022-09-30 DIAGNOSIS — R51.9 ACUTE INTRACTABLE HEADACHE, UNSPECIFIED HEADACHE TYPE: ICD-10-CM

## 2022-09-30 DIAGNOSIS — Z20.822 CLOSE EXPOSURE TO COVID-19 VIRUS: Primary | ICD-10-CM

## 2022-09-30 DIAGNOSIS — G43.909 MIGRAINE WITHOUT STATUS MIGRAINOSUS, NOT INTRACTABLE, UNSPECIFIED MIGRAINE TYPE: Primary | ICD-10-CM

## 2022-09-30 LAB — SARS-COV-2 RNA RESP QL NAA+PROBE: NEGATIVE

## 2022-09-30 PROCEDURE — 96360 HYDRATION IV INFUSION INIT: CPT | Mod: NCP | Performed by: STUDENT IN AN ORGANIZED HEALTH CARE EDUCATION/TRAINING PROGRAM

## 2022-09-30 PROCEDURE — 2580000300 HC RX 258: Performed by: STUDENT IN AN ORGANIZED HEALTH CARE EDUCATION/TRAINING PROGRAM

## 2022-09-30 PROCEDURE — U0005 INFEC AGEN DETEC AMPLI PROBE: HCPCS | Performed by: STUDENT IN AN ORGANIZED HEALTH CARE EDUCATION/TRAINING PROGRAM

## 2022-09-30 PROCEDURE — 6360000200 HC RX 636 W HCPCS (ALT 250 FOR IP): Performed by: STUDENT IN AN ORGANIZED HEALTH CARE EDUCATION/TRAINING PROGRAM

## 2022-09-30 PROCEDURE — 96375 TX/PRO/DX INJ NEW DRUG ADDON: CPT | Performed by: STUDENT IN AN ORGANIZED HEALTH CARE EDUCATION/TRAINING PROGRAM

## 2022-09-30 PROCEDURE — 96374 THER/PROPH/DIAG INJ IV PUSH: CPT | Performed by: STUDENT IN AN ORGANIZED HEALTH CARE EDUCATION/TRAINING PROGRAM

## 2022-09-30 PROCEDURE — 99213 OFFICE O/P EST LOW 20 MIN: CPT | Performed by: STUDENT IN AN ORGANIZED HEALTH CARE EDUCATION/TRAINING PROGRAM

## 2022-09-30 PROCEDURE — 87635 SARS-COV-2 COVID-19 AMP PRB: CPT | Performed by: STUDENT IN AN ORGANIZED HEALTH CARE EDUCATION/TRAINING PROGRAM

## 2022-09-30 PROCEDURE — 96375 TX/PRO/DX INJ NEW DRUG ADDON: CPT | Mod: NCP | Performed by: STUDENT IN AN ORGANIZED HEALTH CARE EDUCATION/TRAINING PROGRAM

## 2022-09-30 RX ORDER — KETOROLAC TROMETHAMINE 30 MG/ML
15 INJECTION, SOLUTION INTRAMUSCULAR; INTRAVENOUS ONCE
Status: COMPLETED | OUTPATIENT
Start: 2022-09-30 | End: 2022-09-30

## 2022-09-30 RX ORDER — METOCLOPRAMIDE HYDROCHLORIDE 5 MG/ML
10 INJECTION INTRAMUSCULAR; INTRAVENOUS ONCE
Status: COMPLETED | OUTPATIENT
Start: 2022-09-30 | End: 2022-09-30

## 2022-09-30 RX ORDER — DIPHENHYDRAMINE HYDROCHLORIDE 50 MG/ML
50 INJECTION INTRAMUSCULAR; INTRAVENOUS ONCE
Status: COMPLETED | OUTPATIENT
Start: 2022-09-30 | End: 2022-09-30

## 2022-09-30 RX ORDER — SODIUM CHLORIDE 9 MG/ML
1000 INJECTION, SOLUTION INTRAVENOUS ONCE
Status: COMPLETED | OUTPATIENT
Start: 2022-09-30 | End: 2022-09-30

## 2022-09-30 RX ORDER — METOCLOPRAMIDE 10 MG/1
10 TABLET ORAL 4 TIMES DAILY
Qty: 40 TABLET | Refills: 0 | Status: SHIPPED | OUTPATIENT
Start: 2022-09-30 | End: 2022-10-10

## 2022-09-30 RX ADMIN — KETOROLAC TROMETHAMINE 15 MG: 30 INJECTION, SOLUTION INTRAMUSCULAR; INTRAVENOUS at 17:19

## 2022-09-30 RX ADMIN — SODIUM CHLORIDE 1000 ML: 9 INJECTION, SOLUTION INTRAVENOUS at 17:13

## 2022-09-30 RX ADMIN — DIPHENHYDRAMINE HYDROCHLORIDE 50 MG: 50 INJECTION, SOLUTION INTRAMUSCULAR; INTRAVENOUS at 17:16

## 2022-09-30 RX ADMIN — METOCLOPRAMIDE 10 MG: 5 INJECTION, SOLUTION INTRAMUSCULAR; INTRAVENOUS at 17:20

## 2022-09-30 ASSESSMENT — ENCOUNTER SYMPTOMS
CONSTIPATION: 0
FEVER: 0
NAUSEA: 1
SHORTNESS OF BREATH: 0
DIARRHEA: 0
APPETITE CHANGE: 1
CHILLS: 0
VOMITING: 1
DYSURIA: 0
HEADACHES: 1
RHINORRHEA: 0

## 2022-10-01 NOTE — PROGRESS NOTES
"Subjective     I would like your consent to use a new technology to help document today’s visit. The technology is called Dragon Ambient eXperience or KOLTON for short. The KOLTON technology will securely listen to your visit and convert what it hears into an office visit note. Would you be comfortable using KOLTON during your visit today?      Patient's response: Yes      Luiza Tay is a 57 y.o. female who presents for Covid exposure, vomiting, and headache.     The patient reports that she was recently exposed to Covid. Her Covid test was negative.     The patient reports that yesterday 09/29/2022 she had ordered a burger that did not taste right so she did not finish it. She states that last night around 11:30 pm she started having projectile vomiting and was throwing up food from the night of 09/28/2022. The patient reports she has taken Zofran but still is having nausea and abdominal soreness. The patient states that her mouth starts to water before getting the urge to vomit. She states that all she wanted was a \"frozen\" and has not eaten anything today. She denies being dehydrated and states that she drinks water when riding horses.     The patient reports that she has had a headache all week and believes it is stress related to horse camp. She states that she took Ubrelvy Wednesday morning and had relief from it that night and took it again yesterday.       The following have been reviewed and updated as appropriate in this visit:   Meds  Problems         Review of Systems   Constitutional:  Positive for appetite change. Negative for chills and fever.   HENT:  Negative for rhinorrhea.    Respiratory:  Negative for shortness of breath.    Cardiovascular:  Negative for chest pain and leg swelling.   Gastrointestinal:  Positive for nausea and vomiting. Negative for constipation and diarrhea.   Genitourinary:  Negative for dysuria.   Neurological:  Positive for headaches.     Objective   /78 (BP Location: " "Left arm, Patient Position: Sitting, Cuff Size: Regular Adult)   Pulse 82   Ht 1.676 m (5' 6\")   Wt 65.8 kg (145 lb)   SpO2 93%   BMI 23.40 kg/m²     Physical Exam  Vitals reviewed.   Constitutional:       General: She is awake. She is not in acute distress.     Appearance: Normal appearance. She is well-developed. She is not ill-appearing.   HENT:      Head: Normocephalic and atraumatic.      Nose: Nose normal. No congestion or rhinorrhea.      Mouth/Throat:      Mouth: Mucous membranes are dry.   Eyes:      General: No scleral icterus.        Right eye: No discharge.         Left eye: No discharge.      Conjunctiva/sclera: Conjunctivae normal.   Neck:      Trachea: Trachea normal.   Cardiovascular:      Rate and Rhythm: Normal rate and regular rhythm.      Heart sounds: Normal heart sounds. No murmur heard.  Pulmonary:      Effort: Pulmonary effort is normal. No tachypnea, accessory muscle usage or respiratory distress.      Breath sounds: Normal breath sounds. No wheezing or rales.   Abdominal:      General: Bowel sounds are normal. There is no distension.      Palpations: There is no mass.      Tenderness: There is no abdominal tenderness. There is no guarding.   Musculoskeletal:      Cervical back: Neck supple. No edema or signs of trauma.      Right lower leg: No edema.      Left lower leg: No edema.   Lymphadenopathy:      Cervical: No cervical adenopathy.   Skin:     General: Skin is warm and dry.      Capillary Refill: Capillary refill takes less than 2 seconds.      Coloration: Skin is not jaundiced.      Findings: No rash.   Neurological:      General: No focal deficit present.      Mental Status: She is alert and oriented to person, place, and time.      Gait: Gait is intact.   Psychiatric:         Attention and Perception: Attention normal.         Mood and Affect: Mood and affect normal.         Behavior: Behavior normal. Behavior is cooperative.         Thought Content: Thought content normal.    "      Judgment: Judgment normal.       Assessment   ASSESSMENT AND PLAN   Problem List Items Addressed This Visit    None  Visit Diagnoses       Close exposure to COVID-19 virus    -  Primary      Relevant Orders    COVID-19 PCR (Completed)    Cough        Relevant Orders    COVID-19 PCR (Completed)    Nausea and vomiting, intractability of vomiting not specified, unspecified vomiting type        Relevant Medications    metoclopramide (Reglan) 10 mg tablet    Acute intractable headache, unspecified headache type        Relevant Orders    Ambulatory Referral to Infusion Therapy    Ambulatory Referral to Infusion Therapy    Ambulatory Referral to Infusion Therapy    Ambulatory Referral to Infusion Therapy         Patient initially wanted to be tested for Covid because of headache, nausea, vomiting that she has been having. Covid test was negative.   She is having nausea and vomiting since having a questionable meal so this may be related to possible food poisoning, but has been having a pretty significant headache as well which may be due to dehydration versus her chronic migraines. We will treat her with fluids, Reglan, Benadryl, and Toradol and I have sent in a prescription for Reglan for her.  Follow up for any new or worsening symptoms.     Sanna Pavon,    9/30/2022  6:45 PM    A voice recognition program was used to aid in documentation of this record. Sometimes words are not printed exactly as they were spoken. While efforts were made to carefully edit and correct any inaccuracies, some errors may be present; please take these into context.  Please contact the provider if errors are identified.    There are no Patient Instructions on file for this visit.    ATTESTATION:     Draft generated by [employer] KOLTON and edited by Leticia Ari[employer]  on 09/30/22 .

## 2022-10-06 DIAGNOSIS — R11.0 NAUSEA: ICD-10-CM

## 2022-10-06 RX ORDER — ONDANSETRON 4 MG/1
4 TABLET, ORALLY DISINTEGRATING ORAL EVERY 8 HOURS PRN
Qty: 60 TABLET | Refills: 0 | Status: SHIPPED | OUTPATIENT
Start: 2022-10-06 | End: 2022-12-28 | Stop reason: SDUPTHER

## 2022-10-06 NOTE — TELEPHONE ENCOUNTER
No new care gaps identified.  [facility] Embedded Care Gaps. Reference number: [id number]. 10/06/2022 11:49:55 AM YAHIR

## 2022-10-14 ENCOUNTER — OFFICE VISIT (OUTPATIENT)
Dept: URGENT CARE | Facility: CLINIC | Age: 57
End: 2022-10-14
Payer: OTHER GOVERNMENT

## 2022-10-14 VITALS
WEIGHT: 145 LBS | HEART RATE: 69 BPM | OXYGEN SATURATION: 99 % | RESPIRATION RATE: 18 BRPM | SYSTOLIC BLOOD PRESSURE: 98 MMHG | DIASTOLIC BLOOD PRESSURE: 80 MMHG | BODY MASS INDEX: 23.4 KG/M2

## 2022-10-14 DIAGNOSIS — G43.909 MIGRAINE WITHOUT STATUS MIGRAINOSUS, NOT INTRACTABLE, UNSPECIFIED MIGRAINE TYPE: Primary | ICD-10-CM

## 2022-10-14 PROCEDURE — 96372 THER/PROPH/DIAG INJ SC/IM: CPT | Performed by: FAMILY MEDICINE

## 2022-10-14 PROCEDURE — 99214 OFFICE O/P EST MOD 30 MIN: CPT | Mod: 25 | Performed by: FAMILY MEDICINE

## 2022-10-14 RX ORDER — DIPHENHYDRAMINE HYDROCHLORIDE 50 MG/ML
25 INJECTION INTRAMUSCULAR; INTRAVENOUS ONCE
Status: DISCONTINUED | OUTPATIENT
Start: 2022-10-14 | End: 2022-10-14

## 2022-10-14 RX ORDER — METOCLOPRAMIDE 10 MG/1
10 TABLET ORAL ONCE
Status: DISCONTINUED | OUTPATIENT
Start: 2022-10-14 | End: 2022-10-14

## 2022-10-14 RX ORDER — KETOROLAC TROMETHAMINE 15 MG/ML
15 INJECTION, SOLUTION INTRAMUSCULAR; INTRAVENOUS ONCE
Status: COMPLETED | OUTPATIENT
Start: 2022-10-14 | End: 2022-10-14

## 2022-10-14 RX ORDER — METOCLOPRAMIDE HYDROCHLORIDE 5 MG/ML
10 INJECTION INTRAMUSCULAR; INTRAVENOUS ONCE
Status: COMPLETED | OUTPATIENT
Start: 2022-10-14 | End: 2022-10-14

## 2022-10-14 RX ORDER — DIPHENHYDRAMINE HYDROCHLORIDE 50 MG/ML
25 INJECTION INTRAMUSCULAR; INTRAVENOUS ONCE
Status: COMPLETED | OUTPATIENT
Start: 2022-10-14 | End: 2022-10-14

## 2022-10-14 RX ADMIN — DIPHENHYDRAMINE HYDROCHLORIDE 25 MG: 50 INJECTION INTRAMUSCULAR; INTRAVENOUS at 11:09

## 2022-10-14 RX ADMIN — KETOROLAC TROMETHAMINE 15 MG: 15 INJECTION, SOLUTION INTRAMUSCULAR; INTRAVENOUS at 11:09

## 2022-10-14 RX ADMIN — METOCLOPRAMIDE HYDROCHLORIDE 10 MG: 5 INJECTION INTRAMUSCULAR; INTRAVENOUS at 11:07

## 2022-10-14 ASSESSMENT — ENCOUNTER SYMPTOMS
FEVER: 0
APPETITE CHANGE: 0
SHORTNESS OF BREATH: 0
NAUSEA: 1
DIAPHORESIS: 0
HEADACHES: 1
FATIGUE: 0
VOMITING: 1
UNEXPECTED WEIGHT CHANGE: 0
CHILLS: 0
PHOTOPHOBIA: 0
ABDOMINAL PAIN: 0
COUGH: 0
DIZZINESS: 0
LIGHT-HEADEDNESS: 0

## 2022-10-14 NOTE — PROGRESS NOTES
"Luiza Tay is a 57 y.o. year old female who presents for   Chief Complaint   Patient presents with    Migraine     Patient reports that she continues to experience severe migraines that has not been relieved with current medications.   .  Subjective     HPI  Very pleasant 57-year-old female with a history of chronic migraines who comes in complaining of the same today.  She reports this migraine started about 2 weeks ago, has never really fully gone away despite her abortive at home treatments.  This can be a typical duration of a migraine for her.  She has had migraines \"her whole life.\"  Reports her migraines have been worse since she had COVID in July 2022.    She is on Aimovig monthly (last injection was September 19th, 2022).  She took Ubrelvy this morning.    Is nauseated and has vomited several times in the last couple of days.  Is taking Zofran regularly.  No photophobia.  She reports smells do tend to make her more nauseated however.  Has also taken Benadryl in the past as well.  Per chart review she has had Reglan, Benadryl, and Toradol in the past which worked well for abortive treatment.  She is agreeable to try this again today.            Allergies   Allergen Reactions    Hydromorphone Hives    Meperidine      cause migraines    Trazodone      nightmares    Clindamycin Rash    Sumatriptan Rash         Current Outpatient Medications on File Prior to Visit   Medication Sig    butorphanol (STADOL) 10 mg/mL nasal spray Administer 1 spray into one nostril every 6 (six) hours as needed for pain scale 8-10/10 Max Daily Amount: 4 sprays    ondansetron ODT (ZOFRAN-ODT) 4 mg disintegrating tablet Take 1 tablet (4 mg total) by mouth every 8 (eight) hours as needed for nausea or vomiting    erenumab-aooe (Aimovig Autoinjector) 140 mg/mL auto-injector Inject 140 mg under the skin every 28 (twenty-eight) days Indications: migraine prevention    zolpidem (AMBIEN) 10 mg tablet Take 1 tablet (10 mg total) by " mouth nightly as needed for sleep Max Daily Amount: 10 mg    venlafaxine XR (EFFEXOR-XR) 150 mg 24 hr capsule Take 1 capsule (150 mg total) by mouth daily    ubrogepant 100 mg tablet Take 100 mg by mouth once as needed (for migraine) for up to 1 dose May repeat dose after 2 hours. Not to exceed 200mg in 24 hours    gabapentin (NEURONTIN) 100 mg capsule TAKE ONE CAPSULE BY MOUTH THREE TIMES DAILY    ALPRAZolam (XANAX) 0.5 mg tablet TAKE ONE TABLET BY MOUTH DAILY AS NEEDED FOR ANXIETY    tiZANidine (ZANAFLEX) 4 mg tablet Take 1 tablet (4 mg total) by mouth 2 (two) times a day    ubrogepant (Ubrelvy) 100 mg tablet Take 1 tablet by mouth    valACYclovir (VALTREX) 1 gram tablet TAKE ONE TABLET BY MOUTH THREE TIMES DAILY FOR SEVEN DAYS (Patient not taking: Reported on 10/14/2022)    butalbital-acetaminophen-caff (FIORICET, ESGIC) -40 mg Take 1 tablet by mouth every 4 (four) hours as needed for headaches (Patient not taking: No sig reported)     No current facility-administered medications on file prior to visit.        The following portions of the patient's history were reviewed and updated as appropriate:   Allergies  Meds  Problems  Med Hx  Surg Hx  Fam Hx          Review of Systems   Constitutional:  Negative for appetite change, chills, diaphoresis, fatigue, fever and unexpected weight change.   Eyes:  Negative for photophobia.   Respiratory:  Negative for cough and shortness of breath.    Cardiovascular:  Negative for chest pain.   Gastrointestinal:  Positive for nausea and vomiting. Negative for abdominal pain.   Neurological:  Positive for headaches. Negative for dizziness, syncope and light-headedness.     Objective    Vitals:    10/14/22 1016   BP: 98/80   Pulse: 69   Resp: 18   SpO2: 99%         Physical Exam  Vitals and nursing note reviewed.   Constitutional:       Appearance: She is well-developed.   HENT:      Head: Normocephalic and atraumatic.      Right Ear: Tympanic membrane, ear canal and  external ear normal. Tympanic membrane is not injected, erythematous, retracted or bulging.      Left Ear: Tympanic membrane, ear canal and external ear normal. Tympanic membrane is not injected, erythematous, retracted or bulging.      Nose: No congestion or rhinorrhea.   Eyes:      Pupils: Pupils are equal, round, and reactive to light.   Cardiovascular:      Rate and Rhythm: Normal rate and regular rhythm.      Heart sounds: No murmur heard.    No friction rub. No gallop.   Pulmonary:      Effort: No respiratory distress.      Breath sounds: No rhonchi.   Musculoskeletal:      Cervical back: Normal range of motion and neck supple. No rigidity.   Lymphadenopathy:      Cervical: No cervical adenopathy.   Skin:     General: Skin is warm.   Neurological:      Mental Status: She is alert and oriented to person, place, and time.      Cranial Nerves: No cranial nerve deficit.      Motor: No weakness.   Psychiatric:         Behavior: Behavior normal.         No results found for this or any previous visit (from the past 24 hour(s)).      Assessment/Plan    Luiza was seen today for migraine.    Diagnoses and all orders for this visit:    Migraine without status migrainosus, not intractable, unspecified migraine type -Toradol, Reglan, and Benadryl given today.  She wished to discharge home with her  after medications were given.  She was advised to return for any persistent or worsening symptoms.  She is otherwise planning on following up with Dr. Lacy on Monday to further discuss long-term treatment options prior leaving for [place] later this month.  -     ketorolac (TORADOL) injection 15 mg  -     metoclopramide (REGLAN) tablet 10 mg  -     diphenhydrAMINE (BENADRYL) injection 25 mg

## 2022-10-17 ENCOUNTER — OFFICE VISIT (OUTPATIENT)
Dept: URGENT CARE | Facility: CLINIC | Age: 57
End: 2022-10-17
Payer: OTHER GOVERNMENT

## 2022-10-17 VITALS
HEART RATE: 83 BPM | SYSTOLIC BLOOD PRESSURE: 124 MMHG | DIASTOLIC BLOOD PRESSURE: 80 MMHG | OXYGEN SATURATION: 97 % | RESPIRATION RATE: 16 BRPM

## 2022-10-17 DIAGNOSIS — F32.A ANXIETY AND DEPRESSION: ICD-10-CM

## 2022-10-17 DIAGNOSIS — F43.10 PTSD (POST-TRAUMATIC STRESS DISORDER): ICD-10-CM

## 2022-10-17 DIAGNOSIS — G43.909 MIGRAINE WITHOUT STATUS MIGRAINOSUS, NOT INTRACTABLE, UNSPECIFIED MIGRAINE TYPE: Primary | ICD-10-CM

## 2022-10-17 DIAGNOSIS — G47.09 OTHER INSOMNIA: ICD-10-CM

## 2022-10-17 DIAGNOSIS — F41.8 SITUATIONAL ANXIETY: ICD-10-CM

## 2022-10-17 DIAGNOSIS — F41.9 ANXIETY AND DEPRESSION: ICD-10-CM

## 2022-10-17 DIAGNOSIS — M79.7 FIBROMYALGIA: ICD-10-CM

## 2022-10-17 PROCEDURE — 99214 OFFICE O/P EST MOD 30 MIN: CPT | Performed by: FAMILY MEDICINE

## 2022-10-17 RX ORDER — VENLAFAXINE HYDROCHLORIDE 75 MG/1
75 CAPSULE, EXTENDED RELEASE ORAL DAILY
Qty: 90 CAPSULE | Refills: 3 | Status: SHIPPED | OUTPATIENT
Start: 2022-10-17 | End: 2023-06-06 | Stop reason: SDUPTHER

## 2022-10-17 RX ORDER — BUTORPHANOL TARTRATE 10 MG/ML
1 SPRAY NASAL EVERY 6 HOURS PRN
Qty: 2.5 ML | Refills: 0 | Status: SHIPPED | OUTPATIENT
Start: 2022-10-17 | End: 2022-11-03 | Stop reason: SDUPTHER

## 2022-10-17 RX ORDER — ERENUMAB-AOOE 140 MG/ML
140 INJECTION, SOLUTION SUBCUTANEOUS
Qty: 1 ML | Refills: 3 | Status: SHIPPED | OUTPATIENT
Start: 2022-10-17 | End: 2023-05-22 | Stop reason: ALTCHOICE

## 2022-10-17 RX ORDER — BUTORPHANOL TARTRATE 10 MG/ML
1 SPRAY NASAL EVERY 6 HOURS PRN
Qty: 2.5 ML | Refills: 0 | Status: SHIPPED | OUTPATIENT
Start: 2022-10-17 | End: 2022-10-17 | Stop reason: SDUPTHER

## 2022-10-17 RX ORDER — GABAPENTIN 400 MG/1
400 CAPSULE ORAL NIGHTLY
Qty: 90 CAPSULE | Refills: 3 | Status: SHIPPED | OUTPATIENT
Start: 2022-10-17 | End: 2023-06-27

## 2022-10-17 NOTE — PROGRESS NOTES
There are no Patient Instructions on file for this visit.        IMPRESSION AND PLAN  Diagnoses and all orders for this visit:    Migraine without status migrainosus, not intractable, unspecified migraine type  -     erenumab-aooe (Aimovig Autoinjector) 140 mg/mL auto-injector; Inject 140 mg under the skin every 28 (twenty-eight) days Indications: migraine prevention  -     butorphanol (STADOL) 10 mg/mL nasal spray; Administer 1 spray into one nostril every 6 (six) hours as needed for pain scale 8-10/10 Max Daily Amount: 4 sprays    Other insomnia  -     suvorexant (BELSOMRA) 10 mg tablet tablet; Take 1 tablet (10 mg total) by mouth nightly Max Daily Amount: 10 mg    Fibromyalgia  -     gabapentin (NEURONTIN) 400 mg capsule; Take 1 capsule (400 mg total) by mouth nightly    Situational anxiety    PTSD (post-traumatic stress disorder)  -     venlafaxine XR (EFFEXOR-XR) 75 mg 24 hr capsule; Take 1 capsule (75 mg total) by mouth daily    Anxiety and depression  -     venlafaxine XR (EFFEXOR-XR) 75 mg 24 hr capsule; Take 1 capsule (75 mg total) by mouth daily    Luiza is a 57-year-old presents today for follow-up of her chronic conditions.  We will switch her Ambien to Belsomra to see if that works better for her insomnia.  We will have her try for 2 weeks and we may need to go up to a higher dose if she is not having good results and if that does not work and we can certainly consider switching to Seroquel.  She would also like a refill of her Stadol just to have on hand in case she gets an acute migraine.  She is currently on Aimovig monthly she needs a refill of that so that will be done for her as well.  Fioricet does not really seem to work well for her so we will stop that medication.  She is also wanting to go up on the gabapentin to 400 mg from 300 mg to help more with pain at night and I told her that would be fine so we sent an updated prescription for that.  She had issues with tolerating the higher dose of  venlafaxine at 150 mg daily so we will go back to 75 mg daily.  Hopefully with better sleep she is able to have better results with her anxiety and depression.  We will do a telephone visit with her in 3 months mid January to continue with refilling her medications.  She will be back sometime in the spring we will do an in person visit with her at that time.    HPI  Chief complaint for visit per nursing.       Chief Complaint   Patient presents with    Med Refill     Pt leaving town for winter, would like to discuss meds while she is gone         Luiza is a 57 y.o. who presents for follow-up of her chronic conditions.  She has been to be leaving the area for the wintertime like she normally does however instead of going to [place] she is going to be going to [place] on staying with some friends at a ranch there.  She will need her medications refilled through the course of the winter as well.  She would like to discuss something different to help with her insomnia.  Has been on ambien since 2001  Isnt' working as well for her  Sometimes she has to take 2 tablets to get any relief.  She has been on latuda, luneta and restoril and trazodone in the past without any relief  Her sister uses seroquel and did have success with that and suggested it for her.  She would like to increase her gabapentin to 400mg nightly instead of 300mg nightly.  It does help with her pain so she can do better with that overnight.  She also states that the increased dose of the venlafaxine made her too jittery and so she backed off from 150 and down to 75 mg daily.  Her and her  recently got a new truck.    PROBLEM LIST  Patient Active Problem List   Diagnosis    Situational anxiety    Other insomnia    Fibromyalgia    Diverticulitis    PTSD (post-traumatic stress disorder)    Anxiety and depression    Migraine without status migrainosus, not intractable    ASHLEY positive    Colitis    COVID-19         SOCIAL HX  Social History      Socioeconomic History    Marital status:      Spouse name: Not on file    Number of children: Not on file    Years of education: Not on file    Highest education level: Not on file   Occupational History    Not on file   Tobacco Use    Smoking status: Never    Smokeless tobacco: Never   Vaping Use    Vaping Use: Never used   Substance and Sexual Activity    Alcohol use: Yes     Alcohol/week: 1.0 standard drink     Types: 1 Standard drinks or equivalent per week     Comment: socially    Drug use: No    Sexual activity: Yes     Partners: Male     Birth control/protection: Post-menopausal, Male Sterilization   Other Topics Concern    Not on file   Social History Narrative    Retired nurse.  Now works with horses.     Social Determinants of Health     Financial Resource Strain: Not on file   Food Insecurity: Not on file   Transportation Needs: Not on file   Physical Activity: Not on file   Stress: Not on file   Social Connections: Not on file   Intimate Partner Violence: Not on file   Housing Stability: Not on file       FAMILY HX  Family History   Problem Relation Age of Onset    Colon cancer Maternal Grandmother     Ovarian cancer Maternal Grandmother     Cancer Maternal Grandmother     Bone cancer Paternal Grandmother     Hypertension Other     Breast cancer Other     Lung cancer Other     Hypertension Mother     Drug abuse Father     Heart disease Father     Cancer Paternal Grandfather     Cancer Mother's Sister     Drug abuse Sister        ROS  12 point review of systems performed is negative unless otherwise mentioned per HPI.    Physical Exam  /80   Pulse 83   Resp 16   SpO2 97%   BP Readings from Last 3 Encounters:   10/17/22 124/80   10/14/22 98/80   09/30/22 128/78       Physical Exam  Vitals and nursing note reviewed.   Constitutional:       General: She is not in acute distress.     Appearance: She is not ill-appearing or toxic-appearing.   HENT:      Head: Normocephalic and atraumatic.       Nose: Nose normal.      Mouth/Throat:      Mouth: Mucous membranes are moist.   Eyes:      Extraocular Movements: Extraocular movements intact.   Cardiovascular:      Rate and Rhythm: Normal rate and regular rhythm.   Pulmonary:      Effort: Pulmonary effort is normal.      Breath sounds: Normal breath sounds.   Musculoskeletal:      Cervical back: Neck supple.   Skin:     General: Skin is warm.   Neurological:      Mental Status: She is alert and oriented to person, place, and time.   Psychiatric:         Mood and Affect: Mood normal.          LABS AND XRAYS  Lab Results   Component Value Date    GLUCOSE 69 (L) 09/15/2022    CALCIUM 9.0 09/15/2022     (L) 09/15/2022    K 3.6 09/15/2022    CO2 24 09/15/2022     09/15/2022    BUN 5 (L) 09/15/2022    CREATININE 0.63 09/15/2022    ANIONGAP 9 09/15/2022     Lab Results   Component Value Date    WBC 6.5 09/15/2022    HGB 12.5 09/15/2022    HCT 35.6 09/15/2022    MCV 92.5 09/15/2022     09/15/2022     Lab Results   Component Value Date    TSH 4.291 09/15/2022     No results found for: HGBA1C  Lab Results   Component Value Date    CREATININE 0.63 09/15/2022     Lab Results   Component Value Date    SEDRATE 8 09/15/2022     No results found for: URACSRM  No results found for: AMYLASE  Lab Results   Component Value Date    LIPASE 11 08/06/2020         No results found.    Procedures     MEDICATIONS    Current Outpatient Medications:     ondansetron ODT (ZOFRAN-ODT) 4 mg disintegrating tablet, Take 1 tablet (4 mg total) by mouth every 8 (eight) hours as needed for nausea or vomiting, Disp: 60 tablet, Rfl: 0    ubrogepant 100 mg tablet, Take 100 mg by mouth once as needed (for migraine) for up to 1 dose May repeat dose after 2 hours. Not to exceed 200mg in 24 hours, Disp: 16 tablet, Rfl: 2    ALPRAZolam (XANAX) 0.5 mg tablet, TAKE ONE TABLET BY MOUTH DAILY AS NEEDED FOR ANXIETY, Disp: 30 tablet, Rfl: 2    tiZANidine (ZANAFLEX) 4 mg tablet, Take 1 tablet  (4 mg total) by mouth 2 (two) times a day, Disp: 180 tablet, Rfl: 3    ubrogepant (Ubrelvy) 100 mg tablet, Take 1 tablet by mouth, Disp: , Rfl:     suvorexant (BELSOMRA) 10 mg tablet tablet, Take 1 tablet (10 mg total) by mouth nightly Max Daily Amount: 10 mg, Disp: 30 tablet, Rfl: 0    gabapentin (NEURONTIN) 400 mg capsule, Take 1 capsule (400 mg total) by mouth nightly, Disp: 90 capsule, Rfl: 3    venlafaxine XR (EFFEXOR-XR) 75 mg 24 hr capsule, Take 1 capsule (75 mg total) by mouth daily, Disp: 90 capsule, Rfl: 3    erenumab-aooe (Aimovig Autoinjector) 140 mg/mL auto-injector, Inject 140 mg under the skin every 28 (twenty-eight) days Indications: migraine prevention, Disp: 1 mL, Rfl: 3    butorphanol (STADOL) 10 mg/mL nasal spray, Administer 1 spray into one nostril every 6 (six) hours as needed for pain scale 8-10/10 Max Daily Amount: 4 sprays, Disp: 2.5 mL, Rfl: 0    valACYclovir (VALTREX) 1 gram tablet, TAKE ONE TABLET BY MOUTH THREE TIMES DAILY FOR SEVEN DAYS (Patient not taking: No sig reported), Disp: 21 tablet, Rfl: 3      SEE ABOVE FOR IMPRESSION AND PLAN    Tiffany Lacy MD  10/17/22  3:02 PM

## 2022-10-18 ENCOUNTER — TELEPHONE (OUTPATIENT)
Dept: FAMILY MEDICINE | Facility: CLINIC | Age: 57
End: 2022-10-18
Payer: OTHER GOVERNMENT

## 2022-10-18 NOTE — TELEPHONE ENCOUNTER
Luiza calling again regarding medication and how she feels.     She informed me she was in the area, but advised to try again tomorrow and cannot guarantee a call back today.     She agreed and went home

## 2022-10-18 NOTE — TELEPHONE ENCOUNTER
Patient is having a reaction to the suvorexant (BELSOMRA) 10 mg tablet tablet. Please advise patient is going out of town and would like a call back.

## 2022-10-31 ENCOUNTER — TELEPHONE (OUTPATIENT)
Dept: FAMILY MEDICINE | Facility: CLINIC | Age: 57
End: 2022-10-31
Payer: OTHER GOVERNMENT

## 2022-11-01 ENCOUNTER — TELEPHONE - BILLABLE (OUTPATIENT)
Dept: FAMILY MEDICINE | Facility: CLINIC | Age: 57
End: 2022-11-01
Payer: OTHER GOVERNMENT

## 2022-11-01 ENCOUNTER — TELEPHONE (OUTPATIENT)
Dept: FAMILY MEDICINE | Facility: CLINIC | Age: 57
End: 2022-11-01
Payer: OTHER GOVERNMENT

## 2022-11-01 DIAGNOSIS — G47.09 OTHER INSOMNIA: Primary | ICD-10-CM

## 2022-11-01 PROCEDURE — 99442 *INACTIVE DO NOT USE* PR PHYS/QHP TELEPHONE EVALUATION 11-20 MIN: CPT | Mod: GT | Performed by: FAMILY MEDICINE

## 2022-11-01 RX ORDER — QUETIAPINE FUMARATE 100 MG/1
200 TABLET, FILM COATED ORAL NIGHTLY
Qty: 60 TABLET | Refills: 3 | Status: SHIPPED | OUTPATIENT
Start: 2022-11-01 | End: 2023-03-20 | Stop reason: SDUPTHER

## 2022-11-01 NOTE — PROGRESS NOTES
Subjective   Per discussion with Tiffany Lacy MD, Luiza, has verbally consented to be treated via a telephone based visit: Yes. A total of 15 minutes were required for this telephone based visit.   Patient Location: Home  Provider Location: Clinic  Technology used by Provider: Computer    HPI  Luiza Tay is a 57 y.o. female who presents for telephone visit to discuss her insomnia.  She was placed on Seroquel nightly to help with her insomnia as well as her mood.  She states she has noticed it helped her move quite a bit but not so much the insomnia.  She states that she is doing her venlafaxine still.  She stopped the Belsomra due to untoward side effects.  She states she still has to take some Ambien nightly to help with sleep but she is hoping to wean off of that.  She states her mother and sister are both on Seroquel but much higher doses.  Patient states that she self titrated up to 50 mg nightly and then last night to 75 mg nightly of the Seroquel to help and did not get much relief in her symptoms with sleep    HPI    The following have been reviewed and updated as appropriate in this visit:    Allergies   Allergen Reactions    Hydromorphone Hives    Meperidine      cause migraines    Trazodone      nightmares    Clindamycin Rash    Sumatriptan Rash     Current Outpatient Medications   Medication Sig Dispense Refill    QUEtiapine (SEROquel) 100 mg tablet Take 2 tablets (200 mg total) by mouth nightly 60 tablet 3    gabapentin (NEURONTIN) 400 mg capsule Take 1 capsule (400 mg total) by mouth nightly 90 capsule 3    venlafaxine XR (EFFEXOR-XR) 75 mg 24 hr capsule Take 1 capsule (75 mg total) by mouth daily 90 capsule 3    erenumab-aooe (Aimovig Autoinjector) 140 mg/mL auto-injector Inject 140 mg under the skin every 28 (twenty-eight) days Indications: migraine prevention 1 mL 3    butorphanol (STADOL) 10 mg/mL nasal spray Administer 1 spray into one nostril every 6 (six) hours as needed for pain scale  8-10/10 Max Daily Amount: 4 sprays 2.5 mL 0    ondansetron ODT (ZOFRAN-ODT) 4 mg disintegrating tablet Take 1 tablet (4 mg total) by mouth every 8 (eight) hours as needed for nausea or vomiting 60 tablet 0    ubrogepant 100 mg tablet Take 100 mg by mouth once as needed (for migraine) for up to 1 dose May repeat dose after 2 hours. Not to exceed 200mg in 24 hours 16 tablet 2    valACYclovir (VALTREX) 1 gram tablet TAKE ONE TABLET BY MOUTH THREE TIMES DAILY FOR SEVEN DAYS (Patient not taking: No sig reported) 21 tablet 3    ALPRAZolam (XANAX) 0.5 mg tablet TAKE ONE TABLET BY MOUTH DAILY AS NEEDED FOR ANXIETY 30 tablet 2    tiZANidine (ZANAFLEX) 4 mg tablet Take 1 tablet (4 mg total) by mouth 2 (two) times a day 180 tablet 3    ubrogepant (Ubrelvy) 100 mg tablet Take 1 tablet by mouth       No current facility-administered medications for this visit.     Past Medical History:   Diagnosis Date    Appendicitis     Depression     Diverticulitis     Fibromyalgia 05/03/2018    Gallstones     Headache     History of migraine 09/14/2017    History of migraine 09/14/2017    Insomnia     Migraines     Muscle tightness     Other insomnia 05/03/2018    Situational anxiety 05/03/2018    after a trauma riding horses    Skin abnormalities 1/2022    Traumatic closed displaced fracture of shaft of humerus with routine healing, right 05/16/2019     Past Surgical History:   Procedure Laterality Date    APPENDECTOMY      BREAST SURGERY      reduction    BREAST SURGERY Bilateral     reduction    CHOLECYSTECTOMY      COLONOSCOPY  03/2018    no polyps    FOOT SURGERY Right 2009    Screws    HERNIA REPAIR      HUMERUS SURGERY Right 2013    plate/screws    HYSTERECTOMY      endometriosis    ORIF WRIST FRACTURE Right 07/25/2018    Procedure: RIGHT ORIF WRIST;  Surgeon: Saad Zuniga MD;  Location: [facility];  Service: Orthopedics;  Laterality: Right;    ORTHOPEDIC SURGERY      SHOULDER SURGERY      SIGMOIDECTOMY  2016    diverticular ds     TONSILLECTOMY AND ADENOIDECTOMY      TUBAL LIGATION  1992    UPPER EXTREMITY DEBRIDEMENT Right 07/25/2018    Procedure: irrigation and debridement of right upper extremity and all indicated procedures;  Surgeon: Saad Zuniga MD;  Location: [facility] OR;  Service: Orthopedics;  Laterality: Right;     Family History   Problem Relation Age of Onset    Colon cancer Maternal Grandmother     Ovarian cancer Maternal Grandmother     Cancer Maternal Grandmother     Bone cancer Paternal Grandmother     Hypertension Other     Breast cancer Other     Lung cancer Other     Hypertension Mother     Drug abuse Father     Heart disease Father     Cancer Paternal Grandfather     Cancer Mother's Sister     Drug abuse Sister      Social History     Socioeconomic History    Marital status:    Tobacco Use    Smoking status: Never    Smokeless tobacco: Never   Vaping Use    Vaping Use: Never used   Substance and Sexual Activity    Alcohol use: Yes     Alcohol/week: 1.0 standard drink     Types: 1 Standard drinks or equivalent per week     Comment: socially    Drug use: No    Sexual activity: Yes     Partners: Male     Birth control/protection: Post-menopausal, Male Sterilization   Social History Narrative    Retired nurse.  Now works with horses.     Social Determinants of Health     Tobacco Use: Low Risk     Smoking Tobacco Use: Never    Smokeless Tobacco Use: Never   Depression: At risk    PHQ-2 Score: 10       Review of Systems  12 point review of systems performed that was negative unless otherwise mentioned per HPI.    Objective   Physical Exam    Assessment/Plan   Diagnoses and all orders for this visit:    Other insomnia  -     QUEtiapine (SEROquel) 100 mg tablet; Take 2 tablets (200 mg total) by mouth nightly    We are doing a telephone visit with Luiza in regards to her insomnia.  She is currently in [place] staying with some friends.  She would like her medication sent to the [facility] pharmacy in that city.  We talked about  her insomnia we will go up on her Seroquel 200 mg nightly for 1 week then up to 200 mg nightly thereafter.  She will send me a [employer] message within the next couple weeks to let me know how it is going and if its not helpful we may need to titrate the dose up or consider something else for insomnia.  She endorsed good understanding.

## 2022-11-01 NOTE — TELEPHONE ENCOUNTER
Luiza has a telephone visit scheduled with Dr Lacy for today @ 8:30 AM.     She has not received a call yet and wanted to make sure she was correct and she would be called

## 2022-11-01 NOTE — PROGRESS NOTES
"Subjective   Per discussion with Tiffany Lacy MD, Luiza, has verbally consented to be treated via a telephone based visit: Yes. A total of *** minutes were required for this telephone based visit.   [facility]\"}  Provider Location: {[facility] PROVIDER LOCATION:[address]::\"Clinic\"}  Technology used by Provider: { TELEHEALTH TECHNOLOGY USED:30854}    HPI  Luiza Tay is a 57 y.o. female who presents for insomnia.     HPI    The following have been reviewed and updated as appropriate in this visit:    Allergies   Allergen Reactions    Hydromorphone Hives    Meperidine      cause migraines    Trazodone      nightmares    Clindamycin Rash    Sumatriptan Rash     Current Outpatient Medications   Medication Sig Dispense Refill    QUEtiapine (SEROquel) 25 mg tablet Take 1 tablet (25 mg total) by mouth nightly 30 tablet 0    suvorexant (BELSOMRA) 10 mg tablet tablet Take 1 tablet (10 mg total) by mouth nightly Max Daily Amount: 10 mg 30 tablet 0    gabapentin (NEURONTIN) 400 mg capsule Take 1 capsule (400 mg total) by mouth nightly 90 capsule 3    venlafaxine XR (EFFEXOR-XR) 75 mg 24 hr capsule Take 1 capsule (75 mg total) by mouth daily 90 capsule 3    erenumab-aooe (Aimovig Autoinjector) 140 mg/mL auto-injector Inject 140 mg under the skin every 28 (twenty-eight) days Indications: migraine prevention 1 mL 3    butorphanol (STADOL) 10 mg/mL nasal spray Administer 1 spray into one nostril every 6 (six) hours as needed for pain scale 8-10/10 Max Daily Amount: 4 sprays 2.5 mL 0    ondansetron ODT (ZOFRAN-ODT) 4 mg disintegrating tablet Take 1 tablet (4 mg total) by mouth every 8 (eight) hours as needed for nausea or vomiting 60 tablet 0    ubrogepant 100 mg tablet Take 100 mg by mouth once as needed (for migraine) for up to 1 dose May repeat dose after 2 hours. Not to exceed 200mg in 24 hours 16 tablet 2    valACYclovir (VALTREX) 1 gram tablet TAKE ONE TABLET BY " MOUTH THREE TIMES DAILY FOR SEVEN DAYS (Patient not taking: No sig reported) 21 tablet 3    ALPRAZolam (XANAX) 0.5 mg tablet TAKE ONE TABLET BY MOUTH DAILY AS NEEDED FOR ANXIETY 30 tablet 2    tiZANidine (ZANAFLEX) 4 mg tablet Take 1 tablet (4 mg total) by mouth 2 (two) times a day 180 tablet 3    ubrogepant (Ubrelvy) 100 mg tablet Take 1 tablet by mouth       No current facility-administered medications for this visit.     Past Medical History:   Diagnosis Date    Appendicitis     Depression     Diverticulitis     Fibromyalgia 05/03/2018    Gallstones     Headache     History of migraine 09/14/2017    History of migraine 09/14/2017    Insomnia     Migraines     Muscle tightness     Other insomnia 05/03/2018    Situational anxiety 05/03/2018    after a trauma riding horses    Skin abnormalities 1/2022    Traumatic closed displaced fracture of shaft of humerus with routine healing, right 05/16/2019     Past Surgical History:   Procedure Laterality Date    APPENDECTOMY      BREAST SURGERY      reduction    BREAST SURGERY Bilateral     reduction    CHOLECYSTECTOMY      COLONOSCOPY  03/2018    no polyps    FOOT SURGERY Right 2009    Screws    HERNIA REPAIR      HUMERUS SURGERY Right 2013    plate/screws    HYSTERECTOMY      endometriosis    ORIF WRIST FRACTURE Right 07/25/2018    Procedure: RIGHT ORIF WRIST;  Surgeon: Saad Zuniga MD;  Location: [facility] OR;  Service: Orthopedics;  Laterality: Right;    ORTHOPEDIC SURGERY      SHOULDER SURGERY      SIGMOIDECTOMY  2016    diverticular ds    TONSILLECTOMY AND ADENOIDECTOMY      TUBAL LIGATION  1992    UPPER EXTREMITY DEBRIDEMENT Right 07/25/2018    Procedure: irrigation and debridement of right upper extremity and all indicated procedures;  Surgeon: Saad Zuniga MD;  Location: [facility] OR;  Service: Orthopedics;  Laterality: Right;     Family History   Problem Relation Age of Onset    Colon cancer Maternal Grandmother     Ovarian cancer Maternal Grandmother     Cancer  Maternal Grandmother     Bone cancer Paternal Grandmother     Hypertension Other     Breast cancer Other     Lung cancer Other     Hypertension Mother     Drug abuse Father     Heart disease Father     Cancer Paternal Grandfather     Cancer Mother's Sister     Drug abuse Sister      Social History     Socioeconomic History    Marital status:    Tobacco Use    Smoking status: Never    Smokeless tobacco: Never   Vaping Use    Vaping Use: Never used   Substance and Sexual Activity    Alcohol use: Yes     Alcohol/week: 1.0 standard drink     Types: 1 Standard drinks or equivalent per week     Comment: socially    Drug use: No    Sexual activity: Yes     Partners: Male     Birth control/protection: Post-menopausal, Male Sterilization   Social History Narrative    Retired nurse.  Now works with horses.     Social Determinants of Health     Tobacco Use: Low Risk     Smoking Tobacco Use: Never    Smokeless Tobacco Use: Never   Depression: At risk    PHQ-2 Score: 10       Review of Systems    Objective   Physical Exam    Assessment/Plan   Diagnoses and all orders for this visit:    Other insomnia

## 2022-11-03 ENCOUNTER — TELEPHONE (OUTPATIENT)
Dept: FAMILY MEDICINE | Facility: CLINIC | Age: 57
End: 2022-11-03
Payer: OTHER GOVERNMENT

## 2022-11-03 DIAGNOSIS — G43.909 MIGRAINE WITHOUT STATUS MIGRAINOSUS, NOT INTRACTABLE, UNSPECIFIED MIGRAINE TYPE: ICD-10-CM

## 2022-11-03 RX ORDER — BUTORPHANOL TARTRATE 10 MG/ML
1 SPRAY NASAL EVERY 6 HOURS PRN
OUTPATIENT
Start: 2022-11-03

## 2022-11-03 RX ORDER — BUTORPHANOL TARTRATE 10 MG/ML
1 SPRAY NASAL EVERY 6 HOURS PRN
Qty: 2.5 ML | Refills: 0 | Status: SHIPPED | OUTPATIENT
Start: 2022-11-03 | End: 2022-11-23 | Stop reason: SDUPTHER

## 2022-11-03 NOTE — TELEPHONE ENCOUNTER
Patient is in [place] and she is having a migraine really bad and is asking if she could get her migraine medication sent there.  She has taken two ubrelvy ? And nausea medicine but she was hoping she could get a butorphanol sent to [facility] in [place], [place] where another med was sent before.  Please advise and I can let her know.

## 2022-11-04 NOTE — TELEPHONE ENCOUNTER
Checked this morning and saw that it was prescribed so I called patient to let her know and apologize I did not get back to her earlier, but did not see that it was done until today.

## 2022-11-22 DIAGNOSIS — G43.909 MIGRAINE WITHOUT STATUS MIGRAINOSUS, NOT INTRACTABLE, UNSPECIFIED MIGRAINE TYPE: ICD-10-CM

## 2022-11-22 RX ORDER — BUTORPHANOL TARTRATE 10 MG/ML
1 SPRAY NASAL EVERY 6 HOURS PRN
OUTPATIENT
Start: 2022-11-22

## 2022-11-22 NOTE — TELEPHONE ENCOUNTER
No new care gaps identified.  [facility] Embedded Care Gaps. Reference number: [id number]. 11/22/2022 11:28:54 AM MST

## 2022-11-22 NOTE — TELEPHONE ENCOUNTER
No new care gaps identified.  [facility] Embedded Care Gaps. Reference number: [id number]. 11/22/2022 11:29:31 AM MST

## 2022-11-23 DIAGNOSIS — G43.909 MIGRAINE WITHOUT STATUS MIGRAINOSUS, NOT INTRACTABLE, UNSPECIFIED MIGRAINE TYPE: ICD-10-CM

## 2022-11-23 RX ORDER — BUTORPHANOL TARTRATE 10 MG/ML
SPRAY NASAL
Qty: 3 ML | Refills: 0 | OUTPATIENT
Start: 2022-11-23

## 2022-11-23 RX ORDER — BUTORPHANOL TARTRATE 10 MG/ML
1 SPRAY NASAL EVERY 6 HOURS PRN
Qty: 2.5 ML | Refills: 0 | Status: SHIPPED | OUTPATIENT
Start: 2022-11-23 | End: 2022-12-06

## 2022-11-23 NOTE — TELEPHONE ENCOUNTER
No new care gaps identified.  [facility] Embedded Care Gaps. Reference number: [id number]. 11/23/2022 3:52:43 PM MST

## 2022-11-23 NOTE — TELEPHONE ENCOUNTER
Luiza has a migraine that has lasted for days and is not going away.     She wants to get butorphanol nasal spray refilled.     She went to the chiropractor yesterday and this helped temporarily, but headache is still persistent.    Luiza is in a different town and does not want to go to the ER there as that is what is available right now.     Can this be refilled?     [facility] in [facility] is what she wants to use.     [phone]

## 2022-11-23 NOTE — TELEPHONE ENCOUNTER
Patient called and the pharmacy it went to is out, and she is asking if it could be sent to [facility], [address], [address] [place], [place].  [address].  Can that be resent to that pharmacy?  [phone] is their phone number.

## 2022-12-06 ENCOUNTER — TELEPHONE (OUTPATIENT)
Dept: FAMILY MEDICINE | Facility: CLINIC | Age: 57
End: 2022-12-06
Payer: OTHER GOVERNMENT

## 2022-12-06 DIAGNOSIS — G43.909 MIGRAINE WITHOUT STATUS MIGRAINOSUS, NOT INTRACTABLE, UNSPECIFIED MIGRAINE TYPE: ICD-10-CM

## 2022-12-06 DIAGNOSIS — F41.8 SITUATIONAL ANXIETY: ICD-10-CM

## 2022-12-06 RX ORDER — ALPRAZOLAM 0.5 MG/1
TABLET ORAL
Qty: 30 TABLET | Refills: 0 | Status: SHIPPED | OUTPATIENT
Start: 2022-12-06 | End: 2023-01-19 | Stop reason: SDUPTHER

## 2022-12-06 RX ORDER — BUTORPHANOL TARTRATE 10 MG/ML
1 SPRAY NASAL EVERY 6 HOURS PRN
OUTPATIENT
Start: 2022-12-06

## 2022-12-06 RX ORDER — BUTORPHANOL TARTRATE 10 MG/ML
SPRAY NASAL
Qty: 3 ML | Refills: 0 | Status: SHIPPED | OUTPATIENT
Start: 2022-12-06 | End: 2022-12-28 | Stop reason: SDUPTHER

## 2022-12-06 NOTE — TELEPHONE ENCOUNTER
Not sure who would have called her. We are waiting for response from Dr regarding refills and appointment.

## 2022-12-06 NOTE — TELEPHONE ENCOUNTER
No new care gaps identified.  [facility] Embedded Care Gaps. Reference number: [id number]. 12/06/2022 1:52:57 PM MST

## 2022-12-06 NOTE — TELEPHONE ENCOUNTER
No new care gaps identified.  [facility] Embedded Care Gaps. Reference number: [id number]. 12/06/2022 10:14:03 AM MST

## 2022-12-28 DIAGNOSIS — G43.909 MIGRAINE WITHOUT STATUS MIGRAINOSUS, NOT INTRACTABLE, UNSPECIFIED MIGRAINE TYPE: ICD-10-CM

## 2022-12-28 DIAGNOSIS — R11.0 NAUSEA: ICD-10-CM

## 2022-12-28 RX ORDER — BUTORPHANOL TARTRATE 10 MG/ML
SPRAY NASAL
Qty: 2.5 ML | Refills: 0 | OUTPATIENT
Start: 2022-12-28

## 2022-12-28 RX ORDER — ONDANSETRON 4 MG/1
4 TABLET, ORALLY DISINTEGRATING ORAL EVERY 8 HOURS PRN
Qty: 60 TABLET | Refills: 0 | Status: SHIPPED | OUTPATIENT
Start: 2022-12-28 | End: 2023-01-19 | Stop reason: SDUPTHER

## 2022-12-28 RX ORDER — BUTORPHANOL TARTRATE 10 MG/ML
1 SPRAY NASAL EVERY 6 HOURS PRN
Qty: 2.5 ML | Refills: 0 | Status: SHIPPED | OUTPATIENT
Start: 2022-12-28 | End: 2023-01-19 | Stop reason: SDUPTHER

## 2022-12-28 RX ORDER — BUTORPHANOL TARTRATE 10 MG/ML
SPRAY NASAL
OUTPATIENT
Start: 2022-12-28

## 2022-12-28 NOTE — TELEPHONE ENCOUNTER
No new care gaps identified.  [facility] Embedded Care Gaps. Reference number: [id number]. 12/28/2022 2:07:34 PM MST

## 2022-12-28 NOTE — TELEPHONE ENCOUNTER
Patient is asking for a refill of her nasal spray be sent to her pharmacy in [place]. The phone number for the pharmacy is [phone]. Its the [facility].

## 2022-12-28 NOTE — TELEPHONE ENCOUNTER
No new care gaps identified.  [facility] Embedded Care Gaps. Reference number: [id number]. 12/28/2022 2:37:47 PM MST

## 2022-12-28 NOTE — TELEPHONE ENCOUNTER
No new care gaps identified.  [facility] Embedded Care Gaps. Reference number: [id number]. 12/28/2022 3:13:14 PM MST

## 2022-12-28 NOTE — TELEPHONE ENCOUNTER
No new care gaps identified.  [facility] Embedded Care Gaps. Reference number: [id number]. 12/28/2022 2:48:45 PM MST

## 2023-01-18 ENCOUNTER — TELEPHONE (OUTPATIENT)
Dept: FAMILY MEDICINE | Facility: CLINIC | Age: 58
End: 2023-01-18
Payer: OTHER GOVERNMENT

## 2023-01-18 DIAGNOSIS — G43.909 MIGRAINE WITHOUT STATUS MIGRAINOSUS, NOT INTRACTABLE, UNSPECIFIED MIGRAINE TYPE: ICD-10-CM

## 2023-01-18 DIAGNOSIS — F41.8 SITUATIONAL ANXIETY: ICD-10-CM

## 2023-01-18 DIAGNOSIS — R11.0 NAUSEA: ICD-10-CM

## 2023-01-18 RX ORDER — BUTORPHANOL TARTRATE 10 MG/ML
1 SPRAY NASAL EVERY 6 HOURS PRN
OUTPATIENT
Start: 2023-01-18

## 2023-01-18 RX ORDER — ONDANSETRON 4 MG/1
4 TABLET, ORALLY DISINTEGRATING ORAL EVERY 8 HOURS PRN
OUTPATIENT
Start: 2023-01-18

## 2023-01-18 RX ORDER — ALPRAZOLAM 0.5 MG/1
TABLET ORAL
OUTPATIENT
Start: 2023-01-18

## 2023-01-18 NOTE — TELEPHONE ENCOUNTER
Care Due:                  Date            Visit Type   Department     Provider  --------------------------------------------------------------------------------                              [facility]  Last Visit: 11-      [facility]       MAGEN PIERRE  Next Visit: None Scheduled  None         None Found                                                            Last  Test          Frequency    Reason                     Performed    Due Date  --------------------------------------------------------------------------------  Office Visit  3 months...  butorphanol..............  11- 01-    [facility] Embedded Care Gaps. Reference number: [id number]. 1/18/2023 7:05:58 AM MST

## 2023-01-18 NOTE — TELEPHONE ENCOUNTER
Please send medications to [facility] in [facility]       [facility] [employer]  [address]  [place], [place] [address] [phone]     Thank you    ALPRAZolam (XANAX) 0.5 mg tablet  butorphanol (STADOL) 10 mg/mL nasal spray  ondansetron ODT (ZOFRAN-ODT) 4 mg disintegrating tablet

## 2023-01-19 ENCOUNTER — TELEPHONE (OUTPATIENT)
Dept: FAMILY MEDICINE | Facility: CLINIC | Age: 58
End: 2023-01-19

## 2023-01-19 ENCOUNTER — TELEPHONE - BILLABLE (OUTPATIENT)
Dept: FAMILY MEDICINE | Facility: CLINIC | Age: 58
End: 2023-01-19
Payer: OTHER GOVERNMENT

## 2023-01-19 DIAGNOSIS — R11.0 NAUSEA: ICD-10-CM

## 2023-01-19 DIAGNOSIS — G43.909 MIGRAINE WITHOUT STATUS MIGRAINOSUS, NOT INTRACTABLE, UNSPECIFIED MIGRAINE TYPE: ICD-10-CM

## 2023-01-19 DIAGNOSIS — M79.7 FIBROMYALGIA: ICD-10-CM

## 2023-01-19 DIAGNOSIS — G47.09 OTHER INSOMNIA: Primary | ICD-10-CM

## 2023-01-19 DIAGNOSIS — F41.8 SITUATIONAL ANXIETY: ICD-10-CM

## 2023-01-19 PROCEDURE — 99441 *INACTIVE DO NOT USE* PR PHYS/QHP TELEPHONE EVALUATION 5-10 MIN: CPT | Mod: GT | Performed by: FAMILY MEDICINE

## 2023-01-19 RX ORDER — ZOLPIDEM TARTRATE 10 MG/1
10 TABLET ORAL DAILY
COMMUNITY
Start: 2023-01-04 | End: 2023-05-22 | Stop reason: SDUPTHER

## 2023-01-19 NOTE — TELEPHONE ENCOUNTER
Luiza called to report that she just realized that her cell service is not very good where she is and wanted to make sure she did not miss her telephone appointment with Dr Lacy.     This was regarding medications she really does not want to miss an opportunity to get these refilled.     Please contact patient, she is ready to discuss medication as soon as provider is available.

## 2023-01-19 NOTE — PROGRESS NOTES
Subjective   Per discussion with Tiffany Lacy MD, Luiza, has verbally consented to be treated via a telephone based visit: Yes. A total of 5 minutes were required for this telephone based visit.   Patient Location: Home  Provider Location: Clinic  Technology used by Provider: Phone    HPI  Luiza Tay is a 57 y.o. female who presents for follow-up of chronic conditions.  She restarted her Ambien for insomnia.  She needs a refill of her Stadol for migraines.  She also has cut back her dose of Seroquel down to 50 mg daily and that seems to be working better for her.  No other concerns today..        The following have been reviewed and updated as appropriate in this visit:    Allergies   Allergen Reactions    Hydromorphone Hives    Meperidine      cause migraines    Trazodone      nightmares    Clindamycin Rash    Sumatriptan Rash     Current Outpatient Medications   Medication Sig Dispense Refill    zolpidem (AMBIEN) 10 mg tablet Take 10 mg by mouth daily      QUEtiapine (SEROquel) 100 mg tablet Take 2 tablets (200 mg total) by mouth nightly (Patient taking differently: Take 50 mg by mouth nightly) 60 tablet 3    gabapentin (NEURONTIN) 400 mg capsule Take 1 capsule (400 mg total) by mouth nightly 90 capsule 3    venlafaxine XR (EFFEXOR-XR) 75 mg 24 hr capsule Take 1 capsule (75 mg total) by mouth daily 90 capsule 3    erenumab-aooe (Aimovig Autoinjector) 140 mg/mL auto-injector Inject 140 mg under the skin every 28 (twenty-eight) days Indications: migraine prevention 1 mL 3    ubrogepant 100 mg tablet Take 100 mg by mouth once as needed (for migraine) for up to 1 dose May repeat dose after 2 hours. Not to exceed 200mg in 24 hours 16 tablet 2    valACYclovir (VALTREX) 1 gram tablet TAKE ONE TABLET BY MOUTH THREE TIMES DAILY FOR SEVEN DAYS 21 tablet 3    tiZANidine (ZANAFLEX) 4 mg tablet Take 1 tablet (4 mg total) by mouth 2 (two) times a day 180 tablet 3    ubrogepant (Ubrelvy) 100 mg tablet Take 1 tablet by  mouth      ALPRAZolam (XANAX) 0.5 mg tablet Take 1 tablet (0.5 mg total) by mouth nightly as needed for anxiety Max Daily Amount: 0.5 mg 30 tablet 0    butorphanol (STADOL) 10 mg/mL nasal spray Administer 1 spray into one nostril every 6 (six) hours as needed for pain scale 8-10/10 Max Daily Amount: 4 sprays 5 mL 0    ondansetron ODT (ZOFRAN-ODT) 4 mg disintegrating tablet Take 1 tablet (4 mg total) by mouth every 8 (eight) hours as needed for nausea or vomiting 60 tablet 0     No current facility-administered medications for this visit.     Past Medical History:   Diagnosis Date    Appendicitis     Depression     Diverticulitis     Fibromyalgia 05/03/2018    Gallstones     Headache     History of migraine 09/14/2017    History of migraine 09/14/2017    Insomnia     Migraines     Muscle tightness     Other insomnia 05/03/2018    Situational anxiety 05/03/2018    after a trauma riding horses    Skin abnormalities 1/2022    Traumatic closed displaced fracture of shaft of humerus with routine healing, right 05/16/2019     Past Surgical History:   Procedure Laterality Date    APPENDECTOMY      BREAST SURGERY      reduction    BREAST SURGERY Bilateral     reduction    CHOLECYSTECTOMY      COLONOSCOPY  03/2018    no polyps    FOOT SURGERY Right 2009    Screws    HERNIA REPAIR      HUMERUS SURGERY Right 2013    plate/screws    HYSTERECTOMY      endometriosis    ORIF WRIST FRACTURE Right 07/25/2018    Procedure: RIGHT ORIF WRIST;  Surgeon: Saad Zuniga MD;  Location: [facility] OR;  Service: Orthopedics;  Laterality: Right;    ORTHOPEDIC SURGERY      SHOULDER SURGERY      SIGMOIDECTOMY  2016    diverticular ds    TONSILLECTOMY AND ADENOIDECTOMY      TUBAL LIGATION  1992    UPPER EXTREMITY DEBRIDEMENT Right 07/25/2018    Procedure: irrigation and debridement of right upper extremity and all indicated procedures;  Surgeon: Saad Zuniga MD;  Location: [facility] OR;  Service: Orthopedics;  Laterality: Right;     Family History    Problem Relation Age of Onset    Colon cancer Maternal Grandmother     Ovarian cancer Maternal Grandmother     Cancer Maternal Grandmother     Bone cancer Paternal Grandmother     Hypertension Other     Breast cancer Other     Lung cancer Other     Hypertension Mother     Drug abuse Father     Heart disease Father     Cancer Paternal Grandfather     Cancer Mother's Sister     Drug abuse Sister      Social History     Socioeconomic History    Marital status:    Tobacco Use    Smoking status: Never    Smokeless tobacco: Never   Vaping Use    Vaping Use: Never used   Substance and Sexual Activity    Alcohol use: Yes     Alcohol/week: 1.0 standard drink     Types: 1 Standard drinks or equivalent per week     Comment: socially    Drug use: No    Sexual activity: Yes     Partners: Male     Birth control/protection: Post-menopausal, Male Sterilization   Social History Narrative    Retired nurse.  Now works with horses.     Social Determinants of Health     Tobacco Use: Low Risk     Smoking Tobacco Use: Never    Smokeless Tobacco Use: Never   Depression: At risk    PHQ-2 Score: 10       Review of Systems  12 point review of systems performed that was negative unless otherwise mentioned per HPI.    Objective   Physical Exam    Assessment/Plan   Diagnoses and all orders for this visit:    Other insomnia    Situational anxiety  -     ALPRAZolam (XANAX) 0.5 mg tablet; Take 1 tablet (0.5 mg total) by mouth nightly as needed for anxiety Max Daily Amount: 0.5 mg    Migraine without status migrainosus, not intractable, unspecified migraine type  -     butorphanol (STADOL) 10 mg/mL nasal spray; Administer 1 spray into one nostril every 6 (six) hours as needed for pain scale 8-10/10 Max Daily Amount: 4 sprays    Nausea  -     ondansetron ODT (ZOFRAN-ODT) 4 mg disintegrating tablet; Take 1 tablet (4 mg total) by mouth every 8 (eight) hours as needed for nausea or vomiting    Fibromyalgia    Patient is doing telephone call if  she is out of the state for follow-up of her chronic conditions and medication refills.  These were done for her.  She restarted her Ambien and decrease her Seroquel down to 50 mg daily.

## 2023-01-19 NOTE — TELEPHONE ENCOUNTER
Called Luiza back.  Advised her that  is also on call in the ED and will call her as soon as time allows.  She expressed understanding

## 2023-01-20 RX ORDER — BUTORPHANOL TARTRATE 10 MG/ML
1 SPRAY NASAL EVERY 6 HOURS PRN
Qty: 5 ML | Refills: 0 | Status: SHIPPED | OUTPATIENT
Start: 2023-01-20 | End: 2023-02-21 | Stop reason: SDUPTHER

## 2023-01-20 RX ORDER — ONDANSETRON 4 MG/1
4 TABLET, ORALLY DISINTEGRATING ORAL EVERY 8 HOURS PRN
Qty: 60 TABLET | Refills: 0 | Status: SHIPPED | OUTPATIENT
Start: 2023-01-20 | End: 2023-02-21 | Stop reason: SDUPTHER

## 2023-01-20 RX ORDER — ALPRAZOLAM 0.5 MG/1
0.5 TABLET ORAL NIGHTLY PRN
Qty: 30 TABLET | Refills: 0 | Status: SHIPPED | OUTPATIENT
Start: 2023-01-20 | End: 2023-05-22 | Stop reason: SDUPTHER

## 2023-02-03 ENCOUNTER — TELEPHONE (OUTPATIENT)
Dept: FAMILY MEDICINE | Facility: CLINIC | Age: 58
End: 2023-02-03
Payer: OTHER GOVERNMENT

## 2023-02-03 NOTE — TELEPHONE ENCOUNTER
Left message advising that a script was sent on 1-20 and that she should call them to see if the med has been filled.

## 2023-02-03 NOTE — TELEPHONE ENCOUNTER
Luiza calling to request a refill for her butorphanol be sent to:     [facility]  [place], [place]     P# [phone]  F# [phone]    Patient would like a call back when this has been sent.

## 2023-02-03 NOTE — TELEPHONE ENCOUNTER
Patient called back and when advised that it would probably be Monday since Dr Lacy's nurses are not here, she asked if there would be a chance that another provider or nurse could send in for her.  Advised that I would send back a note but could not promise anything.

## 2023-02-13 ENCOUNTER — OFFICE (OUTPATIENT)
Dept: URBAN - NONMETROPOLITAN AREA CLINIC 1 | Facility: CLINIC | Age: 58
End: 2023-02-13

## 2023-02-13 VITALS — SYSTOLIC BLOOD PRESSURE: 126 MMHG | HEIGHT: 66 IN | WEIGHT: 170 LBS | DIASTOLIC BLOOD PRESSURE: 98 MMHG

## 2023-02-13 DIAGNOSIS — K57.92 DIVERTICULITIS OF INTESTINE, PART UNSPECIFIED, WITHOUT PERFO: ICD-10-CM

## 2023-02-13 PROCEDURE — 99204 OFFICE O/P NEW MOD 45 MIN: CPT | Performed by: INTERNAL MEDICINE

## 2023-02-21 DIAGNOSIS — R11.0 NAUSEA: ICD-10-CM

## 2023-02-21 DIAGNOSIS — G43.909 MIGRAINE WITHOUT STATUS MIGRAINOSUS, NOT INTRACTABLE, UNSPECIFIED MIGRAINE TYPE: ICD-10-CM

## 2023-02-21 RX ORDER — ONDANSETRON 4 MG/1
4 TABLET, ORALLY DISINTEGRATING ORAL EVERY 8 HOURS PRN
Qty: 60 TABLET | Refills: 0 | Status: CANCELLED | OUTPATIENT
Start: 2023-02-21

## 2023-02-21 RX ORDER — ONDANSETRON 4 MG/1
4 TABLET, ORALLY DISINTEGRATING ORAL EVERY 8 HOURS PRN
Qty: 60 TABLET | Refills: 0 | Status: SHIPPED | OUTPATIENT
Start: 2023-02-21 | End: 2023-04-06 | Stop reason: SDUPTHER

## 2023-02-21 RX ORDER — BUTORPHANOL TARTRATE 10 MG/ML
1 SPRAY NASAL EVERY 6 HOURS PRN
Qty: 5 ML | Refills: 0 | Status: SHIPPED | OUTPATIENT
Start: 2023-02-21 | End: 2023-03-09

## 2023-02-21 NOTE — TELEPHONE ENCOUNTER
No new care gaps identified.  [facility] Embedded Care Gaps. Reference number: [id number]. 2/21/2023 4:52:16 PM MST

## 2023-03-09 DIAGNOSIS — G43.909 MIGRAINE WITHOUT STATUS MIGRAINOSUS, NOT INTRACTABLE, UNSPECIFIED MIGRAINE TYPE: ICD-10-CM

## 2023-03-09 RX ORDER — BUTORPHANOL TARTRATE 10 MG/ML
SPRAY NASAL
Qty: 5 ML | Refills: 0 | Status: SHIPPED | OUTPATIENT
Start: 2023-03-09 | End: 2023-04-06 | Stop reason: SDUPTHER

## 2023-03-09 NOTE — TELEPHONE ENCOUNTER
No new care gaps identified.  [facility] Embedded Care Gaps. Reference number: [id number]. 3/09/2023 12:25:18 PM MST

## 2023-03-10 ENCOUNTER — TELEPHONE (OUTPATIENT)
Dept: FAMILY MEDICINE | Facility: CLINIC | Age: 58
End: 2023-03-10
Payer: OTHER GOVERNMENT

## 2023-03-10 NOTE — TELEPHONE ENCOUNTER
[facility] in [place] requesting a call back to get clarification on a refill for this patient.      Patient  script for her burorphanol on the 22nd of Feb and then was wanting to pick another up today. Patient told them that she talked to Dr Lacy about this and Dr Lacy okayed it.      They need to confirm that Dr Lacy is okay with her picking up a refill early.

## 2023-03-16 ENCOUNTER — TELEPHONE (OUTPATIENT)
Dept: FAMILY MEDICINE | Facility: CLINIC | Age: 58
End: 2023-03-16
Payer: OTHER GOVERNMENT

## 2023-03-16 NOTE — TELEPHONE ENCOUNTER
Can we call patient and let her know her refills for stadol are getting excessive and we will need to do a telephone visit and try to find a different option for her to manage her pain  thanks

## 2023-03-16 NOTE — TELEPHONE ENCOUNTER
[facility] calling to get clarification on the patients butorphanol.  She seems to be going through it pretty fast and they want to make sure that Dr Lacy is okay with that.     Please give them a call at [phone]

## 2023-03-16 NOTE — TELEPHONE ENCOUNTER
Ema with [facility] pharmacy in [place] called stating that patient has been filling a lot of the stadol nasal spray. Patient filled 2.5 ml bottle on 1/20/23, 5 ml bottle 2/22/23, 2.5 ml bottle on 3/10/23. Now patient is asking for another fill today. Pharmacy wanted to make sure this was appropriate.

## 2023-03-16 NOTE — TELEPHONE ENCOUNTER
Luiza was told [facility] was going to call today for a prescription for her.     Informed her a message had been sent to Dr Lacy that [facility] had called and wanted to speak to the doctor.

## 2023-03-17 NOTE — TELEPHONE ENCOUNTER
She is stating that she thinks the weather fluctuations and possibly the Seroquel contributing to her migraines. She is scheduled for a telephone visit on Monday.

## 2023-03-20 ENCOUNTER — TELEPHONE - BILLABLE (OUTPATIENT)
Dept: FAMILY MEDICINE | Facility: CLINIC | Age: 58
End: 2023-03-20
Payer: OTHER GOVERNMENT

## 2023-03-20 DIAGNOSIS — F43.10 PTSD (POST-TRAUMATIC STRESS DISORDER): ICD-10-CM

## 2023-03-20 DIAGNOSIS — G43.909 MIGRAINE WITHOUT STATUS MIGRAINOSUS, NOT INTRACTABLE, UNSPECIFIED MIGRAINE TYPE: Primary | ICD-10-CM

## 2023-03-20 DIAGNOSIS — G47.09 OTHER INSOMNIA: ICD-10-CM

## 2023-03-20 PROCEDURE — 99442 *INACTIVE DO NOT USE* PR PHYS/QHP TELEPHONE EVALUATION 11-20 MIN: CPT | Mod: GT | Performed by: FAMILY MEDICINE

## 2023-03-20 RX ORDER — QUETIAPINE FUMARATE 25 MG/1
50 TABLET, FILM COATED ORAL NIGHTLY
Qty: 180 TABLET | Refills: 0 | Status: SHIPPED | OUTPATIENT
Start: 2023-03-20 | End: 2023-05-22 | Stop reason: SINTOL

## 2023-03-20 NOTE — PROGRESS NOTES
Subjective   Per discussion with Tiffany Lacy MD, Luiza, has verbally consented to be treated via a telephone based visit: Yes. A total of 20 minutes were required for this telephone based visit.   Patient Location: Home  Provider Location: Clinic  Technology used by Provider: Computer    HPI  Luiza Tay is a 57 y.o. female who presents for telephone visit to go over her chronic conditions.   She does have some insomnia for which she is on zolpidem 10 mg nightly and previously was on Seroquel 200 mg nightly but she is got her self down to 50 mg nightly and that works really well for her insomnia she does not have any bad dreams or nightmares with it like she had with the prazosin however she has noted about a 30 pound weight gain.  She is currently 179 pounds.  She is wondering if it is due to the Seroquel.  She has not really changed her diet very much.  She has not been able to ride horses this winter she has been in [place] with a sick cousin who has stage IV renal cell cancer and she has been more providing her with some support.  She did have a bout of diverticulitis in early January was hospitalized for it and she has seen a GI doctor for this since then and they recommended getting a colonoscopy done and see if she will get that done in April.  She also saw her PCPs office in [place] and they had ordered some autoimmune labs unclear what those labs were and what those work-up entitled in terms of outcome she states she has not heard back with the results of her labs in that regard but may need repeat labs I told her to just let us know.  In terms of her migraine she is on Aimovig monthly and uses Ubrelvy as needed and Stadol and she is been using this data quite a bit over the last few months she had a prescription for January February and then to request in March for it she had received the first prescription in the beginning of March but then this pharmacy had called with her second request  for March and they were concerned about dispensing the state also I did want to talk with her about her migraines and sounds like she does have to use Ubrelvy more than once a week as well and we talked about different types of daily prophylactics my thought is perhaps to change her Aimovig to Emgality but we will wait until she comes back in town to do that her and her  will be back in this area middle of May and she already has an appointment set up for me with the 22nd.    HPI    The following have been reviewed and updated as appropriate in this visit:    Allergies   Allergen Reactions    Hydromorphone Hives    Meperidine      cause migraines    Trazodone      nightmares    Clindamycin Rash    Sumatriptan Rash     Current Outpatient Medications   Medication Sig Dispense Refill    butorphanol (STADOL) 10 mg/mL nasal spray SPRAY 1 SQUIRT IN EACH NOSTRIL EVERY 6 HOUR AS NEED FOR PAIN MAX OF 4 SPRAYS DAILY 5 mL 0    ondansetron ODT (ZOFRAN-ODT) 4 mg disintegrating tablet Take 1 tablet (4 mg total) by mouth every 8 (eight) hours as needed for nausea or vomiting 60 tablet 0    ALPRAZolam (XANAX) 0.5 mg tablet Take 1 tablet (0.5 mg total) by mouth nightly as needed for anxiety Max Daily Amount: 0.5 mg 30 tablet 0    zolpidem (AMBIEN) 10 mg tablet Take 10 mg by mouth daily      gabapentin (NEURONTIN) 400 mg capsule Take 1 capsule (400 mg total) by mouth nightly 90 capsule 3    venlafaxine XR (EFFEXOR-XR) 75 mg 24 hr capsule Take 1 capsule (75 mg total) by mouth daily 90 capsule 3    erenumab-aooe (Aimovig Autoinjector) 140 mg/mL auto-injector Inject 140 mg under the skin every 28 (twenty-eight) days Indications: migraine prevention 1 mL 3    ubrogepant 100 mg tablet Take 100 mg by mouth once as needed (for migraine) for up to 1 dose May repeat dose after 2 hours. Not to exceed 200mg in 24 hours 16 tablet 2    valACYclovir (VALTREX) 1 gram tablet TAKE ONE TABLET BY MOUTH THREE TIMES DAILY FOR SEVEN DAYS 21 tablet  3    tiZANidine (ZANAFLEX) 4 mg tablet Take 1 tablet (4 mg total) by mouth 2 (two) times a day 180 tablet 3    QUEtiapine (SEROquel) 25 mg tablet Take 2 tablets (50 mg total) by mouth nightly 180 tablet 0     No current facility-administered medications for this visit.     Past Medical History:   Diagnosis Date    Appendicitis     Depression     Diverticulitis     Fibromyalgia 05/03/2018    Gallstones     Headache     History of migraine 09/14/2017    History of migraine 09/14/2017    Insomnia     Migraines     Muscle tightness     Other insomnia 05/03/2018    Situational anxiety 05/03/2018    after a trauma riding horses    Skin abnormalities 1/2022    Traumatic closed displaced fracture of shaft of humerus with routine healing, right 05/16/2019     Past Surgical History:   Procedure Laterality Date    APPENDECTOMY      BREAST SURGERY      reduction    BREAST SURGERY Bilateral     reduction    CHOLECYSTECTOMY      COLONOSCOPY  03/2018    no polyps    FOOT SURGERY Right 2009    Screws    HERNIA REPAIR      HUMERUS SURGERY Right 2013    plate/screws    HYSTERECTOMY      endometriosis    ORIF WRIST FRACTURE Right 07/25/2018    Procedure: RIGHT ORIF WRIST;  Surgeon: Saad Zuniga MD;  Location: [facility] OR;  Service: Orthopedics;  Laterality: Right;    ORTHOPEDIC SURGERY      SHOULDER SURGERY      SIGMOIDECTOMY  2016    diverticular ds    TONSILLECTOMY AND ADENOIDECTOMY      TUBAL LIGATION  1992    UPPER EXTREMITY DEBRIDEMENT Right 07/25/2018    Procedure: irrigation and debridement of right upper extremity and all indicated procedures;  Surgeon: Saad Zuniga MD;  Location: [facility] OR;  Service: Orthopedics;  Laterality: Right;     Family History   Problem Relation Age of Onset    Colon cancer Maternal Grandmother     Ovarian cancer Maternal Grandmother     Cancer Maternal Grandmother     Bone cancer Paternal Grandmother     Hypertension Other     Breast cancer Other     Lung cancer Other     Hypertension Mother     Drug  abuse Father     Heart disease Father     Cancer Paternal Grandfather     Cancer Mother's Sister     Drug abuse Sister      Social History     Socioeconomic History    Marital status:    Tobacco Use    Smoking status: Never    Smokeless tobacco: Never   Vaping Use    Vaping Use: Never used   Substance and Sexual Activity    Alcohol use: Yes     Alcohol/week: 1.0 standard drink     Types: 1 Standard drinks or equivalent per week     Comment: socially    Drug use: No    Sexual activity: Yes     Partners: Male     Birth control/protection: Post-menopausal, Male Sterilization   Social History Narrative    Retired nurse.  Now works with horses.     Social Determinants of Health     Tobacco Use: Low Risk     Smoking Tobacco Use: Never    Smokeless Tobacco Use: Never   Depression: At risk    PHQ-2 Score: 10       Review of Systems  12 point ROS performed that was negative unless otherwise mentioned per HPI.     Objective   Physical Exam    Assessment/Plan   Diagnoses and all orders for this visit:    Migraine without status migrainosus, not intractable, unspecified migraine type    Other insomnia  -     QUEtiapine (SEROquel) 25 mg tablet; Take 2 tablets (50 mg total) by mouth nightly    PTSD (post-traumatic stress disorder)      Luiza is a nice 57-year-old woman with a history of migraines and insomnia presents for telephone visit today.  Visit was requested as patient is out of town she will be back in this area in May however outside pharmacy was concerned about her state prescriptions.  We went over her migraines a little bit more she is on Aimovig 140 mg monthly and maxed out on that dose and also has Ubrelvy ordered as needed and she does get frequent migraines and was told not to take the Ubrelvy more than twice a week which she normally does have to do for her migraines so we talked about potentially changing her daily prophylactic agent but I agree with her we should wait until she gets back into town before  we make med changes in case she has any issues with that so for now we will keep things as is for her insomnia I will have her quetiapine dose changed to 25 mg tablets and we will have her try to do 25 mg at night instead of 50 if she is able to tolerate it if not she can stay at 50 to maintain relief of her insomnia as long as her weights not going up.  If her weight continues to increase then we may need to switch to a different agent.  We will discuss further at upcoming appointment.

## 2023-03-22 NOTE — ED NOTES
"Pt states, \"I broke a tooth Friday night. I broke another tooth eating last night. The tooth I broke yesterday was infected. I went to the dentist yesterday and was started on amoxicillin. I am scheduled for a root canal and 3 crowns in August. The headache started friday night. The pain is sharp and ache in the right forehead. I am very nauseated. The broken teeth are in the top right side.\"       Tabatha Carias RN  07/08/21 1649       Tabatha Carias RN  07/08/21 1651    " Total Volume Injected (Ccs- Only Use Numbers And Decimals): .1

## 2023-04-06 DIAGNOSIS — G43.909 MIGRAINE WITHOUT STATUS MIGRAINOSUS, NOT INTRACTABLE, UNSPECIFIED MIGRAINE TYPE: ICD-10-CM

## 2023-04-06 DIAGNOSIS — R11.0 NAUSEA: ICD-10-CM

## 2023-04-06 RX ORDER — ONDANSETRON 4 MG/1
4 TABLET, ORALLY DISINTEGRATING ORAL EVERY 8 HOURS PRN
Qty: 60 TABLET | Refills: 0 | Status: SHIPPED | OUTPATIENT
Start: 2023-04-06 | End: 2023-05-22 | Stop reason: SDUPTHER

## 2023-04-06 RX ORDER — BUTORPHANOL TARTRATE 10 MG/ML
1 SPRAY NASAL EVERY 6 HOURS PRN
Qty: 5 ML | Refills: 0 | Status: SHIPPED | OUTPATIENT
Start: 2023-04-06 | End: 2023-05-18 | Stop reason: SDUPTHER

## 2023-04-06 NOTE — TELEPHONE ENCOUNTER
No new care gaps identified.  [facility] Embedded Care Gaps. Reference number: [id number]. 4/06/2023 1:14:05 PM YAHIR

## 2023-04-14 ENCOUNTER — ON CAMPUS - OUTPATIENT (OUTPATIENT)
Dept: URBAN - NONMETROPOLITAN AREA HOSPITAL 34 | Facility: HOSPITAL | Age: 58
End: 2023-04-14
Payer: OTHER GOVERNMENT

## 2023-04-14 DIAGNOSIS — Z12.11 ENCOUNTER FOR SCREENING FOR MALIGNANT NEOPLASM OF COLON: ICD-10-CM

## 2023-04-14 PROCEDURE — G0121 COLON CA SCRN NOT HI RSK IND: HCPCS | Performed by: INTERNAL MEDICINE

## 2023-05-18 DIAGNOSIS — G43.909 MIGRAINE WITHOUT STATUS MIGRAINOSUS, NOT INTRACTABLE, UNSPECIFIED MIGRAINE TYPE: ICD-10-CM

## 2023-05-18 RX ORDER — BUTORPHANOL TARTRATE 10 MG/ML
1 SPRAY NASAL EVERY 6 HOURS PRN
Qty: 2.5 ML | Refills: 0 | Status: SHIPPED | OUTPATIENT
Start: 2023-05-18 | End: 2023-06-06

## 2023-05-18 NOTE — TELEPHONE ENCOUNTER
No care due was identified.  [facility] Embedded Care Due Messages. Reference number: [id number]. 5/18/2023 4:05:37 PM MDT

## 2023-05-18 NOTE — TELEPHONE ENCOUNTER
Patient is having a severe migraine and she has already taken Ubrelvy and Amivig, all that she can have, and still not going away.  She was asking if she could call in the nose spray or if she needs to be seen, or how you want to proceed.  She is scheduled to come in Monday but would like something for now.

## 2023-05-22 ENCOUNTER — OFFICE VISIT (OUTPATIENT)
Dept: FAMILY MEDICINE | Facility: CLINIC | Age: 58
End: 2023-05-22
Payer: OTHER GOVERNMENT

## 2023-05-22 VITALS
OXYGEN SATURATION: 95 % | HEART RATE: 105 BPM | SYSTOLIC BLOOD PRESSURE: 148 MMHG | DIASTOLIC BLOOD PRESSURE: 94 MMHG | WEIGHT: 173 LBS | BODY MASS INDEX: 27.92 KG/M2 | RESPIRATION RATE: 18 BRPM

## 2023-05-22 DIAGNOSIS — R11.0 NAUSEA: ICD-10-CM

## 2023-05-22 DIAGNOSIS — M79.7 FIBROMYALGIA: ICD-10-CM

## 2023-05-22 DIAGNOSIS — G47.09 OTHER INSOMNIA: ICD-10-CM

## 2023-05-22 DIAGNOSIS — F41.8 SITUATIONAL ANXIETY: Primary | ICD-10-CM

## 2023-05-22 DIAGNOSIS — G43.909 MIGRAINE WITHOUT STATUS MIGRAINOSUS, NOT INTRACTABLE, UNSPECIFIED MIGRAINE TYPE: ICD-10-CM

## 2023-05-22 DIAGNOSIS — R76.8 POSITIVE ANA (ANTINUCLEAR ANTIBODY): ICD-10-CM

## 2023-05-22 LAB
BASOPHILS # BLD AUTO: 0 10*3/UL
BASOPHILS NFR BLD AUTO: 0.5 % (ref 0–2)
CRP SERPL-MCNC: 4 MG/L
EOSINOPHIL # BLD AUTO: 0.2 10*3/UL
EOSINOPHIL NFR BLD AUTO: 4.2 % (ref 0–3)
ERYTHROCYTE [DISTWIDTH] IN BLOOD BY AUTOMATED COUNT: 12.5 % (ref 11.5–14)
ERYTHROCYTE [SEDIMENTATION RATE] IN BLOOD: 13 MM/HR
HCT VFR BLD AUTO: 42.9 % (ref 34–45)
HGB BLD-MCNC: 14.9 G/DL (ref 11.5–15.5)
LYMPHOCYTES # BLD AUTO: 1.2 10*3/UL
LYMPHOCYTES NFR BLD AUTO: 26 % (ref 11–47)
MCH RBC QN AUTO: 31.5 PG (ref 28–33)
MCHC RBC AUTO-ENTMCNC: 34.8 G/DL (ref 32–36)
MCV RBC AUTO: 90.5 FL (ref 81–97)
MONOCYTES # BLD AUTO: 0.4 10*3/UL
MONOCYTES NFR BLD AUTO: 8.9 % (ref 3–11)
NEUTROPHILS # BLD AUTO: 2.7 10*3/UL
NEUTROPHILS NFR BLD AUTO: 60.4 % (ref 41–81)
PLATELET # BLD AUTO: 299 10*3/UL (ref 140–350)
PMV BLD AUTO: 7.1 FL (ref 6.9–10.8)
RBC # BLD AUTO: 4.74 10*6/ΜL (ref 3.7–5.3)
WBC # BLD AUTO: 4.5 10*3/UL (ref 4.5–10.5)

## 2023-05-22 PROCEDURE — 85652 RBC SED RATE AUTOMATED: CPT | Performed by: FAMILY MEDICINE

## 2023-05-22 PROCEDURE — 85025 COMPLETE CBC W/AUTO DIFF WBC: CPT | Performed by: FAMILY MEDICINE

## 2023-05-22 PROCEDURE — 36415 COLL VENOUS BLD VENIPUNCTURE: CPT | Performed by: FAMILY MEDICINE

## 2023-05-22 PROCEDURE — 99214 OFFICE O/P EST MOD 30 MIN: CPT | Mod: 25 | Performed by: FAMILY MEDICINE

## 2023-05-22 PROCEDURE — 86140 C-REACTIVE PROTEIN: CPT | Performed by: FAMILY MEDICINE

## 2023-05-22 PROCEDURE — 96372 THER/PROPH/DIAG INJ SC/IM: CPT | Performed by: FAMILY MEDICINE

## 2023-05-22 RX ORDER — ORPHENADRINE CITRATE 30 MG/ML
60 INJECTION INTRAMUSCULAR; INTRAVENOUS ONCE
Status: COMPLETED | OUTPATIENT
Start: 2023-05-22 | End: 2023-05-22

## 2023-05-22 RX ORDER — KETOROLAC TROMETHAMINE 30 MG/ML
60 INJECTION, SOLUTION INTRAMUSCULAR; INTRAVENOUS
Status: COMPLETED | OUTPATIENT
Start: 2023-05-22 | End: 2023-05-22

## 2023-05-22 RX ORDER — HYDROXYCHLOROQUINE SULFATE 300 MG/1
1 TABLET ORAL DAILY
Qty: 90 TABLET | Refills: 3 | Status: SHIPPED | OUTPATIENT
Start: 2023-05-22 | End: 2024-02-19 | Stop reason: ALTCHOICE

## 2023-05-22 RX ORDER — HYDROXYCHLOROQUINE SULFATE 300 MG/1
1 TABLET ORAL DAILY
COMMUNITY
Start: 2023-04-07 | End: 2023-05-22 | Stop reason: SDUPTHER

## 2023-05-22 RX ORDER — ALPRAZOLAM 0.5 MG/1
0.5 TABLET ORAL NIGHTLY PRN
Qty: 30 TABLET | Refills: 0 | Status: SHIPPED | OUTPATIENT
Start: 2023-05-22 | End: 2023-06-21 | Stop reason: SDUPTHER

## 2023-05-22 RX ORDER — ZOLPIDEM TARTRATE 10 MG/1
10 TABLET ORAL DAILY
Qty: 30 TABLET | Refills: 0 | Status: SHIPPED | OUTPATIENT
Start: 2023-05-22 | End: 2023-06-21 | Stop reason: SDUPTHER

## 2023-05-22 RX ORDER — HYDROXYCHLOROQUINE SULFATE 200 MG/1
200 TABLET, FILM COATED ORAL DAILY
COMMUNITY
Start: 2023-05-05 | End: 2024-02-19 | Stop reason: ALTCHOICE

## 2023-05-22 RX ORDER — ONDANSETRON 4 MG/1
4 TABLET, ORALLY DISINTEGRATING ORAL EVERY 8 HOURS PRN
Qty: 90 TABLET | Refills: 1 | Status: SHIPPED | OUTPATIENT
Start: 2023-05-22 | End: 2023-07-19

## 2023-05-22 RX ORDER — KETOROLAC TROMETHAMINE 10 MG/1
10 TABLET, FILM COATED ORAL EVERY 6 HOURS PRN
Qty: 16 TABLET | Refills: 0 | Status: SHIPPED | OUTPATIENT
Start: 2023-05-22 | End: 2024-02-19 | Stop reason: ALTCHOICE

## 2023-05-22 RX ADMIN — ORPHENADRINE CITRATE 60 MG: 30 INJECTION INTRAMUSCULAR; INTRAVENOUS at 10:33

## 2023-05-22 RX ADMIN — KETOROLAC TROMETHAMINE 60 MG: 30 INJECTION, SOLUTION INTRAMUSCULAR; INTRAVENOUS at 10:33

## 2023-05-22 NOTE — PROGRESS NOTES
There are no Patient Instructions on file for this visit.        IMPRESSION AND PLAN  Diagnoses and all orders for this visit:    Situational anxiety  -     ALPRAZolam (XANAX) 0.5 mg tablet; Take 1 tablet (0.5 mg total) by mouth nightly as needed for anxiety Max Daily Amount: 0.5 mg    Fibromyalgia  -     hydrOXYchloroQUINE 300 mg tablet; Take 1 tablet by mouth daily  -     ketorolac (TORADOL) 10 mg tablet; Take 1 tablet (10 mg total) by mouth every 6 (six) hours as needed for pain scale 8-10/10 for up to 4 days  -     CBC w/auto differential Blood, Venous  -     C-reactive protein (Inflammation) Blood, Venous  -     Sedimentation rate, automated Blood, Venous  -     ketorolac (TORADOL) injection 60 mg  -     orphenadrine (NORFLEX) injection 60 mg    Migraine without status migrainosus, not intractable, unspecified migraine type  -     galcanezumab-gnlm (EMGALITY) 120 mg/mL pen; Inject 1 mL (120 mg total) under the skin every 28 (twenty-eight) days    Other insomnia  -     zolpidem (AMBIEN) 10 mg tablet; Take 1 tablet (10 mg total) by mouth daily Max Daily Amount: 10 mg    Nausea  -     ondansetron ODT (ZOFRAN-ODT) 4 mg disintegrating tablet; Take 1 tablet (4 mg total) by mouth every 8 (eight) hours as needed for nausea or vomiting    Positive ASHLEY (antinuclear antibody)  -     galcanezumab-gnlm (EMGALITY) 120 mg/mL pen; Inject 1 mL (120 mg total) under the skin every 28 (twenty-eight) days  -     hydrOXYchloroQUINE 300 mg tablet; Take 1 tablet by mouth daily  -     CBC w/auto differential Blood, Venous  -     C-reactive protein (Inflammation) Blood, Venous  -     Sedimentation rate, automated Blood, Venous      Luiza is a very nice 58-year-old woman who comes in today for follow-up of her chronic conditions.  She has a history of positive ASHLEY and recently started on hydroxychloroquine by her doctor in [place].  We will go back up to 300 mg daily as she was feeling better on that dose.  I will switch her Aimovig to  "Emgality to see if that helps with her migraines a bit more so she does not need to take her abortive agents more frequently.  We will continue with your [place] instilled all as needed.  I will give her some ketorolac and Norflex today to help with her pain if she is having more pain.  We will get inflammatory markers on her as well to look at that.  Refilled her zolpidem for her insomnia.  Refilled her ondansetron for nausea.  Routine follow-up in 3 months or sooner if needed.    HPI  Chief complaint for visit per nursing.       Chief Complaint   Patient presents with    Follow-up     Wt gain - pt has gained 28 lbs since last visit. Feels r/t Seroquel, has stopped med. Pt states not sleeping well still  Skin lesion - pt has noticed some small random \"dry\" type lesions, does not itch  Migraines -possible switch up of meds         Luiza is a 58 y.o. woman who presents for follow up of her chronic conditions.  She has a history of migraines for which she is on Aimovig and is also needed Stadol and Ubrelvy to help with breakthrough.  She states sometimes she will need to take the medications more than twice a week and other times she does not.  She was told by the pharmacist that she should not take it that often if she does not need to.  She is struggling with recent death of her close cousin from renal cell carcinoma.  She states that her doctor in [place] did prescribe her hydroxychloroquine at 300 mg daily.  She did have a positive ASHLEY titer and I looked at the rest of her labs she also had positive sed rate and CRP but negative MANISHA panel.  She states that the other pharmacy that she had to get hydroxychloroquine from did not carry 300 mg tablet so she was taking 200 mg and states that she is not sure if that is not working well but her whole body just feels very painful tired.  She has achy joints especially her knees shoulders.  She states that everything hurts from head to toe.  She is wondering what she can " do to help with this.  She is tried some Tylenol without significant relief of her symptoms.  She also stopped her Seroquel for insomnia.  Her mother had previously been on it and is currently on it but the patient cannot tolerate it due to significant weight gain so she stopped it.  She is not sleeping well since stopping it.        PROBLEM LIST  Patient Active Problem List   Diagnosis    Situational anxiety    Other insomnia    Fibromyalgia    Diverticulitis    PTSD (post-traumatic stress disorder)    Anxiety and depression    Migraine without status migrainosus, not intractable    ASHLEY positive    Colitis    COVID-19         SOCIAL HX  Social History     Socioeconomic History    Marital status:      Spouse name: Not on file    Number of children: Not on file    Years of education: Not on file    Highest education level: Not on file   Occupational History    Not on file   Tobacco Use    Smoking status: Never    Smokeless tobacco: Never   Vaping Use    Vaping Use: Never used   Substance and Sexual Activity    Alcohol use: Yes     Alcohol/week: 1.0 standard drink of alcohol     Types: 1 Standard drinks or equivalent per week     Comment: socially    Drug use: No    Sexual activity: Yes     Partners: Male     Birth control/protection: Post-menopausal, Male Sterilization   Other Topics Concern    Not on file   Social History Narrative    Retired nurse.  Now works with horses.     Social Determinants of Health     Financial Resource Strain: Not on file   Food Insecurity: Not on file   Transportation Needs: Not on file   Physical Activity: Not on file   Stress: Not on file   Social Connections: Not on file   Intimate Partner Violence: Not on file   Housing Stability: Not on file       FAMILY HX  Family History   Problem Relation Age of Onset    Colon cancer Maternal Grandmother     Ovarian cancer Maternal Grandmother     Cancer Maternal Grandmother     Bone cancer Paternal Grandmother     Hypertension Other      Breast cancer Other     Lung cancer Other     Hypertension Mother     Drug abuse Father     Heart disease Father     Cancer Paternal Grandfather     Cancer Mother's Sister     Drug abuse Sister        ROS  12 point review of systems performed is negative unless otherwise mentioned per HPI.    Physical Exam  /94   Pulse 105   Resp 18   Wt 78.5 kg (173 lb)   SpO2 95%   BMI 27.92 kg/m²   BP Readings from Last 3 Encounters:   05/22/23 148/94   10/17/22 124/80   10/14/22 98/80       Physical Exam  Vitals and nursing note reviewed.   Constitutional:       General: She is not in acute distress.  HENT:      Head: Normocephalic and atraumatic.      Nose: Nose normal.      Mouth/Throat:      Mouth: Mucous membranes are moist.   Eyes:      Extraocular Movements: Extraocular movements intact.      Pupils: Pupils are equal, round, and reactive to light.   Cardiovascular:      Rate and Rhythm: Normal rate and regular rhythm.   Pulmonary:      Effort: Pulmonary effort is normal.      Breath sounds: Normal breath sounds.   Musculoskeletal:      Cervical back: Neck supple.   Skin:     General: Skin is warm.   Neurological:      Mental Status: She is alert and oriented to person, place, and time.   Psychiatric:         Mood and Affect: Mood normal.            LABS AND XRAYS  Lab Results   Component Value Date    GLUCOSE 69 (L) 09/15/2022    CALCIUM 9.0 09/15/2022     (L) 09/15/2022    K 3.6 09/15/2022    CO2 24 09/15/2022     09/15/2022    BUN 5 (L) 09/15/2022    CREATININE 0.63 09/15/2022    ANIONGAP 9 09/15/2022     Lab Results   Component Value Date    WBC 4.5 05/22/2023    HGB 14.9 05/22/2023    HCT 42.9 05/22/2023    MCV 90.5 05/22/2023     05/22/2023     Lab Results   Component Value Date    TSH 4.291 09/15/2022     No results found for: HGBA1C  Lab Results   Component Value Date    CREATININE 0.63 09/15/2022     Lab Results   Component Value Date    SEDRATE 8 09/15/2022     No results found for:  URACSRM  No results found for: AMYLASE  Lab Results   Component Value Date    LIPASE 11 08/06/2020         No results found.    Procedures     MEDICATIONS    Current Outpatient Medications:     hydrOXYchloroQUINE (PLAQUENIL) 200 mg tablet, Take 1 tablet (200 mg total) by mouth daily, Disp: , Rfl:     butorphanol (STADOL) 10 mg/mL nasal spray, Administer 1 spray into each nostril every 6 (six) hours as needed for pain scale 8-10/10 Max Daily Amount: 4 sprays, Disp: 2.5 mL, Rfl: 0    gabapentin (NEURONTIN) 400 mg capsule, Take 1 capsule (400 mg total) by mouth nightly, Disp: 90 capsule, Rfl: 3    venlafaxine XR (EFFEXOR-XR) 75 mg 24 hr capsule, Take 1 capsule (75 mg total) by mouth daily, Disp: 90 capsule, Rfl: 3    ubrogepant 100 mg tablet, Take 100 mg by mouth once as needed (for migraine) for up to 1 dose May repeat dose after 2 hours. Not to exceed 200mg in 24 hours, Disp: 16 tablet, Rfl: 2    valACYclovir (VALTREX) 1 gram tablet, TAKE ONE TABLET BY MOUTH THREE TIMES DAILY FOR SEVEN DAYS, Disp: 21 tablet, Rfl: 3    tiZANidine (ZANAFLEX) 4 mg tablet, Take 1 tablet (4 mg total) by mouth 2 (two) times a day, Disp: 180 tablet, Rfl: 3    ondansetron ODT (ZOFRAN-ODT) 4 mg disintegrating tablet, Take 1 tablet (4 mg total) by mouth every 8 (eight) hours as needed for nausea or vomiting, Disp: 90 tablet, Rfl: 1    ALPRAZolam (XANAX) 0.5 mg tablet, Take 1 tablet (0.5 mg total) by mouth nightly as needed for anxiety Max Daily Amount: 0.5 mg, Disp: 30 tablet, Rfl: 0    zolpidem (AMBIEN) 10 mg tablet, Take 1 tablet (10 mg total) by mouth daily Max Daily Amount: 10 mg, Disp: 30 tablet, Rfl: 0    galcanezumab-gnlm (EMGALITY) 120 mg/mL pen, Inject 1 mL (120 mg total) under the skin every 28 (twenty-eight) days, Disp: 3 mL, Rfl: 3    hydrOXYchloroQUINE 300 mg tablet, Take 1 tablet by mouth daily, Disp: 90 tablet, Rfl: 3    ketorolac (TORADOL) 10 mg tablet, Take 1 tablet (10 mg total) by mouth every 6 (six) hours as needed for pain  scale 8-10/10 for up to 4 days, Disp: 16 tablet, Rfl: 0      SEE ABOVE FOR IMPRESSION AND PLAN    Tiffany Lacy MD  05/22/23  11:19 AM

## 2023-05-27 DIAGNOSIS — G43.909 MIGRAINE WITHOUT STATUS MIGRAINOSUS, NOT INTRACTABLE, UNSPECIFIED MIGRAINE TYPE: ICD-10-CM

## 2023-05-27 NOTE — TELEPHONE ENCOUNTER
No care due was identified.  [facility] Embedded Care Due Messages. Reference number: [id number]. 5/27/2023 8:53:31 AM YAHIR

## 2023-05-29 ENCOUNTER — HOSPITAL ENCOUNTER (EMERGENCY)
Facility: HOSPITAL | Age: 58
Discharge: HOME | End: 2023-05-29
Attending: FAMILY MEDICINE
Payer: OTHER GOVERNMENT

## 2023-05-29 VITALS
OXYGEN SATURATION: 97 % | RESPIRATION RATE: 16 BRPM | BODY MASS INDEX: 27.8 KG/M2 | DIASTOLIC BLOOD PRESSURE: 77 MMHG | SYSTOLIC BLOOD PRESSURE: 143 MMHG | HEART RATE: 76 BPM | WEIGHT: 173 LBS | HEIGHT: 66 IN | TEMPERATURE: 98.3 F

## 2023-05-29 DIAGNOSIS — G43.119 INTRACTABLE MIGRAINE WITH AURA WITHOUT STATUS MIGRAINOSUS: Primary | ICD-10-CM

## 2023-05-29 PROCEDURE — 99284 EMERGENCY DEPT VISIT MOD MDM: CPT | Performed by: FAMILY MEDICINE

## 2023-05-29 PROCEDURE — 6360000200 HC RX 636 W HCPCS (ALT 250 FOR IP): Performed by: FAMILY MEDICINE

## 2023-05-29 PROCEDURE — 99283 EMERGENCY DEPT VISIT LOW MDM: CPT | Performed by: FAMILY MEDICINE

## 2023-05-29 PROCEDURE — 96374 THER/PROPH/DIAG INJ IV PUSH: CPT

## 2023-05-29 PROCEDURE — 2580000300 HC RX 258: Performed by: FAMILY MEDICINE

## 2023-05-29 PROCEDURE — 96375 TX/PRO/DX INJ NEW DRUG ADDON: CPT

## 2023-05-29 RX ORDER — DEXAMETHASONE SODIUM PHOSPHATE 4 MG/ML
4 INJECTION, SOLUTION INTRA-ARTICULAR; INTRALESIONAL; INTRAMUSCULAR; INTRAVENOUS; SOFT TISSUE ONCE
Status: COMPLETED | OUTPATIENT
Start: 2023-05-29 | End: 2023-05-29

## 2023-05-29 RX ORDER — KETOROLAC TROMETHAMINE 30 MG/ML
30 INJECTION, SOLUTION INTRAMUSCULAR; INTRAVENOUS ONCE
Status: COMPLETED | OUTPATIENT
Start: 2023-05-29 | End: 2023-05-29

## 2023-05-29 RX ORDER — DIPHENHYDRAMINE HYDROCHLORIDE 50 MG/ML
25 INJECTION INTRAMUSCULAR; INTRAVENOUS ONCE
Status: COMPLETED | OUTPATIENT
Start: 2023-05-29 | End: 2023-05-29

## 2023-05-29 RX ORDER — METOCLOPRAMIDE HYDROCHLORIDE 5 MG/ML
10 INJECTION INTRAMUSCULAR; INTRAVENOUS ONCE
Status: COMPLETED | OUTPATIENT
Start: 2023-05-29 | End: 2023-05-29

## 2023-05-29 RX ORDER — SODIUM CHLORIDE 9 MG/ML
1000 INJECTION, SOLUTION INTRAVENOUS ONCE
Status: COMPLETED | OUTPATIENT
Start: 2023-05-29 | End: 2023-05-29

## 2023-05-29 RX ADMIN — DIPHENHYDRAMINE HYDROCHLORIDE 25 MG: 50 INJECTION, SOLUTION INTRAMUSCULAR; INTRAVENOUS at 15:07

## 2023-05-29 RX ADMIN — KETOROLAC TROMETHAMINE 30 MG: 30 INJECTION, SOLUTION INTRAMUSCULAR; INTRAVENOUS at 15:08

## 2023-05-29 RX ADMIN — DEXAMETHASONE SODIUM PHOSPHATE 4 MG: 4 INJECTION, SOLUTION INTRA-ARTICULAR; INTRALESIONAL; INTRAMUSCULAR; INTRAVENOUS; SOFT TISSUE at 16:40

## 2023-05-29 RX ADMIN — SODIUM CHLORIDE 1000 ML: 9 INJECTION, SOLUTION INTRAVENOUS at 16:38

## 2023-05-29 RX ADMIN — METOCLOPRAMIDE HYDROCHLORIDE 10 MG: 5 INJECTION INTRAMUSCULAR; INTRAVENOUS at 15:06

## 2023-05-29 NOTE — ED PROVIDER NOTES
EMERGENCY NOTE    Chief Complaint:  Chief Complaint   Patient presents with    Headache     HA since Friday. Zofran and ice helps temporarily, but then comes back. Vomiting/dry heaving/ diarrhea yesterday.         HPI:  Luiza Tay is a 58 y.o. female who has a past medical history significant for migraines and presents today for evaluation of migraine.  This started about 3 days ago.  She states she has tried ice and Zofran which is helped intermittently.  She states she has had some concurrent vomiting with this as well.  She states that she did take her Emgality for her migraines on Thursday and her headache started the following day which is unusual.  She states that she took a dose of Ubrelvy yesterday but that did not help.  She also sometimes will take Stadol which is helpful for her to get some sleep.  She states over the last few days she feels like her headache will start in the morning and will get a little bit better and then will come back.  She is having a difficult time sleeping secondary to the pain.  Denies any recent fevers chills.  She does have concurrent nausea with her migraines.    HISTORY:  Past Medical History:   Diagnosis Date    Appendicitis     Depression     Diverticulitis     Fibromyalgia 05/03/2018    Gallstones     Headache     History of migraine 09/14/2017    History of migraine 09/14/2017    Insomnia     Migraines     Muscle tightness     Other insomnia 05/03/2018    Situational anxiety 05/03/2018    after a trauma riding horses    Skin abnormalities 1/2022    Traumatic closed displaced fracture of shaft of humerus with routine healing, right 05/16/2019       Past Surgical History:   Procedure Laterality Date    APPENDECTOMY      BREAST SURGERY      reduction    BREAST SURGERY Bilateral     reduction    CHOLECYSTECTOMY      COLONOSCOPY  03/2018    no polyps    FOOT SURGERY Right 2009    Screws    HERNIA REPAIR      HUMERUS SURGERY Right 2013    plate/screws    HYSTERECTOMY       endometriosis    ORIF WRIST FRACTURE Right 07/25/2018    Procedure: RIGHT ORIF WRIST;  Surgeon: Saad Zuniga MD;  Location: [facility] OR;  Service: Orthopedics;  Laterality: Right;    ORTHOPEDIC SURGERY      SHOULDER SURGERY      SIGMOIDECTOMY  2016    diverticular ds    TONSILLECTOMY AND ADENOIDECTOMY      TUBAL LIGATION  1992    UPPER EXTREMITY DEBRIDEMENT Right 07/25/2018    Procedure: irrigation and debridement of right upper extremity and all indicated procedures;  Surgeon: Saad Zuniga MD;  Location: [facility] OR;  Service: Orthopedics;  Laterality: Right;       Family History   Problem Relation Age of Onset    Colon cancer Maternal Grandmother     Ovarian cancer Maternal Grandmother     Cancer Maternal Grandmother     Bone cancer Paternal Grandmother     Hypertension Other     Breast cancer Other     Lung cancer Other     Hypertension Mother     Drug abuse Father     Heart disease Father     Cancer Paternal Grandfather     Cancer Mother's Sister     Drug abuse Sister        Social History     Tobacco Use    Smoking status: Never    Smokeless tobacco: Never   Vaping Use    Vaping Use: Never used   Substance Use Topics    Alcohol use: Yes     Alcohol/week: 1.0 standard drink of alcohol     Types: 1 Standard drinks or equivalent per week     Comment: socially    Drug use: No       Allergies   Allergen Reactions    Hydromorphone Hives    Meperidine      cause migraines    Trazodone      nightmares    Clindamycin Rash    Sumatriptan Rash         Current Outpatient Medications:     ondansetron ODT (ZOFRAN-ODT) 4 mg disintegrating tablet, Take 1 tablet (4 mg total) by mouth every 8 (eight) hours as needed for nausea or vomiting, Disp: 90 tablet, Rfl: 1    ALPRAZolam (XANAX) 0.5 mg tablet, Take 1 tablet (0.5 mg total) by mouth nightly as needed for anxiety Max Daily Amount: 0.5 mg, Disp: 30 tablet, Rfl: 0    zolpidem (AMBIEN) 10 mg tablet, Take 1 tablet (10 mg total) by mouth daily Max Daily Amount: 10 mg, Disp: 30  tablet, Rfl: 0    galcanezumab-gnlm (EMGALITY) 120 mg/mL pen, Inject 1 mL (120 mg total) under the skin every 28 (twenty-eight) days, Disp: 3 mL, Rfl: 3    hydrOXYchloroQUINE 300 mg tablet, Take 1 tablet by mouth daily, Disp: 90 tablet, Rfl: 3    gabapentin (NEURONTIN) 400 mg capsule, Take 1 capsule (400 mg total) by mouth nightly, Disp: 90 capsule, Rfl: 3    venlafaxine XR (EFFEXOR-XR) 75 mg 24 hr capsule, Take 1 capsule (75 mg total) by mouth daily, Disp: 90 capsule, Rfl: 3    ubrogepant 100 mg tablet, Take 100 mg by mouth once as needed (for migraine) for up to 1 dose May repeat dose after 2 hours. Not to exceed 200mg in 24 hours, Disp: 16 tablet, Rfl: 2    tiZANidine (ZANAFLEX) 4 mg tablet, Take 1 tablet (4 mg total) by mouth 2 (two) times a day, Disp: 180 tablet, Rfl: 3    hydrOXYchloroQUINE (PLAQUENIL) 200 mg tablet, Take 1 tablet (200 mg total) by mouth daily, Disp: , Rfl:     ketorolac (TORADOL) 10 mg tablet, Take 1 tablet (10 mg total) by mouth every 6 (six) hours as needed for pain scale 8-10/10 for up to 4 days, Disp: 16 tablet, Rfl: 0    butorphanol (STADOL) 10 mg/mL nasal spray, Administer 1 spray into each nostril every 6 (six) hours as needed for pain scale 8-10/10 Max Daily Amount: 4 sprays, Disp: 2.5 mL, Rfl: 0    valACYclovir (VALTREX) 1 gram tablet, TAKE ONE TABLET BY MOUTH THREE TIMES DAILY FOR SEVEN DAYS, Disp: 21 tablet, Rfl: 3      ROS:  Review of Systems  12 point ROS performed that was negative unless otherwise mentioned per HPI.    PE:  ED Triage Vitals [05/29/23 1347]   Temp Heart Rate Resp BP SpO2   36.7 °C (98.1 °F) 77 18 148/80 94 %      Mean BP (mmHg) Temp Source Heart Rate Source Patient Position BP Location   125 Oral Monitor Sitting Left arm      FiO2 (%)       --           Physical Exam  Vitals and nursing note reviewed.   Constitutional:       Appearance: She is not ill-appearing or toxic-appearing.   GABRIELLA:      Head: Normocephalic and atraumatic.      Nose: Nose normal.       Mouth/Throat:      Mouth: Mucous membranes are moist.   Eyes:      Extraocular Movements: Extraocular movements intact.      Pupils: Pupils are equal, round, and reactive to light.   Cardiovascular:      Rate and Rhythm: Normal rate and regular rhythm.   Pulmonary:      Effort: Pulmonary effort is normal.      Breath sounds: Normal breath sounds.   Musculoskeletal:      Cervical back: Neck supple.   Skin:     General: Skin is warm.   Neurological:      General: No focal deficit present.      Mental Status: She is alert and oriented to person, place, and time.   Psychiatric:         Mood and Affect: Mood normal.           ED LABS:  Labs Reviewed - No data to display    ED IMAGES:  No orders to display       PROCEDURE    Procedures      ED MEDS  Medications   sodium chloride 0.9 % bolus 1,000 mL (has no administration in time range)   dexAMETHasone (DECADRON) 4 mg/mL injection 4 mg (has no administration in time range)   ketorolac (TORADOL) injection 30 mg (30 mg intravenous Given 5/29/23 1508)   metoclopramide (REGLAN) injection 10 mg (10 mg intravenous Given 5/29/23 1506)   diphenhydrAMINE (BENADRYL) injection 25 mg (25 mg intravenous Given 5/29/23 1507)       ED DIAGNOSIS  Final diagnoses:   [G43.119] Intractable migraine with aura without status migrainosus           ED COURSE:  Luiza is a 58-year-old woman with a history of migraines who presents to the emergency department for an acute migraine.  This has been ongoing for last 3 days.  It is typical in appearance of her previous migraines.  Upon arrival here, ABCs attended to.  Vital signs grossly within normal limits.  Physical exam as noted above and benign.  IV access established and patient was given ketorolac Reglan and diphenhydramine with IV.  Upon reassessment her pain and improved but she still has some residual symptoms on the left side of her head and so she was given a liter normal saline bolus as well as dexamethasone.  She had improvement in her  symptoms with this.  She was discharged in stable condition home with her .  She is scheduled as an outpatient with her PCP myself for further evaluation of options for outpatient migraine control.      Tiffany Lacy MD  05/29/23    A voice recognition program was used to aid in medical record documentation. Sometimes words are printed not exactly as they were spoken. While efforts were made to carefully edit and correct any inaccuracies, some errors may be present. Errors should be taken within the context of the discussion.  Please contact our office if you need assistance interpreting this medical record or notice any mistakes       Tiffany Lacy MD  05/29/23 1640

## 2023-05-29 NOTE — DISCHARGE INSTRUCTIONS
Your evaluated for your migraine.  We gave you migraine cocktail of Toradol Reglan and Benadryl as well as IV fluids and dexamethasone.  This did seem to help your symptoms.   Make sure you get some rest today and sleep.  We will put in a referral to Dr. Gilberto Molina with pain management to see if we can get additional pain relief options to help with your migraines.  Continue all your current medications for now.

## 2023-05-30 RX ORDER — BUTORPHANOL TARTRATE 10 MG/ML
SPRAY NASAL
Qty: 2.5 ML | Refills: 0 | OUTPATIENT
Start: 2023-05-30

## 2023-06-06 ENCOUNTER — TELEPHONE (OUTPATIENT)
Dept: FAMILY MEDICINE | Facility: CLINIC | Age: 58
End: 2023-06-06
Payer: OTHER GOVERNMENT

## 2023-06-06 DIAGNOSIS — F32.A ANXIETY AND DEPRESSION: ICD-10-CM

## 2023-06-06 DIAGNOSIS — F43.10 PTSD (POST-TRAUMATIC STRESS DISORDER): ICD-10-CM

## 2023-06-06 DIAGNOSIS — G43.909 MIGRAINE WITHOUT STATUS MIGRAINOSUS, NOT INTRACTABLE, UNSPECIFIED MIGRAINE TYPE: ICD-10-CM

## 2023-06-06 DIAGNOSIS — F41.9 ANXIETY AND DEPRESSION: ICD-10-CM

## 2023-06-06 RX ORDER — VENLAFAXINE HYDROCHLORIDE 75 MG/1
75 CAPSULE, EXTENDED RELEASE ORAL DAILY
Qty: 90 CAPSULE | Refills: 1 | Status: SHIPPED | OUTPATIENT
Start: 2023-06-06 | End: 2023-11-07

## 2023-06-06 RX ORDER — BUTORPHANOL TARTRATE 10 MG/ML
SPRAY NASAL
Qty: 2.5 ML | Refills: 5 | Status: SHIPPED | OUTPATIENT
Start: 2023-06-06 | End: 2023-07-05

## 2023-06-06 NOTE — TELEPHONE ENCOUNTER
Luiza asking for an update on her prescription.     Her migraines are getting progressively worse and is using ice packs to help, but finds this is not much help.    She was informed note was sent for refill and sending another message

## 2023-06-06 NOTE — TELEPHONE ENCOUNTER
Luiza called to report that she spoke with [facility] and they claim they have sent a refill request to Dr Lacy for butorphanol.     She'd like to know if Dr Lacy had received the request from [facility]?

## 2023-06-06 NOTE — TELEPHONE ENCOUNTER
No care due was identified.  [facility] Embedded Care Due Messages. Reference number: [id number]. 6/06/2023 11:38:24 AM YAHIR

## 2023-06-21 DIAGNOSIS — F41.8 SITUATIONAL ANXIETY: ICD-10-CM

## 2023-06-21 DIAGNOSIS — G47.09 OTHER INSOMNIA: ICD-10-CM

## 2023-06-21 NOTE — TELEPHONE ENCOUNTER
No care due was identified.  [facility] Embedded Care Due Messages. Reference number: [id number]. 6/21/2023 9:23:38 AM YAHIR

## 2023-06-22 RX ORDER — ALPRAZOLAM 0.5 MG/1
0.5 TABLET ORAL NIGHTLY PRN
Qty: 30 TABLET | Refills: 2 | Status: SHIPPED | OUTPATIENT
Start: 2023-06-22 | End: 2023-08-22

## 2023-06-22 RX ORDER — DOXEPIN 3 MG/1
TABLET, FILM COATED ORAL
Qty: 60 TABLET | Refills: 2 | Status: SHIPPED | OUTPATIENT
Start: 2023-06-22 | End: 2023-08-17

## 2023-06-22 RX ORDER — ZOLPIDEM TARTRATE 10 MG/1
10 TABLET ORAL DAILY
Qty: 30 TABLET | Refills: 2 | Status: SHIPPED | OUTPATIENT
Start: 2023-06-22 | End: 2023-08-22

## 2023-06-27 DIAGNOSIS — M79.7 FIBROMYALGIA: ICD-10-CM

## 2023-06-27 RX ORDER — GABAPENTIN 400 MG/1
CAPSULE ORAL
Qty: 90 CAPSULE | Refills: 3 | Status: SHIPPED | OUTPATIENT
Start: 2023-06-27 | End: 2024-01-25

## 2023-07-05 DIAGNOSIS — G43.909 MIGRAINE WITHOUT STATUS MIGRAINOSUS, NOT INTRACTABLE, UNSPECIFIED MIGRAINE TYPE: ICD-10-CM

## 2023-07-05 DIAGNOSIS — N64.4 BREAST PAIN: Primary | ICD-10-CM

## 2023-07-05 RX ORDER — BUTORPHANOL TARTRATE 10 MG/ML
SPRAY NASAL
Qty: 2.5 ML | Refills: 5 | Status: SHIPPED | OUTPATIENT
Start: 2023-07-05 | End: 2023-08-17

## 2023-07-05 NOTE — TELEPHONE ENCOUNTER
No care due was identified.  [facility] Embedded Care Due Messages. Reference number: [id number]. 7/05/2023 8:34:13 AM YAHIR

## 2023-07-07 ENCOUNTER — HOSPITAL ENCOUNTER (OUTPATIENT)
Dept: MRI IMAGING | Facility: HOSPITAL | Age: 58
Discharge: HOME | End: 2023-07-07
Payer: OTHER GOVERNMENT

## 2023-07-07 DIAGNOSIS — M47.812 CERVICAL SPONDYLOSIS: ICD-10-CM

## 2023-07-07 DIAGNOSIS — G44.86 CERVICOGENIC HEADACHE: ICD-10-CM

## 2023-07-07 PROCEDURE — 72141 MRI NECK SPINE W/O DYE: CPT

## 2023-07-18 DIAGNOSIS — R11.0 NAUSEA: ICD-10-CM

## 2023-07-18 NOTE — TELEPHONE ENCOUNTER
No care due was identified.  [facility] Embedded Care Due Messages. Reference number: [id number]. 7/18/2023 8:18:17 AM YAHIR

## 2023-07-19 RX ORDER — ONDANSETRON 4 MG/1
TABLET, ORALLY DISINTEGRATING ORAL
Qty: 90 TABLET | Refills: 1 | Status: SHIPPED | OUTPATIENT
Start: 2023-07-19 | End: 2024-01-02

## 2023-08-17 DIAGNOSIS — G43.909 MIGRAINE WITHOUT STATUS MIGRAINOSUS, NOT INTRACTABLE, UNSPECIFIED MIGRAINE TYPE: ICD-10-CM

## 2023-08-17 DIAGNOSIS — G47.09 OTHER INSOMNIA: ICD-10-CM

## 2023-08-17 RX ORDER — BUTORPHANOL TARTRATE 10 MG/ML
SPRAY NASAL
Qty: 2.5 ML | Refills: 0 | Status: SHIPPED | OUTPATIENT
Start: 2023-08-17 | End: 2023-09-11 | Stop reason: ALTCHOICE

## 2023-08-17 RX ORDER — DOXEPIN 3 MG/1
TABLET, FILM COATED ORAL
Qty: 60 TABLET | Refills: 0 | Status: SHIPPED | OUTPATIENT
Start: 2023-08-17 | End: 2023-10-16 | Stop reason: SDUPTHER

## 2023-08-17 NOTE — TELEPHONE ENCOUNTER
Care Due:                  Date            Visit Type   Department     Provider  --------------------------------------------------------------------------------                              ESTABLISHED   [facility]  Last Visit: 05-      PATIENT      MEDICINE       [facility]  Next Visit: None Scheduled  None         None Found                                                            Last  Test          Frequency    Reason                     Performed    Due Date  --------------------------------------------------------------------------------  Office Visit  3 months...  butorphanol..............  05- 08-    Cr..........  12 months..  gabapentin, ketorolac....  09-   09-    [facility] Embedded Care Due Messages. Reference number: [id number]. 8/17/2023 10:24:39 AM YAHIR

## 2023-08-22 DIAGNOSIS — F41.8 SITUATIONAL ANXIETY: ICD-10-CM

## 2023-08-22 DIAGNOSIS — G47.09 OTHER INSOMNIA: ICD-10-CM

## 2023-08-22 RX ORDER — ZOLPIDEM TARTRATE 10 MG/1
10 TABLET ORAL DAILY
Qty: 30 TABLET | Refills: 2 | Status: SHIPPED | OUTPATIENT
Start: 2023-08-22 | End: 2023-11-07

## 2023-08-22 RX ORDER — ALPRAZOLAM 0.5 MG/1
0.5 TABLET ORAL NIGHTLY PRN
Qty: 30 TABLET | Refills: 2 | Status: SHIPPED | OUTPATIENT
Start: 2023-08-22 | End: 2023-09-06 | Stop reason: SDUPTHER

## 2023-08-22 NOTE — TELEPHONE ENCOUNTER
No care due was identified.  [facility] Embedded Care Due Messages. Reference number: [id number]. 8/22/2023 8:12:18 AM YAHIR

## 2023-08-30 ENCOUNTER — TELEPHONE (OUTPATIENT)
Dept: FAMILY MEDICINE | Facility: CLINIC | Age: 58
End: 2023-08-30
Payer: OTHER GOVERNMENT

## 2023-08-30 DIAGNOSIS — M79.7 FIBROMYALGIA: ICD-10-CM

## 2023-08-30 DIAGNOSIS — G43.909 MIGRAINE WITHOUT STATUS MIGRAINOSUS, NOT INTRACTABLE, UNSPECIFIED MIGRAINE TYPE: ICD-10-CM

## 2023-08-30 RX ORDER — TIZANIDINE 4 MG/1
4 TABLET ORAL 2 TIMES DAILY
Qty: 60 TABLET | Refills: 0 | Status: SHIPPED | OUTPATIENT
Start: 2023-08-30 | End: 2023-09-25 | Stop reason: SDUPTHER

## 2023-08-30 RX ORDER — BUTORPHANOL TARTRATE 10 MG/ML
SPRAY NASAL
OUTPATIENT
Start: 2023-08-30

## 2023-08-30 RX ORDER — BUTORPHANOL TARTRATE 10 MG/ML
SPRAY NASAL
Qty: 2.5 ML | Refills: 0 | OUTPATIENT
Start: 2023-08-30

## 2023-08-30 NOTE — TELEPHONE ENCOUNTER
A refill of her tizanidine was sent to the pharmacy. She should have fills of her venlafaxine at the pharmacy.    She just filled her butorphanol on August 25th.  It is an as needed prescription and she should not need to fill this.

## 2023-08-30 NOTE — TELEPHONE ENCOUNTER
No care due was identified.  [facility] Embedded Care Due Messages. Reference number: [id number]. 8/30/2023 1:51:47 PM MDT

## 2023-08-30 NOTE — TELEPHONE ENCOUNTER
Patient needs refills on the following meds    butorphanol (STADOL) 10 mg/mL nasal spray  tiZANidine (ZANAFLEX) 4 mg tablet  venlafaxine XR (EFFEXOR-XR) 75 mg 24 hr capsule    Please send to [facility]

## 2023-08-30 NOTE — TELEPHONE ENCOUNTER
Luiza called back, she stated that [facility] did not receive prescription of butorphanol.     I let her know that [facility] did not send this as this was filled August 25.     Luiza denies the refill as she does not have the prescription and she had not asked the pharmacy for a refill.     I suggested to call pharmacy and check to see if they had the refill for  her.

## 2023-09-02 DIAGNOSIS — G43.909 MIGRAINE WITHOUT STATUS MIGRAINOSUS, NOT INTRACTABLE, UNSPECIFIED MIGRAINE TYPE: ICD-10-CM

## 2023-09-02 NOTE — TELEPHONE ENCOUNTER
No care due was identified.  [facility] Embedded Care Due Messages. Reference number: [id number]. 9/02/2023 10:55:32 AM YAHIR

## 2023-09-05 RX ORDER — BUTORPHANOL TARTRATE 10 MG/ML
SPRAY NASAL
Qty: 2.5 ML | Refills: 0 | OUTPATIENT
Start: 2023-09-05

## 2023-09-06 DIAGNOSIS — F41.8 SITUATIONAL ANXIETY: ICD-10-CM

## 2023-09-06 RX ORDER — ALPRAZOLAM 0.5 MG/1
0.5 TABLET ORAL 2 TIMES DAILY PRN
Qty: 60 TABLET | Refills: 0 | Status: SHIPPED | OUTPATIENT
Start: 2023-09-06 | End: 2023-09-25 | Stop reason: SDUPTHER

## 2023-09-06 NOTE — TELEPHONE ENCOUNTER
Patient is dealing with finding out her mom is dying from cancer and she is out of her ALPRAZolam (XANAX) 0.5 mg tablet  Is there something we can do for the patient. Please advise. She is very emotional. Please advise

## 2023-09-06 NOTE — TELEPHONE ENCOUNTER
No care due was identified.  [facility] Embedded Care Due Messages. Reference number: [id number]. 9/06/2023 4:30:15 PM MDT

## 2023-09-07 ENCOUNTER — OFFICE VISIT (OUTPATIENT)
Dept: URGENT CARE | Facility: CLINIC | Age: 58
End: 2023-09-07
Payer: OTHER GOVERNMENT

## 2023-09-07 ENCOUNTER — TELEPHONE (OUTPATIENT)
Dept: FAMILY MEDICINE | Facility: CLINIC | Age: 58
End: 2023-09-07
Payer: OTHER GOVERNMENT

## 2023-09-07 VITALS
DIASTOLIC BLOOD PRESSURE: 78 MMHG | TEMPERATURE: 98.5 F | OXYGEN SATURATION: 97 % | BODY MASS INDEX: 26.03 KG/M2 | HEIGHT: 66 IN | RESPIRATION RATE: 18 BRPM | HEART RATE: 84 BPM | WEIGHT: 162 LBS | SYSTOLIC BLOOD PRESSURE: 142 MMHG

## 2023-09-07 DIAGNOSIS — G43.909 MIGRAINE WITHOUT STATUS MIGRAINOSUS, NOT INTRACTABLE, UNSPECIFIED MIGRAINE TYPE: ICD-10-CM

## 2023-09-07 DIAGNOSIS — G43.909 MIGRAINE WITHOUT STATUS MIGRAINOSUS, NOT INTRACTABLE, UNSPECIFIED MIGRAINE TYPE: Primary | ICD-10-CM

## 2023-09-07 PROCEDURE — 96375 TX/PRO/DX INJ NEW DRUG ADDON: CPT | Performed by: FAMILY MEDICINE

## 2023-09-07 PROCEDURE — 99214 OFFICE O/P EST MOD 30 MIN: CPT | Mod: 25 | Performed by: FAMILY MEDICINE

## 2023-09-07 PROCEDURE — 96374 THER/PROPH/DIAG INJ IV PUSH: CPT | Performed by: FAMILY MEDICINE

## 2023-09-07 RX ORDER — DIPHENHYDRAMINE HYDROCHLORIDE 50 MG/ML
25 INJECTION INTRAMUSCULAR; INTRAVENOUS ONCE
Status: COMPLETED | OUTPATIENT
Start: 2023-09-07 | End: 2023-09-07

## 2023-09-07 RX ORDER — METOCLOPRAMIDE HYDROCHLORIDE 5 MG/ML
10 INJECTION INTRAMUSCULAR; INTRAVENOUS ONCE
Status: COMPLETED | OUTPATIENT
Start: 2023-09-07 | End: 2023-09-07

## 2023-09-07 RX ORDER — SODIUM CHLORIDE 9 MG/ML
1000 INJECTION, SOLUTION INTRAVENOUS ONCE
Status: COMPLETED | OUTPATIENT
Start: 2023-09-07 | End: 2023-09-07

## 2023-09-07 RX ORDER — BUTORPHANOL TARTRATE 10 MG/ML
SPRAY NASAL
Qty: 2.5 ML | Refills: 0 | OUTPATIENT
Start: 2023-09-07

## 2023-09-07 RX ORDER — ONDANSETRON HYDROCHLORIDE 2 MG/ML
4 INJECTION, SOLUTION INTRAVENOUS ONCE
Status: COMPLETED | OUTPATIENT
Start: 2023-09-07 | End: 2023-09-07

## 2023-09-07 RX ADMIN — DIPHENHYDRAMINE HYDROCHLORIDE 25 MG: 50 INJECTION INTRAMUSCULAR; INTRAVENOUS at 16:42

## 2023-09-07 RX ADMIN — METOCLOPRAMIDE HYDROCHLORIDE 10 MG: 5 INJECTION INTRAMUSCULAR; INTRAVENOUS at 16:38

## 2023-09-07 RX ADMIN — SODIUM CHLORIDE 1000 ML: 9 INJECTION, SOLUTION INTRAVENOUS at 16:36

## 2023-09-07 RX ADMIN — ONDANSETRON HYDROCHLORIDE 4 MG: 2 INJECTION, SOLUTION INTRAVENOUS at 16:45

## 2023-09-07 ASSESSMENT — PAIN SCALES - GENERAL: PAINLEVEL: 8

## 2023-09-07 ASSESSMENT — ENCOUNTER SYMPTOMS: HEADACHES: 1
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
## 2023-09-07 NOTE — TELEPHONE ENCOUNTER
No care due was identified.  [facility] Embedded Care Due Messages. Reference number: [id number]. 9/07/2023 8:12:48 AM YAHIR

## 2023-09-07 NOTE — TELEPHONE ENCOUNTER
Patient need a refill   butorphanol (STADOL) 10 mg/mL nasal spray  [facility]  Patient is out

## 2023-09-11 ENCOUNTER — OFFICE VISIT (OUTPATIENT)
Dept: FAMILY MEDICINE | Facility: CLINIC | Age: 58
End: 2023-09-11
Payer: OTHER GOVERNMENT

## 2023-09-11 VITALS
RESPIRATION RATE: 18 BRPM | WEIGHT: 162 LBS | OXYGEN SATURATION: 98 % | DIASTOLIC BLOOD PRESSURE: 90 MMHG | SYSTOLIC BLOOD PRESSURE: 138 MMHG | HEART RATE: 105 BPM | BODY MASS INDEX: 26.15 KG/M2

## 2023-09-11 DIAGNOSIS — G43.909 MIGRAINE WITHOUT STATUS MIGRAINOSUS, NOT INTRACTABLE, UNSPECIFIED MIGRAINE TYPE: ICD-10-CM

## 2023-09-11 DIAGNOSIS — F41.9 ANXIETY AND DEPRESSION: ICD-10-CM

## 2023-09-11 DIAGNOSIS — M79.7 FIBROMYALGIA: Primary | ICD-10-CM

## 2023-09-11 DIAGNOSIS — F32.A ANXIETY AND DEPRESSION: ICD-10-CM

## 2023-09-11 PROBLEM — M47.812 CERVICAL SPONDYLOSIS WITHOUT MYELOPATHY: Status: ACTIVE | Noted: 2023-09-11

## 2023-09-11 PROCEDURE — 99214 OFFICE O/P EST MOD 30 MIN: CPT | Performed by: FAMILY MEDICINE

## 2023-09-11 RX ORDER — BUTALBITAL, ACETAMINOPHEN AND CAFFEINE 50; 325; 40 MG/1; MG/1; MG/1
1 TABLET ORAL EVERY 6 HOURS PRN
Qty: 30 TABLET | Refills: 2 | Status: SHIPPED | OUTPATIENT
Start: 2023-09-11 | End: 2023-12-05

## 2023-09-11 NOTE — PROGRESS NOTES
There are no Patient Instructions on file for this visit.        IMPRESSION AND PLAN  Diagnoses and all orders for this visit:    Fibromyalgia    Migraine without status migrainosus, not intractable, unspecified migraine type  -     butalbital-acetaminophen-caff (FIORICET, ESGIC) -40 mg; Take 1 tablet by mouth every 6 (six) hours as needed for migraine    Anxiety and depression    Luiza is a nice 58-year-old woman who presents today for follow-up of her chronic conditions.  They are currently stable in terms of her migraines she is getting relief with Botox injections from Dr. Molina.  However she still gets migraines and can only take to Ubrelvy when she needs it.  I told her we cannot do anymore Stadol due to multiple fills of this so instead were going to do some Fioricet for her prescription was sent for her and she will let us know what other meds she needs refilled.  We will agree to also do telephone visits with her while she is gone out of town for any med refills until her and her  are back.  She endorsed good understanding    HPI  Chief complaint for visit per nursing.       Chief Complaint   Patient presents with    Med Refill     Pt states needing refill of Gabapentin,Tizanidine and Doxepin   Pt states taking Gabapentin 400mg, 3 pills (1200mg) QHS  Pt would also like refill of Stadol to have on hand     Knee Pain     Right - Stepped off horse and twisted knee, wearing compression brace to help - bruised right FA and hand         Luiza is a 58 y.o. woman who presents today for follow up on her chronic conditions  She has history of migraines and had botex which was helpful and then again on oct 4  Planning on leaving oct 10.   Mother has stage IV cancer and is in hospice  She did have an injection done for her cervical spine  She did have an appt in [facility] clinic this past Friday for which she received IV fluids, benadryl, regland and zofran.  She states that she still has a headache.   She  has used ubrevly as an abortive  She has done Aimovig and emgality gave her a reaction  She has done fioricet in the past and is willing to try that again.   I told her we cannot do any more stadol due to multiple fills of this.  She is fine with trying Fioricet as she has done that in the past.          PROBLEM LIST  Patient Active Problem List   Diagnosis    Situational anxiety    Other insomnia    Fibromyalgia    Diverticulitis    PTSD (post-traumatic stress disorder)    Anxiety and depression    Migraine without status migrainosus, not intractable    ASHLEY positive    Colitis    COVID-19    Cervical spondylosis without myelopathy         SOCIAL HX  Social History     Socioeconomic History    Marital status:      Spouse name: Not on file    Number of children: Not on file    Years of education: Not on file    Highest education level: Not on file   Occupational History    Not on file   Tobacco Use    Smoking status: Never    Smokeless tobacco: Never   Vaping Use    Vaping Use: Never used   Substance and Sexual Activity    Alcohol use: Yes     Alcohol/week: 1.0 standard drink of alcohol     Types: 1 Standard drinks or equivalent per week     Comment: socially    Drug use: No    Sexual activity: Yes     Partners: Male     Birth control/protection: Post-menopausal, Male Sterilization   Other Topics Concern    Not on file   Social History Narrative    Retired nurse.  Now works with horses.     Social Determinants of Health     Financial Resource Strain: Not on file   Food Insecurity: Not on file   Transportation Needs: Not on file   Physical Activity: Not on file   Stress: Not on file   Social Connections: Not on file   Intimate Partner Violence: Not on file   Housing Stability: Not on file       FAMILY HX  Family History   Problem Relation Age of Onset    Colon cancer Maternal Grandmother     Ovarian cancer Maternal Grandmother     Cancer Maternal Grandmother     Bone cancer Paternal Grandmother     Hypertension  "Other     Breast cancer Other     Lung cancer Other     Hypertension Mother     Drug abuse Father     Heart disease Father     Cancer Paternal Grandfather     Cancer Mother's Sister     Drug abuse Sister        ROS  12 point review of systems performed is negative unless otherwise mentioned per HPI.    Physical Exam  /90   Pulse 105   Resp 18   Wt 73.5 kg (162 lb)   SpO2 98%   BMI 26.15 kg/m²   BP Readings from Last 3 Encounters:   09/11/23 138/90   09/07/23 142/78   05/29/23 143/77       Physical Exam  Constitutional:       General: She is not in acute distress.     Appearance: She is not ill-appearing or toxic-appearing.   HENT:      Head: Normocephalic and atraumatic.      Nose: Nose normal.      Mouth/Throat:      Mouth: Mucous membranes are moist.   Eyes:      Extraocular Movements: Extraocular movements intact.      Pupils: Pupils are equal, round, and reactive to light.   Cardiovascular:      Rate and Rhythm: Normal rate and regular rhythm.   Pulmonary:      Effort: Pulmonary effort is normal.      Breath sounds: Normal breath sounds.   Musculoskeletal:      Cervical back: Neck supple.      Comments: Right knee in a brace    Skin:     General: Skin is warm.   Neurological:      Mental Status: She is alert and oriented to person, place, and time.   Psychiatric:         Mood and Affect: Mood normal.            LABS AND XRAYS  Lab Results   Component Value Date    GLUCOSE 69 (L) 09/15/2022    CALCIUM 9.0 09/15/2022     (L) 09/15/2022    K 3.6 09/15/2022    CO2 24 09/15/2022     09/15/2022    BUN 5 (L) 09/15/2022    CREATININE 0.63 09/15/2022    ANIONGAP 9 09/15/2022     Lab Results   Component Value Date    WBC 4.5 05/22/2023    HGB 14.9 05/22/2023    HCT 42.9 05/22/2023    MCV 90.5 05/22/2023     05/22/2023     Lab Results   Component Value Date    TSH 4.291 09/15/2022     No results found for: \"HGBA1C\"  Lab Results   Component Value Date    CREATININE 0.63 09/15/2022     Lab " "Results   Component Value Date    SEDRATE 13 05/22/2023     No results found for: \"URACSRM\"  No results found for: \"AMYLASE\"  Lab Results   Component Value Date    LIPASE 11 08/06/2020         No results found.    Procedures     MEDICATIONS    Current Outpatient Medications:     ALPRAZolam (XANAX) 0.5 mg tablet, Take 1 tablet (0.5 mg total) by mouth 2 (two) times a day as needed for anxiety Max Daily Amount: 1 mg, Disp: 60 tablet, Rfl: 0    tiZANidine (ZANAFLEX) 4 mg tablet, Take 1 tablet (4 mg total) by mouth 2 (two) times a day, Disp: 60 tablet, Rfl: 0    zolpidem (AMBIEN) 10 mg tablet, TAKE ONE TABLET BY MOUTH DAILY, Disp: 30 tablet, Rfl: 2    doxepin 3 mg tablet, TAKE ONE TABLET BY MOUTH NIGHTLY FOR ONE WEEK AND if no improvement in SLEEP increase TABLET TWO TABLETS NIGHTLY. take 30 TO 60 MINUTES prior TO wanting TO fall asleep, Disp: 60 tablet, Rfl: 0    ondansetron ODT (ZOFRAN-ODT) 4 mg disintegrating tablet, TAKE ONE TABLET BY MOUTH EVERY 8 HOURS AS NEEDED FOR NAUSEA OR FOR VOMITING, Disp: 90 tablet, Rfl: 1    gabapentin (NEURONTIN) 400 mg capsule, TAKE ONE CAPSULE BY MOUTH NIGHTLY (Patient taking differently: 3 capsules (1,200 mg total) nightly), Disp: 90 capsule, Rfl: 3    venlafaxine XR (EFFEXOR-XR) 75 mg 24 hr capsule, Take 1 capsule (75 mg total) by mouth daily, Disp: 90 capsule, Rfl: 1    ubrogepant 100 mg tablet, Take 100 mg by mouth once as needed (for migraine) for up to 1 dose May repeat dose after 2 hours. Not to exceed 200mg in 24 hours, Disp: 16 tablet, Rfl: 2    valACYclovir (VALTREX) 1 gram tablet, TAKE ONE TABLET BY MOUTH THREE TIMES DAILY FOR SEVEN DAYS, Disp: 21 tablet, Rfl: 3    butalbital-acetaminophen-caff (FIORICET, ESGIC) -40 mg, Take 1 tablet by mouth every 6 (six) hours as needed for migraine, Disp: 30 tablet, Rfl: 2    hydrOXYchloroQUINE (PLAQUENIL) 200 mg tablet, Take 1 tablet (200 mg total) by mouth daily, Disp: , Rfl:     galcanezumab-gnlm (EMGALITY) 120 mg/mL pen, Inject 1 " mL (120 mg total) under the skin every 28 (twenty-eight) days (Patient not taking: Reported on 9/7/2023), Disp: 3 mL, Rfl: 3    hydrOXYchloroQUINE 300 mg tablet, Take 1 tablet by mouth daily (Patient not taking: Reported on 9/7/2023), Disp: 90 tablet, Rfl: 3    ketorolac (TORADOL) 10 mg tablet, Take 1 tablet (10 mg total) by mouth every 6 (six) hours as needed for pain scale 8-10/10 for up to 4 days (Patient not taking: Reported on 9/7/2023), Disp: 16 tablet, Rfl: 0      SEE ABOVE FOR IMPRESSION AND PLAN    Tiffany Lacy MD  09/11/23  1:44 PM

## 2023-09-21 DIAGNOSIS — F41.8 SITUATIONAL ANXIETY: ICD-10-CM

## 2023-09-21 RX ORDER — ALPRAZOLAM 0.5 MG/1
0.5 TABLET ORAL 2 TIMES DAILY PRN
Qty: 60 TABLET | Refills: 0 | OUTPATIENT
Start: 2023-09-21

## 2023-09-21 RX ORDER — ALPRAZOLAM 0.5 MG/1
0.5 TABLET ORAL 2 TIMES DAILY PRN
OUTPATIENT
Start: 2023-09-21

## 2023-09-21 NOTE — TELEPHONE ENCOUNTER
No care due was identified.  [facility] Embedded Care Due Messages. Reference number: [id number]. 9/21/2023 1:22:10 AM YAHIR

## 2023-09-21 NOTE — TELEPHONE ENCOUNTER
No care due was identified.  [facility] Embedded Care Due Messages. Reference number: [id number]. 9/21/2023 1:18:23 AM YAHIR

## 2023-09-25 DIAGNOSIS — F41.8 SITUATIONAL ANXIETY: ICD-10-CM

## 2023-09-25 DIAGNOSIS — M79.7 FIBROMYALGIA: ICD-10-CM

## 2023-09-25 RX ORDER — TIZANIDINE 4 MG/1
4 TABLET ORAL 2 TIMES DAILY
Qty: 60 TABLET | Refills: 1 | Status: SHIPPED | OUTPATIENT
Start: 2023-09-25 | End: 2023-10-16

## 2023-09-25 RX ORDER — ALPRAZOLAM 0.5 MG/1
0.5 TABLET ORAL NIGHTLY PRN
Qty: 30 TABLET | Refills: 0 | Status: SHIPPED | OUTPATIENT
Start: 2023-09-25 | End: 2023-10-16 | Stop reason: SDUPTHER

## 2023-09-25 NOTE — TELEPHONE ENCOUNTER
No care due was identified.  [facility] Embedded Care Due Messages. Reference number: [id number]. 9/25/2023 7:54:40 AM YAHIR

## 2023-10-04 ENCOUNTER — TELEPHONE (OUTPATIENT)
Dept: FAMILY MEDICINE | Facility: CLINIC | Age: 58
End: 2023-10-04
Payer: OTHER GOVERNMENT

## 2023-10-04 DIAGNOSIS — G43.909 MIGRAINE WITHOUT STATUS MIGRAINOSUS, NOT INTRACTABLE, UNSPECIFIED MIGRAINE TYPE: ICD-10-CM

## 2023-10-04 RX ORDER — BUTALBITAL, ACETAMINOPHEN AND CAFFEINE 50; 325; 40 MG/1; MG/1; MG/1
TABLET ORAL
Qty: 30 TABLET | Refills: 2 | OUTPATIENT
Start: 2023-10-04

## 2023-10-04 NOTE — TELEPHONE ENCOUNTER
No care due was identified.  [facility] Embedded Care Due Messages. Reference number: [id number]. 10/04/2023 2:15:34 PM MDT

## 2023-10-06 NOTE — TELEPHONE ENCOUNTER
Called and spoke with patient about this.  Ca LACKEY RN also did send a my chart message back to patient.

## 2023-10-06 NOTE — TELEPHONE ENCOUNTER
If she is wanting the STADOL nasal spray, this is a controlled substance. In Dr. Lacy's last note, she discontinued this due to multiple refills.

## 2023-10-16 DIAGNOSIS — F41.8 SITUATIONAL ANXIETY: ICD-10-CM

## 2023-10-16 DIAGNOSIS — M79.7 FIBROMYALGIA: ICD-10-CM

## 2023-10-16 DIAGNOSIS — G47.09 OTHER INSOMNIA: ICD-10-CM

## 2023-10-16 RX ORDER — TIZANIDINE 4 MG/1
4 TABLET ORAL 2 TIMES DAILY
Qty: 60 TABLET | Refills: 1 | Status: SHIPPED | OUTPATIENT
Start: 2023-10-16 | End: 2023-12-05

## 2023-10-16 RX ORDER — DOXEPIN 3 MG/1
TABLET, FILM COATED ORAL
Qty: 60 TABLET | Refills: 0 | OUTPATIENT
Start: 2023-10-16

## 2023-10-16 RX ORDER — DOXEPIN 3 MG/1
TABLET, FILM COATED ORAL
Qty: 60 TABLET | Refills: 1 | Status: SHIPPED | OUTPATIENT
Start: 2023-10-16 | End: 2023-11-07

## 2023-10-16 RX ORDER — ALPRAZOLAM 0.5 MG/1
0.5 TABLET ORAL NIGHTLY PRN
Qty: 30 TABLET | Refills: 0 | Status: SHIPPED | OUTPATIENT
Start: 2023-10-16 | End: 2024-01-25

## 2023-10-16 NOTE — TELEPHONE ENCOUNTER
No care due was identified.  [facility] Embedded Care Due Messages. Reference number: [id number]. 10/16/2023 11:27:59 AM YAHIR

## 2023-10-16 NOTE — TELEPHONE ENCOUNTER
Care Due:                  Date            Visit Type   Department     Provider  --------------------------------------------------------------------------------                                           [facility]  Last Visit: 09-      None         MEDICINE       MAGEN PIERRE  Next Visit: None Scheduled  None         None Found                                                            Last  Test          Frequency    Reason                     Performed    Due Date  --------------------------------------------------------------------------------  Cr..........  12 months..  gabapentin, ketorolac....  09-   09-    [facility] Embedded Care Due Messages. Reference number: [id number]. 10/16/2023 11:23:57 AM YAHIR

## 2023-11-07 DIAGNOSIS — F43.10 PTSD (POST-TRAUMATIC STRESS DISORDER): ICD-10-CM

## 2023-11-07 DIAGNOSIS — G47.09 OTHER INSOMNIA: ICD-10-CM

## 2023-11-07 DIAGNOSIS — F32.A ANXIETY AND DEPRESSION: ICD-10-CM

## 2023-11-07 DIAGNOSIS — F41.9 ANXIETY AND DEPRESSION: ICD-10-CM

## 2023-11-07 RX ORDER — DOXEPIN 3 MG/1
TABLET, FILM COATED ORAL
Qty: 60 TABLET | Refills: 1 | Status: SHIPPED | OUTPATIENT
Start: 2023-11-07 | End: 2023-12-05

## 2023-11-07 RX ORDER — ZOLPIDEM TARTRATE 10 MG/1
10 TABLET ORAL DAILY
Qty: 30 TABLET | Refills: 2 | Status: SHIPPED | OUTPATIENT
Start: 2023-11-07 | End: 2024-02-19 | Stop reason: SDUPTHER

## 2023-11-07 RX ORDER — VENLAFAXINE HYDROCHLORIDE 75 MG/1
75 CAPSULE, EXTENDED RELEASE ORAL DAILY
Qty: 90 CAPSULE | Refills: 1 | Status: SHIPPED | OUTPATIENT
Start: 2023-11-07 | End: 2024-02-19 | Stop reason: SDUPTHER

## 2023-11-07 NOTE — TELEPHONE ENCOUNTER
No care due was identified.  [facility] Embedded Care Due Messages. Reference number: [id number]. 11/07/2023 7:13:36 AM MST

## 2023-12-04 DIAGNOSIS — G43.909 MIGRAINE WITHOUT STATUS MIGRAINOSUS, NOT INTRACTABLE, UNSPECIFIED MIGRAINE TYPE: ICD-10-CM

## 2023-12-04 DIAGNOSIS — G47.09 OTHER INSOMNIA: ICD-10-CM

## 2023-12-04 DIAGNOSIS — M79.7 FIBROMYALGIA: ICD-10-CM

## 2023-12-04 NOTE — TELEPHONE ENCOUNTER
No care due was identified.  [facility] Embedded Care Due Messages. Reference number: [id number]. 12/04/2023 8:45:52 AM MST

## 2023-12-05 RX ORDER — BUTALBITAL, ACETAMINOPHEN AND CAFFEINE 50; 325; 40 MG/1; MG/1; MG/1
TABLET ORAL
Qty: 30 TABLET | Refills: 2 | Status: SHIPPED | OUTPATIENT
Start: 2023-12-05 | End: 2024-05-09 | Stop reason: ALTCHOICE

## 2023-12-05 RX ORDER — DOXEPIN 3 MG/1
TABLET, FILM COATED ORAL
Qty: 60 TABLET | Refills: 1 | Status: SHIPPED | OUTPATIENT
Start: 2023-12-05 | End: 2024-01-25

## 2023-12-05 RX ORDER — TIZANIDINE 4 MG/1
4 TABLET ORAL 2 TIMES DAILY
Qty: 60 TABLET | Refills: 1 | Status: SHIPPED | OUTPATIENT
Start: 2023-12-05 | End: 2024-01-25

## 2023-12-30 DIAGNOSIS — R11.0 NAUSEA: ICD-10-CM

## 2023-12-30 NOTE — TELEPHONE ENCOUNTER
Care Due:                  Date            Visit Type   Department     Provider  --------------------------------------------------------------------------------                                           [facility]  Last Visit: 09-      None         MEDICINE       MAGEN PIERRE  Next Visit: None Scheduled  None         None Found                                                            Last  Test          Frequency    Reason                     Performed    Due Date  --------------------------------------------------------------------------------  Cr..........  12 months..  gabapentin, ketorolac....  09-   09-    [facility] Embedded Care Due Messages. Reference number: [id number]. 12/30/2023 11:24:11 AM MST

## 2024-01-02 RX ORDER — ONDANSETRON 4 MG/1
TABLET, ORALLY DISINTEGRATING ORAL
Qty: 90 TABLET | Refills: 1 | Status: SHIPPED | OUTPATIENT
Start: 2024-01-02 | End: 2024-10-15 | Stop reason: SDUPTHER

## 2024-01-25 DIAGNOSIS — G47.09 OTHER INSOMNIA: ICD-10-CM

## 2024-01-25 DIAGNOSIS — M79.7 FIBROMYALGIA: ICD-10-CM

## 2024-01-25 DIAGNOSIS — F41.8 SITUATIONAL ANXIETY: ICD-10-CM

## 2024-01-25 RX ORDER — DOXEPIN 3 MG/1
TABLET, FILM COATED ORAL
Qty: 60 TABLET | Refills: 1 | Status: SHIPPED | OUTPATIENT
Start: 2024-01-25 | End: 2024-02-19 | Stop reason: SDUPTHER

## 2024-01-25 RX ORDER — ALPRAZOLAM 0.5 MG/1
0.5 TABLET ORAL NIGHTLY PRN
Qty: 30 TABLET | Refills: 0 | Status: SHIPPED | OUTPATIENT
Start: 2024-01-25 | End: 2024-02-19 | Stop reason: SDUPTHER

## 2024-01-25 RX ORDER — GABAPENTIN 400 MG/1
CAPSULE ORAL
Qty: 90 CAPSULE | Refills: 3 | Status: SHIPPED | OUTPATIENT
Start: 2024-01-25 | End: 2024-10-15 | Stop reason: SDUPTHER

## 2024-01-25 RX ORDER — TIZANIDINE 4 MG/1
4 TABLET ORAL 2 TIMES DAILY
Qty: 60 TABLET | Refills: 1 | Status: SHIPPED | OUTPATIENT
Start: 2024-01-25 | End: 2024-02-19 | Stop reason: SDUPTHER

## 2024-01-25 NOTE — TELEPHONE ENCOUNTER
No care due was identified.  [facility] Embedded Care Due Messages. Reference number: [id number]. 1/25/2024 9:42:36 AM MST

## 2024-02-15 ENCOUNTER — TELEPHONE (OUTPATIENT)
Dept: FAMILY MEDICINE | Facility: CLINIC | Age: 59
End: 2024-02-15
Payer: OTHER GOVERNMENT

## 2024-02-15 NOTE — TELEPHONE ENCOUNTER
"Luiza is currently out of state in [place].     She explains she typically schedules a telephone visit \"mid winter\" with Dr Lacy for medication refills     [facility] mails her medications, but is going to run out of refills at the pharmacy soon     Luiza wants to schedule her telephone visit with Dr Lacy.     Please advise a date and time Dr Lacy can visit with Luiza over the phone.     Phone number is:   [phone]  "

## 2024-02-19 ENCOUNTER — TELEPHONE - BILLABLE (OUTPATIENT)
Dept: FAMILY MEDICINE | Facility: CLINIC | Age: 59
End: 2024-02-19
Payer: OTHER GOVERNMENT

## 2024-02-19 DIAGNOSIS — F41.8 SITUATIONAL ANXIETY: ICD-10-CM

## 2024-02-19 DIAGNOSIS — F32.A ANXIETY AND DEPRESSION: ICD-10-CM

## 2024-02-19 DIAGNOSIS — F43.10 PTSD (POST-TRAUMATIC STRESS DISORDER): ICD-10-CM

## 2024-02-19 DIAGNOSIS — G47.09 OTHER INSOMNIA: ICD-10-CM

## 2024-02-19 DIAGNOSIS — M79.7 FIBROMYALGIA: ICD-10-CM

## 2024-02-19 DIAGNOSIS — F41.9 ANXIETY AND DEPRESSION: ICD-10-CM

## 2024-02-19 PROCEDURE — 99442 *INACTIVE DO NOT USE* PR PHYS/QHP TELEPHONE EVALUATION 11-20 MIN: CPT | Mod: GT | Performed by: FAMILY MEDICINE

## 2024-02-19 RX ORDER — TIZANIDINE 4 MG/1
4 TABLET ORAL 2 TIMES DAILY
Qty: 180 TABLET | Refills: 3 | Status: SHIPPED | OUTPATIENT
Start: 2024-02-19 | End: 2024-07-25

## 2024-02-19 RX ORDER — VENLAFAXINE HYDROCHLORIDE 75 MG/1
75 CAPSULE, EXTENDED RELEASE ORAL DAILY
Qty: 90 CAPSULE | Refills: 3 | Status: SHIPPED | OUTPATIENT
Start: 2024-02-19 | End: 2024-08-28 | Stop reason: ALTCHOICE

## 2024-02-19 RX ORDER — ZOLPIDEM TARTRATE 10 MG/1
10 TABLET ORAL DAILY
Qty: 30 TABLET | Refills: 3 | Status: SHIPPED | OUTPATIENT
Start: 2024-02-19 | End: 2024-03-25

## 2024-02-19 RX ORDER — ALPRAZOLAM 0.5 MG/1
0.5 TABLET ORAL NIGHTLY PRN
Qty: 30 TABLET | Refills: 3 | Status: SHIPPED | OUTPATIENT
Start: 2024-02-19 | End: 2024-03-25

## 2024-02-19 RX ORDER — DOXEPIN 3 MG/1
6 TABLET, FILM COATED ORAL NIGHTLY PRN
Qty: 180 TABLET | Refills: 3 | Status: SHIPPED | OUTPATIENT
Start: 2024-02-19 | End: 2024-07-04

## 2024-02-19 NOTE — PROGRESS NOTES
Subjective   Per discussion with Tiffany Lacy MD, Luiza, has verbally consented to be treated via a telephone based visit: Yes. A total of 12 minutes were required for this telephone based visit.   Patient Location: Home  Provider Location: Clinic  Technology used by Provider: Computer    HPI  Luiza Tay is a 58 y.o. female who presents for telephone visit for follow-up of her chronic conditions.  Her and her  are currently in [place] staying with some friends.  He is going to be getting back surgery for lumbar radiculopathy done there and then after surgery they will be on their way back this way.  As she needs refills of her tizanidine and venlafaxine alprazolam doxepin and zolpidem.  Chronic conditions stable.  No acute concern.    HPI    The following have been reviewed and updated as appropriate in this visit:    Allergies   Allergen Reactions    Hydromorphone Hives    Meperidine      cause migraines    Trazodone      nightmares    Clindamycin Rash    Sumatriptan Rash     Current Outpatient Medications   Medication Sig Dispense Refill    zolpidem (AMBIEN) 10 mg tablet Take 1 tablet (10 mg total) by mouth daily 30 tablet 3    tiZANidine (ZANAFLEX) 4 mg tablet Take 1 tablet (4 mg total) by mouth 2 (two) times a day 180 tablet 3    venlafaxine XR (EFFEXOR-XR) 75 mg 24 hr capsule Take 1 capsule (75 mg total) by mouth daily 90 capsule 3    doxepin 3 mg tablet Take 2 tablets (6 mg total) by mouth nightly as needed (insomnia) 180 tablet 3    ALPRAZolam (XANAX) 0.5 mg tablet Take 1 tablet (0.5 mg total) by mouth nightly as needed for anxiety 30 tablet 3    gabapentin (NEURONTIN) 400 mg capsule TAKE ONE CAPSULE BY MOUTH NIGHTLY 90 capsule 3    ondansetron ODT (ZOFRAN-ODT) 4 mg disintegrating tablet TAKE ONE TABLET BY MOUTH EVERY 8 HOURS AS NEEDED FOR NAUSEA OR FOR VOMITING 90 tablet 1    butalbital-acetaminophen-caff (FIORICET, ESGIC) -40 mg TAKE ONE TABLET BY MOUTH EVERY 6 HOURS AS NEEDED  migraine 30 tablet 2    ubrogepant 100 mg tablet Take 100 mg by mouth once as needed (for migraine) for up to 1 dose May repeat dose after 2 hours. Not to exceed 200mg in 24 hours 16 tablet 2    valACYclovir (VALTREX) 1 gram tablet TAKE ONE TABLET BY MOUTH THREE TIMES DAILY FOR SEVEN DAYS 21 tablet 3     No current facility-administered medications for this visit.     Past Medical History:   Diagnosis Date    Appendicitis     Depression     Diverticulitis     Fibromyalgia 05/03/2018    Gallstones     Headache     History of migraine 09/14/2017    History of migraine 09/14/2017    Insomnia     Migraines     Muscle tightness     Other insomnia 05/03/2018    Situational anxiety 05/03/2018    after a trauma riding horses    Skin abnormalities 1/2022    Traumatic closed displaced fracture of shaft of humerus with routine healing, right 05/16/2019     Past Surgical History:   Procedure Laterality Date    APPENDECTOMY      BREAST SURGERY      reduction    BREAST SURGERY Bilateral     reduction    CHOLECYSTECTOMY      COLONOSCOPY  03/2018    no polyps    FOOT SURGERY Right 2009    Screws    HERNIA REPAIR      HUMERUS SURGERY Right 2013    plate/screws    HYSTERECTOMY      endometriosis    ORIF WRIST FRACTURE Right 07/25/2018    Procedure: RIGHT ORIF WRIST;  Surgeon: Saad Zuniga MD;  Location: [facility] OR;  Service: Orthopedics;  Laterality: Right;    ORTHOPEDIC SURGERY      SHOULDER SURGERY      SIGMOIDECTOMY  2016    diverticular ds    TONSILLECTOMY AND ADENOIDECTOMY      TUBAL LIGATION  1992    UPPER EXTREMITY DEBRIDEMENT Right 07/25/2018    Procedure: irrigation and debridement of right upper extremity and all indicated procedures;  Surgeon: Saad Zuniga MD;  Location: [facility] OR;  Service: Orthopedics;  Laterality: Right;     Family History   Problem Relation Age of Onset    Colon cancer Maternal Grandmother     Ovarian cancer Maternal Grandmother     Cancer Maternal Grandmother     Bone cancer Paternal Grandmother      Hypertension Other     Breast cancer Other     Lung cancer Other     Hypertension Mother     Drug abuse Father     Heart disease Father     Cancer Paternal Grandfather     Cancer Mother's Sister     Drug abuse Sister      Social History     Socioeconomic History    Marital status:    Tobacco Use    Smoking status: Never    Smokeless tobacco: Never   Vaping Use    Vaping Use: Never used   Substance and Sexual Activity    Alcohol use: Yes     Alcohol/week: 1.0 standard drink of alcohol     Types: 1 Standard drinks or equivalent per week     Comment: socially    Drug use: No    Sexual activity: Yes     Partners: Male     Birth control/protection: Post-menopausal, Male Sterilization   Social History Narrative    Retired nurse.  Now works with horses.     Social Determinants of Health     Tobacco Use: Low Risk  (9/11/2023)    Patient History     Smoking Tobacco Use: Never     Smokeless Tobacco Use: Never   Depression: At risk (6/27/2022)    PHQ-2     PHQ-2 Score: 10       Review of Systems  12 point review systems performed that was negative unless otherwise mentioned per HPI.    Objective   Physical Exam    Assessment/Plan   Diagnoses and all orders for this visit:    Other insomnia  -     zolpidem (AMBIEN) 10 mg tablet; Take 1 tablet (10 mg total) by mouth daily  -     doxepin 3 mg tablet; Take 2 tablets (6 mg total) by mouth nightly as needed (insomnia)    Fibromyalgia  -     tiZANidine (ZANAFLEX) 4 mg tablet; Take 1 tablet (4 mg total) by mouth 2 (two) times a day    PTSD (post-traumatic stress disorder)  -     venlafaxine XR (EFFEXOR-XR) 75 mg 24 hr capsule; Take 1 capsule (75 mg total) by mouth daily    Anxiety and depression  -     venlafaxine XR (EFFEXOR-XR) 75 mg 24 hr capsule; Take 1 capsule (75 mg total) by mouth daily    Situational anxiety  -     ALPRAZolam (XANAX) 0.5 mg tablet; Take 1 tablet (0.5 mg total) by mouth nightly as needed for anxiety      Luiza is a very nice 58-year-old woman with the  above chronic conditions who presents today for prescription medication refill.  [place] is currently out of state she is a snowbird her and her  are in [place].  He is actually awaiting to get back surgery done.  They usually had back this way in the spring.  Refilled her medications for 30 days.  Will follow-up with her when she gets back or sooner if needed via telephone.

## 2024-02-22 DIAGNOSIS — F41.8 SITUATIONAL ANXIETY: ICD-10-CM

## 2024-02-22 DIAGNOSIS — G47.09 OTHER INSOMNIA: ICD-10-CM

## 2024-02-22 RX ORDER — ALPRAZOLAM 0.5 MG/1
0.5 TABLET ORAL NIGHTLY PRN
Qty: 30 TABLET | Refills: 2 | OUTPATIENT
Start: 2024-02-22

## 2024-02-22 RX ORDER — ZOLPIDEM TARTRATE 10 MG/1
10 TABLET ORAL DAILY
Qty: 30 TABLET | Refills: 2 | OUTPATIENT
Start: 2024-02-22

## 2024-02-22 NOTE — TELEPHONE ENCOUNTER
No care due was identified.  [facility] Embedded Care Due Messages. Reference number: [id number]. 2/22/2024 11:26:40 AM MST

## 2024-03-23 DIAGNOSIS — F41.8 SITUATIONAL ANXIETY: ICD-10-CM

## 2024-03-23 DIAGNOSIS — G47.09 OTHER INSOMNIA: ICD-10-CM

## 2024-03-23 NOTE — TELEPHONE ENCOUNTER
Care Due:                  Date            Visit Type   Department     Provider  --------------------------------------------------------------------------------                              TELEPHONE-JAMIN  [facility]  Last Visit: 02-      [facility]      MEDICINE       MAGEN PIERRE  Next Visit: None Scheduled  None         None Found                                                            Last  Test          Frequency    Reason                     Performed    Due Date  --------------------------------------------------------------------------------  Cr..........  12 months..  gabapentin...............  09-   09-    Health [facility] Embedded Care Due Messages. Reference number: [id number]. 3/23/2024 12:01:26 PM YAHIR

## 2024-03-25 RX ORDER — ALPRAZOLAM 0.5 MG/1
0.5 TABLET ORAL NIGHTLY PRN
Qty: 30 TABLET | Refills: 2 | Status: SHIPPED | OUTPATIENT
Start: 2024-03-25 | End: 2024-05-09 | Stop reason: SDUPTHER

## 2024-03-25 RX ORDER — ZOLPIDEM TARTRATE 10 MG/1
10 TABLET ORAL DAILY
Qty: 30 TABLET | Refills: 2 | Status: SHIPPED | OUTPATIENT
Start: 2024-03-25 | End: 2024-05-09 | Stop reason: SDUPTHER

## 2024-05-09 ENCOUNTER — TELEPHONE - BILLABLE (OUTPATIENT)
Dept: FAMILY MEDICINE | Facility: CLINIC | Age: 59
End: 2024-05-09
Payer: OTHER GOVERNMENT

## 2024-05-09 DIAGNOSIS — F43.10 PTSD (POST-TRAUMATIC STRESS DISORDER): ICD-10-CM

## 2024-05-09 DIAGNOSIS — G47.09 OTHER INSOMNIA: ICD-10-CM

## 2024-05-09 DIAGNOSIS — F41.9 ANXIETY AND DEPRESSION: ICD-10-CM

## 2024-05-09 DIAGNOSIS — F41.8 SITUATIONAL ANXIETY: ICD-10-CM

## 2024-05-09 DIAGNOSIS — F32.A ANXIETY AND DEPRESSION: ICD-10-CM

## 2024-05-09 DIAGNOSIS — M79.7 FIBROMYALGIA: ICD-10-CM

## 2024-05-09 DIAGNOSIS — B00.1 HERPES LABIALIS: ICD-10-CM

## 2024-05-09 PROCEDURE — 99442 *INACTIVE DO NOT USE* PR PHYS/QHP TELEPHONE EVALUATION 11-20 MIN: CPT | Mod: 95 | Performed by: FAMILY MEDICINE

## 2024-05-09 RX ORDER — ALPRAZOLAM 0.5 MG/1
0.5 TABLET ORAL NIGHTLY PRN
Qty: 30 TABLET | Refills: 1 | Status: SHIPPED | OUTPATIENT
Start: 2024-05-09 | End: 2024-07-04

## 2024-05-09 RX ORDER — VALACYCLOVIR HYDROCHLORIDE 1 G/1
1000 TABLET, FILM COATED ORAL 3 TIMES DAILY PRN
Qty: 90 TABLET | Refills: 0 | Status: SHIPPED | OUTPATIENT
Start: 2024-05-09 | End: 2024-10-15 | Stop reason: SDUPTHER

## 2024-05-09 RX ORDER — BUTORPHANOL TARTRATE 10 MG/ML
SPRAY NASAL EVERY 6 HOURS
COMMUNITY
Start: 2024-03-29

## 2024-05-09 RX ORDER — ZOLPIDEM TARTRATE 10 MG/1
10 TABLET ORAL DAILY
Qty: 30 TABLET | Refills: 1 | Status: SHIPPED | OUTPATIENT
Start: 2024-05-09 | End: 2024-07-25

## 2024-05-09 RX ORDER — VENLAFAXINE HYDROCHLORIDE 37.5 MG/1
37.5 CAPSULE, EXTENDED RELEASE ORAL DAILY
Qty: 30 CAPSULE | Refills: 1 | Status: SHIPPED | OUTPATIENT
Start: 2024-05-09 | End: 2024-08-28 | Stop reason: ALTCHOICE

## 2024-05-09 NOTE — PROGRESS NOTES
Subjective   Per discussion with Tiffany Lacy MD, Luiza, has verbally consented to be treated via a telephone based visit: Yes. A total of *** minutes were required for this telephone based visit.   Patient Location: Home  Provider Location: Clinic  Technology used by Provider: Phone    HPI  Luiza Tay is a 59 y.o. female who presents for ***.    HPI    The following have been reviewed and updated as appropriate in this visit:    Allergies   Allergen Reactions    Hydromorphone Hives    Meperidine      cause migraines    Trazodone      nightmares    Clindamycin Rash    Sumatriptan Rash     Current Outpatient Medications   Medication Sig Dispense Refill    zolpidem (AMBIEN) 10 mg tablet TAKE ONE TABLET BY MOUTH DAILY 30 tablet 2    ALPRAZolam (XANAX) 0.5 mg tablet TAKE ONE TABLET BY MOUTH NIGHTLY AS NEEDED FOR ANXIETY 30 tablet 2    tiZANidine (ZANAFLEX) 4 mg tablet Take 1 tablet (4 mg total) by mouth 2 (two) times a day 180 tablet 3    venlafaxine XR (EFFEXOR-XR) 75 mg 24 hr capsule Take 1 capsule (75 mg total) by mouth daily 90 capsule 3    doxepin 3 mg tablet Take 2 tablets (6 mg total) by mouth nightly as needed (insomnia) 180 tablet 3    gabapentin (NEURONTIN) 400 mg capsule TAKE ONE CAPSULE BY MOUTH NIGHTLY 90 capsule 3    ondansetron ODT (ZOFRAN-ODT) 4 mg disintegrating tablet TAKE ONE TABLET BY MOUTH EVERY 8 HOURS AS NEEDED FOR NAUSEA OR FOR VOMITING 90 tablet 1    butalbital-acetaminophen-caff (FIORICET, ESGIC) -40 mg TAKE ONE TABLET BY MOUTH EVERY 6 HOURS AS NEEDED migraine 30 tablet 2    ubrogepant 100 mg tablet Take 100 mg by mouth once as needed (for migraine) for up to 1 dose May repeat dose after 2 hours. Not to exceed 200mg in 24 hours 16 tablet 2    valACYclovir (VALTREX) 1 gram tablet TAKE ONE TABLET BY MOUTH THREE TIMES DAILY FOR SEVEN DAYS 21 tablet 3     No current facility-administered medications for this visit.     Past Medical History:   Diagnosis Date    Appendicitis      Depression     Diverticulitis     Fibromyalgia 05/03/2018    Gallstones     Headache     History of migraine 09/14/2017    History of migraine 09/14/2017    Insomnia     Migraines     Muscle tightness     Other insomnia 05/03/2018    Situational anxiety 05/03/2018    after a trauma riding horses    Skin abnormalities 1/2022    Traumatic closed displaced fracture of shaft of humerus with routine healing, right 05/16/2019     Past Surgical History:   Procedure Laterality Date    APPENDECTOMY      BREAST SURGERY      reduction    BREAST SURGERY Bilateral     reduction    CHOLECYSTECTOMY      COLONOSCOPY  03/2018    no polyps    FOOT SURGERY Right 2009    Screws    HERNIA REPAIR      HUMERUS SURGERY Right 2013    plate/screws    HYSTERECTOMY      endometriosis    ORIF WRIST FRACTURE Right 07/25/2018    Procedure: RIGHT ORIF WRIST;  Surgeon: Saad Zuniga MD;  Location: [facility] OR;  Service: Orthopedics;  Laterality: Right;    ORTHOPEDIC SURGERY      SHOULDER SURGERY      SIGMOIDECTOMY  2016    diverticular ds    TONSILLECTOMY AND ADENOIDECTOMY      TUBAL LIGATION  1992    UPPER EXTREMITY DEBRIDEMENT Right 07/25/2018    Procedure: irrigation and debridement of right upper extremity and all indicated procedures;  Surgeon: Saad Zuniga MD;  Location: [facility] OR;  Service: Orthopedics;  Laterality: Right;     Family History   Problem Relation Age of Onset    Colon cancer Maternal Grandmother     Ovarian cancer Maternal Grandmother     Cancer Maternal Grandmother     Bone cancer Paternal Grandmother     Hypertension Other     Breast cancer Other     Lung cancer Other     Hypertension Mother     Drug abuse Father     Heart disease Father     Cancer Paternal Grandfather     Cancer Mother's Sister     Drug abuse Sister      Social History     Socioeconomic History    Marital status:    Tobacco Use    Smoking status: Never    Smokeless tobacco: Never   Vaping Use    Vaping Use: Never used   Substance and Sexual Activity     Alcohol use: Yes     Alcohol/week: 1.0 standard drink of alcohol     Types: 1 Standard drinks or equivalent per week     Comment: socially    Drug use: No    Sexual activity: Yes     Partners: Male     Birth control/protection: Post-menopausal, Male Sterilization   Social History Narrative    Retired nurse.  Now works with horses.     Social Determinants of Health     Tobacco Use: Low Risk  (9/11/2023)    Patient History     Smoking Tobacco Use: Never     Smokeless Tobacco Use: Never   Depression: At risk (6/27/2022)    PHQ-2     PHQ-2 Score: 10       Review of Systems    Objective   Physical Exam    Assessment/Plan   There are no diagnoses linked to this encounter.

## 2024-05-09 NOTE — PROGRESS NOTES
Subjective   Per discussion with Tiffany Lacy MD, Luiza, has verbally consented to be treated via a telephone based visit: Yes. A total of  15 minutes were required for this telephone based visit.   Patient Location: Home  Provider Location: Clinic  Technology used by Provider: Computer    HPI  Luiza Tay is a 59 y.o. female who presents for telephone visit   recently had major back surgery is recovering   He is not able to be released for travel back to the area yet.  They are still in [place]  She is struggling with down mood and depression  Best friend recently passed away and she sold her horse which was a hard decision for her      HPI    The following have been reviewed and updated as appropriate in this visit:    Allergies   Allergen Reactions    Hydromorphone Hives    Meperidine      cause migraines    Trazodone      nightmares    Clindamycin Rash    Sumatriptan Rash     Current Outpatient Medications   Medication Sig Dispense Refill    butorphanol (STADOL) 10 mg/mL nasal spray every 6 hours      tiZANidine (ZANAFLEX) 4 mg tablet Take 1 tablet (4 mg total) by mouth 2 (two) times a day (Patient taking differently: Take 1 tablet (4 mg total) by mouth 2 (two) times a day At bedtime) 180 tablet 3    venlafaxine XR (EFFEXOR-XR) 75 mg 24 hr capsule Take 1 capsule (75 mg total) by mouth daily 90 capsule 3    doxepin 3 mg tablet Take 2 tablets (6 mg total) by mouth nightly as needed (insomnia) 180 tablet 3    gabapentin (NEURONTIN) 400 mg capsule TAKE ONE CAPSULE BY MOUTH NIGHTLY 90 capsule 3    ondansetron ODT (ZOFRAN-ODT) 4 mg disintegrating tablet TAKE ONE TABLET BY MOUTH EVERY 8 HOURS AS NEEDED FOR NAUSEA OR FOR VOMITING 90 tablet 1    ALPRAZolam (XANAX) 0.5 mg tablet Take 1 tablet (0.5 mg total) by mouth nightly as needed for anxiety 30 tablet 1    valACYclovir (VALTREX) 1 gram tablet Take 1 tablet (1,000 mg total) by mouth 3 (three) times a day as needed (cold sore) 90 tablet 0    zolpidem  (AMBIEN) 10 mg tablet Take 1 tablet (10 mg total) by mouth daily 30 tablet 1    venlafaxine XR (Effexor XR) 37.5 mg 24 hr capsule Take 1 capsule (37.5 mg total) by mouth daily 30 capsule 1    ubrogepant 100 mg tablet Take 100 mg by mouth once as needed (for migraine) for up to 1 dose May repeat dose after 2 hours. Not to exceed 200mg in 24 hours (Patient not taking: Reported on 5/9/2024) 16 tablet 2     No current facility-administered medications for this visit.     Past Medical History:   Diagnosis Date    Appendicitis     Depression     Diverticulitis     Fibromyalgia 05/03/2018    Gallstones     Headache     History of migraine 09/14/2017    History of migraine 09/14/2017    Insomnia     Migraines     Muscle tightness     Other insomnia 05/03/2018    Situational anxiety 05/03/2018    after a trauma riding horses    Skin abnormalities 1/2022    Traumatic closed displaced fracture of shaft of humerus with routine healing, right 05/16/2019     Past Surgical History:   Procedure Laterality Date    APPENDECTOMY      BREAST SURGERY      reduction    BREAST SURGERY Bilateral     reduction    CHOLECYSTECTOMY      COLONOSCOPY  03/2018    no polyps    FOOT SURGERY Right 2009    Screws    HERNIA REPAIR      HUMERUS SURGERY Right 2013    plate/screws    HYSTERECTOMY      endometriosis    ORIF WRIST FRACTURE Right 07/25/2018    Procedure: RIGHT ORIF WRIST;  Surgeon: Saad Zuniga MD;  Location: [facility] OR;  Service: Orthopedics;  Laterality: Right;    ORTHOPEDIC SURGERY      SHOULDER SURGERY      SIGMOIDECTOMY  2016    diverticular ds    TONSILLECTOMY AND ADENOIDECTOMY      TUBAL LIGATION  1992    UPPER EXTREMITY DEBRIDEMENT Right 07/25/2018    Procedure: irrigation and debridement of right upper extremity and all indicated procedures;  Surgeon: Saad Zuniga MD;  Location: [facility] OR;  Service: Orthopedics;  Laterality: Right;     Family History   Problem Relation Age of Onset    Colon cancer Maternal Grandmother     Ovarian  cancer Maternal Grandmother     Cancer Maternal Grandmother     Bone cancer Paternal Grandmother     Hypertension Other     Breast cancer Other     Lung cancer Other     Hypertension Mother     Drug abuse Father     Heart disease Father     Cancer Paternal Grandfather     Cancer Mother's Sister     Drug abuse Sister      Social History     Socioeconomic History    Marital status:    Tobacco Use    Smoking status: Never    Smokeless tobacco: Never   Vaping Use    Vaping Use: Never used   Substance and Sexual Activity    Alcohol use: Yes     Alcohol/week: 1.0 standard drink of alcohol     Types: 1 Standard drinks or equivalent per week     Comment: socially    Drug use: No    Sexual activity: Yes     Partners: Male     Birth control/protection: Post-menopausal, Male Sterilization   Social History Narrative    Retired nurse.  Now works with horses.     Social Determinants of Health     Tobacco Use: Low Risk  (9/11/2023)    Patient History     Smoking Tobacco Use: Never     Smokeless Tobacco Use: Never   Depression: At risk (6/27/2022)    PHQ-2     PHQ-2 Score: 10       Review of Systems  12 point ROS performed that was negative unless otherwise mentioned per HPI.    Objective   Physical Exam    Assessment/Plan   Diagnoses and all orders for this visit:    Situational anxiety  -     ALPRAZolam (XANAX) 0.5 mg tablet; Take 1 tablet (0.5 mg total) by mouth nightly as needed for anxiety    Other insomnia  -     zolpidem (AMBIEN) 10 mg tablet; Take 1 tablet (10 mg total) by mouth daily    Fibromyalgia    Herpes labialis  -     valACYclovir (VALTREX) 1 gram tablet; Take 1 tablet (1,000 mg total) by mouth 3 (three) times a day as needed (cold sore)    PTSD (post-traumatic stress disorder)    Anxiety and depression  -     venlafaxine XR (Effexor XR) 37.5 mg 24 hr capsule; Take 1 capsule (37.5 mg total) by mouth daily        She will increase venlafaxine from 75mg daily to 112.5mg daily   She will message us in three  weeks to let us know how that is going  If not going well, we can add Abilify.

## 2024-07-02 DIAGNOSIS — G47.09 OTHER INSOMNIA: ICD-10-CM

## 2024-07-02 DIAGNOSIS — F41.8 SITUATIONAL ANXIETY: ICD-10-CM

## 2024-07-02 NOTE — TELEPHONE ENCOUNTER
Care Due:                  Date            Visit Type   Department     Provider  --------------------------------------------------------------------------------                              TELEPHONE-JAMIN  [facility]  Last Visit: 05-      [facility]       MAGEN  IGNACIO  Next Visit: None Scheduled  None         None Found                                                            Last  Test          Frequency    Reason                     Performed    Due Date  --------------------------------------------------------------------------------  Cr..........  12 months..  gabapentin...............  09-   09-    Health [facility] Embedded Care Due Messages. Reference number: [id number]. 7/02/2024 2:44:11 PM YAHIR

## 2024-07-04 RX ORDER — ALPRAZOLAM 0.5 MG/1
0.5 TABLET ORAL NIGHTLY PRN
Qty: 30 TABLET | Refills: 2 | Status: SHIPPED | OUTPATIENT
Start: 2024-07-04 | End: 2024-08-19 | Stop reason: SDUPTHER

## 2024-07-04 RX ORDER — DOXEPIN 3 MG/1
TABLET, FILM COATED ORAL
Qty: 60 TABLET | Refills: 1 | Status: SHIPPED | OUTPATIENT
Start: 2024-07-04 | End: 2024-08-26

## 2024-07-09 ENCOUNTER — TELEPHONE (OUTPATIENT)
Dept: FAMILY MEDICINE | Facility: CLINIC | Age: 59
End: 2024-07-09
Payer: OTHER GOVERNMENT

## 2024-07-09 NOTE — TELEPHONE ENCOUNTER
The patient is scheduled for 9/18 she wanted to let Dr Lacy know that she is scheduled and that she is returning back to [place]. LOTUS

## 2024-07-25 DIAGNOSIS — G47.09 OTHER INSOMNIA: ICD-10-CM

## 2024-07-25 DIAGNOSIS — M79.7 FIBROMYALGIA: ICD-10-CM

## 2024-07-25 RX ORDER — TIZANIDINE 4 MG/1
4 TABLET ORAL 2 TIMES DAILY
Qty: 60 TABLET | Refills: 1 | Status: SHIPPED | OUTPATIENT
Start: 2024-07-25 | End: 2024-09-23

## 2024-07-25 RX ORDER — ZOLPIDEM TARTRATE 10 MG/1
10 TABLET ORAL DAILY
Qty: 30 TABLET | Refills: 2 | Status: SHIPPED | OUTPATIENT
Start: 2024-07-25 | End: 2024-08-19 | Stop reason: ALTCHOICE

## 2024-07-25 NOTE — TELEPHONE ENCOUNTER
No care due was identified.  [facility] Embedded Care Due Messages. Reference number: [id number]. 7/25/2024 2:52:54 PM MDT

## 2024-08-08 ENCOUNTER — TELEPHONE (OUTPATIENT)
Dept: FAMILY MEDICINE | Facility: CLINIC | Age: 59
End: 2024-08-08
Payer: OTHER GOVERNMENT

## 2024-08-08 NOTE — TELEPHONE ENCOUNTER
I called Luiza and let her know that Dr Lacy's schedule was full and would not be able to schedule appt sooner than her appt date-- but can come to walk-in for concerns     Luiza is coming to walk-in

## 2024-08-08 NOTE — TELEPHONE ENCOUNTER
Luiza has an appointment with Dr Lacy for yearly med refill     September 18    She wants to schedule the appointment sooner- maybe sometime this month or so-- she explains that she is having some issues that she would rather discuss with Dr Lacy- but she wants addressed before then     Anyway Luiza can be scheduled sooner?

## 2024-08-08 NOTE — TELEPHONE ENCOUNTER
If she has a more urgent need she can come into the walk in clinic to discuss that issue with Dr Lacy.  Dr's schedule is full and unfortunately unable to schedule her annual appointment sooner

## 2024-08-19 ENCOUNTER — OFFICE VISIT (OUTPATIENT)
Dept: FAMILY MEDICINE | Facility: CLINIC | Age: 59
End: 2024-08-19
Payer: OTHER GOVERNMENT

## 2024-08-19 VITALS
RESPIRATION RATE: 16 BRPM | WEIGHT: 166 LBS | OXYGEN SATURATION: 99 % | SYSTOLIC BLOOD PRESSURE: 112 MMHG | DIASTOLIC BLOOD PRESSURE: 80 MMHG | BODY MASS INDEX: 26.79 KG/M2 | HEART RATE: 63 BPM

## 2024-08-19 DIAGNOSIS — F51.01 PRIMARY INSOMNIA: ICD-10-CM

## 2024-08-19 DIAGNOSIS — Z12.31 ENCOUNTER FOR SCREENING MAMMOGRAM FOR MALIGNANT NEOPLASM OF BREAST: ICD-10-CM

## 2024-08-19 DIAGNOSIS — F43.10 PTSD (POST-TRAUMATIC STRESS DISORDER): ICD-10-CM

## 2024-08-19 DIAGNOSIS — G47.09 OTHER INSOMNIA: ICD-10-CM

## 2024-08-19 DIAGNOSIS — Z13.220 SCREENING FOR CHOLESTEROL LEVEL: ICD-10-CM

## 2024-08-19 DIAGNOSIS — F41.8 SITUATIONAL ANXIETY: ICD-10-CM

## 2024-08-19 DIAGNOSIS — Z00.00 ENCOUNTER FOR WELLNESS EXAMINATION IN ADULT: Primary | ICD-10-CM

## 2024-08-19 DIAGNOSIS — R25.1 SHAKINESS: ICD-10-CM

## 2024-08-19 PROBLEM — M70.51 OTHER BURSITIS OF KNEE, RIGHT KNEE: Status: ACTIVE | Noted: 2024-08-19

## 2024-08-19 LAB
ALBUMIN SERPL-MCNC: 4.6 G/DL (ref 3.5–5.3)
ALP SERPL-CCNC: 62 U/L (ref 46–118)
ALT SERPL-CCNC: 13 U/L (ref 7–52)
AMORPH CRY #/AREA URNS HPF: PRESENT /HPF
ANION GAP SERPL CALC-SCNC: 9 MMOL/L (ref 3–11)
AST SERPL-CCNC: 14 U/L
BACTERIA #/AREA URNS HPF: ABNORMAL /HPF
BASOPHILS # BLD AUTO: 0 10*3/UL
BASOPHILS NFR BLD AUTO: 0.5 % (ref 0–2)
BILIRUB SERPL-MCNC: 0.81 MG/DL (ref 0.2–1.4)
BUN SERPL-MCNC: 16 MG/DL (ref 7–25)
CALCIUM ALBUM COR SERPL-MCNC: 9 MG/DL (ref 8.6–10.3)
CALCIUM SERPL-MCNC: 9.5 MG/DL (ref 8.6–10.3)
CHLORIDE SERPL-SCNC: 100 MMOL/L (ref 98–107)
CHOLEST SERPL-MCNC: 184 MG/DL (ref 0–199)
CO2 SERPL-SCNC: 27 MMOL/L (ref 21–32)
CREAT SERPL-MCNC: 0.88 MG/DL (ref 0.6–1.1)
EGFRCR SERPLBLD CKD-EPI 2021: 77 ML/MIN/1.73M*2
EOSINOPHIL # BLD AUTO: 0.2 10*3/UL
EOSINOPHIL NFR BLD AUTO: 2.3 % (ref 0–3)
ERYTHROCYTE [DISTWIDTH] IN BLOOD BY AUTOMATED COUNT: 12.6 % (ref 11.5–14)
EST. AVERAGE GLUCOSE BLD GHB EST-MCNC: 96.8 MG/DL
FASTING STATUS PATIENT QL REPORTED: YES
GLUCOSE SERPL-MCNC: 90 MG/DL (ref 70–105)
HBA1C MFR BLD: 5 % (ref 4.8–6)
HCT VFR BLD AUTO: 38.4 % (ref 34–45)
HDLC SERPL-MCNC: 51 MG/DL
HGB BLD-MCNC: 13.4 G/DL (ref 11.5–15.5)
LDLC SERPL CALC-MCNC: 118 MG/DL (ref 20–99)
LYMPHOCYTES # BLD AUTO: 2.2 10*3/UL
LYMPHOCYTES NFR BLD AUTO: 32.8 % (ref 11–47)
MAGNESIUM SERPL-MCNC: 2 MG/DL (ref 1.8–2.4)
MCH RBC QN AUTO: 32 PG (ref 28–33)
MCHC RBC AUTO-ENTMCNC: 34.8 G/DL (ref 32–36)
MCV RBC AUTO: 92 FL (ref 81–97)
MONOCYTES # BLD AUTO: 0.6 10*3/UL
MONOCYTES NFR BLD AUTO: 8.7 % (ref 3–11)
NEUTROPHILS # BLD AUTO: 3.8 10*3/UL
NEUTROPHILS NFR BLD AUTO: 55.7 % (ref 41–81)
PLATELET # BLD AUTO: 244 10*3/UL (ref 140–350)
PMV BLD AUTO: 7.6 FL (ref 6.9–10.8)
POTASSIUM SERPL-SCNC: 3.4 MMOL/L (ref 3.5–5.1)
PROT SERPL-MCNC: 7.5 G/DL (ref 6–8.3)
RBC # BLD AUTO: 4.18 10*6/ΜL (ref 3.7–5.3)
RBC #/AREA URNS HPF: ABNORMAL /HPF
SODIUM SERPL-SCNC: 136 MMOL/L (ref 135–145)
SQUAMOUS #/AREA URNS HPF: ABNORMAL /HPF
TRIGL SERPL-MCNC: 77 MG/DL
TSH SERPL DL<=0.05 MIU/L-ACNC: 3.56 UIU/ML (ref 0.34–4.82)
WBC # BLD AUTO: 6.8 10*3/UL (ref 4.5–10.5)
WBC #/AREA URNS HPF: ABNORMAL /HPF

## 2024-08-19 PROCEDURE — 81015 MICROSCOPIC EXAM OF URINE: CPT | Performed by: FAMILY MEDICINE

## 2024-08-19 PROCEDURE — 85025 COMPLETE CBC W/AUTO DIFF WBC: CPT | Performed by: FAMILY MEDICINE

## 2024-08-19 PROCEDURE — 99214 OFFICE O/P EST MOD 30 MIN: CPT | Mod: 25 | Performed by: FAMILY MEDICINE

## 2024-08-19 PROCEDURE — 83735 ASSAY OF MAGNESIUM: CPT | Performed by: FAMILY MEDICINE

## 2024-08-19 PROCEDURE — 99396 PREV VISIT EST AGE 40-64: CPT | Performed by: FAMILY MEDICINE

## 2024-08-19 PROCEDURE — 83036 HEMOGLOBIN GLYCOSYLATED A1C: CPT | Performed by: FAMILY MEDICINE

## 2024-08-19 PROCEDURE — 80061 LIPID PANEL: CPT | Performed by: FAMILY MEDICINE

## 2024-08-19 PROCEDURE — 84443 ASSAY THYROID STIM HORMONE: CPT | Performed by: FAMILY MEDICINE

## 2024-08-19 PROCEDURE — 80053 COMPREHEN METABOLIC PANEL: CPT | Performed by: FAMILY MEDICINE

## 2024-08-19 PROCEDURE — 36415 COLL VENOUS BLD VENIPUNCTURE: CPT | Performed by: FAMILY MEDICINE

## 2024-08-19 RX ORDER — ALPRAZOLAM 0.5 MG/1
.5-1 TABLET ORAL NIGHTLY PRN
Qty: 60 TABLET | Refills: 0 | Status: SHIPPED | OUTPATIENT
Start: 2024-08-19 | End: 2024-09-17

## 2024-08-19 RX ORDER — ZOLPIDEM TARTRATE 12.5 MG/1
12.5 TABLET, FILM COATED, EXTENDED RELEASE ORAL NIGHTLY PRN
Qty: 30 TABLET | Refills: 0 | Status: SHIPPED | OUTPATIENT
Start: 2024-08-19 | End: 2024-09-18

## 2024-08-19 ASSESSMENT — PATIENT HEALTH QUESTIONNAIRE - PHQ9
10. IF YOU CHECKED OFF ANY PROBLEMS, HOW DIFFICULT HAVE THESE PROBLEMS MADE IT FOR YOU TO DO YOUR WORK, TAKE CARE OF THINGS AT HOME, OR GET ALONG WITH OTHER PEOPLE: SOMEWHAT DIFFICULT
2. FEELING DOWN, DEPRESSED OR HOPELESS: MORE THAN HALF THE DAYS
9. THOUGHTS THAT YOU WOULD BE BETTER OFF DEAD, OR OF HURTING YOURSELF: NOT AT ALL
6. FEELING BAD ABOUT YOURSELF - OR THAT YOU ARE A FAILURE OR HAVE LET YOURSELF OR YOUR FAMILY DOWN: NEARLY EVERY DAY
3. TROUBLE FALLING OR STAYING ASLEEP: NEARLY EVERY DAY
7. TROUBLE CONCENTRATING ON THINGS, SUCH AS READING THE NEWSPAPER OR WATCHING TELEVISION: NOT AT ALL
4. FEELING TIRED OR HAVING LITTLE ENERGY: SEVERAL DAYS
1. LITTLE INTEREST OR PLEASURE IN DOING THINGS: MORE THAN HALF THE DAYS
SUM OF ALL RESPONSES TO PHQ QUESTIONS 1-9: 15
SUM OF ALL RESPONSES TO PHQ9 QUESTIONS 1 & 2: 4
5. POOR APPETITE OR OVEREATING: NEARLY EVERY DAY

## 2024-08-19 NOTE — PROGRESS NOTES
There are no Patient Instructions on file for this visit.        IMPRESSION AND PLAN  Diagnoses and all orders for this visit:    Encounter for wellness examination in adult    Situational anxiety  -     ALPRAZolam (XANAX) 0.5 mg tablet; Take 1-2 tablets (0.5-1 mg total) by mouth nightly as needed for anxiety Max Daily Amount: 1 mg    PTSD (post-traumatic stress disorder)    Other insomnia    Shakiness  -     CBC w/auto differential Blood, Venous  -     Comprehensive metabolic panel Blood, Venous  -     Hemoglobin A1c (glycosylated) Blood, Venous  -     Thyroid Stimulating Hormone, Ultrasensitive Blood, Venous  -     Urinalysis, microscopic Urine, Clean Catch  -     Magnesium Blood, Venous    Encounter for screening mammogram for malignant neoplasm of breast    Screening for cholesterol level  -     Lipid panel Blood, Venous; Future    Primary insomnia  -     zolpidem CR (Ambien CR) 12.5 mg CR tablet; Take 1 tablet (12.5 mg total) by mouth nightly as needed for sleep Max Daily Amount: 12.5 mg      Luiza is a 59 year old woman who presents today for annual preventive health exam.  Scheduled for mammogram already due for screening lipid labs.  Otherwise up-to-date on screenings.  Chronic conditions currently depression and anxiety slightly worsened and has had a recent life stressors and big life adjustments we will increase alprazolam from 0.5 daily to twice daily as needed and zolpidem will be slightly increased as well.  She will let us know if these changes are not helpful or not improving her symptoms assess TSH magnesium and A1c in regards to episode of shakiness and some elevated fasting glucose reading      HPI  Chief complaint for visit per nursing.       Chief Complaint   Patient presents with    Blood Sugar Problem     Pt states she feels she has had episodes of low blood sugar, but has been checking sugars with readings approx 150 fasting.     Insomnia     Pt would like to discuss meds for sleep - she  states having difficulty falling asleep       Luiza is a 59 year old woman who presents to the clinic today for annual preventative health exam and follow-up of her chronic conditions.  History of insomnia on zolpidem 10 mg daily wanting about the 12.5 mg dose pharmacist told her about.  She also has been taking Xanax 0.5 mg up to twice daily to deal with symptoms of anxiety recent life stressors including  having surgery, mother passing away from cancer, friend dying from a freak accident, having to sell all of her horses and trying to buy a townhouse here in town has been many life changes for her.  Continues on the venlafaxine 75 mg daily  Also using gabapentin and doxepin  In crease zolpidem and alprazolam 0.5mg po qd to bid  She had an episode of shakiness and noted that her blood sugar was not able to be read on glucometer she has since been checking and her fasting readings are running in the 150s she notes.  She is scheduled for her mammogram.  Colonoscopy up-to-date.      PROBLEM LIST  Patient Active Problem List   Diagnosis    Situational anxiety    Other insomnia    Fibromyalgia    Diverticulitis    PTSD (post-traumatic stress disorder)    Anxiety and depression    Migraine without status migrainosus, not intractable    ASHLEY positive    Colitis    COVID-19    Cervical spondylosis without myelopathy    Other bursitis of knee, right knee         SOCIAL HX  Social History     Socioeconomic History    Marital status:      Spouse name: Not on file    Number of children: Not on file    Years of education: Not on file    Highest education level: Not on file   Occupational History    Not on file   Tobacco Use    Smoking status: Never    Smokeless tobacco: Never   Vaping Use    Vaping status: Never Used   Substance and Sexual Activity    Alcohol use: Yes     Alcohol/week: 1.0 standard drink of alcohol     Types: 1 Standard drinks or equivalent per week     Comment: socially    Drug use: No    Sexual  activity: Yes     Partners: Male     Birth control/protection: Post-menopausal, Male Sterilization   Other Topics Concern    Not on file   Social History Narrative    Retired nurse.  Now works with horses.     Social Determinants of Health     Financial Resource Strain: Not on file   Food Insecurity: Not on file   Transportation Needs: Not on file   Physical Activity: Not on file   Stress: Not on file   Social Connections: Not on file   Intimate Partner Violence: Not on file   Housing Stability: Not on file       FAMILY HX  Family History   Problem Relation Age of Onset    Colon cancer Maternal Grandmother     Ovarian cancer Maternal Grandmother     Cancer Maternal Grandmother     Bone cancer Paternal Grandmother     Hypertension Other     Breast cancer Other     Lung cancer Other     Hypertension Mother     Drug abuse Father     Heart disease Father     Cancer Paternal Grandfather     Cancer Mother's Sister     Drug abuse Sister        ROS  12 point review of systems performed is negative unless otherwise mentioned per HPI.    Physical Exam  /80   Pulse 63   Resp 16   Wt 75.3 kg (166 lb)   SpO2 99%   BMI 26.79 kg/m²   BP Readings from Last 3 Encounters:   08/19/24 112/80   09/11/23 138/90   09/07/23 142/78       Physical Exam  Vitals reviewed.   Constitutional:       General: She is not in acute distress.  HENT:      Head: Normocephalic and atraumatic.      Nose: Nose normal.      Mouth/Throat:      Mouth: Mucous membranes are moist.   Eyes:      Extraocular Movements: Extraocular movements intact.   Cardiovascular:      Rate and Rhythm: Normal rate and regular rhythm.   Pulmonary:      Effort: Pulmonary effort is normal.      Breath sounds: Normal breath sounds.   Musculoskeletal:      Cervical back: Neck supple.   Skin:     General: Skin is warm.   Neurological:      Mental Status: She is alert and oriented to person, place, and time.   Psychiatric:         Mood and Affect: Mood normal.            LABS  "AND XRAYS  Lab Results   Component Value Date    GLUCOSE 69 (L) 09/15/2022    CALCIUM 9.0 09/15/2022     (L) 09/15/2022    K 3.6 09/15/2022    CO2 24 09/15/2022     09/15/2022    BUN 5 (L) 09/15/2022    CREATININE 0.63 09/15/2022    ANIONGAP 9 09/15/2022     Lab Results   Component Value Date    WBC 4.5 05/22/2023    HGB 14.9 05/22/2023    HCT 42.9 05/22/2023    MCV 90.5 05/22/2023     05/22/2023     Lab Results   Component Value Date    TSH 4.291 09/15/2022     No results found for: \"HGBA1C\"  Lab Results   Component Value Date    CREATININE 0.63 09/15/2022     Lab Results   Component Value Date    SEDRATE 13 05/22/2023     No results found for: \"URACSRM\"  No results found for: \"AMYLASE\"  Lab Results   Component Value Date    LIPASE 11 08/06/2020         No results found.    Procedures     MEDICATIONS    Current Outpatient Medications:     tiZANidine (ZANAFLEX) 4 mg tablet, TAKE ONE TABLET BY MOUTH TWICE DAILY, Disp: 60 tablet, Rfl: 1    doxepin 3 mg tablet, TAKE ONE TABLET BY MOUTH NIGHTLY FOR ONE WEEK if no improvement in SLEEP increase TO TWO TABLETS NIGHTLY. take 30 TO 60 MINUTES prior TO falling asleep, Disp: 60 tablet, Rfl: 1    butorphanol (STADOL) 10 mg/mL nasal spray, every 6 hours, Disp: , Rfl:     valACYclovir (VALTREX) 1 gram tablet, Take 1 tablet (1,000 mg total) by mouth 3 (three) times a day as needed (cold sore), Disp: 90 tablet, Rfl: 0    venlafaxine XR (EFFEXOR-XR) 75 mg 24 hr capsule, Take 1 capsule (75 mg total) by mouth daily, Disp: 90 capsule, Rfl: 3    gabapentin (NEURONTIN) 400 mg capsule, TAKE ONE CAPSULE BY MOUTH NIGHTLY, Disp: 90 capsule, Rfl: 3    ondansetron ODT (ZOFRAN-ODT) 4 mg disintegrating tablet, TAKE ONE TABLET BY MOUTH EVERY 8 HOURS AS NEEDED FOR NAUSEA OR FOR VOMITING, Disp: 90 tablet, Rfl: 1    ALPRAZolam (XANAX) 0.5 mg tablet, Take 1-2 tablets (0.5-1 mg total) by mouth nightly as needed for anxiety Max Daily Amount: 1 mg, Disp: 60 tablet, Rfl: 0    zolpidem " CR (Ambien CR) 12.5 mg CR tablet, Take 1 tablet (12.5 mg total) by mouth nightly as needed for sleep Max Daily Amount: 12.5 mg, Disp: 30 tablet, Rfl: 0    venlafaxine XR (Effexor XR) 37.5 mg 24 hr capsule, Take 1 capsule (37.5 mg total) by mouth daily (Patient not taking: Reported on 8/19/2024), Disp: 30 capsule, Rfl: 1      SEE ABOVE FOR IMPRESSION AND PLAN    Tiffany Lacy MD  08/19/24  9:10 AM

## 2024-08-26 DIAGNOSIS — G47.09 OTHER INSOMNIA: ICD-10-CM

## 2024-08-26 RX ORDER — DOXEPIN 3 MG/1
TABLET, FILM COATED ORAL
Qty: 60 TABLET | Refills: 1 | Status: SHIPPED | OUTPATIENT
Start: 2024-08-26 | End: 2024-10-15 | Stop reason: SDUPTHER

## 2024-08-26 NOTE — TELEPHONE ENCOUNTER
No care due was identified.  Health [facility] Embedded Care Due Messages. Reference number: [id number]. 8/26/2024 8:40:03 AM YAHIR

## 2024-08-28 DIAGNOSIS — F32.A ANXIETY AND DEPRESSION: Primary | ICD-10-CM

## 2024-08-28 DIAGNOSIS — F41.9 ANXIETY AND DEPRESSION: Primary | ICD-10-CM

## 2024-08-28 RX ORDER — VENLAFAXINE HYDROCHLORIDE 150 MG/1
150 CAPSULE, EXTENDED RELEASE ORAL DAILY
Qty: 30 CAPSULE | Refills: 11 | Status: SHIPPED | OUTPATIENT
Start: 2024-08-28 | End: 2024-11-11 | Stop reason: ALTCHOICE

## 2024-08-29 PROBLEM — I95.9 HYPOTENSION: Status: ACTIVE | Noted: 2024-08-29

## 2024-08-30 PROBLEM — I95.9 HYPOTENSION: Status: RESOLVED | Noted: 2024-08-29 | Resolved: 2024-08-30

## 2024-09-10 ENCOUNTER — TELEPHONE (OUTPATIENT)
Dept: FAMILY MEDICINE | Facility: CLINIC | Age: 59
End: 2024-09-10
Payer: OTHER GOVERNMENT

## 2024-09-10 NOTE — TELEPHONE ENCOUNTER
Luiza explains that she has an appointment September 18-- but she really wants to speak to Dr Lacy about her medication     She states she has a wedding to go to, but her emotions are imbalanced and feels like an emotional wreck and wants to see if there is something she can do.    She asks if she can just set up an appointment- possibly telephone or something today-- I asked Luiza and she cannot make to the walk-in clinic today    Can we schedule telephone visit today?

## 2024-09-10 NOTE — TELEPHONE ENCOUNTER
Unable to accomododate due to walk in. We can try a telephone call on Thursday call day if she would like

## 2024-09-11 NOTE — TELEPHONE ENCOUNTER
Luiza was open to a telephone visit tomorrow @ 11:15 she can be scheduled     Okay to schedule this time? She is also keeping her appt September 18

## 2024-09-16 DIAGNOSIS — F41.8 SITUATIONAL ANXIETY: ICD-10-CM

## 2024-09-16 NOTE — TELEPHONE ENCOUNTER
No care due was identified.  Health [facility] Embedded Care Due Messages. Reference number: [id number]. 9/16/2024 9:21:31 AM YAHIR

## 2024-09-17 RX ORDER — ALPRAZOLAM 0.5 MG/1
TABLET ORAL
Qty: 60 TABLET | Refills: 0 | Status: SHIPPED | OUTPATIENT
Start: 2024-09-17 | End: 2024-10-15 | Stop reason: SDUPTHER

## 2024-09-23 DIAGNOSIS — M79.7 FIBROMYALGIA: ICD-10-CM

## 2024-09-23 RX ORDER — TIZANIDINE 4 MG/1
4 TABLET ORAL 2 TIMES DAILY
Qty: 60 TABLET | Refills: 1 | Status: SHIPPED | OUTPATIENT
Start: 2024-09-23 | End: 2024-10-15 | Stop reason: SDUPTHER

## 2024-09-23 NOTE — TELEPHONE ENCOUNTER
No care due was identified.  Health [facility] Embedded Care Due Messages. Reference number: [id number]. 9/23/2024 2:51:01 PM YAHIR

## 2024-10-03 ENCOUNTER — HOSPITAL ENCOUNTER (OUTPATIENT)
Dept: MAMMOGRAPHY | Facility: HOSPITAL | Age: 59
Discharge: HOME | End: 2024-10-03
Payer: OTHER GOVERNMENT

## 2024-10-03 DIAGNOSIS — Z12.31 ENCOUNTER FOR SCREENING MAMMOGRAM FOR MALIGNANT NEOPLASM OF BREAST: ICD-10-CM

## 2024-10-03 PROCEDURE — 77063 BREAST TOMOSYNTHESIS BI: CPT

## 2024-10-14 DIAGNOSIS — G47.09 OTHER INSOMNIA: ICD-10-CM

## 2024-10-14 NOTE — TELEPHONE ENCOUNTER
No care due was identified.  Health [facility] Embedded Care Due Messages. Reference number: [id number]. 10/14/2024 10:27:11 AM YAHIR

## 2024-10-15 ENCOUNTER — OFFICE VISIT (OUTPATIENT)
Dept: FAMILY MEDICINE | Facility: CLINIC | Age: 59
End: 2024-10-15
Payer: OTHER GOVERNMENT

## 2024-10-15 VITALS
BODY MASS INDEX: 26.96 KG/M2 | TEMPERATURE: 97.3 F | HEART RATE: 90 BPM | OXYGEN SATURATION: 95 % | DIASTOLIC BLOOD PRESSURE: 78 MMHG | SYSTOLIC BLOOD PRESSURE: 130 MMHG | WEIGHT: 164.5 LBS | RESPIRATION RATE: 12 BRPM

## 2024-10-15 DIAGNOSIS — M79.7 FIBROMYALGIA: ICD-10-CM

## 2024-10-15 DIAGNOSIS — G47.09 OTHER INSOMNIA: ICD-10-CM

## 2024-10-15 DIAGNOSIS — F32.A ANXIETY AND DEPRESSION: ICD-10-CM

## 2024-10-15 DIAGNOSIS — F41.9 ANXIETY AND DEPRESSION: ICD-10-CM

## 2024-10-15 DIAGNOSIS — F43.10 PTSD (POST-TRAUMATIC STRESS DISORDER): Primary | ICD-10-CM

## 2024-10-15 DIAGNOSIS — F41.8 SITUATIONAL ANXIETY: ICD-10-CM

## 2024-10-15 DIAGNOSIS — B00.1 HERPES LABIALIS: ICD-10-CM

## 2024-10-15 DIAGNOSIS — R11.0 NAUSEA: ICD-10-CM

## 2024-10-15 PROCEDURE — 99214 OFFICE O/P EST MOD 30 MIN: CPT | Performed by: FAMILY MEDICINE

## 2024-10-15 RX ORDER — BUTALBITAL, ACETAMINOPHEN AND CAFFEINE 50; 325; 40 MG/1; MG/1; MG/1
1 TABLET ORAL EVERY 6 HOURS PRN
COMMUNITY
Start: 2024-10-14 | End: 2024-10-17

## 2024-10-15 RX ORDER — ZOLPIDEM TARTRATE 10 MG/1
10 TABLET ORAL DAILY
Qty: 30 TABLET | Refills: 2 | Status: SHIPPED | OUTPATIENT
Start: 2024-10-15 | End: 2024-10-15 | Stop reason: SDUPTHER

## 2024-10-15 NOTE — PROGRESS NOTES
There are no Patient Instructions on file for this visit.        IMPRESSION AND PLAN  Diagnoses and all orders for this visit:    PTSD (post-traumatic stress disorder)    Herpes labialis  -     valACYclovir (VALTREX) 1 gram tablet; Take 1 tablet (1,000 mg total) by mouth 3 (three) times a day as needed (cold sore)    Fibromyalgia  -     tiZANidine (ZANAFLEX) 4 mg tablet; Take 1 tablet (4 mg total) by mouth 2 (two) times a day  -     gabapentin (NEURONTIN) 400 mg capsule; Take 1 capsule (400 mg total) by mouth 2 (two) times a day as needed (pain)  -     celecoxib (CeleBREX) 200 mg capsule; Take 1 capsule (200 mg total) by mouth 2 (two) times a day    Anxiety and depression    Situational anxiety  -     ALPRAZolam (XANAX) 0.5 mg tablet; Take 2 tablets (1 mg total) by mouth nightly as needed for anxiety Max Daily Amount: 1 mg  -     buPROPion XL (WELLBUTRIN XL) 150 mg 24 hr tablet; Take 1 tablet (150 mg total) by mouth daily    Other insomnia  -     doxepin 3 mg tablet; Take 2 tablets (6 mg total) by mouth nightly as needed (insomnia)  -     zolpidem (AMBIEN) 10 mg tablet; Take 1 tablet (10 mg total) by mouth daily    Nausea  -     ondansetron ODT (ZOFRAN-ODT) 4 mg disintegrating tablet; Take 1 tablet (4 mg total) by mouth every 8 (eight) hours as needed for nausea or vomiting        Luiza is a 59-year-old woman who presents today for follow-up of her above chronic conditions had some worsening depression and anxiety I think she would benefit from increase energy and help with her anhedonia will add some Wellbutrin 150 mg to her regimen to help with this.  Continue keeping her venlafaxine same at 150 mg nightly as well to help with her joint aches and discomfort she has not done a trial of prescription and says she does over-the-counter we will do some Celebrex to see how that works she was counseled not to take over-the-counter NSAIDs and can supplement with over-the-counter Tylenol as needed we also did go up on her  gabapentin so she could take an additional 400 mg dose during the day if needed as well.    HPI  Chief complaint for visit per nursing.       Chief Complaint   Patient presents with    Med Refill     Patient brought her support animal today and she has helped incredibly with her anxiety and episodes. They will be leaving and needs to get meds all changed to 3 month refills and refilled so they are ready for the trip [place].    Follow-up     Talk about joint pain being worse and increasing the dosage of gabapentin and trying maybe prozac or something to make her feel up more.       Luiza is a 59-year-old woman who presents today for follow-up of her chronic conditions.  She would like 3-month refills for medication she states [facility] will be able to do that for her her and her  [place].  She has been having some worsening joint aches and pains and wondering about increasing her dosage of gabapentin currently takes 400 mg at night.  She states she feels it more in her knees and elbows.  She takes venlafaxine 150 mg nightly and has been having some worsening depression and decreased motivation and energy she is wanting about ways to help with this previously she was on Prozac and we had switched to venlafaxine few years back because the Prozac was not helpful but she is wonder if it would be helpful to resume that again.      PROBLEM LIST  Patient Active Problem List   Diagnosis    Situational anxiety    Other insomnia    Fibromyalgia    Diverticulitis    PTSD (post-traumatic stress disorder)    Anxiety and depression    Migraine without status migrainosus, not intractable    ASHLEY positive    Colitis    COVID-19    Cervical spondylosis without myelopathy    Other bursitis of knee, right knee         SOCIAL HX  Social History     Socioeconomic History    Marital status:      Spouse name: Not on file    Number of children: Not on file    Years of education: Not  on file    Highest education level: Not on file   Occupational History    Not on file   Tobacco Use    Smoking status: Never    Smokeless tobacco: Never   Vaping Use    Vaping status: Never Used   Substance and Sexual Activity    Alcohol use: Yes     Alcohol/week: 1.0 standard drink of alcohol     Types: 1 Standard drinks or equivalent per week     Comment: socially    Drug use: Yes     Types: Marijuana    Sexual activity: Yes     Partners: Male     Birth control/protection: Post-menopausal, Male Sterilization   Other Topics Concern    Not on file   Social History Narrative    Retired nurse.  Now works with horses.     Social Determinants of Health     Financial Resource Strain: Not on file   Food Insecurity: No Food Insecurity (8/29/2024)    Hunger Vital Sign     Worried About Running Out of Food in the Last Year: Never true     Ran Out of Food in the Last Year: Never true   Transportation Needs: No Transportation Needs (8/29/2024)    PRAPARE - Transportation     Lack of Transportation (Medical): No     Lack of Transportation (Non-Medical): No   Physical Activity: Not on file   Stress: Not on file   Social Connections: Not on file   Intimate Partner Violence: Not At Risk (8/29/2024)    Humiliation, Afraid, Rape, and Kick questionnaire     Fear of Current or Ex-Partner: No     Emotionally Abused: No     Physically Abused: No     Sexually Abused: No   Housing Stability: Low Risk  (8/29/2024)    Housing Stability Vital Sign     Unable to Pay for Housing in the Last Year: No     Number of Times Moved in the Last Year: 1     Homeless in the Last Year: No       FAMILY HX  Family History   Problem Relation Age of Onset    Colon cancer Maternal Grandmother     Ovarian cancer Maternal Grandmother     Cancer Maternal Grandmother     Bone cancer Paternal Grandmother     Hypertension Other     Breast cancer Other     Lung cancer Other     Hypertension Mother     Drug abuse Father     Heart disease Father     Cancer Paternal  Grandfather     Cancer Mother's Sister     Drug abuse Sister        ROS  12 point review of systems performed is negative unless otherwise mentioned per HPI.    Physical Exam  /78 (BP Location: Left arm, Patient Position: Sitting, Cuff Size: Regular Adult)   Pulse 90   Temp 36.3 °C (97.3 °F) (Temporal)   Resp 12   Wt 74.6 kg (164 lb 8 oz)   SpO2 95%   BMI 26.96 kg/m²   BP Readings from Last 3 Encounters:   10/15/24 130/78   08/30/24 125/53   08/19/24 112/80       Physical Exam  Vitals and nursing note reviewed.   Constitutional:       General: She is not in acute distress.  HENT:      Head: Normocephalic and atraumatic.      Nose: Nose normal.      Mouth/Throat:      Mouth: Mucous membranes are moist.   Eyes:      Extraocular Movements: Extraocular movements intact.   Cardiovascular:      Rate and Rhythm: Normal rate and regular rhythm.   Pulmonary:      Effort: Pulmonary effort is normal.      Breath sounds: Normal breath sounds.   Musculoskeletal:      Cervical back: Neck supple.   Skin:     General: Skin is warm.   Neurological:      Mental Status: She is alert and oriented to person, place, and time.   Psychiatric:         Mood and Affect: Mood normal.            LABS AND XRAYS  Lab Results   Component Value Date    GLUCOSE 92 08/30/2024    CALCIUM 8.0 (L) 08/30/2024     (L) 08/30/2024    K 4.3 08/30/2024    CO2 21 08/30/2024     (H) 08/30/2024    BUN 4 (L) 08/30/2024    CREATININE 0.62 08/30/2024    ANIONGAP 5 08/30/2024     Lab Results   Component Value Date    WBC 5.9 08/30/2024    HGB 10.6 (L) 08/30/2024    HCT 30.5 (L) 08/30/2024    MCV 93.1 08/30/2024     08/30/2024     Lab Results   Component Value Date    TSH 3.558 08/19/2024     Lab Results   Component Value Date    HGBA1C 5.0 08/19/2024     Lab Results   Component Value Date    LDLCALC 118 (H) 08/19/2024    CREATININE 0.62 08/30/2024     Lab Results   Component Value Date    SEDRATE 13 05/22/2023     No results found for:  "\"URACSRM\"  No results found for: \"AMYLASE\"  Lab Results   Component Value Date    LIPASE 11 08/06/2020         BI screening mammogram bilateral w manan    Result Date: 10/3/2024  Narrative: Reason:  Screening.  History:  Patient  is 59 years old. The patient has a history of skin cancer. The patient has the following personal history of other cancer:  skin cancer. The patient has the following family history of breast cancer:  2 maternal aunts and 3 paternal aunts.  Risk: Estimated lifetime breast cancer risk: 8.8% Average (less than 15%, as per the Tyrer-Cuzick/JOHANNE model) Risk is calculated using the Tyrer-Cuzick model, utilizing questions answered by the patient and her breast density.  For patients who have an average lifetime risk of breast cancer, it is recommended that they have an annual screening mammogram, per the National Comprehensive Cancer Network (NCCN) guidelines.  Procedures performed: bilateral digital mammography with manan, image analysis using computer aided detection (cad).  The present examination has been compared to prior imaging studies dated 11/02/2015, 11/16/2015, 07/08/2019 and 10/06/2020.  BILATERAL MAMMOGRAM FINDINGS There are scattered areas of fibroglandular density.  No suspicious masses, significant calcifications or other abnormalities are seen.      Impression: IMPRESSION There is no mammographic evidence of malignancy.  Screening Mammogram in 1 year is recommended.  BI-RADS Category 1: Negative         Procedures     MEDICATIONS    Current Outpatient Medications:     butorphanol (STADOL) 10 mg/mL nasal spray, every 6 hours, Disp: , Rfl:     valACYclovir (VALTREX) 1 gram tablet, Take 1 tablet (1,000 mg total) by mouth 3 (three) times a day as needed (cold sore), Disp: 90 tablet, Rfl: 0    tiZANidine (ZANAFLEX) 4 mg tablet, Take 1 tablet (4 mg total) by mouth 2 (two) times a day, Disp: 180 tablet, Rfl: 3    ALPRAZolam (XANAX) 0.5 mg tablet, Take 2 tablets (1 mg total) by mouth " nightly as needed for anxiety Max Daily Amount: 1 mg, Disp: 180 tablet, Rfl: 0    doxepin 3 mg tablet, Take 2 tablets (6 mg total) by mouth nightly as needed (insomnia), Disp: 180 tablet, Rfl: 0    ondansetron ODT (ZOFRAN-ODT) 4 mg disintegrating tablet, Take 1 tablet (4 mg total) by mouth every 8 (eight) hours as needed for nausea or vomiting, Disp: 90 tablet, Rfl: 1    buPROPion XL (WELLBUTRIN XL) 150 mg 24 hr tablet, Take 1 tablet (150 mg total) by mouth daily, Disp: 90 tablet, Rfl: 3    gabapentin (NEURONTIN) 400 mg capsule, Take 1 capsule (400 mg total) by mouth 2 (two) times a day as needed (pain), Disp: 180 capsule, Rfl: 3    celecoxib (CeleBREX) 200 mg capsule, Take 1 capsule (200 mg total) by mouth 2 (two) times a day, Disp: 180 capsule, Rfl: 3    zolpidem (AMBIEN) 10 mg tablet, Take 1 tablet (10 mg total) by mouth daily, Disp: 90 tablet, Rfl: 0    butalbital-acetaminophen-caff (FIORICET, ESGIC) -40 mg, Take 1 tablet by mouth every 6 (six) hours as needed, Disp: , Rfl:     medical marijuana, cannabis, leaves, Patient stated she smokes occasionally, 4-6 times a year., Disp: , Rfl:     venlafaxine XR (Effexor XR) 150 mg 24 hr capsule, Take 1 capsule (150 mg total) by mouth daily (Patient taking differently: Take 1 capsule (150 mg total) by mouth nightly), Disp: 30 capsule, Rfl: 11      SEE ABOVE FOR IMPRESSION AND PLAN    Tiffany Lacy MD  10/16/24  5:25 PM

## 2024-10-16 RX ORDER — GABAPENTIN 400 MG/1
400 CAPSULE ORAL 2 TIMES DAILY PRN
Qty: 180 CAPSULE | Refills: 3 | Status: SHIPPED | OUTPATIENT
Start: 2024-10-16 | End: 2025-10-16

## 2024-10-16 RX ORDER — VALACYCLOVIR HYDROCHLORIDE 1 G/1
1000 TABLET, FILM COATED ORAL 3 TIMES DAILY PRN
Qty: 90 TABLET | Refills: 0 | Status: SHIPPED | OUTPATIENT
Start: 2024-10-16

## 2024-10-16 RX ORDER — ZOLPIDEM TARTRATE 10 MG/1
10 TABLET ORAL DAILY
Qty: 90 TABLET | Refills: 0 | Status: SHIPPED | OUTPATIENT
Start: 2024-10-16 | End: 2025-01-17 | Stop reason: SDUPTHER

## 2024-10-16 RX ORDER — DOXEPIN 3 MG/1
6 TABLET, FILM COATED ORAL NIGHTLY PRN
Qty: 180 TABLET | Refills: 0 | Status: SHIPPED | OUTPATIENT
Start: 2024-10-16 | End: 2025-01-21 | Stop reason: SDUPTHER

## 2024-10-16 RX ORDER — ALPRAZOLAM 0.5 MG/1
1 TABLET ORAL NIGHTLY PRN
Qty: 180 TABLET | Refills: 0 | Status: SHIPPED | OUTPATIENT
Start: 2024-10-16 | End: 2025-01-22

## 2024-10-16 RX ORDER — BUPROPION HYDROCHLORIDE 150 MG/1
150 TABLET ORAL DAILY
Qty: 90 TABLET | Refills: 3 | Status: SHIPPED | OUTPATIENT
Start: 2024-10-16 | End: 2025-10-16

## 2024-10-16 RX ORDER — TIZANIDINE 4 MG/1
4 TABLET ORAL 2 TIMES DAILY
Qty: 180 TABLET | Refills: 3 | Status: SHIPPED | OUTPATIENT
Start: 2024-10-16

## 2024-10-16 RX ORDER — CELECOXIB 200 MG/1
200 CAPSULE ORAL 2 TIMES DAILY
Qty: 180 CAPSULE | Refills: 3 | Status: SHIPPED | OUTPATIENT
Start: 2024-10-16 | End: 2025-10-11

## 2024-10-16 RX ORDER — ONDANSETRON 4 MG/1
4 TABLET, ORALLY DISINTEGRATING ORAL EVERY 8 HOURS PRN
Qty: 90 TABLET | Refills: 1 | Status: SHIPPED | OUTPATIENT
Start: 2024-10-16

## 2024-10-17 DIAGNOSIS — G43.909 MIGRAINE, UNSPECIFIED, NOT INTRACTABLE, WITHOUT STATUS MIGRAINOSUS: ICD-10-CM

## 2024-10-17 RX ORDER — BUTALBITAL, ACETAMINOPHEN AND CAFFEINE 50; 325; 40 MG/1; MG/1; MG/1
TABLET ORAL
Qty: 30 TABLET | Refills: 2 | Status: SHIPPED | OUTPATIENT
Start: 2024-10-17

## 2024-10-17 NOTE — TELEPHONE ENCOUNTER
No care due was identified.  Health [facility] Embedded Care Due Messages. Reference number: [id number]. 10/17/2024 8:58:07 AM YAHIR

## 2024-11-08 ENCOUNTER — PATIENT MESSAGE (OUTPATIENT)
Dept: FAMILY MEDICINE | Facility: CLINIC | Age: 59
End: 2024-11-08
Payer: OTHER GOVERNMENT

## 2024-11-08 DIAGNOSIS — F32.A ANXIETY AND DEPRESSION: ICD-10-CM

## 2024-11-08 DIAGNOSIS — F41.9 ANXIETY AND DEPRESSION: ICD-10-CM

## 2024-11-11 RX ORDER — VENLAFAXINE HYDROCHLORIDE 150 MG/1
150 CAPSULE, EXTENDED RELEASE ORAL NIGHTLY
Qty: 90 CAPSULE | Refills: 2 | Status: SHIPPED | OUTPATIENT
Start: 2024-11-11 | End: 2025-11-11

## 2025-01-16 ENCOUNTER — TELEPHONE (OUTPATIENT)
Dept: FAMILY MEDICINE | Facility: CLINIC | Age: 60
End: 2025-01-16
Payer: OTHER GOVERNMENT

## 2025-01-16 DIAGNOSIS — G47.09 OTHER INSOMNIA: ICD-10-CM

## 2025-01-16 NOTE — TELEPHONE ENCOUNTER
Luiza explains that she travels out of state for the winter, but comes back in the summer     She explains that she has had some major life changes recently and is not sure they can come back for the summer     She explains that she has refills on all of her medications, except her ambien     Luiza wants to speak to a nurse-- she wanted phone appt, but Dr Lacy is out on maternity leave     She requests a call back:  [phone]

## 2025-01-17 NOTE — TELEPHONE ENCOUNTER
No care due was identified.  Health [facility] Embedded Care Due Messages. Reference number: [id number]. 1/17/2025 3:15:38 PM MST

## 2025-01-21 DIAGNOSIS — G47.09 OTHER INSOMNIA: ICD-10-CM

## 2025-01-21 RX ORDER — DOXEPIN 3 MG/1
6 TABLET, FILM COATED ORAL NIGHTLY PRN
Qty: 60 TABLET | Refills: 1 | Status: SHIPPED | OUTPATIENT
Start: 2025-01-21

## 2025-01-21 RX ORDER — ZOLPIDEM TARTRATE 10 MG/1
10 TABLET ORAL DAILY
Qty: 90 TABLET | Refills: 0 | Status: SHIPPED | OUTPATIENT
Start: 2025-01-21 | End: 2025-02-17 | Stop reason: SDUPTHER

## 2025-01-21 NOTE — TELEPHONE ENCOUNTER
No care due was identified.  Health [facility] Embedded Care Due Messages. Reference number: [id number]. 1/21/2025 9:16:20 AM MST

## 2025-01-22 DIAGNOSIS — F41.8 SITUATIONAL ANXIETY: ICD-10-CM

## 2025-01-22 RX ORDER — ALPRAZOLAM 0.5 MG/1
TABLET ORAL
Qty: 180 TABLET | Refills: 0 | Status: SHIPPED | OUTPATIENT
Start: 2025-01-22

## 2025-01-22 NOTE — TELEPHONE ENCOUNTER
No care due was identified.  Health [facility] Embedded Care Due Messages. Reference number: [id number]. 1/22/2025 9:23:35 AM MST

## 2025-02-11 ENCOUNTER — TELEPHONE (OUTPATIENT)
Dept: FAMILY MEDICINE | Facility: CLINIC | Age: 60
End: 2025-02-11
Payer: OTHER GOVERNMENT

## 2025-02-11 NOTE — TELEPHONE ENCOUNTER
Luiza is currently in [place]     She has questions on her ambien prescription and is requesting a call back at [phone]

## 2025-02-17 ENCOUNTER — TELEPHONE (OUTPATIENT)
Dept: FAMILY MEDICINE | Facility: CLINIC | Age: 60
End: 2025-02-17
Payer: OTHER GOVERNMENT

## 2025-02-17 DIAGNOSIS — G43.909 MIGRAINE, UNSPECIFIED, NOT INTRACTABLE, WITHOUT STATUS MIGRAINOSUS: ICD-10-CM

## 2025-02-17 DIAGNOSIS — G47.09 OTHER INSOMNIA: ICD-10-CM

## 2025-02-17 RX ORDER — ZOLPIDEM TARTRATE 10 MG/1
10 TABLET ORAL NIGHTLY
Qty: 30 TABLET | Refills: 0 | Status: CANCELLED | OUTPATIENT
Start: 2025-02-17

## 2025-02-17 RX ORDER — BUTALBITAL, ACETAMINOPHEN AND CAFFEINE 50; 325; 40 MG/1; MG/1; MG/1
TABLET ORAL
Qty: 30 TABLET | Refills: 0 | OUTPATIENT
Start: 2025-02-17

## 2025-02-17 RX ORDER — ZOLPIDEM TARTRATE 10 MG/1
10 TABLET ORAL DAILY
Qty: 30 TABLET | Refills: 0 | Status: SHIPPED | OUTPATIENT
Start: 2025-02-17 | End: 2025-03-19

## 2025-02-17 NOTE — TELEPHONE ENCOUNTER
I called Luiza and let her know she should be able to transfer the script     She explains that she tried to have pharmacy in [place] transfer to [place] but the pharmacy stated that since they got the prescription originally from Dr Mirza and not Dr Lacy they considered this as a new script and they are not going to transfer     Luiza just needs the clinic to sent to [facility] in [place]

## 2025-02-17 NOTE — TELEPHONE ENCOUNTER
Luiza is traveling from [place] back to [place]     She explains that they are leaving today, but she was needing a refill of her zolpidem sent to the pharmacy so she had enough to cover while she was traveling to [place]     Luiza called [facility] in [facility] and they did not receive any prescription from the clinic    Luiza is leaving today and asks if this could be sent in today?

## 2025-02-17 NOTE — TELEPHONE ENCOUNTER
No care due was identified.  [facility] Embedded Care Due Messages. Reference number: [id number]. 2/17/2025 8:31:36 AM MST

## 2025-02-17 NOTE — TELEPHONE ENCOUNTER
No care due was identified.  Health [facility] Embedded Care Due Messages. Reference number: [id number]. 2/17/2025 10:13:04 AM MST

## 2025-02-26 DIAGNOSIS — M79.7 FIBROMYALGIA: ICD-10-CM

## 2025-02-26 RX ORDER — TIZANIDINE 4 MG/1
TABLET ORAL
Qty: 180 TABLET | Refills: 1 | Status: SHIPPED | OUTPATIENT
Start: 2025-02-26

## 2025-02-26 NOTE — TELEPHONE ENCOUNTER
No care due was identified.  Health [facility] Embedded Care Due Messages. Reference number: [id number]. 2/26/2025 11:23:41 AM MST

## (undated) DEVICE — TRAY UPPER EXTREMITY CUST RCRH CUSTOM PACK

## (undated) DEVICE — SUTURE MONOCRYL 3-0 PS2 18" UNDYED

## (undated) DEVICE — CONTAINER SPECIMEN 4OZ STL SCREW TOP BLISTER PACK

## (undated) DEVICE — GAUZE XEROFORM PETROLATUM 5X9

## (undated) DEVICE — GLOVE SENSICARE 8.5 SURG

## (undated) DEVICE — GOWN SURGICAL XL LEVEL 4

## (undated) DEVICE — GLOVE SENSICARE 7.0 SURG

## (undated) DEVICE — SUTURE VICRYL 2-0 CP-2 27" UNDYED

## (undated) DEVICE — BIT DRILL QC 1.8MM 110MM

## (undated) DEVICE — PADDNG CAST 4 INCH STERILE SHEETWADDING

## (undated) DEVICE — GLOVE SENSICARE LT 8.0 SURG @

## (undated) DEVICE — KIT INCISIONAL WOUND VAC PREVENA PLUS

## (undated) DEVICE — K-WIRE 1.8M W TROCAR POINT 150MM FOR VA DIST RAD

## (undated) DEVICE — NEEDLE HYPO 22G 1 1/2" SAFETY MONOJECT MAGELLAN

## (undated) DEVICE — SUTURE VICRYL 0 CT-1

## (undated) DEVICE — GLOVE BIOGEL 7 SKIN SENSE POWDERFREE SURGICAL

## (undated) DEVICE — PREP SKIN DRY W/GLOVES TRAY

## (undated) DEVICE — K-WIRE ST 1.25MM 150MM

## (undated) DEVICE — SPONGE LAP 18X18" W LOOP STL

## (undated) DEVICE — GLOVE SENSICARE GREEN 8.5 SURG @

## (undated) DEVICE — SYRINGE 12CC LUER LOCK TIP CTL MONOJECT / RING CONTROL

## (undated) DEVICE — ADHESIVE MASTISOL LIQUID

## (undated) DEVICE — SUTURE PDSII 2-0 SH 27" VIOLET

## (undated) DEVICE — BANDAGE ESMARCH 4"X 9' STL @

## (undated) DEVICE — PADDNG CAST 3 INCH STERILE SHEETWADDING

## (undated) DEVICE — DRAPE STERI SM

## (undated) DEVICE — CLOSURE SKIN STERISTRIP 1/2" 3M

## (undated) DEVICE — GLOVE BIOGEL PI INDICATOR SIZE 8.0 UNDERGLOVE 0

## (undated) DEVICE — COVER LIGHT HANDLE STERIS

## (undated) DEVICE — GLOVE SENSICARE MICRO PF 8.0

## (undated) DEVICE — DRAPE C-ARM 3M 60X42

## (undated) DEVICE — IMMOBILIZER SHOULDER SM STRAP LENGTH 36

## (undated) DEVICE — BIT DRILL QC 2.0MM 100MM

## (undated) DEVICE — PAD BOVIE ADULT 9' CORD REM ELECTRODE PATIENT RETURN

## (undated) DEVICE — SPONGE GAUZE 4X4-12 STL 10'S

## (undated) DEVICE — SUTURE ETHILON 2-0 FS 18" BLACK MONOFILAMENT NON ABS

## (undated) DEVICE — GLOVE SENSICARE LT 7.5 SURG @ W/ ALOE 10ML THK PDWR FREE

## (undated) DEVICE — SCREW CORTEX SLF TAP 2.7 X16MM T8 STARDRIVE RECESS
Type: IMPLANTABLE DEVICE | Site: WRIST | Status: NON-FUNCTIONAL
Removed: 2018-07-25